# Patient Record
Sex: MALE | Race: WHITE | NOT HISPANIC OR LATINO | Employment: OTHER | URBAN - NONMETROPOLITAN AREA
[De-identification: names, ages, dates, MRNs, and addresses within clinical notes are randomized per-mention and may not be internally consistent; named-entity substitution may affect disease eponyms.]

---

## 2017-10-10 ENCOUNTER — LAB (OUTPATIENT)
Dept: LAB | Facility: HOSPITAL | Age: 62
End: 2017-10-10

## 2017-10-10 ENCOUNTER — OFFICE VISIT (OUTPATIENT)
Dept: FAMILY MEDICINE CLINIC | Facility: CLINIC | Age: 62
End: 2017-10-10

## 2017-10-10 VITALS
HEIGHT: 67 IN | HEART RATE: 72 BPM | DIASTOLIC BLOOD PRESSURE: 88 MMHG | WEIGHT: 180.9 LBS | SYSTOLIC BLOOD PRESSURE: 142 MMHG | BODY MASS INDEX: 28.39 KG/M2 | OXYGEN SATURATION: 98 %

## 2017-10-10 DIAGNOSIS — Z23 NEED FOR TDAP VACCINATION: ICD-10-CM

## 2017-10-10 DIAGNOSIS — M54.31 BILATERAL SCIATICA: Primary | ICD-10-CM

## 2017-10-10 DIAGNOSIS — Z00.00 HEALTH CARE MAINTENANCE: ICD-10-CM

## 2017-10-10 DIAGNOSIS — Z11.59 NEED FOR HEPATITIS B SCREENING TEST: ICD-10-CM

## 2017-10-10 DIAGNOSIS — Z23 FLU VACCINE NEED: ICD-10-CM

## 2017-10-10 DIAGNOSIS — M54.32 BILATERAL SCIATICA: Primary | ICD-10-CM

## 2017-10-10 DIAGNOSIS — I10 ESSENTIAL HYPERTENSION: ICD-10-CM

## 2017-10-10 DIAGNOSIS — Z11.59 NEED FOR HEPATITIS C SCREENING TEST: ICD-10-CM

## 2017-10-10 LAB
ALBUMIN SERPL-MCNC: 5 G/DL (ref 3.4–4.8)
ALBUMIN/GLOB SERPL: 1.7 G/DL (ref 1.1–1.8)
ALP SERPL-CCNC: 56 U/L (ref 38–126)
ALT SERPL W P-5'-P-CCNC: 36 U/L (ref 21–72)
ANION GAP SERPL CALCULATED.3IONS-SCNC: 14 MMOL/L (ref 5–15)
ARTICHOKE IGE QN: 83 MG/DL (ref 1–129)
AST SERPL-CCNC: 31 U/L (ref 17–59)
BILIRUB CONJ SERPL-MCNC: 0 MG/DL (ref 0–0.3)
BILIRUB SERPL-MCNC: 0.8 MG/DL (ref 0.2–1.3)
BUN BLD-MCNC: 11 MG/DL (ref 7–21)
BUN/CREAT SERPL: 12.8 (ref 7–25)
CALCIUM SPEC-SCNC: 10.2 MG/DL (ref 8.4–10.2)
CHLORIDE SERPL-SCNC: 99 MMOL/L (ref 95–110)
CHOLEST SERPL-MCNC: 173 MG/DL (ref 0–199)
CO2 SERPL-SCNC: 28 MMOL/L (ref 22–31)
CREAT BLD-MCNC: 0.86 MG/DL (ref 0.7–1.3)
DEPRECATED RDW RBC AUTO: 43.8 FL (ref 35.1–43.9)
ERYTHROCYTE [DISTWIDTH] IN BLOOD BY AUTOMATED COUNT: 13.2 % (ref 11.5–14.5)
FERRITIN SERPL-MCNC: 215 NG/ML (ref 17.9–464)
GFR SERPL CREATININE-BSD FRML MDRD: 91 ML/MIN/1.73 (ref 49–113)
GGT SERPL-CCNC: 26 U/L (ref 15–73)
GLOBULIN UR ELPH-MCNC: 3 GM/DL (ref 2.3–3.5)
GLUCOSE BLD-MCNC: 103 MG/DL (ref 60–100)
HBA1C MFR BLD: 5 % (ref 4–5.6)
HCT VFR BLD AUTO: 41.5 % (ref 39–49)
HDLC SERPL-MCNC: 66 MG/DL (ref 60–200)
HGB BLD-MCNC: 14.5 G/DL (ref 13.7–17.3)
IRON 24H UR-MRATE: 111 MCG/DL (ref 49–181)
IRON SATN MFR SERPL: 31 % (ref 20–55)
LDLC/HDLC SERPL: 1.37 {RATIO} (ref 0–3.55)
MCH RBC QN AUTO: 30.9 PG (ref 26.5–34)
MCHC RBC AUTO-ENTMCNC: 34.9 G/DL (ref 31.5–36.3)
MCV RBC AUTO: 88.3 FL (ref 80–98)
PLATELET # BLD AUTO: 214 10*3/MM3 (ref 150–450)
PMV BLD AUTO: 10.6 FL (ref 8–12)
POTASSIUM BLD-SCNC: 4.6 MMOL/L (ref 3.5–5.1)
PROT SERPL-MCNC: 8 G/DL (ref 6.3–8.6)
RBC # BLD AUTO: 4.7 10*6/MM3 (ref 4.37–5.74)
SODIUM BLD-SCNC: 141 MMOL/L (ref 137–145)
TIBC SERPL-MCNC: 357 MCG/DL (ref 261–462)
TRIGL SERPL-MCNC: 83 MG/DL (ref 20–199)
WBC NRBC COR # BLD: 6.53 10*3/MM3 (ref 3.2–9.8)

## 2017-10-10 PROCEDURE — 80074 ACUTE HEPATITIS PANEL: CPT | Performed by: STUDENT IN AN ORGANIZED HEALTH CARE EDUCATION/TRAINING PROGRAM

## 2017-10-10 PROCEDURE — 83550 IRON BINDING TEST: CPT | Performed by: STUDENT IN AN ORGANIZED HEALTH CARE EDUCATION/TRAINING PROGRAM

## 2017-10-10 PROCEDURE — 90715 TDAP VACCINE 7 YRS/> IM: CPT | Performed by: STUDENT IN AN ORGANIZED HEALTH CARE EDUCATION/TRAINING PROGRAM

## 2017-10-10 PROCEDURE — 99213 OFFICE O/P EST LOW 20 MIN: CPT | Performed by: STUDENT IN AN ORGANIZED HEALTH CARE EDUCATION/TRAINING PROGRAM

## 2017-10-10 PROCEDURE — 90686 IIV4 VACC NO PRSV 0.5 ML IM: CPT | Performed by: STUDENT IN AN ORGANIZED HEALTH CARE EDUCATION/TRAINING PROGRAM

## 2017-10-10 PROCEDURE — 90472 IMMUNIZATION ADMIN EACH ADD: CPT | Performed by: STUDENT IN AN ORGANIZED HEALTH CARE EDUCATION/TRAINING PROGRAM

## 2017-10-10 PROCEDURE — 85027 COMPLETE CBC AUTOMATED: CPT | Performed by: STUDENT IN AN ORGANIZED HEALTH CARE EDUCATION/TRAINING PROGRAM

## 2017-10-10 PROCEDURE — 82728 ASSAY OF FERRITIN: CPT | Performed by: STUDENT IN AN ORGANIZED HEALTH CARE EDUCATION/TRAINING PROGRAM

## 2017-10-10 PROCEDURE — 90471 IMMUNIZATION ADMIN: CPT | Performed by: STUDENT IN AN ORGANIZED HEALTH CARE EDUCATION/TRAINING PROGRAM

## 2017-10-10 PROCEDURE — 86704 HEP B CORE ANTIBODY TOTAL: CPT | Performed by: STUDENT IN AN ORGANIZED HEALTH CARE EDUCATION/TRAINING PROGRAM

## 2017-10-10 PROCEDURE — 83540 ASSAY OF IRON: CPT | Performed by: STUDENT IN AN ORGANIZED HEALTH CARE EDUCATION/TRAINING PROGRAM

## 2017-10-10 PROCEDURE — 86707 HEPATITIS BE ANTIBODY: CPT | Performed by: STUDENT IN AN ORGANIZED HEALTH CARE EDUCATION/TRAINING PROGRAM

## 2017-10-10 PROCEDURE — 83036 HEMOGLOBIN GLYCOSYLATED A1C: CPT | Performed by: STUDENT IN AN ORGANIZED HEALTH CARE EDUCATION/TRAINING PROGRAM

## 2017-10-10 PROCEDURE — 87350 HEPATITIS BE AG IA: CPT | Performed by: STUDENT IN AN ORGANIZED HEALTH CARE EDUCATION/TRAINING PROGRAM

## 2017-10-10 PROCEDURE — 82977 ASSAY OF GGT: CPT | Performed by: STUDENT IN AN ORGANIZED HEALTH CARE EDUCATION/TRAINING PROGRAM

## 2017-10-10 PROCEDURE — 80061 LIPID PANEL: CPT | Performed by: STUDENT IN AN ORGANIZED HEALTH CARE EDUCATION/TRAINING PROGRAM

## 2017-10-10 PROCEDURE — 80053 COMPREHEN METABOLIC PANEL: CPT | Performed by: STUDENT IN AN ORGANIZED HEALTH CARE EDUCATION/TRAINING PROGRAM

## 2017-10-10 PROCEDURE — 36415 COLL VENOUS BLD VENIPUNCTURE: CPT

## 2017-10-10 PROCEDURE — 86706 HEP B SURFACE ANTIBODY: CPT | Performed by: STUDENT IN AN ORGANIZED HEALTH CARE EDUCATION/TRAINING PROGRAM

## 2017-10-10 PROCEDURE — 82248 BILIRUBIN DIRECT: CPT | Performed by: STUDENT IN AN ORGANIZED HEALTH CARE EDUCATION/TRAINING PROGRAM

## 2017-10-10 RX ORDER — MELOXICAM 7.5 MG/1
7.5 TABLET ORAL DAILY
Status: DISCONTINUED | OUTPATIENT
Start: 2017-10-10 | End: 2017-10-12

## 2017-10-10 RX ORDER — LISINOPRIL 10 MG/1
10 TABLET ORAL DAILY
Qty: 30 TABLET | Refills: 5 | Status: SHIPPED | OUTPATIENT
Start: 2017-10-10 | End: 2018-01-04 | Stop reason: SDUPTHER

## 2017-10-10 RX ORDER — TRIAMCINOLONE ACETONIDE 40 MG/ML
80 INJECTION, SUSPENSION INTRA-ARTICULAR; INTRAMUSCULAR ONCE
Status: DISCONTINUED | OUTPATIENT
Start: 2017-10-10 | End: 2017-10-10

## 2017-10-11 ENCOUNTER — TELEPHONE (OUTPATIENT)
Dept: FAMILY MEDICINE CLINIC | Facility: CLINIC | Age: 62
End: 2017-10-11

## 2017-10-11 NOTE — PROGRESS NOTES
CHIEF COMPLAINT:     Chief Complaint   Patient presents with   • Establish Care         Subjective:     62 y.o. male who presents to establish care and for bilateral leg pain.  Patient has not seen a physician in approximately 2.5 years due to losing his insurance previously.      Leg Pain -- patient has bilateral leg pain that originates in the lower back area and radiates down both legs to the ankles.  The pain is constant and dull mostly with intermittent episodes of throbbing on both sides.  Previous imaging shows joint space narrowing and osteophyte production in the lumbar spine, hips, & knees bilaterally.  Patient had previously been following with pain management where he received Norco as well as injections for his pain, but was unable to continue when he lost his insurance.  Patient would like to be referred to pain management to re-establish now that he has insurance.    HTN -- Patient had previous diagnosis of essential hypertension and was previously on medication, but cannot remember what medicine.  He has not had any medications for HTN in the last 2.5 years.  BP in office today was 142/88.      Preventive Care -- Patient is due for Tdap & flu shot.  He is also unsure of his hepatitis vaccination status.  He had a colonoscopy 3 years ago and was told to return for another in 10 years.  He has previously received prevnar as well as zostavax.       Preventative:  Over the past 2 weeks, have you felt down, depressed, or hopeless?Yes   Over the past 2 weeks, have you felt little interest or pleasure in doing things?Yes  Clinical depression screening refused by patient.No     PHQ-9 Depression Screening  Little interest or pleasure in doing things? 1   Feeling down, depressed, or hopeless? 1   Trouble falling or staying asleep, or sleeping too much? 1   Feeling tired or having little energy? 0   Poor appetite or overeating? 0   Feeling bad about yourself - or that you are a failure or have let yourself or  your family down? 1   Trouble concentrating on things, such as reading the newspaper or watching television? 0   Moving or speaking so slowly that other people could have noticed? Or the opposite - being so fidgety or restless that you have been moving around a lot more than usual? 0   Thoughts that you would be better off dead, or of hurting yourself in some way? 0   PHQ-9 Total Score 4   If you checked off any problems, how difficult have these problems made it for you to do your work, take care of things at home, or get along with other people? Not difficult at all       Health Maintenance:  Health Maintenance   Topic Date Due   • TDAP/TD VACCINES (1 - Tdap) 06/22/1976   • MEDICARE ANNUAL WELLNESS  10/10/2017   • COLONOSCOPY  10/10/2024   • HEPATITIS C SCREENING  Completed   • INFLUENZA VACCINE  Completed   • ZOSTER VACCINE  Completed       Past Medical Hx:  Past Medical History:   Diagnosis Date   • Asthma     Previously diagnosed and previously prescribed inhaler medications.     • Essential hypertension    • Gastroesophageal reflux disease        Past Surgical Hx:  Past Surgical History:   Procedure Laterality Date   • BONE GRAFT     • WRIST FRACTURE SURGERY Bilateral        Current & Past Meds:    Current Outpatient Prescriptions:   •  lisinopril (PRINIVIL,ZESTRIL) 10 MG tablet, Take 1 tablet by mouth Daily., Disp: 30 tablet, Rfl: 5    Current Facility-Administered Medications:   •  meloxicam (MOBIC) tablet 7.5 mg, 7.5 mg, Oral, Daily, Pelon Stewart Jr., MD    Medications Discontinued During This Encounter   Medication Reason   • triamcinolone acetonide (KENALOG-40) injection 80 mg        Allergies:  Review of patient's allergies indicates no known allergies.    Family Hx:  Family History   Problem Relation Age of Onset   • COPD Mother    • Hypertension Mother    • Diabetes type II Mother    • Cancer Father    • Heart failure Father         Social History:  Social History     Social History   • Marital  status:      Spouse name: N/A   • Number of children: N/A   • Years of education: N/A     Occupational History   • Disability      Social History Main Topics   • Smoking status: Former Smoker     Years: 25.00     Types: Pipe     Start date: 10/10/1972     Quit date: 10/10/1997   • Smokeless tobacco: Current User     Types: Chew      Comment: Approximately 3 minutes spent on risks of smokeless tobacco including increased risk of mouth and bladder cancers   • Alcohol use 1.2 oz/week     2 Cans of beer per week      Comment: Prior heavy drinker   • Drug use: No   • Sexual activity: Yes     Partners: Female     Other Topics Concern   • Not on file     Social History Narrative    Patient lives in [place] with his wife.  Transportation is an issue for the couple.        reports that he drinks about 1.2 oz of alcohol per week    reports that he does not use illicit drugs.      Review of Systems  General:negative for - chills, fatigue, fever, hot flashes, malaise, night sweats, weight gain or weight loss  Psychological: negative for - anxiety, depression, sleep disturbances or suicidal ideation  Ophthalmic: negative for - blurry vision or loss of vision  ENT: negative for - hearing change, nasal congestion or sore throat  Hematological and Lymphatic: negative for - jaundice  Endocrine: negative for - hair pattern changes, skin changes or temperature intolerance  Respiratory: no cough, shortness of breath, or wheezing  Cardiovascular: no chest pain, edema or dyspnea on exertion  Gastrointestinal: no  Nausea/vomiting, abdominal pain, change in bowel habits, or black or bloody stools  Genito-Urinary: no dysuria, trouble voiding, or hematuria  Musculoskeletal: positive for knee, hip, & lumbar pain bilaterally.  Positive for right shoulder pain.  Neurological: negative for - dizziness, headaches, numbness/tingling or seizures  Dermatological: negative for rash and skin lesion changes        Objective:     /88   "Pulse 72  Ht 67\" (170.2 cm)  Wt 180 lb 14.4 oz (82.1 kg)  SpO2 98%  BMI 28.33 kg/m2    Physical Exam   General Appearance:    Alert, cooperative, no distress, appears stated age   Head:    Normocephalic, without obvious abnormality, atraumatic   Eyes:    PERRL, conjunctiva/corneas clear, EOM's intact   Ears:    Normal TM's and external ear canals, both ears   Nose:   Nares normal, septum midline, mucosa normal, no drainage     or sinus tenderness   Throat:   Lips, mucosa, and tongue normal; teeth and gums normal   Neck:   Supple, symmetrical, trachea midline, no adenopathy   Back:     Symmetric, no curvature, ROM normal   Lungs:     Clear to auscultation bilaterally, respirations unlabored   Chest Wall:    No tenderness or deformity    Heart:    Regular rate and rhythm, S1 and S2 normal, no murmur, rub    or gallop   Abdomen:     Soft, non-tender, bowel sounds active all four quadrants,     no masses, no organomegaly   Extremities:   Decreased ROM in both lower extremities on hip & knee flexion bilaterally.  Positive straight leg raise test bilaterally. No crepitus or warmth at joints.    Pulses:   2+ and symmetric all extremities   Skin:   Skin color, texture, turgor normal, no rashes or lesions   Lymph nodes:   Cervical, supraclavicular nodes normal   Neurologic:   CNII-XII grossly intact           Assessment/Plan:     1. Bilateral sciatica    2. Essential hypertension    3. Flu vaccine need    4. Need for Tdap vaccination    5. Health care maintenance    6. Need for hepatitis B screening test    7. Need for hepatitis C screening test         Diagnoses & Orders (This Visit):  Jian was seen today for establish care.    Diagnoses and all orders for this visit:    Bilateral sciatica  -     Discontinue: triamcinolone acetonide (KENALOG-40) injection 80 mg; Inject 2 mL into the shoulder, thigh, or buttocks 1 (One) Time.  -     Cancel: XR Knee 1 or 2 View Bilateral; Future  -     XR Spine Lumbar 2 or 3 View; " Future  -     XR Pelvis 1 or 2 View; Future  -     meloxicam (MOBIC) tablet 7.5 mg; Take 1 tablet by mouth Daily.  -     Ambulatory Referral to Pain Management  -     XR knee 4+ vw bilateral; Future    Essential hypertension  -     lisinopril (PRINIVIL,ZESTRIL) 10 MG tablet; Take 1 tablet by mouth Daily.    Flu vaccine need  -     Flu Vaccine Quad PF 3YR+    Need for Tdap vaccination  -     Tdap Vaccine Greater Than or Equal To 8yo IM    Health care maintenance  -     Lipid Panel  -     Hemoglobin A1c  -     Comprehensive Metabolic Panel  -     CBC (No Diff)    Need for hepatitis B screening test  -     Comprehensive metabolic panel; Future  -     Bilirubin, direct; Future  -     Gamma GT; Future  -     Hepatitis B and C Profile; Future  -     Iron; Future  -     Ferritin; Future  -     Iron and TIBC; Future    Need for hepatitis C screening test  -     Comprehensive metabolic panel; Future  -     Bilirubin, direct; Future  -     Gamma GT; Future  -     Hepatitis B and C Profile; Future  -     Iron; Future  -     Ferritin; Future  -     Iron and TIBC; Future            Follow-up:     Return in about 3 months (around 1/10/2018).      GOALS:   Goals     PAIN CONTROL            Preventative:  Health Maintenance   Topic Date Due   • TDAP/TD VACCINES (1 - Tdap) 06/22/1976   • COLONOSCOPY  10/10/2024   • INFLUENZA VACCINE  Completed   • ZOSTER VACCINE  Completed         Pelon Stewart Jr., M.D.  PGY1  Family Practice Resident  [address] [place] [address]  Phone: [phone]  Fax: [phone]        RISK SCORE: 3     Pelon Stewart Jr., M.D.  PGY1  Family Practice Resident  [address] [place] [address]  Phone: [phone]  Fax: [phone]      This document has been electronically signed by Pelon Stewart Jr, MD on October 10, 2017 7:21 PM

## 2017-10-11 NOTE — PROGRESS NOTES
I have reviewed the notes, assessments, and/or procedures performed. I concur with her/his documentation of Jian Baker.     Yuan Harper, DO

## 2017-10-12 DIAGNOSIS — M17.0 PRIMARY OSTEOARTHRITIS OF BOTH KNEES: Primary | ICD-10-CM

## 2017-10-12 LAB
HBV CORE AB SER DONR QL IA: NEGATIVE
HBV CORE IGM SERPL QL IA: NEGATIVE
HBV E AB SERPL QL IA: NEGATIVE
HBV E AG SERPL QL IA: NEGATIVE
HBV SURFACE AB SER QL: NON REACTIVE
HBV SURFACE AG SERPL QL IA: NEGATIVE
HCV AB S/CO SERPL IA: <0.1 S/CO RATIO (ref 0–0.9)
LABORATORY COMMENT REPORT: NORMAL

## 2017-10-12 RX ORDER — MELOXICAM 7.5 MG/1
7.5 TABLET ORAL DAILY
Qty: 30 TABLET | Refills: 5 | Status: SHIPPED | OUTPATIENT
Start: 2017-10-12 | End: 2018-01-04

## 2018-01-04 ENCOUNTER — OFFICE VISIT (OUTPATIENT)
Dept: FAMILY MEDICINE CLINIC | Facility: CLINIC | Age: 63
End: 2018-01-04

## 2018-01-04 VITALS
WEIGHT: 179.8 LBS | HEART RATE: 92 BPM | HEIGHT: 67 IN | BODY MASS INDEX: 28.22 KG/M2 | DIASTOLIC BLOOD PRESSURE: 90 MMHG | SYSTOLIC BLOOD PRESSURE: 140 MMHG | OXYGEN SATURATION: 98 %

## 2018-01-04 DIAGNOSIS — I10 ESSENTIAL HYPERTENSION: ICD-10-CM

## 2018-01-04 DIAGNOSIS — M17.0 PRIMARY OSTEOARTHRITIS OF BOTH KNEES: ICD-10-CM

## 2018-01-04 DIAGNOSIS — M54.32 BILATERAL SCIATICA: Primary | ICD-10-CM

## 2018-01-04 DIAGNOSIS — M54.31 BILATERAL SCIATICA: Primary | ICD-10-CM

## 2018-01-04 PROCEDURE — 99213 OFFICE O/P EST LOW 20 MIN: CPT | Performed by: STUDENT IN AN ORGANIZED HEALTH CARE EDUCATION/TRAINING PROGRAM

## 2018-01-04 PROCEDURE — 96372 THER/PROPH/DIAG INJ SC/IM: CPT | Performed by: FAMILY MEDICINE

## 2018-01-04 RX ORDER — LISINOPRIL 10 MG/1
10 TABLET ORAL DAILY
Qty: 30 TABLET | Refills: 2 | Status: SHIPPED | OUTPATIENT
Start: 2018-01-04 | End: 2018-04-02 | Stop reason: SDUPTHER

## 2018-01-04 RX ORDER — TRIAMCINOLONE ACETONIDE 40 MG/ML
80 INJECTION, SUSPENSION INTRA-ARTICULAR; INTRAMUSCULAR ONCE
Status: COMPLETED | OUTPATIENT
Start: 2018-01-04 | End: 2018-01-04

## 2018-01-04 RX ORDER — DICLOFENAC SODIUM 75 MG/1
75 TABLET, DELAYED RELEASE ORAL 2 TIMES DAILY
Qty: 60 TABLET | Refills: 2 | Status: SHIPPED | OUTPATIENT
Start: 2018-01-04 | End: 2018-04-02

## 2018-01-04 RX ORDER — LISINOPRIL 2.5 MG/1
12.5 TABLET ORAL DAILY
Qty: 150 TABLET | Refills: 5 | Status: SHIPPED | OUTPATIENT
Start: 2018-01-04 | End: 2018-01-04 | Stop reason: SDUPTHER

## 2018-01-04 RX ORDER — HYDROCHLOROTHIAZIDE 12.5 MG/1
12.5 CAPSULE, GELATIN COATED ORAL DAILY
Qty: 30 CAPSULE | Refills: 2 | Status: SHIPPED | OUTPATIENT
Start: 2018-01-04 | End: 2018-04-02 | Stop reason: SDDI

## 2018-01-04 RX ADMIN — TRIAMCINOLONE ACETONIDE 80 MG: 40 INJECTION, SUSPENSION INTRA-ARTICULAR; INTRAMUSCULAR at 10:04

## 2018-01-04 NOTE — PROGRESS NOTES
Chief Complaint   Patient presents with   • Hypertension   • Leg Pain     Subjective:     Jian Baker is a 62 y.o. male who presents for follow up of HTN.    New concerns: No new concerns.     Evaluation:   Blood pressure reading not indicated for medication reasons: Yes   Blood pressure reading refused by patient: No   Home blood pressure readings: Does not check at home   Blood pressure often elevated in physician office: No  Symptoms:   Headache: no   Visual disturbance: no   Fatigue: no   Dyspnea: no   Orthopnea: no   Edema: no   Chest pain: no   Palpitations: no   Diaphoresis: no    Risk Factors:   None    Comorbid Conditions:   Former smoker    Patient was also seen for his bilateral sciactica and osteoarthritis of both knees.  He does not believe the mobiq he is using has worked well.  We will try diclofenac now and also a triamcinolone shot today.  Also, patient had some confusion with getting into a pain clinic last time with a referral made to [facility].  Patient would prefer to go to [facility] which is much closer to him.      Pain continues to be a 5/10 constantly and patient has trouble rising from a chair due to back pain.  It does not improve or worsen during the day.      The following portions of the patient's history were reviewed and updated as appropriate: allergies, current medications, past family history, past medical history, past social history, past surgical history and problem list.    Preventative:  Over the past 2 weeks, have you felt down, depressed, or hopeless?No   Over the past 2 weeks, have you felt little interest or pleasure in doing things?No  Clinical depression screening refused by patient.No     On osteoporosis therapy?Not Indicated     After patient had left our office it was confirmed that bugs which had been seen on patients head and under winter cap on physical exam were bed bugs.  He has been called at his main # which is his cell phone # [phone]  and a message was left.  If patient does not return call to office by early next week will try him again.  Will also send letter to his primary residence with patient information literature on bed bugs & how to manage infestations in the home. UpToDate -- Bed Bug Overview for Patients     Past Medical Hx:  Past Medical History:   Diagnosis Date   • Asthma     Previously diagnosed and previously prescribed inhaler medications.     • Essential hypertension    • Gastroesophageal reflux disease        Past Surgical Hx:  Past Surgical History:   Procedure Laterality Date   • BONE GRAFT     • WRIST FRACTURE SURGERY Bilateral        Health Maintenance:  Health Maintenance   Topic Date Due   • MEDICARE ANNUAL WELLNESS  10/10/2017   • COLONOSCOPY  10/10/2024   • TDAP/TD VACCINES (2 - Td) 10/10/2027   • HEPATITIS C SCREENING  Completed   • INFLUENZA VACCINE  Completed   • ZOSTER VACCINE  Completed       Current Meds:    Current Outpatient Prescriptions:   •  lisinopril (PRINIVIL,ZESTRIL) 10 MG tablet, Take 1 tablet by mouth Daily., Disp: 30 tablet, Rfl: 2  •  diclofenac (VOLTAREN) 75 MG EC tablet, Take 1 tablet by mouth 2 (Two) Times a Day., Disp: 60 tablet, Rfl: 2  •  hydrochlorothiazide (MICROZIDE) 12.5 MG capsule, Take 1 capsule by mouth Daily., Disp: 30 capsule, Rfl: 2  No current facility-administered medications for this visit.     Allergies:  Review of patient's allergies indicates no known allergies.    Family Hx:  Family History   Problem Relation Age of Onset   • COPD Mother    • Hypertension Mother    • Diabetes type II Mother    • Cancer Father    • Heart failure Father         Social History:  Social History     Social History   • Marital status:      Spouse name: N/A   • Number of children: N/A   • Years of education: N/A     Occupational History   • Disability      Social History Main Topics   • Smoking status: Former Smoker     Years: 25.00     Types: Pipe     Start date: 10/10/1972     Quit date:  "10/10/1997   • Smokeless tobacco: Current User     Types: Chew      Comment: Approximately 3 minutes spent on risks of smokeless tobacco including increased risk of mouth and bladder cancers   • Alcohol use 1.2 oz/week     2 Cans of beer per week      Comment: Prior heavy drinker   • Drug use: No   • Sexual activity: Yes     Partners: Female     Other Topics Concern   • Not on file     Social History Narrative    Patient lives in [place] with his wife.  Transportation is an issue for the couple.       Review of Systems  Review of Systems  Hypertension ROS: taking medications as instructed, no medication side effects noted, no TIA's, no chest pain on exertion, no dyspnea on exertion and no swelling of ankles.    Objective:     /90 (BP Location: Left arm, Patient Position: Sitting)  Pulse 92  Ht 170.2 cm (67\")  Wt 81.6 kg (179 lb 12.8 oz)  SpO2 98%  BMI 28.16 kg/m2    General:  alert, appears stated age and cooperative   Oropharynx: Poor dentition    Eyes:  conjunctivae/corneas clear. PERRL, EOM's intact. Fundi benign.    Ears:  normal TM's and external ear canals both ears   Neck: no adenopathy, no carotid bruit, no JVD, supple, symmetrical, trachea midline and thyroid not enlarged, symmetric, no tenderness/mass/nodules   Thyroid:  no palpable nodule   Lung: clear to auscultation bilaterally   Heart:  regular rate and rhythm, S1, S2 normal, no murmur, click, rub or gallop   Abdomen: soft, non-tender; bowel sounds normal; no masses,  no organomegaly   Extremities: extremities normal, atraumatic, no cyanosis or edema   Skin: warm and dry, no hyperpigmentation, vitiligo, or suspicious lesions   Pulses: 2+ and symmetric   Neuro: normal without focal findings, mental status, speech normal, alert and oriented x3 and reflexes normal and symmetric       Lab Review    none     Assessment:     Jian was seen today for hypertension and leg pain.    Diagnoses and all orders for this visit:    Bilateral sciatica  -  "    triamcinolone acetonide (KENALOG-40) injection 80 mg; Inject 2 mL into the shoulder, thigh, or buttocks 1 (One) Time.  -     Ambulatory Referral to Pain Management    Essential hypertension  -     Discontinue: lisinopril (PRINIVIL,ZESTRIL) 2.5 MG tablet; Take 5 tablets by mouth Daily.  -     lisinopril (PRINIVIL,ZESTRIL) 10 MG tablet; Take 1 tablet by mouth Daily.    Primary osteoarthritis of both knees  -     Ambulatory Referral to Pain Management    Other orders  -     hydrochlorothiazide (MICROZIDE) 12.5 MG capsule; Take 1 capsule by mouth Daily.  -     diclofenac (VOLTAREN) 75 MG EC tablet; Take 1 tablet by mouth 2 (Two) Times a Day.        Plan:   1.  Rx changes: HCTZ 12.5 mg added to his current lisinoprio  2.  Education:    EKG ordered: no    Findings: N/A   Presented an overview of HTN, expected course, considerations, risk factors, and exacerbation prevention.   Discussed treatment options for HTN: yes   Recommended restricted dietary Na intake: yes   Recommended increased in dietary K intake: yes   Discussed patient action plan for HTN: yes  3.  Compliance at present is estimated to be inadequate. Efforts to improve compliance (if necessary) will be directed at Taking medications every day.    Return in about 3 months (around 4/4/2018) for Recheck.    Goals     • Medication management            Patient has been having trouble with medication compliance.  Goal now is to take medications everyday at the same time.    Barriers: patient has not been on medications consistently for sometime.  Has trouble with remembering to take medications.              Preventative:  Patient is currently up-to-date on preventative maintenance items.     RISK SCORE: 4     Pelon Stewart Jr., M.D.  PGY1  Family Practice Resident  [address] [place] [address]  Phone: [phone]  Fax: [phone]      This document has been electronically signed by Pelon Stewart Jr, MD on January 4, 2018 10:50 AM

## 2018-01-04 NOTE — PROGRESS NOTES
Subjective:     Jian Baker is a 62 y.o. male who presents for follow up of hypertension.  His blood pressure is marginally controlled. He is not taking medication regularly.  He also has bilateral sciatica. He is requesting pain management referral in [place], [place].     The following portions of the patient's history were reviewed and updated as appropriate: allergies, current medications, past family history, past medical history, past social history, past surgical history and problem list.      Past Medical Hx:  Past Medical History:   Diagnosis Date   • Asthma     Previously diagnosed and previously prescribed inhaler medications.     • Essential hypertension    • Gastroesophageal reflux disease        Past Surgical Hx:  Past Surgical History:   Procedure Laterality Date   • BONE GRAFT     • WRIST FRACTURE SURGERY Bilateral        Health Maintenance:  Health Maintenance   Topic Date Due   • MEDICARE ANNUAL WELLNESS  10/10/2017   • COLONOSCOPY  10/10/2024   • TDAP/TD VACCINES (2 - Td) 10/10/2027   • HEPATITIS C SCREENING  Completed   • INFLUENZA VACCINE  Completed   • ZOSTER VACCINE  Completed       Current Meds:    Current Outpatient Prescriptions:   •  lisinopril (PRINIVIL,ZESTRIL) 10 MG tablet, Take 1 tablet by mouth Daily., Disp: 30 tablet, Rfl: 2  •  diclofenac (VOLTAREN) 75 MG EC tablet, Take 1 tablet by mouth 2 (Two) Times a Day., Disp: 60 tablet, Rfl: 2  •  hydrochlorothiazide (MICROZIDE) 12.5 MG capsule, Take 1 capsule by mouth Daily., Disp: 30 capsule, Rfl: 2    Allergies:  Review of patient's allergies indicates no known allergies.    Family Hx:  Family History   Problem Relation Age of Onset   • COPD Mother    • Hypertension Mother    • Diabetes type II Mother    • Cancer Father    • Heart failure Father         Social History:  Social History     Social History   • Marital status:      Spouse name: N/A   • Number of children: N/A   • Years of education: N/A     Occupational History   •  "Disability      Social History Main Topics   • Smoking status: Former Smoker     Years: 25.00     Types: Pipe     Start date: 10/10/1972     Quit date: 10/10/1997   • Smokeless tobacco: Current User     Types: Chew      Comment: Approximately 3 minutes spent on risks of smokeless tobacco including increased risk of mouth and bladder cancers   • Alcohol use 1.2 oz/week     2 Cans of beer per week      Comment: Prior heavy drinker   • Drug use: No   • Sexual activity: Yes     Partners: Female     Other Topics Concern   • Not on file     Social History Narrative    Patient lives in [place] with his wife.  Transportation is an issue for the couple.       Review of Systems  Review of Systems   Constitutional: Negative for activity change, appetite change, fatigue and fever.   HENT: Negative for ear pain and sore throat.    Eyes: Negative for pain and visual disturbance.   Respiratory: Negative for cough and shortness of breath.    Cardiovascular: Negative for chest pain and palpitations.   Gastrointestinal: Negative for abdominal pain and nausea.   Endocrine: Negative for cold intolerance and heat intolerance.   Genitourinary: Negative for difficulty urinating and dysuria.   Musculoskeletal: Positive for arthralgias and back pain. Negative for gait problem.   Skin: Negative for color change and rash.   Neurological: Negative for dizziness, weakness and headaches.   Hematological: Negative for adenopathy. Does not bruise/bleed easily.   Psychiatric/Behavioral: Negative for agitation, confusion and sleep disturbance.       Objective:     /90 (BP Location: Left arm, Patient Position: Sitting)  Pulse 92  Ht 170.2 cm (67\")  Wt 81.6 kg (179 lb 12.8 oz)  SpO2 98%  BMI 28.16 kg/m2  Physical Exam   Constitutional: He is oriented to person, place, and time. He appears well-developed and well-nourished.   HENT:   Head: Normocephalic and atraumatic.   Right Ear: External ear normal.   Left Ear: External ear normal. "   Nose: Nose normal.   Mouth/Throat: Oropharynx is clear and moist. Abnormal dentition.   Eyes: Conjunctivae and EOM are normal. Pupils are equal, round, and reactive to light.   Neck: Normal range of motion.   Cardiovascular: Normal rate, regular rhythm and normal heart sounds.    Pulmonary/Chest: Effort normal and breath sounds normal.   Abdominal: Soft. Bowel sounds are normal.   Musculoskeletal: Normal range of motion.   Neurological: He is alert and oriented to person, place, and time.   Skin: Skin is warm and dry.   Psychiatric: He has a normal mood and affect. His behavior is normal.       Lab Review  Results for orders placed or performed in visit on 10/10/17   Lipid Panel   Result Value Ref Range    Total Cholesterol 173 0 - 199 mg/dL    Triglycerides 83 20 - 199 mg/dL    HDL Cholesterol 66 60 - 200 mg/dL    LDL Cholesterol  83 1 - 129 mg/dL    LDL/HDL Ratio 1.37 0.00 - 3.55   Hemoglobin A1c   Result Value Ref Range    Hemoglobin A1C 5.0 4 - 5.6 %   Comprehensive Metabolic Panel   Result Value Ref Range    Glucose 103 (H) 60 - 100 mg/dL    BUN 11 7 - 21 mg/dL    Creatinine 0.86 0.70 - 1.30 mg/dL    Sodium 141 137 - 145 mmol/L    Potassium 4.6 3.5 - 5.1 mmol/L    Chloride 99 95 - 110 mmol/L    CO2 28.0 22.0 - 31.0 mmol/L    Calcium 10.2 8.4 - 10.2 mg/dL    Total Protein 8.0 6.3 - 8.6 g/dL    Albumin 5.00 (H) 3.40 - 4.80 g/dL    ALT (SGPT) 36 21 - 72 U/L    AST (SGOT) 31 17 - 59 U/L    Alkaline Phosphatase 56 38 - 126 U/L    Total Bilirubin 0.8 0.2 - 1.3 mg/dL    eGFR Non  Amer 91 49 - 113 mL/min/1.73    Globulin 3.0 2.3 - 3.5 gm/dL    A/G Ratio 1.7 1.1 - 1.8 g/dL    BUN/Creatinine Ratio 12.8 7.0 - 25.0    Anion Gap 14.0 5.0 - 15.0 mmol/L   CBC (No Diff)   Result Value Ref Range    WBC 6.53 3.20 - 9.80 10*3/mm3    RBC 4.70 4.37 - 5.74 10*6/mm3    Hemoglobin 14.5 13.7 - 17.3 g/dL    Hematocrit 41.5 39.0 - 49.0 %    MCV 88.3 80.0 - 98.0 fL    MCH 30.9 26.5 - 34.0 pg    MCHC 34.9 31.5 - 36.3 g/dL    RDW  13.2 11.5 - 14.5 %    RDW-SD 43.8 35.1 - 43.9 fl    MPV 10.6 8.0 - 12.0 fL    Platelets 214 150 - 450 10*3/mm3            Assessment:     Jian was seen today for hypertension and leg pain.    Diagnoses and all orders for this visit:    Bilateral sciatica  -     triamcinolone acetonide (KENALOG-40) injection 80 mg; Inject 2 mL into the shoulder, thigh, or buttocks 1 (One) Time.  -     Ambulatory Referral to Pain Management    Essential hypertension  -     Discontinue: lisinopril (PRINIVIL,ZESTRIL) 2.5 MG tablet; Take 5 tablets by mouth Daily.  -     lisinopril (PRINIVIL,ZESTRIL) 10 MG tablet; Take 1 tablet by mouth Daily.    Primary osteoarthritis of both knees  -     Ambulatory Referral to Pain Management    Other orders  -     hydrochlorothiazide (MICROZIDE) 12.5 MG capsule; Take 1 capsule by mouth Daily.  -     diclofenac (VOLTAREN) 75 MG EC tablet; Take 1 tablet by mouth 2 (Two) Times a Day.      Plan:     I have seen and examined the patient.  I have reviewed the notes, assessments, and/or procedures performed by Dr. Stewart, I concur with her/his documentation and assessment and plan for Jian Baker.        This document has been electronically signed by Ada Nguyen MD on January 24, 2018 7:53 AM

## 2018-04-02 ENCOUNTER — OFFICE VISIT (OUTPATIENT)
Dept: FAMILY MEDICINE CLINIC | Facility: CLINIC | Age: 63
End: 2018-04-02

## 2018-04-02 ENCOUNTER — TELEPHONE (OUTPATIENT)
Dept: FAMILY MEDICINE CLINIC | Facility: CLINIC | Age: 63
End: 2018-04-02

## 2018-04-02 VITALS
HEART RATE: 111 BPM | DIASTOLIC BLOOD PRESSURE: 84 MMHG | BODY MASS INDEX: 28.74 KG/M2 | HEIGHT: 67 IN | WEIGHT: 183.1 LBS | OXYGEN SATURATION: 97 % | SYSTOLIC BLOOD PRESSURE: 150 MMHG

## 2018-04-02 DIAGNOSIS — I10 ESSENTIAL HYPERTENSION: Primary | ICD-10-CM

## 2018-04-02 DIAGNOSIS — J30.9 ALLERGIC RHINITIS, UNSPECIFIED CHRONICITY, UNSPECIFIED SEASONALITY, UNSPECIFIED TRIGGER: ICD-10-CM

## 2018-04-02 DIAGNOSIS — M17.0 PRIMARY OSTEOARTHRITIS OF BOTH KNEES: ICD-10-CM

## 2018-04-02 PROCEDURE — 99213 OFFICE O/P EST LOW 20 MIN: CPT | Performed by: STUDENT IN AN ORGANIZED HEALTH CARE EDUCATION/TRAINING PROGRAM

## 2018-04-02 RX ORDER — ATORVASTATIN CALCIUM 20 MG/1
20 TABLET, FILM COATED ORAL DAILY
Qty: 30 TABLET | Refills: 5 | Status: SHIPPED | OUTPATIENT
Start: 2018-04-02 | End: 2018-09-06 | Stop reason: SDUPTHER

## 2018-04-02 RX ORDER — LISINOPRIL 10 MG/1
20 TABLET ORAL DAILY
Qty: 30 TABLET | Refills: 5 | Status: SHIPPED | OUTPATIENT
Start: 2018-04-02 | End: 2018-07-05 | Stop reason: SDUPTHER

## 2018-04-02 RX ORDER — MELOXICAM 7.5 MG/1
15 TABLET ORAL DAILY
Qty: 30 TABLET | Refills: 5 | Status: SHIPPED | OUTPATIENT
Start: 2018-04-02 | End: 2018-09-06 | Stop reason: SDUPTHER

## 2018-04-02 RX ORDER — MELOXICAM 7.5 MG/1
TABLET ORAL
COMMUNITY
Start: 2018-02-07 | End: 2018-04-02 | Stop reason: SDUPTHER

## 2018-04-02 RX ORDER — FLUTICASONE PROPIONATE 50 MCG
2 SPRAY, SUSPENSION (ML) NASAL DAILY
Qty: 1 BOTTLE | Refills: 10 | Status: SHIPPED | OUTPATIENT
Start: 2018-04-02 | End: 2019-11-18 | Stop reason: SDUPTHER

## 2018-04-02 NOTE — TELEPHONE ENCOUNTER
[facility] CALLED TO CLARIFY SCRIPT FOR MELOXICAM AND LISINOPRIL. PLEASE CALL PHARMACY TO CLARIFY.

## 2018-04-02 NOTE — PROGRESS NOTES
Hypertension (f/u) and Osteoarthritis (f/u)      Subjective:     Jian Baker is a 62 y.o. male who presents for follow up for hypertension & osteoarthritis.  Patient states that his osteoarthritis has been doing somewhat better on his Mobic and patient would like to continue with this therapy.      HTN -- Patient has been prescribed lisinopril & hctz but states that he has only been taking the lisinopril.  His BP is elevated in clinic today.  Discussed this with patient and will increase his lisinopril to 20 today as monotherapy.  If future BP elevations occur will consider adding hctz as combo with lisinopril so the patient is only taking one pill for his BP per day.     Allergic Rhinitis -- patient complains of sinus drainage and post nasal drip that is causing him to dry heave.  Endorses associated headaches, but no fevers.  Also endorses some cough productive of clear sputum.  Discussed Flonase with patient and he is amenable to trying.     Preventative:  Over the past 2 weeks, have you felt down, depressed, or hopeless?No   Over the past 2 weeks, have you felt little interest or pleasure in doing things?No  Clinical depression screening refused by patient.No     On osteoporosis therapy?Not Indicated     Past Medical Hx:  Past Medical History:   Diagnosis Date   • Asthma     Previously diagnosed and previously prescribed inhaler medications.     • Essential hypertension    • Gastroesophageal reflux disease        Past Surgical Hx:  Past Surgical History:   Procedure Laterality Date   • BONE GRAFT     • WRIST FRACTURE SURGERY Bilateral        Health Maintenance:  Health Maintenance   Topic Date Due   • MEDICARE ANNUAL WELLNESS  10/10/2017   • COLONOSCOPY  10/10/2024   • TDAP/TD VACCINES (2 - Td) 10/10/2027   • HEPATITIS C SCREENING  Completed   • INFLUENZA VACCINE  Completed   • ZOSTER VACCINE  Completed       Current Meds:    Current Outpatient Prescriptions:   •  lisinopril (PRINIVIL,ZESTRIL) 10 MG tablet,  Take 2 tablets by mouth Daily., Disp: 30 tablet, Rfl: 5  •  meloxicam (MOBIC) 7.5 MG tablet, Take 2 tablets by mouth Daily., Disp: 30 tablet, Rfl: 5  •  atorvastatin (LIPITOR) 20 MG tablet, Take 1 tablet by mouth Daily., Disp: 30 tablet, Rfl: 5  •  fluticasone (FLONASE) 50 MCG/ACT nasal spray, 2 sprays into each nostril Daily., Disp: 1 bottle, Rfl: 10    Allergies:  Review of patient's allergies indicates no known allergies.    Family Hx:  Family History   Problem Relation Age of Onset   • COPD Mother    • Hypertension Mother    • Diabetes type II Mother    • Cancer Father    • Heart failure Father         Social History:  Social History     Social History   • Marital status:      Spouse name: N/A   • Number of children: N/A   • Years of education: N/A     Occupational History   • Disability      Social History Main Topics   • Smoking status: Former Smoker     Years: 25.00     Types: Pipe     Start date: 10/10/1972     Quit date: 10/10/1997   • Smokeless tobacco: Current User     Types: Chew      Comment: Approximately 3 minutes spent on risks of smokeless tobacco including increased risk of mouth and bladder cancers   • Alcohol use 1.2 oz/week     2 Cans of beer per week      Comment: Prior heavy drinker   • Drug use: No   • Sexual activity: Yes     Partners: Female     Other Topics Concern   • Not on file     Social History Narrative    Patient lives in [place] with his wife.  Transportation is an issue for the couple.       Review of Systems  General:negative for - chills, fatigue, fever, hot flashes, malaise, night sweats, weight gain or weight loss  Psychological: negative for - anxiety, depression, sleep disturbances or suicidal ideation  Ophthalmic: negative for - blurry vision or loss of vision  ENT: negative for - hearing change, nasal congestion or sore throat  Hematological and Lymphatic: negative for - jaundice  Endocrine: negative for - hair pattern changes, skin changes or temperature  "intolerance  Respiratory: no cough, shortness of breath, or wheezing  Cardiovascular: no chest pain, edema or dyspnea on exertion  Gastrointestinal: no  Nausea/vomiting, abdominal pain, change in bowel habits, or black or bloody stools  Genito-Urinary: no dysuria, trouble voiding, or hematuria  Musculoskeletal: negative for - joint pain or muscle pain  Neurological: negative for - dizziness, headaches, numbness/tingling or seizures  Dermatological: negative for rash and skin lesion changes      Objective:     /84   Pulse 111   Ht 170.2 cm (67\")   Wt 83.1 kg (183 lb 1.6 oz)   SpO2 97%   BMI 28.68 kg/m²         General Appearance:    Alert, cooperative, no distress, appears stated age   Head:    Normocephalic, without obvious abnormality, atraumatic   Eyes:    PERRL, conjunctiva/corneas clear, EOM's intact   Ears:    Normal TM's and external ear canals, both ears   Nose:   Nares normal, septum midline, mucosa normal, no drainage     or sinus tenderness   Throat:   Lips, mucosa, and tongue normal; teeth and gums normal   Neck:   Supple, symmetrical, trachea midline, no adenopathy;     thyroid:  no enlargement/tenderness/nodules; no carotid    bruit   Back:     Symmetric, no curvature, ROM normal, no CVA tenderness   Lungs:     Clear to auscultation bilaterally, respirations unlabored   Chest Wall:    No tenderness or deformity    Heart:    Regular rate and rhythm, S1 and S2 normal, no murmur, rub    or gallop   Abdomen:     Soft, non-tender, bowel sounds active all four quadrants,     no masses, no organomegaly   Extremities:   Extremities normal, atraumatic, no cyanosis or edema   Pulses:   2+ and symmetric all extremities   Skin:   Skin color, texture, turgor normal, no rashes or lesions   Lymph nodes:   Cervical, supraclavicular, and axillary nodes normal   Neurologic:   CNII-XII grossly intact              Assessment/Plan:     1. Essential hypertension    2. Allergic rhinitis, unspecified chronicity, " unspecified seasonality, unspecified trigger    3. Primary osteoarthritis of both knees       Jian was seen today for hypertension and osteoarthritis.    Diagnoses and all orders for this visit:    Essential hypertension  -     lisinopril (PRINIVIL,ZESTRIL) 10 MG tablet; Take 2 tablets by mouth Daily.  -     atorvastatin (LIPITOR) 20 MG tablet; Take 1 tablet by mouth Daily.    Allergic rhinitis, unspecified chronicity, unspecified seasonality, unspecified trigger  -     fluticasone (FLONASE) 50 MCG/ACT nasal spray; 2 sprays into each nostril Daily.    Primary osteoarthritis of both knees  -     meloxicam (MOBIC) 7.5 MG tablet; Take 2 tablets by mouth Daily.        Follow-up:     Return in about 2 months (around 6/2/2018) for Recheck if HTN.    GOALS:  Goals     • Better Compliance with HTN Medications (pt-stated)            Barriers: None      • Medication management            Patient has been having trouble with medication compliance.  Goal now is to take medications everyday at the same time.    Barriers: patient has not been on medications consistently for sometime.  Has trouble with remembering to take medications.                Preventative:  Health Maintenance   Topic Date Due   • MEDICARE ANNUAL WELLNESS  10/10/2017   • COLONOSCOPY  10/10/2024   • TDAP/TD VACCINES (2 - Td) 10/10/2027   • HEPATITIS C SCREENING  Completed   • INFLUENZA VACCINE  Completed   • ZOSTER VACCINE  Completed       RISK SCORE: 3     Pelon Stewart Jr., M.D.  PGY1  Family Practice Resident  [address] [place] [address]  Phone: [phone]  Fax: [phone]      This document has been electronically signed by Pelon Stewart Jr, MD on 04/02/18 3:53 PM

## 2018-04-02 NOTE — PROGRESS NOTES
Subjective:     Jian Baker is a 62 y.o. male who presents for htn and OA.  He does not know what medications he is taking or what the medications are for.  He complains of sinus tenderness, post nasal drip. He complains of ringing in his ears.   The following portions of the patient's history were reviewed and updated as appropriate: allergies, current medications, past family history, past medical history, past social history, past surgical history and problem list.      Past Medical Hx:  Past Medical History:   Diagnosis Date   • Asthma     Previously diagnosed and previously prescribed inhaler medications.     • Essential hypertension    • Gastroesophageal reflux disease        Past Surgical Hx:  Past Surgical History:   Procedure Laterality Date   • BONE GRAFT     • WRIST FRACTURE SURGERY Bilateral        Health Maintenance:  Health Maintenance   Topic Date Due   • MEDICARE ANNUAL WELLNESS  10/10/2017   • COLONOSCOPY  10/10/2024   • TDAP/TD VACCINES (2 - Td) 10/10/2027   • HEPATITIS C SCREENING  Completed   • INFLUENZA VACCINE  Completed   • ZOSTER VACCINE  Completed       Current Meds:    Current Outpatient Prescriptions:   •  lisinopril (PRINIVIL,ZESTRIL) 10 MG tablet, Take 2 tablets by mouth Daily., Disp: 30 tablet, Rfl: 5  •  meloxicam (MOBIC) 7.5 MG tablet, Take 2 tablets by mouth Daily., Disp: 30 tablet, Rfl: 5  •  atorvastatin (LIPITOR) 20 MG tablet, Take 1 tablet by mouth Daily., Disp: 30 tablet, Rfl: 5  •  fluticasone (FLONASE) 50 MCG/ACT nasal spray, 2 sprays into each nostril Daily., Disp: 1 bottle, Rfl: 10    Allergies:  Review of patient's allergies indicates no known allergies.    Family Hx:  Family History   Problem Relation Age of Onset   • COPD Mother    • Hypertension Mother    • Diabetes type II Mother    • Cancer Father    • Heart failure Father         Social History:  Social History     Social History   • Marital status:      Spouse name: N/A   • Number of children: N/A   •  "Years of education: N/A     Occupational History   • Disability      Social History Main Topics   • Smoking status: Former Smoker     Years: 25.00     Types: Pipe     Start date: 10/10/1972     Quit date: 10/10/1997   • Smokeless tobacco: Current User     Types: Chew      Comment: Approximately 3 minutes spent on risks of smokeless tobacco including increased risk of mouth and bladder cancers   • Alcohol use 1.2 oz/week     2 Cans of beer per week      Comment: Prior heavy drinker   • Drug use: No   • Sexual activity: Yes     Partners: Female     Other Topics Concern   • Not on file     Social History Narrative    Patient lives in [place] with his wife.  Transportation is an issue for the couple.       Review of Systems  Review of Systems   Constitutional: Negative for activity change, appetite change, fatigue and fever.   HENT: Positive for postnasal drip, sinus pressure and tinnitus. Negative for ear pain and sore throat.    Eyes: Negative for pain and visual disturbance.   Respiratory: Negative for cough and shortness of breath.    Cardiovascular: Negative for chest pain and palpitations.   Gastrointestinal: Negative for abdominal pain and nausea.   Endocrine: Negative for cold intolerance and heat intolerance.   Genitourinary: Negative for difficulty urinating and dysuria.   Musculoskeletal: Positive for back pain. Negative for arthralgias and gait problem.   Skin: Negative for color change and rash.   Neurological: Negative for dizziness, weakness and headaches.   Hematological: Negative for adenopathy. Does not bruise/bleed easily.   Psychiatric/Behavioral: Negative for agitation, confusion and sleep disturbance.       Objective:     /84   Pulse 111   Ht 170.2 cm (67\")   Wt 83.1 kg (183 lb 1.6 oz)   SpO2 97%   BMI 28.68 kg/m²   Physical Exam   Constitutional: He is oriented to person, place, and time. He appears well-developed and well-nourished.   HENT:   Head: Normocephalic and atraumatic. "   Right Ear: Hearing, tympanic membrane, external ear and ear canal normal.   Left Ear: Hearing, tympanic membrane, external ear and ear canal normal.   Nose: Nose normal.   Mouth/Throat: Oropharynx is clear and moist.   Eyes: Conjunctivae and EOM are normal. Pupils are equal, round, and reactive to light.   Neck: Normal range of motion.   Cardiovascular: Normal rate, regular rhythm and normal heart sounds.    Pulmonary/Chest: Effort normal and breath sounds normal.   Abdominal: Soft. Bowel sounds are normal.   Musculoskeletal: Normal range of motion. He exhibits tenderness (low back).   Neurological: He is alert and oriented to person, place, and time.   Skin: Skin is warm and dry.   Psychiatric: He has a normal mood and affect. His behavior is normal. Judgment and thought content normal.       Lab Review  Results for orders placed or performed in visit on 10/10/17   Lipid Panel   Result Value Ref Range    Total Cholesterol 173 0 - 199 mg/dL    Triglycerides 83 20 - 199 mg/dL    HDL Cholesterol 66 60 - 200 mg/dL    LDL Cholesterol  83 1 - 129 mg/dL    LDL/HDL Ratio 1.37 0.00 - 3.55   Hemoglobin A1c   Result Value Ref Range    Hemoglobin A1C 5.0 4 - 5.6 %   Comprehensive Metabolic Panel   Result Value Ref Range    Glucose 103 (H) 60 - 100 mg/dL    BUN 11 7 - 21 mg/dL    Creatinine 0.86 0.70 - 1.30 mg/dL    Sodium 141 137 - 145 mmol/L    Potassium 4.6 3.5 - 5.1 mmol/L    Chloride 99 95 - 110 mmol/L    CO2 28.0 22.0 - 31.0 mmol/L    Calcium 10.2 8.4 - 10.2 mg/dL    Total Protein 8.0 6.3 - 8.6 g/dL    Albumin 5.00 (H) 3.40 - 4.80 g/dL    ALT (SGPT) 36 21 - 72 U/L    AST (SGOT) 31 17 - 59 U/L    Alkaline Phosphatase 56 38 - 126 U/L    Total Bilirubin 0.8 0.2 - 1.3 mg/dL    eGFR Non  Amer 91 49 - 113 mL/min/1.73    Globulin 3.0 2.3 - 3.5 gm/dL    A/G Ratio 1.7 1.1 - 1.8 g/dL    BUN/Creatinine Ratio 12.8 7.0 - 25.0    Anion Gap 14.0 5.0 - 15.0 mmol/L   CBC (No Diff)   Result Value Ref Range    WBC 6.53 3.20 - 9.80  10*3/mm3    RBC 4.70 4.37 - 5.74 10*6/mm3    Hemoglobin 14.5 13.7 - 17.3 g/dL    Hematocrit 41.5 39.0 - 49.0 %    MCV 88.3 80.0 - 98.0 fL    MCH 30.9 26.5 - 34.0 pg    MCHC 34.9 31.5 - 36.3 g/dL    RDW 13.2 11.5 - 14.5 %    RDW-SD 43.8 35.1 - 43.9 fl    MPV 10.6 8.0 - 12.0 fL    Platelets 214 150 - 450 10*3/mm3            Assessment:     Jian was seen today for hypertension and osteoarthritis.    Diagnoses and all orders for this visit:    Essential hypertension  -     lisinopril (PRINIVIL,ZESTRIL) 10 MG tablet; Take 2 tablets by mouth Daily.    Other orders  -     meloxicam (MOBIC) 7.5 MG tablet; Take 2 tablets by mouth Daily.  -     atorvastatin (LIPITOR) 20 MG tablet; Take 1 tablet by mouth Daily.  -     fluticasone (FLONASE) 50 MCG/ACT nasal spray; 2 sprays into each nostril Daily.        Plan:     I have seen and examined the patient.  I have reviewed the notes, assessments, and/or procedures performed by Pelon Stewart Jr, MD , I concur with her/his documentation and assessment and plan for Jian Baker.        This document has been electronically signed by Ada Nguyen MD on April 2, 2018 2:34 PM

## 2018-04-04 ENCOUNTER — TELEPHONE (OUTPATIENT)
Dept: FAMILY MEDICINE CLINIC | Facility: CLINIC | Age: 63
End: 2018-04-04

## 2018-04-04 NOTE — TELEPHONE ENCOUNTER
[facility] HAS NOT RECEIVED CLARIFICATION ON SCRIPTS LISINOPRIL AND MELOXICAM CALLED ABOUT 4-2-18    PLEASE CALL ASAP    THANK YOU

## 2018-04-23 ENCOUNTER — TELEPHONE (OUTPATIENT)
Dept: FAMILY MEDICINE CLINIC | Facility: CLINIC | Age: 63
End: 2018-04-23

## 2018-04-23 NOTE — TELEPHONE ENCOUNTER
[facility] CALLED AND IS NEEDING A SCRIPT CLARIFICATION ON LISINOPRIL AND MELOXICAM.     PLEASE CALL [phone]    THANK YOU

## 2018-04-26 ENCOUNTER — TELEPHONE (OUTPATIENT)
Dept: FAMILY MEDICINE CLINIC | Facility: CLINIC | Age: 63
End: 2018-04-26

## 2018-04-26 NOTE — TELEPHONE ENCOUNTER
[facility] CALLED ABOUT THE NON-COMPLIANCE OF PATIENT TAKING HIS LISINOPRIL.  THEY HAVE ATTEMPTED TO REACH HIM BY PHONE WITH NO SUCCESS.  THEY JUST WANTED YOU TO KNOW FOR YOUR FURTHER TREATMENT OF PATIENT.    THANK YOU

## 2018-06-27 ENCOUNTER — APPOINTMENT (OUTPATIENT)
Dept: LAB | Facility: HOSPITAL | Age: 63
End: 2018-06-27

## 2018-06-27 ENCOUNTER — OFFICE VISIT (OUTPATIENT)
Dept: FAMILY MEDICINE CLINIC | Facility: CLINIC | Age: 63
End: 2018-06-27

## 2018-06-27 VITALS
WEIGHT: 188.5 LBS | DIASTOLIC BLOOD PRESSURE: 88 MMHG | OXYGEN SATURATION: 98 % | HEIGHT: 67 IN | HEART RATE: 85 BPM | BODY MASS INDEX: 29.58 KG/M2 | SYSTOLIC BLOOD PRESSURE: 142 MMHG

## 2018-06-27 DIAGNOSIS — M25.562 CHRONIC PAIN OF BOTH KNEES: ICD-10-CM

## 2018-06-27 DIAGNOSIS — I10 ESSENTIAL HYPERTENSION: Primary | ICD-10-CM

## 2018-06-27 DIAGNOSIS — G89.29 CHRONIC PAIN OF BOTH KNEES: ICD-10-CM

## 2018-06-27 DIAGNOSIS — M25.561 CHRONIC PAIN OF BOTH KNEES: ICD-10-CM

## 2018-06-27 LAB
ALBUMIN SERPL-MCNC: 4.6 G/DL (ref 3.4–4.8)
ALBUMIN/GLOB SERPL: 1.6 G/DL (ref 1.1–1.8)
ALP SERPL-CCNC: 40 U/L (ref 38–126)
ALT SERPL W P-5'-P-CCNC: 27 U/L (ref 21–72)
ANION GAP SERPL CALCULATED.3IONS-SCNC: 10 MMOL/L (ref 5–15)
ARTICHOKE IGE QN: 49 MG/DL (ref 1–129)
AST SERPL-CCNC: 31 U/L (ref 17–59)
BASOPHILS # BLD AUTO: 0.02 10*3/MM3 (ref 0–0.2)
BASOPHILS NFR BLD AUTO: 0.4 % (ref 0–2)
BILIRUB SERPL-MCNC: 0.6 MG/DL (ref 0.2–1.3)
BUN BLD-MCNC: 22 MG/DL (ref 7–21)
BUN/CREAT SERPL: 26.2 (ref 7–25)
CALCIUM SPEC-SCNC: 9.7 MG/DL (ref 8.4–10.2)
CHLORIDE SERPL-SCNC: 101 MMOL/L (ref 95–110)
CHOLEST SERPL-MCNC: 135 MG/DL (ref 0–199)
CO2 SERPL-SCNC: 29 MMOL/L (ref 22–31)
CREAT BLD-MCNC: 0.84 MG/DL (ref 0.7–1.3)
DEPRECATED RDW RBC AUTO: 43.8 FL (ref 35.1–43.9)
EOSINOPHIL # BLD AUTO: 0.08 10*3/MM3 (ref 0–0.7)
EOSINOPHIL NFR BLD AUTO: 1.6 % (ref 0–7)
ERYTHROCYTE [DISTWIDTH] IN BLOOD BY AUTOMATED COUNT: 13.1 % (ref 11.5–14.5)
GFR SERPL CREATININE-BSD FRML MDRD: 93 ML/MIN/1.73 (ref 49–113)
GLOBULIN UR ELPH-MCNC: 2.9 GM/DL (ref 2.3–3.5)
GLUCOSE BLD-MCNC: 98 MG/DL (ref 60–100)
HCT VFR BLD AUTO: 39.4 % (ref 39–49)
HDLC SERPL-MCNC: 68 MG/DL (ref 60–200)
HGB BLD-MCNC: 13.4 G/DL (ref 13.7–17.3)
IMM GRANULOCYTES # BLD: 0.01 10*3/MM3 (ref 0–0.02)
IMM GRANULOCYTES NFR BLD: 0.2 % (ref 0–0.5)
LDLC/HDLC SERPL: 0.79 {RATIO} (ref 0–3.55)
LYMPHOCYTES # BLD AUTO: 0.95 10*3/MM3 (ref 0.6–4.2)
LYMPHOCYTES NFR BLD AUTO: 19.3 % (ref 10–50)
MCH RBC QN AUTO: 31.2 PG (ref 26.5–34)
MCHC RBC AUTO-ENTMCNC: 34 G/DL (ref 31.5–36.3)
MCV RBC AUTO: 91.6 FL (ref 80–98)
MONOCYTES # BLD AUTO: 0.43 10*3/MM3 (ref 0–0.9)
MONOCYTES NFR BLD AUTO: 8.7 % (ref 0–12)
NEUTROPHILS # BLD AUTO: 3.44 10*3/MM3 (ref 2–8.6)
NEUTROPHILS NFR BLD AUTO: 69.8 % (ref 37–80)
PLATELET # BLD AUTO: 209 10*3/MM3 (ref 150–450)
PMV BLD AUTO: 10.7 FL (ref 8–12)
POTASSIUM BLD-SCNC: 4.9 MMOL/L (ref 3.5–5.1)
PROT SERPL-MCNC: 7.5 G/DL (ref 6.3–8.6)
RBC # BLD AUTO: 4.3 10*6/MM3 (ref 4.37–5.74)
SODIUM BLD-SCNC: 140 MMOL/L (ref 137–145)
TRIGL SERPL-MCNC: 68 MG/DL (ref 20–199)
WBC NRBC COR # BLD: 4.93 10*3/MM3 (ref 3.2–9.8)

## 2018-06-27 PROCEDURE — 80061 LIPID PANEL: CPT | Performed by: STUDENT IN AN ORGANIZED HEALTH CARE EDUCATION/TRAINING PROGRAM

## 2018-06-27 PROCEDURE — 80053 COMPREHEN METABOLIC PANEL: CPT | Performed by: STUDENT IN AN ORGANIZED HEALTH CARE EDUCATION/TRAINING PROGRAM

## 2018-06-27 PROCEDURE — 99213 OFFICE O/P EST LOW 20 MIN: CPT | Performed by: STUDENT IN AN ORGANIZED HEALTH CARE EDUCATION/TRAINING PROGRAM

## 2018-06-27 PROCEDURE — 85025 COMPLETE CBC W/AUTO DIFF WBC: CPT | Performed by: STUDENT IN AN ORGANIZED HEALTH CARE EDUCATION/TRAINING PROGRAM

## 2018-06-27 PROCEDURE — 20610 DRAIN/INJ JOINT/BURSA W/O US: CPT | Performed by: STUDENT IN AN ORGANIZED HEALTH CARE EDUCATION/TRAINING PROGRAM

## 2018-06-27 PROCEDURE — 36415 COLL VENOUS BLD VENIPUNCTURE: CPT | Performed by: STUDENT IN AN ORGANIZED HEALTH CARE EDUCATION/TRAINING PROGRAM

## 2018-07-05 RX ORDER — LISINOPRIL 10 MG/1
20 TABLET ORAL DAILY
Qty: 60 TABLET | Refills: 5 | Status: SHIPPED | OUTPATIENT
Start: 2018-07-05 | End: 2019-04-02 | Stop reason: SDUPTHER

## 2018-07-05 NOTE — PROGRESS NOTES
"Hypertension    Subjective       Subjective:     Jian Baker is a 63 y.o. male who presents for follow up for hypertension as well as knee pain.    HTN -- patient is currently on lisinopril 20 mg daily.  States that he needs refills on medications as they were originally sent with only 30 pills instead of 60.  Will resend these.  Patient states that he has no complaints about current medications.  Denies any symptoms of hypotension such as diaphoresis, changes in hearing, changes in vision, or any other adverse symptoms at this time.  His BP in office today is BP: 142/88 which is elevated however patient states he has not taken medication over the last 2 days.  He has been going to his local firehouse to have his BP checked 1-2 times every week & it has been running in the 120-130s/80s.  Will maintain current regimen.    Knee Pain -- Patient has had chronic bilateral knee pain for years.  Patient states that he had been diagnosed with juvenile arthritis as a child.  The knee pain is dull & throbbing in nature with no radiation.  It is constant throughout the day.  He often tries to \"work his knees out\" on stairs to help loosen them up which he states helps.  He has also been using meloxicam which helps somewhat but not completely.  Patient states that he had good luck with joint injections in his shoulder in the past and would like to try joint injections in the knee.  Will do injections in both knees. Will send patient for standing 4-view xray today.    Preventative:  Over the past 2 weeks, have you felt down, depressed, or hopeless?No   Over the past 2 weeks, have you felt little interest or pleasure in doing things?No  Clinical depression screening refused by patient.No     On osteoporosis therapy?Not Indicated     Past Medical Hx:  Past Medical History:   Diagnosis Date   • Asthma     Previously diagnosed and previously prescribed inhaler medications.     • Essential hypertension    • Gastroesophageal " reflux disease        Past Surgical Hx:  Past Surgical History:   Procedure Laterality Date   • BONE GRAFT     • WRIST FRACTURE SURGERY Bilateral        Health Maintenance:  Health Maintenance   Topic Date Due   • MEDICARE ANNUAL WELLNESS  10/10/2017   • ZOSTER VACCINE (2 of 2) 12/05/2017   • INFLUENZA VACCINE  08/01/2018   • COLONOSCOPY  10/10/2024   • TDAP/TD VACCINES (2 - Td) 10/10/2027   • HEPATITIS C SCREENING  Completed       Current Meds:    Current Outpatient Prescriptions:   •  atorvastatin (LIPITOR) 20 MG tablet, Take 1 tablet by mouth Daily., Disp: 30 tablet, Rfl: 5  •  fluticasone (FLONASE) 50 MCG/ACT nasal spray, 2 sprays into each nostril Daily., Disp: 1 bottle, Rfl: 10  •  lisinopril (PRINIVIL,ZESTRIL) 10 MG tablet, Take 2 tablets by mouth Daily., Disp: 30 tablet, Rfl: 5  •  meloxicam (MOBIC) 7.5 MG tablet, Take 2 tablets by mouth Daily., Disp: 30 tablet, Rfl: 5    Allergies:  Patient has no known allergies.    Family Hx:  Family History   Problem Relation Age of Onset   • COPD Mother    • Hypertension Mother    • Diabetes type II Mother    • Cancer Father    • Heart failure Father         Social History:  Social History     Social History   • Marital status:      Spouse name: N/A   • Number of children: N/A   • Years of education: N/A     Occupational History   • Disability      Social History Main Topics   • Smoking status: Former Smoker     Years: 25.00     Types: Pipe     Start date: 10/10/1972     Quit date: 10/10/1997   • Smokeless tobacco: Current User     Types: Chew      Comment: Approximately 3 minutes spent on risks of smokeless tobacco including increased risk of mouth and bladder cancers   • Alcohol use 1.2 oz/week     2 Cans of beer per week      Comment: Prior heavy drinker   • Drug use: No   • Sexual activity: Yes     Partners: Female     Other Topics Concern   • Not on file     Social History Narrative    Patient lives in [place] with his wife.  Transportation is an issue  "for the couple.       Review of Systems  General:negative for - chills, fatigue, fever, hot flashes, malaise, night sweats, weight gain or weight loss  Psychological: negative for - anxiety, depression, sleep disturbances or suicidal ideation  Ophthalmic: negative for - blurry vision or loss of vision  ENT: negative for - hearing change, nasal congestion or sore throat  Hematological and Lymphatic: negative for - jaundice  Endocrine: negative for - hair pattern changes, skin changes or temperature intolerance  Respiratory: no cough, shortness of breath, or wheezing  Cardiovascular: no chest pain, edema or dyspnea on exertion  Gastrointestinal: no  Nausea/vomiting, abdominal pain, change in bowel habits, or black or bloody stools  Genito-Urinary: no dysuria, trouble voiding, or hematuria  Musculoskeletal: negative for - joint pain or muscle pain  Neurological: negative for - dizziness, headaches, numbness/tingling or seizures  Dermatological: negative for rash and skin lesion changes       Objective         Objective:     /88 (BP Location: Left arm, Patient Position: Sitting, Cuff Size: Adult)   Pulse 85   Ht 170.2 cm (67\")   Wt 85.5 kg (188 lb 8 oz)   SpO2 98%   BMI 29.52 kg/m²         General Appearance:    Alert, cooperative, no distress, appears stated age   Head:    Normocephalic, without obvious abnormality, atraumatic   Eyes:    PERRL, conjunctiva/corneas clear, EOM's intact   Ears:    Normal TM's and external ear canals, both ears   Nose:   Nares normal, septum midline, mucosa normal, no drainage     or sinus tenderness   Throat:   Lips, mucosa, and tongue normal; teeth and gums normal   Neck:   Supple, symmetrical, trachea midline, no adenopathy;     thyroid:  no enlargement/tenderness/nodules; no carotid    bruit   Back:     Symmetric, no curvature, ROM normal, no CVA tenderness   Lungs:     Clear to auscultation bilaterally, respirations unlabored   Chest Wall:    No tenderness or deformity    " Heart:    Regular rate and rhythm, S1 and S2 normal, no murmur, rub    or gallop   Abdomen:     Soft, non-tender, bowel sounds active all four quadrants,     no masses, no organomegaly   Extremities:   Extremities normal, atraumatic, no cyanosis or edema   Pulses:   2+ and symmetric all extremities   Skin:   Skin color, texture, turgor normal, no rashes or lesions   Lymph nodes:   Cervical, supraclavicular, and axillary nodes normal   Neurologic:   CNII-XII grossly intact              Assessment/Plan          Assessment/Plan:     1. Essential hypertension    2. Chronic pain of both knees       Jian was seen today for hypertension.    Diagnoses and all orders for this visit:    Essential hypertension  -     Comprehensive Metabolic Panel  -     CBC Auto Differential  -     Lipid Panel    Chronic pain of both knees  -     XR Knee 4+ View Left  -     XR Knee 4+ View Right  -     Arthrocentesis  -     Arthrocentesis          Follow-up:     Return in about 3 months (around 9/27/2018).    GOALS:  Less Knee Pain  Barriers: None    Preventative:  Health Maintenance   Topic Date Due   • MEDICARE ANNUAL WELLNESS  10/10/2017   • ZOSTER VACCINE (2 of 2) 12/05/2017   • INFLUENZA VACCINE  08/01/2018   • COLONOSCOPY  10/10/2024   • TDAP/TD VACCINES (2 - Td) 10/10/2027   • HEPATITIS C SCREENING  Completed     He  reports that he quit smoking about 20 years ago. His smoking use included Pipe. He started smoking about 45 years ago. He quit after 25.00 years of use. His smokeless tobacco use includes Chew.  He  reports that he drinks about 1.2 oz of alcohol per week .  Patient's Body mass index is 29.52 kg/m². BMI is above normal parameters. Recommendations include: exercise counseling and nutrition counseling.           RISK SCORE: 4     Pelon Stewart Jr., M.D.  PGY1  Family Practice Resident  [address] [place], [place] [address]  Phone: [phone]  Fax: [phone]      This document has been electronically signed by  Pelon Stewart Jr, MD on 07/05/18 2:53 PM

## 2018-07-28 NOTE — PROGRESS NOTES
Subjective:     Jian Baker is a 63 y.o. male who presents for a recheck of hypertension and joint pain.  He is having a lot of knee pain and would like joint injections.  He has had injections in them before that seemed to help.  He hasn't taken his blood pressure medicine for a couple of days because he was out of it.  Otherwise he is doing well.      The following portions of the patient's history were reviewed and updated as appropriate: allergies, current medications, past family history, past medical history, past social history, past surgical history and problem list.    Past Medical Hx:  Past Medical History:   Diagnosis Date   • Asthma     Previously diagnosed and previously prescribed inhaler medications.     • Essential hypertension    • Gastroesophageal reflux disease        Past Surgical Hx:  Past Surgical History:   Procedure Laterality Date   • BONE GRAFT     • WRIST FRACTURE SURGERY Bilateral        Health Maintenance:  Health Maintenance   Topic Date Due   • MEDICARE ANNUAL WELLNESS  10/10/2017   • ZOSTER VACCINE (2 of 2) 12/05/2017   • INFLUENZA VACCINE  08/01/2018   • COLONOSCOPY  10/10/2024   • TDAP/TD VACCINES (2 - Td) 10/10/2027   • HEPATITIS C SCREENING  Completed       Current Meds:    Current Outpatient Prescriptions:   •  atorvastatin (LIPITOR) 20 MG tablet, Take 1 tablet by mouth Daily., Disp: 30 tablet, Rfl: 5  •  fluticasone (FLONASE) 50 MCG/ACT nasal spray, 2 sprays into each nostril Daily., Disp: 1 bottle, Rfl: 10  •  lisinopril (PRINIVIL,ZESTRIL) 10 MG tablet, Take 2 tablets by mouth Daily., Disp: 60 tablet, Rfl: 5  •  meloxicam (MOBIC) 7.5 MG tablet, Take 2 tablets by mouth Daily., Disp: 30 tablet, Rfl: 5    Allergies:  Patient has no known allergies.    Family Hx:  Family History   Problem Relation Age of Onset   • COPD Mother    • Hypertension Mother    • Diabetes type II Mother    • Cancer Father    • Heart failure Father         Social History:  Social History     Social  "History   • Marital status:      Spouse name: N/A   • Number of children: N/A   • Years of education: N/A     Occupational History   • Disability      Social History Main Topics   • Smoking status: Former Smoker     Years: 25.00     Types: Pipe     Start date: 10/10/1972     Quit date: 10/10/1997   • Smokeless tobacco: Current User     Types: Chew      Comment: Approximately 3 minutes spent on risks of smokeless tobacco including increased risk of mouth and bladder cancers   • Alcohol use 1.2 oz/week     2 Cans of beer per week      Comment: Prior heavy drinker   • Drug use: No   • Sexual activity: Yes     Partners: Female     Other Topics Concern   • Not on file     Social History Narrative    Patient lives in [place] with his wife.  Transportation is an issue for the couple.       Review of Systems  Review of Systems   Constitutional: Negative for activity change and appetite change.   Respiratory: Negative for cough and shortness of breath.    Cardiovascular: Negative for chest pain, palpitations and leg swelling.   Gastrointestinal: Negative for abdominal pain, constipation, diarrhea, nausea and vomiting.   Genitourinary: Negative for difficulty urinating and dysuria.   Musculoskeletal: Positive for arthralgias and joint swelling.       Objective:     /88 (BP Location: Left arm, Patient Position: Sitting, Cuff Size: Adult)   Pulse 85   Ht 170.2 cm (67\")   Wt 85.5 kg (188 lb 8 oz)   SpO2 98%   BMI 29.52 kg/m²   Physical Exam   Constitutional: He is oriented to person, place, and time. He appears well-developed and well-nourished. No distress.   Cardiovascular: Normal rate, regular rhythm, normal heart sounds and intact distal pulses.  Exam reveals no gallop and no friction rub.    No murmur heard.  Pulmonary/Chest: Effort normal and breath sounds normal. No respiratory distress. He has no wheezes. He has no rales.   Abdominal: Soft. Bowel sounds are normal. He exhibits no distension. There " is no tenderness. There is no rebound and no guarding.   Musculoskeletal: He exhibits tenderness.   Knees tender to palpation   Neurological: He is alert and oriented to person, place, and time.   Skin: He is not diaphoretic.   Psychiatric: He has a normal mood and affect. His behavior is normal. Judgment and thought content normal.   Nursing note and vitals reviewed.      Lab Review  Results for orders placed or performed in visit on 06/27/18   Comprehensive Metabolic Panel   Result Value Ref Range    Glucose 98 60 - 100 mg/dL    BUN 22 (H) 7 - 21 mg/dL    Creatinine 0.84 0.70 - 1.30 mg/dL    Sodium 140 137 - 145 mmol/L    Potassium 4.9 3.5 - 5.1 mmol/L    Chloride 101 95 - 110 mmol/L    CO2 29.0 22.0 - 31.0 mmol/L    Calcium 9.7 8.4 - 10.2 mg/dL    Total Protein 7.5 6.3 - 8.6 g/dL    Albumin 4.60 3.40 - 4.80 g/dL    ALT (SGPT) 27 21 - 72 U/L    AST (SGOT) 31 17 - 59 U/L    Alkaline Phosphatase 40 38 - 126 U/L    Total Bilirubin 0.6 0.2 - 1.3 mg/dL    eGFR Non  Amer 93 49 - 113 mL/min/1.73    Globulin 2.9 2.3 - 3.5 gm/dL    A/G Ratio 1.6 1.1 - 1.8 g/dL    BUN/Creatinine Ratio 26.2 (H) 7.0 - 25.0    Anion Gap 10.0 5.0 - 15.0 mmol/L   CBC Auto Differential   Result Value Ref Range    WBC 4.93 3.20 - 9.80 10*3/mm3    RBC 4.30 (L) 4.37 - 5.74 10*6/mm3    Hemoglobin 13.4 (L) 13.7 - 17.3 g/dL    Hematocrit 39.4 39.0 - 49.0 %    MCV 91.6 80.0 - 98.0 fL    MCH 31.2 26.5 - 34.0 pg    MCHC 34.0 31.5 - 36.3 g/dL    RDW 13.1 11.5 - 14.5 %    RDW-SD 43.8 35.1 - 43.9 fl    MPV 10.7 8.0 - 12.0 fL    Platelets 209 150 - 450 10*3/mm3    Neutrophil % 69.8 37.0 - 80.0 %    Lymphocyte % 19.3 10.0 - 50.0 %    Monocyte % 8.7 0.0 - 12.0 %    Eosinophil % 1.6 0.0 - 7.0 %    Basophil % 0.4 0.0 - 2.0 %    Immature Grans % 0.2 0.0 - 0.5 %    Neutrophils, Absolute 3.44 2.00 - 8.60 10*3/mm3    Lymphocytes, Absolute 0.95 0.60 - 4.20 10*3/mm3    Monocytes, Absolute 0.43 0.00 - 0.90 10*3/mm3    Eosinophils, Absolute 0.08 0.00 - 0.70  10*3/mm3    Basophils, Absolute 0.02 0.00 - 0.20 10*3/mm3    Immature Grans, Absolute 0.01 0.00 - 0.02 10*3/mm3   Lipid Panel   Result Value Ref Range    Total Cholesterol 135 0 - 199 mg/dL    Triglycerides 68 20 - 199 mg/dL    HDL Cholesterol 68 60 - 200 mg/dL    LDL Cholesterol  49 1 - 129 mg/dL    LDL/HDL Ratio 0.79 0.00 - 3.55          Assessment:     Jian was seen today for hypertension.    Diagnoses and all orders for this visit:    Essential hypertension  -     Comprehensive Metabolic Panel  -     CBC Auto Differential  -     Lipid Panel  -     lisinopril (PRINIVIL,ZESTRIL) 10 MG tablet; Take 2 tablets by mouth Daily.    Chronic pain of both knees  -     XR Knee 4+ View Left  -     XR Knee 4+ View Right  -     Arthrocentesis  -     Arthrocentesis        Plan:     I have seen and examined the patient.  I have reviewed the notes, assessments, and/or procedures performed by Dr. Stewart, I concur with her/his documentation and assessment and plan for Jian Howell Samuel.

## 2018-09-06 ENCOUNTER — TELEPHONE (OUTPATIENT)
Dept: FAMILY MEDICINE CLINIC | Facility: CLINIC | Age: 63
End: 2018-09-06

## 2018-09-06 DIAGNOSIS — M17.0 PRIMARY OSTEOARTHRITIS OF BOTH KNEES: ICD-10-CM

## 2018-09-06 DIAGNOSIS — I10 ESSENTIAL HYPERTENSION: ICD-10-CM

## 2018-09-06 RX ORDER — ATORVASTATIN CALCIUM 20 MG/1
20 TABLET, FILM COATED ORAL DAILY
Qty: 30 TABLET | Refills: 5 | Status: SHIPPED | OUTPATIENT
Start: 2018-09-06 | End: 2019-04-02 | Stop reason: SDUPTHER

## 2018-09-06 RX ORDER — MELOXICAM 7.5 MG/1
15 TABLET ORAL DAILY
Qty: 30 TABLET | Refills: 5 | Status: SHIPPED | OUTPATIENT
Start: 2018-09-06 | End: 2018-10-01 | Stop reason: SDUPTHER

## 2018-09-07 ENCOUNTER — TELEPHONE (OUTPATIENT)
Dept: FAMILY MEDICINE CLINIC | Facility: CLINIC | Age: 63
End: 2018-09-07

## 2018-09-07 NOTE — TELEPHONE ENCOUNTER
atorvastatin (LIPITOR) 20 MG tablet  meloxicam (MOBIC) 7.5 MG tablet    SCRIPTS NEED TO GO TO [facility]  [address] [place] [address]    FAX#[phone]

## 2018-10-01 ENCOUNTER — OFFICE VISIT (OUTPATIENT)
Dept: FAMILY MEDICINE CLINIC | Facility: CLINIC | Age: 63
End: 2018-10-01

## 2018-10-01 VITALS
OXYGEN SATURATION: 98 % | SYSTOLIC BLOOD PRESSURE: 142 MMHG | WEIGHT: 186.6 LBS | HEIGHT: 67 IN | HEART RATE: 88 BPM | DIASTOLIC BLOOD PRESSURE: 80 MMHG | BODY MASS INDEX: 29.29 KG/M2

## 2018-10-01 DIAGNOSIS — M17.0 PRIMARY OSTEOARTHRITIS OF BOTH KNEES: ICD-10-CM

## 2018-10-01 DIAGNOSIS — I10 ESSENTIAL HYPERTENSION: Primary | ICD-10-CM

## 2018-10-01 PROCEDURE — 99213 OFFICE O/P EST LOW 20 MIN: CPT | Performed by: STUDENT IN AN ORGANIZED HEALTH CARE EDUCATION/TRAINING PROGRAM

## 2018-10-01 RX ORDER — MELOXICAM 7.5 MG/1
7.5 TABLET ORAL DAILY
Qty: 60 TABLET | Refills: 5 | Status: SHIPPED | OUTPATIENT
Start: 2018-10-01 | End: 2019-04-02 | Stop reason: SDUPTHER

## 2018-10-01 RX ORDER — CETIRIZINE HYDROCHLORIDE 10 MG/1
10 TABLET ORAL DAILY
Qty: 30 TABLET | Refills: 11 | Status: SHIPPED | OUTPATIENT
Start: 2018-10-01 | End: 2019-04-02 | Stop reason: SDUPTHER

## 2018-10-01 NOTE — PROGRESS NOTES
[facility] PROGRESS NOTE  Pelon Stewart Jr, MD    Subjective   Hypertension (2 mo f/u)      Subjective:     Jian Baker is a 63 y.o. male who presents for follow up for    Hypertension  He is not exercising and is adherent to a low-salt diet.  Blood pressure is well controlled at home. Cardiac symptoms: none. Patient denies: chest pain, claudication, cough, dyspnea and palpitations. Cardiovascular risk factors: advanced age (older than 55 for men, 65 for women) and hypertension. Use of agents associated with hypertension: none. History of target organ damage: none.        I have reviewed the patient's medical history in detail; there are no changes to the history as noted in the electronic medical record.    Past Medical Hx:  Past Medical History:   Diagnosis Date   • Asthma     Previously diagnosed and previously prescribed inhaler medications.     • Essential hypertension    • Gastroesophageal reflux disease        Past Surgical Hx:  Past Surgical History:   Procedure Laterality Date   • BONE GRAFT     • WRIST FRACTURE SURGERY Bilateral        Health Maintenance:  Health Maintenance   Topic Date Due   • MEDICARE ANNUAL WELLNESS  10/10/2017   • ZOSTER VACCINE (2 of 2) 12/05/2017   • INFLUENZA VACCINE  08/01/2018   • COLONOSCOPY  10/10/2024   • TDAP/TD VACCINES (2 - Td) 10/10/2027   • HEPATITIS C SCREENING  Completed       Current Meds:    Current Outpatient Prescriptions:   •  atorvastatin (LIPITOR) 20 MG tablet, Take 1 tablet by mouth Daily., Disp: 30 tablet, Rfl: 5  •  fluticasone (FLONASE) 50 MCG/ACT nasal spray, 2 sprays into each nostril Daily., Disp: 1 bottle, Rfl: 10  •  lisinopril (PRINIVIL,ZESTRIL) 10 MG tablet, Take 2 tablets by mouth Daily., Disp: 60 tablet, Rfl: 5  •  meloxicam (MOBIC) 7.5 MG tablet, Take 2 tablets by mouth Daily., Disp: 30 tablet, Rfl: 5    Allergies:  Patient has no known allergies.    Family Hx:  Family History   Problem Relation Age of Onset   • COPD  Mother    • Hypertension Mother    • Diabetes type II Mother    • Cancer Father    • Heart failure Father         Social History:  Social History     Social History   • Marital status:      Spouse name: N/A   • Number of children: N/A   • Years of education: N/A     Occupational History   • Disability      Social History Main Topics   • Smoking status: Former Smoker     Years: 25.00     Types: Pipe     Start date: 10/10/1972     Quit date: 10/10/1997   • Smokeless tobacco: Current User     Types: Chew      Comment: Approximately 3 minutes spent on risks of smokeless tobacco including increased risk of mouth and bladder cancers   • Alcohol use 1.2 oz/week     2 Cans of beer per week      Comment: Prior heavy drinker   • Drug use: No   • Sexual activity: Yes     Partners: Female     Other Topics Concern   • Not on file     Social History Narrative    Patient lives in [place] with his wife.  Transportation is an issue for the couple.       Review of Systems  Review of Systems   Constitutional: Negative for chills, diaphoresis, fatigue and fever.   HENT: Negative for ear pain and sore throat.    Eyes: Negative for pain and visual disturbance.   Respiratory: Negative for chest tightness and shortness of breath.    Cardiovascular: Negative for chest pain and palpitations.   Gastrointestinal: Negative for abdominal distention, abdominal pain, diarrhea and nausea.   Endocrine: Negative for polydipsia and polyuria.   Genitourinary: Negative for dysuria, flank pain and hematuria.   Musculoskeletal: Negative for arthralgias, joint swelling and myalgias.   Skin: Negative for color change and pallor.   Neurological: Negative for dizziness, seizures and speech difficulty.   Hematological: Negative for adenopathy. Does not bruise/bleed easily.   Psychiatric/Behavioral: Negative for agitation and confusion.         Objective   Objective:     Vitals:    10/01/18 1032   BP: 142/80   Pulse: 88   SpO2: 98%   Weight: 84.6 kg  "(186 lb 9.6 oz)   Height: 170.2 cm (67\")       Physical Exam   Constitutional: He is oriented to person, place, and time. He appears well-developed and well-nourished. No distress.   HENT:   Head: Normocephalic and atraumatic.   Right Ear: External ear normal.   Left Ear: External ear normal.   Nose: Nose normal.   Mouth/Throat: Oropharynx is clear and moist. No oropharyngeal exudate.   Eyes: Pupils are equal, round, and reactive to light. Conjunctivae and EOM are normal. Right eye exhibits no discharge. Left eye exhibits no discharge. No scleral icterus.   Neck: Normal range of motion. Neck supple. No tracheal deviation present. No thyromegaly present.   Cardiovascular: Normal rate, regular rhythm, S1 normal, S2 normal, normal heart sounds and intact distal pulses.  Exam reveals no gallop and no friction rub.    No murmur heard.  Pulmonary/Chest: Effort normal and breath sounds normal. No stridor. No respiratory distress. He has no wheezes. He has no rales. He exhibits no tenderness.   Abdominal: Soft. Bowel sounds are normal. He exhibits no distension. There is no tenderness.   Musculoskeletal: Normal range of motion. He exhibits deformity (right hand secondary to work accident ). He exhibits no edema or tenderness.   Neurological: He is alert and oriented to person, place, and time. No cranial nerve deficit.   Skin: Skin is warm and dry. Capillary refill takes less than 2 seconds. No rash noted. He is not diaphoretic. No erythema.   Psychiatric: He has a normal mood and affect. His behavior is normal. He expresses no suicidal plans and no homicidal plans.   Vitals reviewed.    WBC   Date Value Ref Range Status   06/27/2018 4.93 3.20 - 9.80 10*3/mm3 Final     RBC   Date Value Ref Range Status   06/27/2018 4.30 (L) 4.37 - 5.74 10*6/mm3 Final     Hemoglobin   Date Value Ref Range Status   06/27/2018 13.4 (L) 13.7 - 17.3 g/dL Final     Hematocrit   Date Value Ref Range Status   06/27/2018 39.4 39.0 - 49.0 % Final "     MCV   Date Value Ref Range Status   06/27/2018 91.6 80.0 - 98.0 fL Final     MCH   Date Value Ref Range Status   06/27/2018 31.2 26.5 - 34.0 pg Final     MCHC   Date Value Ref Range Status   06/27/2018 34.0 31.5 - 36.3 g/dL Final     RDW   Date Value Ref Range Status   06/27/2018 13.1 11.5 - 14.5 % Final     RDW-SD   Date Value Ref Range Status   06/27/2018 43.8 35.1 - 43.9 fl Final     MPV   Date Value Ref Range Status   06/27/2018 10.7 8.0 - 12.0 fL Final     Platelets   Date Value Ref Range Status   06/27/2018 209 150 - 450 10*3/mm3 Final     Neutrophil %   Date Value Ref Range Status   06/27/2018 69.8 37.0 - 80.0 % Final     Lymphocyte %   Date Value Ref Range Status   06/27/2018 19.3 10.0 - 50.0 % Final     Monocyte %   Date Value Ref Range Status   06/27/2018 8.7 0.0 - 12.0 % Final     Eosinophil %   Date Value Ref Range Status   06/27/2018 1.6 0.0 - 7.0 % Final     Basophil %   Date Value Ref Range Status   06/27/2018 0.4 0.0 - 2.0 % Final     Immature Grans %   Date Value Ref Range Status   06/27/2018 0.2 0.0 - 0.5 % Final     Neutrophils, Absolute   Date Value Ref Range Status   06/27/2018 3.44 2.00 - 8.60 10*3/mm3 Final     Lymphocytes, Absolute   Date Value Ref Range Status   06/27/2018 0.95 0.60 - 4.20 10*3/mm3 Final     Monocytes, Absolute   Date Value Ref Range Status   06/27/2018 0.43 0.00 - 0.90 10*3/mm3 Final     Eosinophils, Absolute   Date Value Ref Range Status   06/27/2018 0.08 0.00 - 0.70 10*3/mm3 Final     Basophils, Absolute   Date Value Ref Range Status   06/27/2018 0.02 0.00 - 0.20 10*3/mm3 Final     Immature Grans, Absolute   Date Value Ref Range Status   06/27/2018 0.01 0.00 - 0.02 10*3/mm3 Final        Assessment/Plan   Assessment/Plan:     Jian was seen today for hypertension.    Diagnoses and all orders for this visit:    Essential hypertension    Primary osteoarthritis of both knees  -     meloxicam (MOBIC) 7.5 MG tablet; Take 2 tablets by mouth Daily.         Diagnosis Plan   1.  Essential hypertension     2. Primary osteoarthritis of both knees  meloxicam (MOBIC) 7.5 MG tablet       Follow-up:     No Follow-up on file.    GOALS:  Goals     • Better Compliance with HTN Medications (pt-stated)            Barriers: None      • Medication management            Patient has been having trouble with medication compliance.  Goal now is to take medications everyday at the same time.    Barriers: patient has not been on medications consistently for sometime.  Has trouble with remembering to take medications.              Preventative:  Health Maintenance   Topic Date Due   • MEDICARE ANNUAL WELLNESS  10/10/2017   • ZOSTER VACCINE (2 of 2) 12/05/2017   • INFLUENZA VACCINE  08/01/2018   • COLONOSCOPY  10/10/2024   • TDAP/TD VACCINES (2 - Td) 10/10/2027   • HEPATITIS C SCREENING  Completed       He  reports that he quit smoking about 20 years ago. His smoking use included Pipe. He started smoking about 46 years ago. He quit after 25.00 years of use. His smokeless tobacco use includes Chew.    He  reports that he drinks about 1.2 oz of alcohol per week .    He  reports that he does not use drugs.    Patient's Body mass index is 29.23 kg/m². BMI is above normal parameters. Recommendations include: educational material and exercise counseling.         RISK SCORE: 4    Pelon Stewart Jr., M.D.  PGY2  Family Practice Resident  [address] [place] [address]  Phone: [phone]  Fax: [phone]      This document has been electronically signed by Pelon Stewart Jr, MD on 10/01/18 10:43 AM

## 2018-10-09 NOTE — PROGRESS NOTES
I have seen the patient.  I have reviewed the notes, assessments, and/or procedures performed by Dr Stewart, I concur with his  documentation and assessment and plan for Jian Baker.          This document has been electronically signed by Hebert Anguiano MD on October 9, 2018 10:00 AM

## 2019-04-02 ENCOUNTER — OFFICE VISIT (OUTPATIENT)
Dept: FAMILY MEDICINE CLINIC | Facility: CLINIC | Age: 64
End: 2019-04-02

## 2019-04-02 VITALS
HEART RATE: 124 BPM | BODY MASS INDEX: 29.19 KG/M2 | OXYGEN SATURATION: 98 % | DIASTOLIC BLOOD PRESSURE: 100 MMHG | WEIGHT: 186 LBS | HEIGHT: 67 IN | SYSTOLIC BLOOD PRESSURE: 170 MMHG

## 2019-04-02 DIAGNOSIS — M17.0 PRIMARY OSTEOARTHRITIS OF BOTH KNEES: ICD-10-CM

## 2019-04-02 DIAGNOSIS — I10 ESSENTIAL HYPERTENSION: ICD-10-CM

## 2019-04-02 PROCEDURE — 99213 OFFICE O/P EST LOW 20 MIN: CPT | Performed by: STUDENT IN AN ORGANIZED HEALTH CARE EDUCATION/TRAINING PROGRAM

## 2019-04-02 RX ORDER — MELOXICAM 7.5 MG/1
7.5 TABLET ORAL DAILY
Qty: 60 TABLET | Refills: 5 | Status: SHIPPED | OUTPATIENT
Start: 2019-04-02 | End: 2020-01-02 | Stop reason: SDUPTHER

## 2019-04-02 RX ORDER — LISINOPRIL 10 MG/1
20 TABLET ORAL DAILY
Qty: 60 TABLET | Refills: 5 | Status: SHIPPED | OUTPATIENT
Start: 2019-04-02 | End: 2019-09-23 | Stop reason: SDUPTHER

## 2019-04-02 RX ORDER — ATORVASTATIN CALCIUM 20 MG/1
20 TABLET, FILM COATED ORAL DAILY
Qty: 30 TABLET | Refills: 5 | Status: SHIPPED | OUTPATIENT
Start: 2019-04-02 | End: 2019-09-23 | Stop reason: SDUPTHER

## 2019-04-02 RX ORDER — PANTOPRAZOLE SODIUM 20 MG/1
20 TABLET, DELAYED RELEASE ORAL DAILY
Qty: 30 TABLET | Refills: 5 | Status: SHIPPED | OUTPATIENT
Start: 2019-04-02 | End: 2019-09-23 | Stop reason: SDUPTHER

## 2019-04-02 RX ORDER — CETIRIZINE HYDROCHLORIDE 10 MG/1
10 TABLET ORAL DAILY
Qty: 30 TABLET | Refills: 11 | Status: SHIPPED | OUTPATIENT
Start: 2019-04-02 | End: 2019-08-09 | Stop reason: ALTCHOICE

## 2019-04-09 ENCOUNTER — PROCEDURE VISIT (OUTPATIENT)
Dept: FAMILY MEDICINE CLINIC | Facility: CLINIC | Age: 64
End: 2019-04-09

## 2019-04-09 VITALS
WEIGHT: 190.4 LBS | BODY MASS INDEX: 29.88 KG/M2 | DIASTOLIC BLOOD PRESSURE: 90 MMHG | SYSTOLIC BLOOD PRESSURE: 132 MMHG | HEIGHT: 67 IN | HEART RATE: 65 BPM | OXYGEN SATURATION: 98 %

## 2019-04-09 DIAGNOSIS — M17.0 PRIMARY OSTEOARTHRITIS OF BOTH KNEES: Primary | ICD-10-CM

## 2019-04-09 PROCEDURE — 20610 DRAIN/INJ JOINT/BURSA W/O US: CPT | Performed by: STUDENT IN AN ORGANIZED HEALTH CARE EDUCATION/TRAINING PROGRAM

## 2019-04-09 PROCEDURE — 99213 OFFICE O/P EST LOW 20 MIN: CPT | Performed by: STUDENT IN AN ORGANIZED HEALTH CARE EDUCATION/TRAINING PROGRAM

## 2019-04-12 NOTE — PROGRESS NOTES
I have reviewed the notes, assessments, and/or procedures performed by Pelon Stewart Jr., MD, I concur with her/his documentation and assessment and plan for Jian Baker.                This document has been electronically signed by Angel Rea MD on April 12, 2019 11:24 AM

## 2019-04-12 NOTE — PATIENT INSTRUCTIONS
Attention Deficit Hyperactivity Disorder, Pediatric  Attention deficit hyperactivity disorder (ADHD) is a condition that can make it hard for a child to pay attention and concentrate or to control his or her behavior. The child may also have a lot of energy. ADHD is a disorder of the brain (neurodevelopmental disorder), and symptoms are typically first seen in early childhood. It is a common reason for behavioral and academic problems in school.  There are three main types of ADHD:  · Inattentive. With this type, children have difficulty paying attention.  · Hyperactive-impulsive. With this type, children have a lot of energy and have difficulty controlling their behavior.  · Combination. This type involves having symptoms of both of the other types.    ADHD is a lifelong condition. If it is not treated, the disorder can affect a child's future academic achievement, employment, and relationships.  What are the causes?  The exact cause of this condition is not known.  What increases the risk?  This condition is more likely to develop in:  · Children who have a first-degree relative, such as a parent or brother or sister, with the condition.  · Children who had a low birth weight.  · Children whose mothers had problems during pregnancy or used alcohol or tobacco during pregnancy.  · Children who have had a brain infection or a head injury.  · Children who have been exposed to lead.    What are the signs or symptoms?  Symptoms of this condition depend on the type of ADHD. Symptoms are listed here for each type:  Inattentive  · Problems with organization.  · Difficulty staying focused.  · Problems completing assignments at school.  · Often making simple mistakes.  · Problems sustaining mental effort.  · Not listening to instructions.  · Losing things often.  · Forgetting things often.  · Being easily distracted.  Hyperactive-impulsive  · Fidgeting often.  · Difficulty sitting still in one's seat.  · Talking a  "lot.  · Talking out of turn.  · Interrupting others.  · Difficulty relaxing or doing quiet activities.  · High energy levels and constant movement.  · Difficulty waiting.  · Always \"on the go.\"  Combination  · Having symptoms of both of the other types.  Children with ADHD may feel frustrated with themselves and may find school to be particularly discouraging. They often perform below their abilities in school.  As children get older, the excess movement can lessen, but the problems with paying attention and staying organized often continue. Most children do not outgrow ADHD, but with good treatment, they can learn to cope with the symptoms.  How is this diagnosed?  This condition is diagnosed based on a child's symptoms and academic history. The child's health care provider will do a complete assessment. As part of the assessment, the health care provider will ask the child questions and will ask the parents and teachers for their observations of the child. The health care provider looks for specific symptoms of ADHD.  Diagnosis will include:  · Ruling out other reasons for the child's behavior.  · Reviewing behavior rating scales that have been filled out about the child by people who deal with the child on a daily basis.    A diagnosis is made only after all information from multiple people has been considered.  How is this treated?  Treatment for this condition may include:  · Behavior therapy.  · Medicines to decrease impulsivity and hyperactivity and to increase attention. Behavior therapy is preferred for children younger than 6 years old. The combination of medicine and behavior therapy is most effective for children older than 6 years of age.  · Tutoring or extra support at school.  · Techniques for parents to use at home to help manage their child's symptoms and behavior.    Follow these instructions at home:  Eating and drinking  · Offer your child a well-balanced diet. Breakfast that includes a balance " of whole grains, protein, and fruits or vegetables is especially important for school performance.  · If your child has trouble with hyperactivity, have your child avoid drinks that contain caffeine. These include:  ? Soft drinks.  ? Coffee.  ? Tea.  · If your child is older and finds that caffeinated drinks help to improve his or her attention, talk with your child's health care provider about what amount of caffeine intake is a safe for your child.  Lifestyle    · Make sure your child gets a full night of sleep and regular daily exercise.  · Help manage your child's behavior by following the techniques learned in therapy. These may include:  ? Looking for good behavior and rewarding it.  ? Making rules for behavior that your child can understand and follow.  ? Giving clear instructions.  ? Responding consistently to your child's challenging behaviors.  ? Setting realistic goals.  ? Looking for activities that can lead to success and self-esteem.  ? Making time for pleasant activities with your child.  ? Giving lots of affection.  · Help your child learn to be organized. Some ways to do this include:  ? Keeping daily schedules the same. Have a regular wake-up time and bedtime for your child. Schedule all activities, including time for homework and time for play. Post the schedule in a place where your child will see it. Miguel schedule changes in advance.  ? Having a regular place for your child to store items such as clothing, backpacks, and school supplies.  ? Encouraging your child to write down school assignments and to bring home needed books. Work with your child's teachers for assistance in organizing school work.  General instructions  · Learn as much as you can about ADHD. This will improve your ability to help your child and to make sure he or she gets the support needed. It will also help you educate your child's teachers and instructors if they do not feel that they have adequate knowledge or experience  in these areas.  · Work with your child's teachers to make sure your child gets the support and extra help that is needed. This may include:  ? Tutoring.  ? Teacher cues to help your child remain on task.  ? Seating changes so your child is working at a desk that is free from distractions.  · Give over-the-counter and prescription medicines only as told by your child's health care provider.  · Keep all follow-up visits as told by your health care provider. This is important.  Contact a health care provider if:  · Your child has repeated muscle twitches (tics), coughs, or speech outbursts.  · Your child has sleep problems.  · Your child has a marked loss of appetite.  · Your child develops depression.  · Your child has new or worsening behavioral problems.  · Your child has dizziness.  · Your child has a racing heart.  · Your child has stomach pains.  · Your child develops headaches.  Get help right away if:  · Your child talks about or threatens suicide.  · You are worried that your child is having a bad reaction to a medicine that he or she is taking for ADHD.  This information is not intended to replace advice given to you by your health care provider. Make sure you discuss any questions you have with your health care provider.  Document Released: 12/08/2003 Document Revised: 08/16/2017 Document Reviewed: 07/13/2017  [employer] Interactive Patient Education © 2019 [employer]

## 2019-04-12 NOTE — PROGRESS NOTES
[facility]  PROGRESS NOTE  Pelon Stewart Jr, MD  Subjective   SUBJECTIVE:   CC: Hypertension (out of all meds)   Jian Baker is a 63 y.o. male who [facility] the [facility] today for primary osteoarthritis of both knees.  Patient states that he has a dull aching pain that is made worse with use.  Pain is better with rest.  He has received knee injections in the past with good relief of symptoms for approximately 6 months.  He would like to have his knees injected again.  He has not amenable to coming back to clinic in 5 days for injection.    Patient's blood pressure is also elevated today in office.  He states that he has been out of medication for a couple of weeks.  We will refill medications today and discussed with patient that the next time he runs out of his medications he can call and receive a refill over the phone.    I have reviewed the patient's medical, family, and social history in detail;there are no changes to the history as noted in the electronic medical record.   Past Medical History:   Diagnosis Date   • Asthma     Previously diagnosed and previously prescribed inhaler medications.     • Essential hypertension    • Gastroesophageal reflux disease      Past Surgical History:   Procedure Laterality Date   • BONE GRAFT     • WRIST FRACTURE SURGERY Bilateral      Current Outpatient Medications on File Prior to Visit   Medication Sig   • fluticasone (FLONASE) 50 MCG/ACT nasal spray 2 sprays into each nostril Daily.     No current facility-administered medications on file prior to visit.      He has No Known Allergies.    Family History   Problem Relation Age of Onset   • COPD Mother    • Hypertension Mother    • Diabetes type II Mother    • Cancer Father    • Heart failure Father         He  reports that he quit smoking about 21 years ago. His smoking use included pipe. He started smoking about 46 years ago. He quit after 25.00 years of use. His  "smokeless tobacco use includes chew. He reports that he drinks about 1.2 oz of alcohol per week. He reports that he does not use drugs.    Review of Systems General: negative for - fatigue or weight loss  PULM: no cough, shortness of breath, or wheezing  CV: no chest pain or dyspnea on exertion  MSK: Bilateral knee pain  GI: no abdominal pain, change in bowel habits, or black or bloody stools  NEURO: no TIA or stroke symptoms; No confusion, dizziness, HA or seizures        Objective   OBJECTIVE:     Vitals:    04/02/19 0918   BP: 170/100   Pulse: (!) 124   SpO2: 98%   Weight: 84.4 kg (186 lb)   Height: 170.2 cm (67\")     Physical Exam GEN: Well developed, in no acute distress  HEENT: NCAT, without lesions or deformities  CV: Normal S1/S2, no murmurs or friction rubs  PULM: Normal effort, without wheezes or rhonchi  GI: Soft, non-tender & non-distended; +BSx4  Ext: Pain in knees bilaterally with palpation of medial and lateral joint lines significant crepitus on flexion extension both knees.  Neuro: AxO x 3, normal gait  Psych: appropriate mood & affect    DATA REVIEWED:  No visits with results within 3 Month(s) from this visit.   Latest known visit with results is:   Office Visit on 06/27/2018   Component Date Value Ref Range Status   • Glucose 06/27/2018 98  60 - 100 mg/dL Final   • BUN 06/27/2018 22* 7 - 21 mg/dL Final   • Creatinine 06/27/2018 0.84  0.70 - 1.30 mg/dL Final   • Sodium 06/27/2018 140  137 - 145 mmol/L Final   • Potassium 06/27/2018 4.9  3.5 - 5.1 mmol/L Final   • Chloride 06/27/2018 101  95 - 110 mmol/L Final   • CO2 06/27/2018 29.0  22.0 - 31.0 mmol/L Final   • Calcium 06/27/2018 9.7  8.4 - 10.2 mg/dL Final   • Total Protein 06/27/2018 7.5  6.3 - 8.6 g/dL Final   • Albumin 06/27/2018 4.60  3.40 - 4.80 g/dL Final   • ALT (SGPT) 06/27/2018 27  21 - 72 U/L Final   • AST (SGOT) 06/27/2018 31  17 - 59 U/L Final   • Alkaline Phosphatase 06/27/2018 40  38 - 126 U/L Final   • Total Bilirubin 06/27/2018 " 0.6  0.2 - 1.3 mg/dL Final   • eGFR Non  Amer 06/27/2018 93  49 - 113 mL/min/1.73 Final   • Globulin 06/27/2018 2.9  2.3 - 3.5 gm/dL Final   • A/G Ratio 06/27/2018 1.6  1.1 - 1.8 g/dL Final   • BUN/Creatinine Ratio 06/27/2018 26.2* 7.0 - 25.0 Final   • Anion Gap 06/27/2018 10.0  5.0 - 15.0 mmol/L Final   • WBC 06/27/2018 4.93  3.20 - 9.80 10*3/mm3 Final   • RBC 06/27/2018 4.30* 4.37 - 5.74 10*6/mm3 Final   • Hemoglobin 06/27/2018 13.4* 13.7 - 17.3 g/dL Final   • Hematocrit 06/27/2018 39.4  39.0 - 49.0 % Final   • MCV 06/27/2018 91.6  80.0 - 98.0 fL Final   • MCH 06/27/2018 31.2  26.5 - 34.0 pg Final   • MCHC 06/27/2018 34.0  31.5 - 36.3 g/dL Final   • RDW 06/27/2018 13.1  11.5 - 14.5 % Final   • RDW-SD 06/27/2018 43.8  35.1 - 43.9 fl Final   • MPV 06/27/2018 10.7  8.0 - 12.0 fL Final   • Platelets 06/27/2018 209  150 - 450 10*3/mm3 Final   • Neutrophil % 06/27/2018 69.8  37.0 - 80.0 % Final   • Lymphocyte % 06/27/2018 19.3  10.0 - 50.0 % Final   • Monocyte % 06/27/2018 8.7  0.0 - 12.0 % Final   • Eosinophil % 06/27/2018 1.6  0.0 - 7.0 % Final   • Basophil % 06/27/2018 0.4  0.0 - 2.0 % Final   • Immature Grans % 06/27/2018 0.2  0.0 - 0.5 % Final   • Neutrophils, Absolute 06/27/2018 3.44  2.00 - 8.60 10*3/mm3 Final   • Lymphocytes, Absolute 06/27/2018 0.95  0.60 - 4.20 10*3/mm3 Final   • Monocytes, Absolute 06/27/2018 0.43  0.00 - 0.90 10*3/mm3 Final   • Eosinophils, Absolute 06/27/2018 0.08  0.00 - 0.70 10*3/mm3 Final   • Basophils, Absolute 06/27/2018 0.02  0.00 - 0.20 10*3/mm3 Final   • Immature Grans, Absolute 06/27/2018 0.01  0.00 - 0.02 10*3/mm3 Final   • Total Cholesterol 06/27/2018 135  0 - 199 mg/dL Final   • Triglycerides 06/27/2018 68  20 - 199 mg/dL Final   • HDL Cholesterol 06/27/2018 68  60 - 200 mg/dL Final   • LDL Cholesterol  06/27/2018 49  1 - 129 mg/dL Final   • LDL/HDL Ratio 06/27/2018 0.79  0.00 - 3.55 Final     Assessment/Plan   ASSESSMENT & PLAN:      Diagnosis Plan   1. Primary  osteoarthritis of both knees  meloxicam (MOBIC) 7.5 MG tablet  Return to clinic next Tuesday for procedure clinic for bilateral knee injections   2. Essential hypertension  lisinopril (PRINIVIL,ZESTRIL) 10 MG tablet    atorvastatin (LIPITOR) 20 MG tablet     No orders of the defined types were placed in this encounter.    Medications Discontinued During This Encounter   Medication Reason   • lisinopril (PRINIVIL,ZESTRIL) 10 MG tablet Reorder   • meloxicam (MOBIC) 7.5 MG tablet Reorder   • cetirizine (zyrTEC) 10 MG tablet Reorder   • atorvastatin (LIPITOR) 20 MG tablet Reorder     -- Preventive Medicine Topics --   Health Maintenance Topics with due status: Overdue       Topic Date Due    MEDICARE ANNUAL WELLNESS 10/10/2017    ZOSTER VACCINE 12/05/2017     Patient's Body mass index is 29.13 kg/m². BMI is above normal parameters. Recommendations include: educational material and nutrition counseling.    Mr. Baker   reports that he quit smoking about 21 years ago. His smoking use included pipe. He started smoking about 46 years ago. He quit after 25.00 years of use. His smokeless tobacco use includes chew.  Goals Addressed     None           Follow-up:   Return in about 5 days (around 4/7/2019).  RISK SCORE: 4  ___________________________  Pelon Stewart Jr., M.D. PGY2  Family Practice Resident  [address] [place] [address]  Phone: [phone]  Fax: [phone]  ¯¯¯¯¯¯¯¯¯¯¯¯¯¯¯¯¯¯¯¯¯¯¯¯¯¯¯    This document has been electronically signed by  Pelon Stewart Jr, MD on 04/11/19 7:26 PM

## 2019-04-30 ENCOUNTER — OFFICE VISIT (OUTPATIENT)
Dept: FAMILY MEDICINE CLINIC | Facility: CLINIC | Age: 64
End: 2019-04-30

## 2019-04-30 VITALS
HEIGHT: 67 IN | WEIGHT: 189.1 LBS | SYSTOLIC BLOOD PRESSURE: 138 MMHG | BODY MASS INDEX: 29.68 KG/M2 | OXYGEN SATURATION: 98 % | HEART RATE: 65 BPM | DIASTOLIC BLOOD PRESSURE: 60 MMHG

## 2019-04-30 DIAGNOSIS — G47.9 SLEEPING DIFFICULTY: ICD-10-CM

## 2019-04-30 DIAGNOSIS — M79.2 NEUROPATHIC PAIN: Primary | ICD-10-CM

## 2019-04-30 DIAGNOSIS — M25.552 LEFT HIP PAIN: ICD-10-CM

## 2019-04-30 PROCEDURE — 99213 OFFICE O/P EST LOW 20 MIN: CPT | Performed by: STUDENT IN AN ORGANIZED HEALTH CARE EDUCATION/TRAINING PROGRAM

## 2019-04-30 RX ORDER — ZOLPIDEM TARTRATE 10 MG/1
10 TABLET ORAL NIGHTLY PRN
Qty: 10 TABLET | Refills: 0 | Status: SHIPPED | OUTPATIENT
Start: 2019-04-30 | End: 2019-11-18

## 2019-04-30 RX ORDER — PREGABALIN 50 MG/1
50 CAPSULE ORAL 3 TIMES DAILY
Qty: 30 CAPSULE | Refills: 1 | Status: SHIPPED | OUTPATIENT
Start: 2019-04-30 | End: 2019-07-12 | Stop reason: SDUPTHER

## 2019-05-14 RX ORDER — TRIAMCINOLONE ACETONIDE 40 MG/ML
80 INJECTION, SUSPENSION INTRA-ARTICULAR; INTRAMUSCULAR
Status: DISCONTINUED | OUTPATIENT
Start: 2019-05-14 | End: 2019-05-14 | Stop reason: HOSPADM

## 2019-05-14 RX ORDER — LIDOCAINE HYDROCHLORIDE 10 MG/ML
1 INJECTION, SOLUTION INFILTRATION; PERINEURAL
Status: DISCONTINUED | OUTPATIENT
Start: 2019-05-14 | End: 2019-05-14 | Stop reason: HOSPADM

## 2019-05-14 RX ADMIN — TRIAMCINOLONE ACETONIDE 80 MG: 40 INJECTION, SUSPENSION INTRA-ARTICULAR; INTRAMUSCULAR at 14:02

## 2019-05-14 RX ADMIN — LIDOCAINE HYDROCHLORIDE 1 ML: 10 INJECTION, SOLUTION INFILTRATION; PERINEURAL at 14:02

## 2019-05-14 NOTE — PROGRESS NOTES
[facility]  PROGRESS NOTE  Pelon Stewart Jr, MD  Subjective   SUBJECTIVE:   CC: Annual Exam   Jian Baker is a 63 y.o. male who presents to the [facility] today for his annual exam to discuss sleeping difficulty since the loss of his son in a car accident 2 weeks ago.    Preventive: patient is relatively healthy.  He eats a balanced diet & gets exercise by doing chores around the the house & getting outside to walk.  He is up to date on all his vaccinations.  He sees a dentist annually.  His main complaint is pain in his knees which has been helped with bilateral knee injections of kenalog.  He does not smoke.  He does not drink alcohol.    Acute Grief:  Patient's son passed away recently in a car accident about 2 weeks ago.  Patient states that it has been difficult on him & his wife but he feels that he is doing okay given the situation.  His main complaint is that he is having difficulty sleeping because of thinking about his son.  He will fall asleep but wake up every 45-60 minutes then has trouble falling back asleep.  We discussed using ambien for a short period to help him through the initial grief stage.  He is agreeable.     I have reviewed the patient's medical, family, and social history in detail;there are no changes to the history as noted in the electronic medical record.   Past Medical History:   Diagnosis Date   • Asthma     Previously diagnosed and previously prescribed inhaler medications.     • Essential hypertension    • Gastroesophageal reflux disease      Past Surgical History:   Procedure Laterality Date   • BONE GRAFT     • WRIST FRACTURE SURGERY Bilateral      Current Outpatient Medications on File Prior to Visit   Medication Sig   • atorvastatin (LIPITOR) 20 MG tablet Take 1 tablet by mouth Daily.   • cetirizine (zyrTEC) 10 MG tablet Take 1 tablet by mouth Daily.   • fluticasone (FLONASE) 50 MCG/ACT nasal spray 2 sprays into each  "nostril Daily.   • lisinopril (PRINIVIL,ZESTRIL) 10 MG tablet Take 2 tablets by mouth Daily.   • meloxicam (MOBIC) 7.5 MG tablet Take 1 tablet by mouth Daily.   • pantoprazole (PROTONIX) 20 MG EC tablet Take 1 tablet by mouth Daily.     Current Facility-Administered Medications on File Prior to Visit   Medication   • [DISCONTINUED] lidocaine (XYLOCAINE) 1 % injection 1 mL   • [DISCONTINUED] triamcinolone acetonide (KENALOG-40) injection 80 mg     He has No Known Allergies.    Family History   Problem Relation Age of Onset   • COPD Mother    • Hypertension Mother    • Diabetes type II Mother    • Cancer Father    • Heart failure Father         He  reports that he quit smoking about 21 years ago. His smoking use included pipe. He started smoking about 46 years ago. He quit after 25.00 years of use. His smokeless tobacco use includes chew. He reports that he drinks about 1.2 oz of alcohol per week. He reports that he does not use drugs.    Review of Systems General: negative for - fatigue or weight loss  PULM: no cough, shortness of breath, or wheezing  CV: no chest pain or dyspnea on exertion  MSK: Negative for gait disturbance, joint pain, joint swelling or muscular weakness  GI: no abdominal pain, change in bowel habits, or black or bloody stools  NEURO: no TIA or stroke symptoms; No confusion, dizziness, HA or seizures        Objective   OBJECTIVE:     Vitals:    04/30/19 1335   BP: 138/60   Pulse: 65   SpO2: 98%   Weight: 85.8 kg (189 lb 1.6 oz)   Height: 170.2 cm (67\")     Physical Exam GEN: Well developed, in no acute distress  HEENT: NCAT, without lesions or deformities  CV: Normal S1/S2, no murmurs or friction rubs  PULM: Normal effort, without wheezes or rhonchi  GI: Soft, non-tender & non-distended; +BSx4  Ext: Full ROM, without edema or deformities  Neuro: AxO x 3, normal gait  Psych: appropriate mood & affect    DATA REVIEWED:  No visits with results within 3 Month(s) from this visit.   Latest known visit " with results is:   Office Visit on 06/27/2018   Component Date Value Ref Range Status   • Glucose 06/27/2018 98  60 - 100 mg/dL Final   • BUN 06/27/2018 22* 7 - 21 mg/dL Final   • Creatinine 06/27/2018 0.84  0.70 - 1.30 mg/dL Final   • Sodium 06/27/2018 140  137 - 145 mmol/L Final   • Potassium 06/27/2018 4.9  3.5 - 5.1 mmol/L Final   • Chloride 06/27/2018 101  95 - 110 mmol/L Final   • CO2 06/27/2018 29.0  22.0 - 31.0 mmol/L Final   • Calcium 06/27/2018 9.7  8.4 - 10.2 mg/dL Final   • Total Protein 06/27/2018 7.5  6.3 - 8.6 g/dL Final   • Albumin 06/27/2018 4.60  3.40 - 4.80 g/dL Final   • ALT (SGPT) 06/27/2018 27  21 - 72 U/L Final   • AST (SGOT) 06/27/2018 31  17 - 59 U/L Final   • Alkaline Phosphatase 06/27/2018 40  38 - 126 U/L Final   • Total Bilirubin 06/27/2018 0.6  0.2 - 1.3 mg/dL Final   • eGFR Non  Amer 06/27/2018 93  49 - 113 mL/min/1.73 Final   • Globulin 06/27/2018 2.9  2.3 - 3.5 gm/dL Final   • A/G Ratio 06/27/2018 1.6  1.1 - 1.8 g/dL Final   • BUN/Creatinine Ratio 06/27/2018 26.2* 7.0 - 25.0 Final   • Anion Gap 06/27/2018 10.0  5.0 - 15.0 mmol/L Final   • WBC 06/27/2018 4.93  3.20 - 9.80 10*3/mm3 Final   • RBC 06/27/2018 4.30* 4.37 - 5.74 10*6/mm3 Final   • Hemoglobin 06/27/2018 13.4* 13.7 - 17.3 g/dL Final   • Hematocrit 06/27/2018 39.4  39.0 - 49.0 % Final   • MCV 06/27/2018 91.6  80.0 - 98.0 fL Final   • MCH 06/27/2018 31.2  26.5 - 34.0 pg Final   • MCHC 06/27/2018 34.0  31.5 - 36.3 g/dL Final   • RDW 06/27/2018 13.1  11.5 - 14.5 % Final   • RDW-SD 06/27/2018 43.8  35.1 - 43.9 fl Final   • MPV 06/27/2018 10.7  8.0 - 12.0 fL Final   • Platelets 06/27/2018 209  150 - 450 10*3/mm3 Final   • Neutrophil % 06/27/2018 69.8  37.0 - 80.0 % Final   • Lymphocyte % 06/27/2018 19.3  10.0 - 50.0 % Final   • Monocyte % 06/27/2018 8.7  0.0 - 12.0 % Final   • Eosinophil % 06/27/2018 1.6  0.0 - 7.0 % Final   • Basophil % 06/27/2018 0.4  0.0 - 2.0 % Final   • Immature Grans % 06/27/2018 0.2  0.0 - 0.5 % Final    • Neutrophils, Absolute 06/27/2018 3.44  2.00 - 8.60 10*3/mm3 Final   • Lymphocytes, Absolute 06/27/2018 0.95  0.60 - 4.20 10*3/mm3 Final   • Monocytes, Absolute 06/27/2018 0.43  0.00 - 0.90 10*3/mm3 Final   • Eosinophils, Absolute 06/27/2018 0.08  0.00 - 0.70 10*3/mm3 Final   • Basophils, Absolute 06/27/2018 0.02  0.00 - 0.20 10*3/mm3 Final   • Immature Grans, Absolute 06/27/2018 0.01  0.00 - 0.02 10*3/mm3 Final   • Total Cholesterol 06/27/2018 135  0 - 199 mg/dL Final   • Triglycerides 06/27/2018 68  20 - 199 mg/dL Final   • HDL Cholesterol 06/27/2018 68  60 - 200 mg/dL Final   • LDL Cholesterol  06/27/2018 49  1 - 129 mg/dL Final   • LDL/HDL Ratio 06/27/2018 0.79  0.00 - 3.55 Final     Assessment/Plan   ASSESSMENT & PLAN:      Diagnosis Plan   1. Neuropathic pain     2. Sleeping difficulty     3. Left hip pain  XR Hips Bilateral With or Without Pelvis 2 View     Orders Placed This Encounter   Procedures   • XR Hips Bilateral With or Without Pelvis 2 View     Order Specific Question:   Reason for Exam:     Answer:   Left hip pain     There are no discontinued medications.  -- Preventive Medicine Topics --   Health Maintenance Topics with due status: Overdue       Topic Date Due    MEDICARE ANNUAL WELLNESS 10/10/2017    ZOSTER VACCINE 12/05/2017     Patient's Body mass index is 29.62 kg/m². BMI is above normal parameters. Recommendations include: educational material and nutrition counseling.    Mr. Baker   reports that he quit smoking about 21 years ago. His smoking use included pipe. He started smoking about 46 years ago. He quit after 25.00 years of use. His smokeless tobacco use includes chew.       Follow-up:   Return in about 6 weeks (around 6/11/2019) for Left hip pain f/u.  RISK SCORE: 3  ___________________________  Pelon Stewart Jr., M.D. PGY2  Family Practice Resident  [address] [place] [address]  Phone: [phone]  Fax: [phone]  ¯¯¯¯¯¯¯¯¯¯¯¯¯¯¯¯¯¯¯¯¯¯¯¯¯¯¯    This document  has been electronically signed by  Pelon Stewart Jr, MD on 05/14/19 4:21 PM

## 2019-05-15 NOTE — PROGRESS NOTES
I have reviewed the notes, assessments, and/or procedures performed by Pelon Stewart Jr., MD, I concur with her/his documentation and assessment and plan for Jian Baker.                This document has been electronically signed by Angel Rea MD on May 15, 2019 9:53 AM

## 2019-05-17 NOTE — PROGRESS NOTES
I have reviewed the notes, assessments, and/or procedures performed by Dr. Pelon Stewart Jr, I concur with his  documentation and assessment and plan for Jian Baker        This document has been electronically signed by Hebert Anguiano MD on May 17, 2019 10:30 AM

## 2019-07-09 ENCOUNTER — OFFICE VISIT (OUTPATIENT)
Dept: FAMILY MEDICINE CLINIC | Facility: CLINIC | Age: 64
End: 2019-07-09

## 2019-07-09 ENCOUNTER — APPOINTMENT (OUTPATIENT)
Dept: LAB | Facility: HOSPITAL | Age: 64
End: 2019-07-09

## 2019-07-09 VITALS
BODY MASS INDEX: 28.52 KG/M2 | HEIGHT: 67 IN | SYSTOLIC BLOOD PRESSURE: 132 MMHG | HEART RATE: 93 BPM | DIASTOLIC BLOOD PRESSURE: 60 MMHG | OXYGEN SATURATION: 98 % | WEIGHT: 181.7 LBS | TEMPERATURE: 97.3 F

## 2019-07-09 DIAGNOSIS — R68.83 SHAKING CHILLS: Primary | ICD-10-CM

## 2019-07-09 PROCEDURE — 85025 COMPLETE CBC W/AUTO DIFF WBC: CPT | Performed by: STUDENT IN AN ORGANIZED HEALTH CARE EDUCATION/TRAINING PROGRAM

## 2019-07-09 PROCEDURE — 36415 COLL VENOUS BLD VENIPUNCTURE: CPT | Performed by: STUDENT IN AN ORGANIZED HEALTH CARE EDUCATION/TRAINING PROGRAM

## 2019-07-09 PROCEDURE — 99213 OFFICE O/P EST LOW 20 MIN: CPT | Performed by: STUDENT IN AN ORGANIZED HEALTH CARE EDUCATION/TRAINING PROGRAM

## 2019-07-10 LAB
BASOPHILS # BLD AUTO: 0.05 10*3/MM3 (ref 0–0.2)
BASOPHILS NFR BLD AUTO: 0.4 % (ref 0–1.5)
DEPRECATED RDW RBC AUTO: 43.2 FL (ref 37–54)
EOSINOPHIL # BLD AUTO: 0.17 10*3/MM3 (ref 0–0.4)
EOSINOPHIL NFR BLD AUTO: 1.3 % (ref 0.3–6.2)
ERYTHROCYTE [DISTWIDTH] IN BLOOD BY AUTOMATED COUNT: 13.2 % (ref 12.3–15.4)
HCT VFR BLD AUTO: 32.6 % (ref 37.5–51)
HGB BLD-MCNC: 10.8 G/DL (ref 13–17.7)
IMM GRANULOCYTES # BLD AUTO: 0.06 10*3/MM3 (ref 0–0.05)
IMM GRANULOCYTES NFR BLD AUTO: 0.5 % (ref 0–0.5)
LYMPHOCYTES # BLD AUTO: 1.48 10*3/MM3 (ref 0.7–3.1)
LYMPHOCYTES NFR BLD AUTO: 11.3 % (ref 19.6–45.3)
MCH RBC QN AUTO: 29.4 PG (ref 26.6–33)
MCHC RBC AUTO-ENTMCNC: 33.1 G/DL (ref 31.5–35.7)
MCV RBC AUTO: 88.8 FL (ref 79–97)
MONOCYTES # BLD AUTO: 1.37 10*3/MM3 (ref 0.1–0.9)
MONOCYTES NFR BLD AUTO: 10.4 % (ref 5–12)
NEUTROPHILS # BLD AUTO: 10.01 10*3/MM3 (ref 1.7–7)
NEUTROPHILS NFR BLD AUTO: 76.1 % (ref 42.7–76)
NRBC BLD AUTO-RTO: 0 /100 WBC (ref 0–0.2)
PLATELET # BLD AUTO: 351 10*3/MM3 (ref 140–450)
PMV BLD AUTO: 11.9 FL (ref 6–12)
RBC # BLD AUTO: 3.67 10*6/MM3 (ref 4.14–5.8)
WBC NRBC COR # BLD: 13.14 10*3/MM3 (ref 3.4–10.8)

## 2019-07-11 ENCOUNTER — TELEPHONE (OUTPATIENT)
Dept: FAMILY MEDICINE CLINIC | Facility: CLINIC | Age: 64
End: 2019-07-11

## 2019-07-11 NOTE — TELEPHONE ENCOUNTER
Pt called and is needing a refill on his pregabalin (LYRICA) 50 MG capsule and would like for it to be called into [facility] please.      Thank you,      Alannah

## 2019-07-12 NOTE — TELEPHONE ENCOUNTER
Pt called and is needing a refill on his pregabalin (LYRICA) 50 MG capsule and would like for it to be called into [facility] please.        Thanks,  Ada

## 2019-07-15 RX ORDER — PREGABALIN 50 MG/1
50 CAPSULE ORAL 3 TIMES DAILY
Qty: 30 CAPSULE | Refills: 1 | Status: SHIPPED | OUTPATIENT
Start: 2019-07-15 | End: 2019-08-09

## 2019-08-09 ENCOUNTER — OFFICE VISIT (OUTPATIENT)
Dept: FAMILY MEDICINE CLINIC | Facility: CLINIC | Age: 64
End: 2019-08-09

## 2019-08-09 VITALS
OXYGEN SATURATION: 97 % | BODY MASS INDEX: 28.63 KG/M2 | DIASTOLIC BLOOD PRESSURE: 80 MMHG | HEART RATE: 91 BPM | SYSTOLIC BLOOD PRESSURE: 138 MMHG | WEIGHT: 182.4 LBS | HEIGHT: 67 IN | TEMPERATURE: 97.5 F

## 2019-08-09 DIAGNOSIS — J30.9 ALLERGIC RHINITIS, UNSPECIFIED SEASONALITY, UNSPECIFIED TRIGGER: ICD-10-CM

## 2019-08-09 DIAGNOSIS — M25.551 PAIN OF RIGHT HIP JOINT: Primary | ICD-10-CM

## 2019-08-09 PROCEDURE — 99213 OFFICE O/P EST LOW 20 MIN: CPT | Performed by: STUDENT IN AN ORGANIZED HEALTH CARE EDUCATION/TRAINING PROGRAM

## 2019-08-09 RX ORDER — LORATADINE 10 MG/1
10 TABLET ORAL DAILY
Qty: 30 TABLET | Refills: 11 | Status: SHIPPED | OUTPATIENT
Start: 2019-08-09 | End: 2019-11-18 | Stop reason: SDUPTHER

## 2019-08-09 RX ORDER — PREGABALIN 50 MG/1
50 CAPSULE ORAL 3 TIMES DAILY
Qty: 90 CAPSULE | Refills: 2 | Status: SHIPPED | OUTPATIENT
Start: 2019-08-09 | End: 2019-11-18 | Stop reason: SDUPTHER

## 2019-08-18 NOTE — PROGRESS NOTES
FAMILY MEDICINE  RESIDENCY CLINIC PROGRESS NOTE  Pelon Stewart Jr, MD  Subjective   SUBJECTIVE:   CC: Hip Pain (1 mo follow up pt denies any pain) and muscle strain (upper abd)     Jian Baker is a 64 y.o. male who presents to the [facility] today for  F/u for right side hip pain.  Initial visit was one month ago.  No known inciting event.  Patient had pain from mid lower back down right hip.  Associated numbness and tingling pain. Pain has been improved with lyrica.  He would like to continue current medication.    Allergic Rhinitis  Patient presents for evaluation of allergic symptoms.  Symptoms include clear rhinorrhea, cough, itchy eyes and itchy nose and are present in a seasonal pattern.  Precipitants include being outdoors.  Treatment in the past has included claritin which has been helpful..  Treatment currently includes intranasal steroids: Fluticasone and is effective in the patient's opinion.       I have reviewed the patient's medical, family, and social history in detail;there are no changes to the history as noted in the electronic medical record.   Concurrent Medical History:  Past Medical History:   Diagnosis Date   • Asthma     Previously diagnosed and previously prescribed inhaler medications.     • Essential hypertension    • Gastroesophageal reflux disease      Past Surgical History:   Procedure Laterality Date   • BONE GRAFT     • WRIST FRACTURE SURGERY Bilateral      Current Outpatient Medications on File Prior to Visit   Medication Sig   • atorvastatin (LIPITOR) 20 MG tablet Take 1 tablet by mouth Daily.   • fluticasone (FLONASE) 50 MCG/ACT nasal spray 2 sprays into each nostril Daily.   • lisinopril (PRINIVIL,ZESTRIL) 10 MG tablet Take 2 tablets by mouth Daily.   • meloxicam (MOBIC) 7.5 MG tablet Take 1 tablet by mouth Daily.   • pantoprazole (PROTONIX) 20 MG EC tablet Take 1 tablet by mouth Daily.   • zolpidem (AMBIEN) 10 MG tablet Take 1 tablet by mouth At  "Night As Needed for Sleep.     No current facility-administered medications on file prior to visit.      He has No Known Allergies.    Family History   Problem Relation Age of Onset   • COPD Mother    • Hypertension Mother    • Diabetes type II Mother    • Cancer Father    • Heart failure Father         He  reports that he quit smoking about 21 years ago. His smoking use included pipe. He started smoking about 46 years ago. He quit after 25.00 years of use. His smokeless tobacco use includes chew. He reports that he drinks about 1.2 oz of alcohol per week. He reports that he does not use drugs.    Review of Systems General: negative for - fatigue or weight loss  HEENT: sneezing, clear rhinorrhea  PULM: no cough, shortness of breath, or wheezing  CV: no chest pain or dyspnea on exertion  MSK: Negative for gait disturbance, joint pain, joint swelling or muscular weakness  GI: no abdominal pain, change in bowel habits, or black or bloody stools  NEURO: no TIA or stroke symptoms; No confusion, dizziness, HA or seizures    Objective   OBJECTIVE:     Vitals:    08/09/19 1055   BP: 138/80   BP Location: Left arm   Patient Position: Sitting   Pulse: 91   Temp: 97.5 °F (36.4 °C)   TempSrc: Temporal   SpO2: 97%   Weight: 82.7 kg (182 lb 6.4 oz)   Height: 170.2 cm (67\")     Physical Exam GEN: Well developed, in no acute distress  HEENT: NCAT, without lesions or deformities  CV: Normal S1/S2, no murmurs or friction rubs  PULM: Normal effort, without wheezes or rhonchi  GI: Soft, non-tender & non-distended; +BSx4  Ext: Full ROM, without edema or deformities  Neuro: AxO x 3, normal gait  Psych: appropriate mood & affect    DATA REVIEWED:  Office Visit on 07/09/2019   Component Date Value Ref Range Status   • WBC 07/09/2019 13.14* 3.40 - 10.80 10*3/mm3 Final   • RBC 07/09/2019 3.67* 4.14 - 5.80 10*6/mm3 Final   • Hemoglobin 07/09/2019 10.8* 13.0 - 17.7 g/dL Final   • Hematocrit 07/09/2019 32.6* 37.5 - 51.0 % Final   • MCV " 07/09/2019 88.8  79.0 - 97.0 fL Final   • MCH 07/09/2019 29.4  26.6 - 33.0 pg Final   • MCHC 07/09/2019 33.1  31.5 - 35.7 g/dL Final   • RDW 07/09/2019 13.2  12.3 - 15.4 % Final   • RDW-SD 07/09/2019 43.2  37.0 - 54.0 fl Final   • MPV 07/09/2019 11.9  6.0 - 12.0 fL Final   • Platelets 07/09/2019 351  140 - 450 10*3/mm3 Final   • Neutrophil % 07/09/2019 76.1* 42.7 - 76.0 % Final   • Lymphocyte % 07/09/2019 11.3* 19.6 - 45.3 % Final   • Monocyte % 07/09/2019 10.4  5.0 - 12.0 % Final   • Eosinophil % 07/09/2019 1.3  0.3 - 6.2 % Final   • Basophil % 07/09/2019 0.4  0.0 - 1.5 % Final   • Immature Grans % 07/09/2019 0.5  0.0 - 0.5 % Final   • Neutrophils, Absolute 07/09/2019 10.01* 1.70 - 7.00 10*3/mm3 Final   • Lymphocytes, Absolute 07/09/2019 1.48  0.70 - 3.10 10*3/mm3 Final   • Monocytes, Absolute 07/09/2019 1.37* 0.10 - 0.90 10*3/mm3 Final   • Eosinophils, Absolute 07/09/2019 0.17  0.00 - 0.40 10*3/mm3 Final   • Basophils, Absolute 07/09/2019 0.05  0.00 - 0.20 10*3/mm3 Final   • Immature Grans, Absolute 07/09/2019 0.06* 0.00 - 0.05 10*3/mm3 Final   • nRBC 07/09/2019 0.0  0.0 - 0.2 /100 WBC Final     Assessment/Plan   ASSESSMENT & PLAN:      Diagnosis Plan   1. Pain of right hip joint  2/2 to nerve pain.  Continue current therapy.  pregabalin (LYRICA) 50 MG capsule   2. Allergic rhinitis, unspecified seasonality, unspecified trigger  Stable & well controlled on claritin.  Continue current therapy.   loratadine (CLARITIN) 10 MG tablet       Medication Refills:  Requested Prescriptions     Signed Prescriptions Disp Refills   • pregabalin (LYRICA) 50 MG capsule 90 capsule 2     Sig: Take 1 capsule by mouth 3 (Three) Times a Day.   • loratadine (CLARITIN) 10 MG tablet 30 tablet 11     Sig: Take 1 tablet by mouth Daily.       -- Preventive Medicine Topics --   Health Maintenance Topics with due status: Overdue       Topic Date Due    MEDICARE ANNUAL WELLNESS 10/10/2017    ZOSTER VACCINE 12/05/2017     Health Maintenance  Topics with due status: Due On       Topic Date Due    INFLUENZA VACCINE 08/01/2019     Patient's Body mass index is 28.57 kg/m². BMI is above normal parameters. Recommendations include: educational material.    Mr. Baker   reports that he quit smoking about 21 years ago. His smoking use included pipe. He started smoking about 46 years ago. He quit after 25.00 years of use. His smokeless tobacco use includes chew.         Patient Instructions   Follow-up:   Return in about 3 months (around 11/9/2019).    No future appointments.     RISK SCORE: 3   ___________________________  Pelon Stewart Jr., M.D. PGY3  Family Practice Resident  [address] [place] [address]  Phone: [phone]  Fax: [phone]  ¯¯¯¯¯¯¯¯¯¯¯¯¯¯¯¯¯¯¯¯¯¯¯¯¯¯¯    This document has been electronically signed by  Pelon Stewart Jr, MD on 08/09/2019 3:23 PM

## 2019-08-19 NOTE — PROGRESS NOTES
I have reviewed the notes, assessments, and/or procedures performed by Pelon Stewart Jr., MD, I concur with her/his documentation and assessment and plan for Jian Baker.                This document has been electronically signed by Angel Rea MD on August 19, 2019 10:50 AM

## 2019-08-19 NOTE — PROGRESS NOTES
I have reviewed the notes, assessments, and/or procedures performed by Pelon Stewart Jr., MD, I concur with her/his documentation and assessment and plan for Jian Baker.                This document has been electronically signed by Angel Rea MD on August 19, 2019 9:55 AM

## 2019-09-23 DIAGNOSIS — I10 ESSENTIAL HYPERTENSION: ICD-10-CM

## 2019-09-24 RX ORDER — ATORVASTATIN CALCIUM 20 MG/1
TABLET, FILM COATED ORAL
Qty: 30 TABLET | Refills: 5 | Status: SHIPPED | OUTPATIENT
Start: 2019-09-24 | End: 2019-11-18 | Stop reason: SDUPTHER

## 2019-09-24 RX ORDER — PANTOPRAZOLE SODIUM 20 MG/1
TABLET, DELAYED RELEASE ORAL
Qty: 30 TABLET | Refills: 5 | Status: SHIPPED | OUTPATIENT
Start: 2019-09-24 | End: 2019-11-18 | Stop reason: SDUPTHER

## 2019-09-24 RX ORDER — LISINOPRIL 10 MG/1
TABLET ORAL
Qty: 60 TABLET | Refills: 5 | Status: SHIPPED | OUTPATIENT
Start: 2019-09-24 | End: 2019-11-18 | Stop reason: SDUPTHER

## 2019-11-18 ENCOUNTER — OFFICE VISIT (OUTPATIENT)
Dept: FAMILY MEDICINE CLINIC | Facility: CLINIC | Age: 64
End: 2019-11-18

## 2019-11-18 VITALS
TEMPERATURE: 97.1 F | SYSTOLIC BLOOD PRESSURE: 126 MMHG | OXYGEN SATURATION: 99 % | BODY MASS INDEX: 28.49 KG/M2 | HEART RATE: 88 BPM | WEIGHT: 181.56 LBS | DIASTOLIC BLOOD PRESSURE: 78 MMHG | HEIGHT: 67 IN

## 2019-11-18 DIAGNOSIS — J30.9 ALLERGIC RHINITIS, UNSPECIFIED SEASONALITY, UNSPECIFIED TRIGGER: ICD-10-CM

## 2019-11-18 DIAGNOSIS — Z23 INFLUENZA VACCINE ADMINISTERED: ICD-10-CM

## 2019-11-18 DIAGNOSIS — K21.9 GASTROESOPHAGEAL REFLUX DISEASE, ESOPHAGITIS PRESENCE NOT SPECIFIED: ICD-10-CM

## 2019-11-18 DIAGNOSIS — M17.0 PRIMARY OSTEOARTHRITIS OF BOTH KNEES: Primary | ICD-10-CM

## 2019-11-18 DIAGNOSIS — M25.551 PAIN OF RIGHT HIP JOINT: ICD-10-CM

## 2019-11-18 DIAGNOSIS — I10 ESSENTIAL HYPERTENSION: ICD-10-CM

## 2019-11-18 PROCEDURE — 90686 IIV4 VACC NO PRSV 0.5 ML IM: CPT | Performed by: STUDENT IN AN ORGANIZED HEALTH CARE EDUCATION/TRAINING PROGRAM

## 2019-11-18 PROCEDURE — G0008 ADMIN INFLUENZA VIRUS VAC: HCPCS | Performed by: STUDENT IN AN ORGANIZED HEALTH CARE EDUCATION/TRAINING PROGRAM

## 2019-11-18 PROCEDURE — 20610 DRAIN/INJ JOINT/BURSA W/O US: CPT | Performed by: STUDENT IN AN ORGANIZED HEALTH CARE EDUCATION/TRAINING PROGRAM

## 2019-11-18 PROCEDURE — 99213 OFFICE O/P EST LOW 20 MIN: CPT | Performed by: STUDENT IN AN ORGANIZED HEALTH CARE EDUCATION/TRAINING PROGRAM

## 2019-11-18 RX ORDER — LORATADINE 10 MG/1
10 TABLET ORAL DAILY
Qty: 30 TABLET | Refills: 11 | Status: SHIPPED | OUTPATIENT
Start: 2019-11-18 | End: 2020-01-02 | Stop reason: SDUPTHER

## 2019-11-18 RX ORDER — TRIAMCINOLONE ACETONIDE 40 MG/ML
40 INJECTION, SUSPENSION INTRA-ARTICULAR; INTRAMUSCULAR ONCE
Status: DISCONTINUED | OUTPATIENT
Start: 2019-11-18 | End: 2020-01-02

## 2019-11-18 RX ORDER — FLUTICASONE PROPIONATE 50 MCG
2 SPRAY, SUSPENSION (ML) NASAL DAILY
Qty: 1 BOTTLE | Refills: 10 | Status: SHIPPED | OUTPATIENT
Start: 2019-11-18 | End: 2020-01-02 | Stop reason: SDUPTHER

## 2019-11-18 RX ORDER — LIDOCAINE HYDROCHLORIDE 10 MG/ML
2 INJECTION, SOLUTION INFILTRATION; PERINEURAL ONCE
Status: DISCONTINUED | OUTPATIENT
Start: 2019-11-18 | End: 2020-01-02

## 2019-11-18 RX ORDER — PANTOPRAZOLE SODIUM 20 MG/1
20 TABLET, DELAYED RELEASE ORAL DAILY
Qty: 30 TABLET | Refills: 5 | Status: SHIPPED | OUTPATIENT
Start: 2019-11-18 | End: 2020-01-02 | Stop reason: SDUPTHER

## 2019-11-18 RX ORDER — LISINOPRIL 10 MG/1
20 TABLET ORAL DAILY
Qty: 60 TABLET | Refills: 5 | Status: SHIPPED | OUTPATIENT
Start: 2019-11-18 | End: 2020-01-02 | Stop reason: SDUPTHER

## 2019-11-18 RX ORDER — PREGABALIN 50 MG/1
50 CAPSULE ORAL 3 TIMES DAILY
Qty: 90 CAPSULE | Refills: 1 | Status: SHIPPED | OUTPATIENT
Start: 2019-11-18 | End: 2020-01-02 | Stop reason: SDUPTHER

## 2019-11-18 RX ORDER — MELOXICAM 7.5 MG/1
7.5 TABLET ORAL DAILY
Qty: 60 TABLET | Refills: 5 | Status: CANCELLED | OUTPATIENT
Start: 2019-11-18

## 2019-11-18 RX ORDER — ATORVASTATIN CALCIUM 20 MG/1
20 TABLET, FILM COATED ORAL DAILY
Qty: 30 TABLET | Refills: 5 | Status: SHIPPED | OUTPATIENT
Start: 2019-11-18 | End: 2020-01-02 | Stop reason: SDUPTHER

## 2019-11-18 NOTE — PROGRESS NOTES
Family Medicine Residency  Serge Marroquin III, MD    Subjective:     Jian Baker is a 64 y.o. male who presents for knee pain, hypertension, seasonal allergies.    Bilatreral knee pain:  7/10 pain stiffness worse at rest without worseneing in the am, pt with minimal effect from pregabalin. Previous phsical therapy with minimal effect.  Patient requesting bilateral glucocorticoid injections today.    HTN: Without symptoms of hypotension, minimally orthostatic.  Patient minimally orthostatic by position changes to rapidly, however corrects within seconds and has no extenuating dizziness.  Patient requesting refill of maintenance medication.    Seasonal Allergies: Pt requesting refills without issue.  Patient feels well controlled her intranasal therapy, however counseled before day on 4-day off regimen requirements.    GERD: Patient without symptoms of acid reflux, has been on PPI for months, hesitant to change currently and requesting refill of maintenance medication.    The following portions of the patient's history were reviewed and updated as appropriate: allergies, current medications, past family history, past medical history, past social history, past surgical history and problem list.    Past Medical Hx:  Past Medical History:   Diagnosis Date   • Asthma     Previously diagnosed and previously prescribed inhaler medications.     • Essential hypertension    • Gastroesophageal reflux disease        Past Surgical Hx:  Past Surgical History:   Procedure Laterality Date   • BONE GRAFT     • WRIST FRACTURE SURGERY Bilateral        Current Meds:    Current Outpatient Medications:   •  atorvastatin (LIPITOR) 20 MG tablet, Take 1 tablet by mouth Daily., Disp: 30 tablet, Rfl: 5  •  fluticasone (FLONASE) 50 MCG/ACT nasal spray, 2 sprays into the nostril(s) as directed by provider Daily., Disp: 1 bottle, Rfl: 10  •  lisinopril (PRINIVIL,ZESTRIL) 10 MG tablet, Take 2 tablets by mouth Daily., Disp: 60 tablet,  Rfl: 5  •  loratadine (CLARITIN) 10 MG tablet, Take 1 tablet by mouth Daily., Disp: 30 tablet, Rfl: 11  •  meloxicam (MOBIC) 7.5 MG tablet, Take 1 tablet by mouth Daily., Disp: 60 tablet, Rfl: 5  •  pantoprazole (PROTONIX) 20 MG EC tablet, Take 1 tablet by mouth Daily., Disp: 30 tablet, Rfl: 5  •  pregabalin (LYRICA) 50 MG capsule, Take 1 capsule by mouth 3 (Three) Times a Day., Disp: 90 capsule, Rfl: 1    Current Facility-Administered Medications:   •  lidocaine (XYLOCAINE) 1 % injection 2 mL, 2 mL, Infiltration, Once, Serge Marroquin III, MD  •  lidocaine (XYLOCAINE) 1 % injection 2 mL, 2 mL, Infiltration, Once, Serge Marroquin III, MD  •  triamcinolone acetonide (KENALOG-40) injection 40 mg, 40 mg, Intramuscular, Once, Serge Marroquin III, MD  •  triamcinolone acetonide (KENALOG-40) injection 40 mg, 40 mg, Intramuscular, Once, Serge Marroquin III, MD    Allergies:  No Known Allergies    Family Hx:  Family History   Problem Relation Age of Onset   • COPD Mother    • Hypertension Mother    • Diabetes type II Mother    • Cancer Father    • Heart failure Father         Social History:  Social History     Socioeconomic History   • Marital status:      Spouse name: Not on file   • Number of children: Not on file   • Years of education: Not on file   • Highest education level: Not on file   Occupational History   • Occupation: Disability   Tobacco Use   • Smoking status: Former Smoker     Packs/day: 1.00     Years: 25.00     Pack years: 25.00     Types: Pipe     Start date: 10/10/1972     Last attempt to quit: 10/31/2019     Years since quittin.0   • Smokeless tobacco: Current User     Types: Chew   Substance and Sexual Activity   • Alcohol use: Yes     Alcohol/week: 1.2 oz     Types: 2 Cans of beer per week     Comment: Prior heavy drinker   • Drug use: No   • Sexual activity: Yes     Partners: Female   Social History Narrative    Patient lives in [place] with his wife.  Transportation is an  "issue for the couple.       Review of Systems  Review of Systems   Constitutional: Negative for activity change, appetite change, chills, diaphoresis and fever.   HENT: Negative for congestion, rhinorrhea, sinus pressure, sinus pain and sore throat.    Eyes: Negative for visual disturbance.   Respiratory: Negative for apnea, cough, choking, chest tightness, shortness of breath and wheezing.    Cardiovascular: Negative for chest pain, palpitations and leg swelling.   Gastrointestinal: Negative for abdominal distention, abdominal pain, blood in stool, constipation, diarrhea, nausea and vomiting.   Genitourinary: Negative for difficulty urinating, dysuria, frequency, hematuria and urgency.   Musculoskeletal: Positive for arthralgias and myalgias. Negative for back pain, joint swelling and neck pain.   Skin: Negative for color change, pallor, rash and wound.   Neurological: Negative for dizziness, weakness, numbness and headaches.   Psychiatric/Behavioral: Negative for agitation and behavioral problems.       Objective:     /78   Pulse 88   Temp 97.1 °F (36.2 °C) (Temporal)   Ht 170.2 cm (67\")   Wt 82.4 kg (181 lb 9 oz)   SpO2 99%   BMI 28.44 kg/m²   Physical Exam   Constitutional: He is oriented to person, place, and time. He appears well-developed and well-nourished. No distress.   HENT:   Head: Normocephalic and atraumatic.   Right Ear: External ear normal.   Left Ear: External ear normal.   Nose: Nose normal.   Eyes: Conjunctivae and EOM are normal. Pupils are equal, round, and reactive to light. Right eye exhibits no discharge. Left eye exhibits no discharge. No scleral icterus.   Neck: Normal range of motion. Neck supple. No thyromegaly present.   Cardiovascular: Normal rate, regular rhythm, normal heart sounds and intact distal pulses. Exam reveals no gallop and no friction rub.   No murmur heard.  Pulmonary/Chest: Effort normal and breath sounds normal. No respiratory distress. He has no wheezes. He " has no rales. He exhibits no tenderness.   Abdominal: Soft. Bowel sounds are normal. He exhibits no distension and no mass. There is no tenderness. There is no guarding.   Musculoskeletal: Normal range of motion. He exhibits no edema, tenderness or deformity.   Crepitus to extension bilaterally good strength bilaterally no effusion noted on physical exam   Lymphadenopathy:     He has no cervical adenopathy.   Neurological: He is alert and oriented to person, place, and time. No cranial nerve deficit.   Skin: Skin is warm and dry. Capillary refill takes 2 to 3 seconds. He is not diaphoretic.   Psychiatric: He has a normal mood and affect. His behavior is normal. Judgment and thought content normal.        Assessment/Plan:     Jian was seen today for knee pain.    Diagnoses and all orders for this visit:    Primary osteoarthritis of both knees  -     triamcinolone acetonide (KENALOG-40) injection 40 mg  -     triamcinolone acetonide (KENALOG-40) injection 40 mg  -     lidocaine (XYLOCAINE) 1 % injection 2 mL  -     lidocaine (XYLOCAINE) 1 % injection 2 mL    Pain of right hip joint  -     pregabalin (LYRICA) 50 MG capsule; Take 1 capsule by mouth 3 (Three) Times a Day.    Allergic rhinitis, unspecified seasonality, unspecified trigger  -     loratadine (CLARITIN) 10 MG tablet; Take 1 tablet by mouth Daily.  -     fluticasone (FLONASE) 50 MCG/ACT nasal spray; 2 sprays into the nostril(s) as directed by provider Daily.    Essential hypertension  -     lisinopril (PRINIVIL,ZESTRIL) 10 MG tablet; Take 2 tablets by mouth Daily.  -     atorvastatin (LIPITOR) 20 MG tablet; Take 1 tablet by mouth Daily.    Influenza vaccine administered  -     Fluarix/Fluzone/Afluria Quad/FluLaval Quad    Gastroesophageal reflux disease, esophagitis presence not specified  -     pantoprazole (PROTONIX) 20 MG EC tablet; Take 1 tablet by mouth Daily.    Other orders  -     Cancel: Flucelvax Quad=>4Years ([id number])  -     Cancel: meloxicam  "(MOBIC) 7.5 MG tablet; Take 1 tablet by mouth Daily.    Patient with bilateral osteoarthritis x-rays reviewed with patient today.  Patient with procedure note as below.    Hip joint with neuropathic type pain refilling maintenance medication will be further addressed by PCP.    Allergic rhinitis well controlled on p.o. H2 blockade and intranasal corticosteroids will continue and refill maintenance medication and defer narrowing management of therapy to PCP, patient also has significant concurrent history of hayfever.    Hypertension well-controlled on current management without signs of hypotension and refill of hyperlipidemic medication related to increase cardiovascular risk factors.    Refill of patient's maintenance PPI which she has been several months and PCP will look at chronicity of PPI.      Arthrocentesis  Date/Time: 11/21/2019 8:01 PM  Performed by: Serge Marroquin III, MD  Authorized by: Serge Marroquin III, MD   Consent: Verbal consent obtained. Written consent not obtained.  Consent given by: patient  Patient understanding: patient states understanding of the procedure being performed  Patient consent: the patient's understanding of the procedure matches consent given  Procedure consent: procedure consent matches procedure scheduled  Relevant documents: relevant documents present and verified  Test results: test results available and properly labeled  Site marked: the operative site was marked  Imaging studies: imaging studies available  Required items: required blood products, implants, devices, and special equipment available  Patient identity confirmed: verbally with patient  Time out: Immediately prior to procedure a \"time out\" was called to verify the correct patient, procedure, equipment, support staff and site/side marked as required.  Indications: pain   Body area: knee  Joint: right knee  Local anesthesia used: no    Anesthesia:  Local anesthesia used: no    Sedation:  Patient sedated: " "no    Preparation: Patient was prepped and draped in the usual sterile fashion.  Needle size: 22 G  Ultrasound guidance: no  Approach: lateral  Patient tolerance: Patient tolerated the procedure well with no immediate complications  Comments: Right knee cleaned with iodine-based soap and alcohol and ethyl chloride spray was used with 2 cc of lidocaine and 1 cc of 40 mg/cc triamcinolone.  Needle was injected without issue and Band-Aid was placed after hemostasis with alcohol prep pad.    Arthrocentesis  Date/Time: 11/21/2019 8:03 PM  Performed by: Serge Marroquin III, MD  Authorized by: Serge Marroquin III, MD   Consent: Verbal consent obtained. Written consent obtained.  Risks and benefits: risks, benefits and alternatives were discussed  Consent given by: patient  Patient understanding: patient states understanding of the procedure being performed  Patient consent: the patient's understanding of the procedure matches consent given  Procedure consent: procedure consent matches procedure scheduled  Relevant documents: relevant documents present and verified  Test results: test results available and properly labeled  Site marked: the operative site was marked  Imaging studies: imaging studies available  Required items: required blood products, implants, devices, and special equipment available  Patient identity confirmed: verbally with patient  Time out: Immediately prior to procedure a \"time out\" was called to verify the correct patient, procedure, equipment, support staff and site/side marked as required.  Indications: pain   Body area: knee  Joint: left knee  Local anesthesia used: no    Anesthesia:  Local anesthesia used: no    Sedation:  Patient sedated: no    Needle size: 22 G  Ultrasound guidance: no  Approach: lateral  Patient tolerance: Patient tolerated the procedure well with no immediate complications  Comments: Left knee injected after cleaned and prepped with iodine-based soap and alcohol prep pad. "  22-gauge needle introduced from lateral approach after ethyl chloride spray, injection containing 2 cc of lidocaine and 1 cc of triamcinolone at 40 mg/cc.  Minimal postinjection bleeding with hemostasis is improved with alcohol pad and Band-Aid was affixed after patient tolerated procedure well.                    · Rx changes: As above  · Patient Education: Knee medications  · Compliance at present is estimated to be excellent.   · Efforts to improve compliance (if necessary) will be directed at increased exercise.     Follow-up:     Return in about 1 month (around 12/18/2019).    Preventative:  Health Maintenance   Topic Date Due   • MEDICARE ANNUAL WELLNESS  10/10/2017   • ZOSTER VACCINE (2 of 2) 12/05/2017   • COLONOSCOPY  10/10/2024   • TDAP/TD VACCINES (2 - Td) 10/10/2027   • HEPATITIS C SCREENING  Completed   • INFLUENZA VACCINE  Completed     Male Preventative: Exercises regularly  Recommended: none    Weight  -Class: Overweight: 25.0-29.9kg/m2   -Patient's Body mass index is 28.44 kg/m². BMI is within normal parameters. No follow-up required..   eat more fruits and vegetables, decrease soda or juice intake, increase water intake, increase physical activity, reduce screen time, reduce portion size, cut out extra servings, reduce fast food intake, family to eat at dinner table more often, keep TV off during meals, plan meals, eat breakfast and have 3 meals a day    Alcohol use:  reports that he drinks about 1.2 oz of alcohol per week.  Nicotine status  reports that he quit smoking about 3 weeks ago. His smoking use included pipe. He started smoking about 47 years ago. He has a 25.00 pack-year smoking history. His smokeless tobacco use includes chew.   Jian Howell Samuel is a current cigarettes user.  He currently smokes and chews 1 pack of cigarettes, cigars and chaw or dip of smokeless tobacco per day for a duration of 25 years. I have educated him on the risk of diseases from using tobacco products such as  cancer, COPD and heart diease.     I advised him to quit and he is not willing to quit.    I spent 4 minutes counseling the patient.          Goals     • Better Compliance with HTN Medications (pt-stated)      Barriers: None      • Medication management      Patient has been having trouble with medication compliance.  Goal now is to take medications everyday at the same time.    Barriers: patient has not been on medications consistently for sometime.  Has trouble with remembering to take medications.              RISK SCORE: 3         This document has been electronically signed by Serge Marroquin III, MD on November 21, 2019 8:07 PM

## 2019-11-20 ENCOUNTER — TELEPHONE (OUTPATIENT)
Dept: FAMILY MEDICINE CLINIC | Facility: CLINIC | Age: 64
End: 2019-11-20

## 2019-11-21 PROBLEM — Z11.59 NEED FOR HEPATITIS B SCREENING TEST: Status: RESOLVED | Noted: 2017-10-10 | Resolved: 2019-11-21

## 2019-11-21 PROBLEM — Z23 NEED FOR TDAP VACCINATION: Status: RESOLVED | Noted: 2017-10-10 | Resolved: 2019-11-21

## 2019-11-21 PROBLEM — Z11.59 NEED FOR HEPATITIS C SCREENING TEST: Status: RESOLVED | Noted: 2017-10-10 | Resolved: 2019-11-21

## 2019-11-21 PROBLEM — Z23 FLU VACCINE NEED: Status: RESOLVED | Noted: 2017-10-10 | Resolved: 2019-11-21

## 2019-11-21 PROBLEM — Z00.00 HEALTH CARE MAINTENANCE: Status: RESOLVED | Noted: 2017-10-10 | Resolved: 2019-11-21

## 2019-11-24 NOTE — PROGRESS NOTES
I have seen the patient.  I have reviewed the notes, assessments, and/or procedures performed by Dr. Marroquin, I concur with her/his documentation and assessment and plan for Jian Baker.       This document has been electronically signed by Ayaan Rowland MD on November 24, 2019 4:24 PM

## 2020-01-02 ENCOUNTER — LAB (OUTPATIENT)
Dept: LAB | Facility: HOSPITAL | Age: 65
End: 2020-01-02

## 2020-01-02 ENCOUNTER — OFFICE VISIT (OUTPATIENT)
Dept: FAMILY MEDICINE CLINIC | Facility: CLINIC | Age: 65
End: 2020-01-02

## 2020-01-02 VITALS
BODY MASS INDEX: 29.16 KG/M2 | DIASTOLIC BLOOD PRESSURE: 78 MMHG | SYSTOLIC BLOOD PRESSURE: 124 MMHG | WEIGHT: 185.8 LBS | HEART RATE: 56 BPM | TEMPERATURE: 97.8 F | OXYGEN SATURATION: 96 % | HEIGHT: 67 IN

## 2020-01-02 DIAGNOSIS — J30.9 ALLERGIC RHINITIS, UNSPECIFIED SEASONALITY, UNSPECIFIED TRIGGER: ICD-10-CM

## 2020-01-02 DIAGNOSIS — Z13.220 SCREENING FOR HYPERLIPIDEMIA: ICD-10-CM

## 2020-01-02 DIAGNOSIS — M79.2 NEUROPATHIC PAIN: Primary | ICD-10-CM

## 2020-01-02 DIAGNOSIS — I10 ESSENTIAL HYPERTENSION: ICD-10-CM

## 2020-01-02 DIAGNOSIS — M79.2 NEUROPATHIC PAIN: ICD-10-CM

## 2020-01-02 DIAGNOSIS — R79.9 ABNORMAL FINDING OF BLOOD CHEMISTRY, UNSPECIFIED: ICD-10-CM

## 2020-01-02 DIAGNOSIS — M17.0 PRIMARY OSTEOARTHRITIS OF BOTH KNEES: ICD-10-CM

## 2020-01-02 DIAGNOSIS — M25.551 PAIN OF RIGHT HIP JOINT: ICD-10-CM

## 2020-01-02 DIAGNOSIS — K21.9 GASTROESOPHAGEAL REFLUX DISEASE, ESOPHAGITIS PRESENCE NOT SPECIFIED: ICD-10-CM

## 2020-01-02 PROBLEM — G62.9 NEUROPATHY: Status: ACTIVE | Noted: 2020-01-02

## 2020-01-02 PROCEDURE — 80053 COMPREHEN METABOLIC PANEL: CPT

## 2020-01-02 PROCEDURE — 85025 COMPLETE CBC W/AUTO DIFF WBC: CPT

## 2020-01-02 PROCEDURE — 99213 OFFICE O/P EST LOW 20 MIN: CPT | Performed by: STUDENT IN AN ORGANIZED HEALTH CARE EDUCATION/TRAINING PROGRAM

## 2020-01-02 PROCEDURE — 82607 VITAMIN B-12: CPT

## 2020-01-02 PROCEDURE — 36415 COLL VENOUS BLD VENIPUNCTURE: CPT

## 2020-01-02 PROCEDURE — 83036 HEMOGLOBIN GLYCOSYLATED A1C: CPT

## 2020-01-02 PROCEDURE — G0481 DRUG TEST DEF 8-14 CLASSES: HCPCS | Performed by: STUDENT IN AN ORGANIZED HEALTH CARE EDUCATION/TRAINING PROGRAM

## 2020-01-02 PROCEDURE — 80307 DRUG TEST PRSMV CHEM ANLYZR: CPT | Performed by: STUDENT IN AN ORGANIZED HEALTH CARE EDUCATION/TRAINING PROGRAM

## 2020-01-02 PROCEDURE — 80061 LIPID PANEL: CPT

## 2020-01-02 RX ORDER — PREGABALIN 50 MG/1
50 CAPSULE ORAL 3 TIMES DAILY
Qty: 90 CAPSULE | Refills: 2 | Status: SHIPPED | OUTPATIENT
Start: 2020-01-02 | End: 2020-01-02 | Stop reason: SDUPTHER

## 2020-01-02 RX ORDER — FLUTICASONE PROPIONATE 50 MCG
2 SPRAY, SUSPENSION (ML) NASAL DAILY
Qty: 1 BOTTLE | Refills: 10 | Status: SHIPPED | OUTPATIENT
Start: 2020-01-02 | End: 2021-12-21 | Stop reason: SDUPTHER

## 2020-01-02 RX ORDER — PANTOPRAZOLE SODIUM 20 MG/1
20 TABLET, DELAYED RELEASE ORAL DAILY
Qty: 30 TABLET | Refills: 5 | Status: SHIPPED | OUTPATIENT
Start: 2020-01-02 | End: 2020-08-18 | Stop reason: SDUPTHER

## 2020-01-02 RX ORDER — LORATADINE 10 MG/1
10 TABLET ORAL DAILY
Qty: 30 TABLET | Refills: 11 | Status: SHIPPED | OUTPATIENT
Start: 2020-01-02 | End: 2020-01-02

## 2020-01-02 RX ORDER — MELOXICAM 7.5 MG/1
7.5 TABLET ORAL DAILY
Qty: 60 TABLET | Refills: 5 | Status: SHIPPED | OUTPATIENT
Start: 2020-01-02 | End: 2020-04-17

## 2020-01-02 RX ORDER — LISINOPRIL 10 MG/1
20 TABLET ORAL DAILY
Qty: 60 TABLET | Refills: 5 | Status: SHIPPED | OUTPATIENT
Start: 2020-01-02 | End: 2020-04-17 | Stop reason: SDUPTHER

## 2020-01-02 RX ORDER — CETIRIZINE HYDROCHLORIDE 10 MG/1
10 TABLET ORAL DAILY
Qty: 30 TABLET | Refills: 2 | Status: SHIPPED | OUTPATIENT
Start: 2020-01-02 | End: 2020-06-02 | Stop reason: SDUPTHER

## 2020-01-02 RX ORDER — PREGABALIN 50 MG/1
50 CAPSULE ORAL 3 TIMES DAILY
Qty: 90 CAPSULE | Refills: 2 | Status: SHIPPED | OUTPATIENT
Start: 2020-01-02 | End: 2020-07-10 | Stop reason: SDUPTHER

## 2020-01-02 RX ORDER — ATORVASTATIN CALCIUM 20 MG/1
20 TABLET, FILM COATED ORAL DAILY
Qty: 30 TABLET | Refills: 5 | Status: SHIPPED | OUTPATIENT
Start: 2020-01-02 | End: 2021-03-19

## 2020-01-02 NOTE — PROGRESS NOTES
Family Medicine Residency   Stephania Head MD    Jian Baker is a 64 y.o. male who presents to family medicine residency clinic for the following:    Subjective:     He is here today for follow up. He had knee injections a month ago, and his pain has improved.     He also takes Lyrica for neuropathy and needs a refill. The neuropathy is worse in his legs from his knees down. He has tried gabapentin in the past, but it did not seem to help him, so he was transitioned to Lyrica.     Past Medical Hx:   Past Medical History:   Diagnosis Date   • Asthma     Previously diagnosed and previously prescribed inhaler medications.     • Essential hypertension    • Gastroesophageal reflux disease        Past Surgical Hx:  Past Surgical History:   Procedure Laterality Date   • BONE GRAFT     • WRIST FRACTURE SURGERY Bilateral        Current Meds:    Current Outpatient Medications:   •  atorvastatin (LIPITOR) 20 MG tablet, Take 1 tablet by mouth Daily., Disp: 30 tablet, Rfl: 5  •  fluticasone (FLONASE) 50 MCG/ACT nasal spray, 2 sprays into the nostril(s) as directed by provider Daily., Disp: 1 bottle, Rfl: 10  •  lisinopril (PRINIVIL,ZESTRIL) 10 MG tablet, Take 2 tablets by mouth Daily., Disp: 60 tablet, Rfl: 5  •  meloxicam (MOBIC) 7.5 MG tablet, Take 1 tablet by mouth Daily., Disp: 60 tablet, Rfl: 5  •  pantoprazole (PROTONIX) 20 MG EC tablet, Take 1 tablet by mouth Daily., Disp: 30 tablet, Rfl: 5  •  pregabalin (LYRICA) 50 MG capsule, Take 1 capsule by mouth 3 (Three) Times a Day., Disp: 90 capsule, Rfl: 2  •  cetirizine (zyrTEC) 10 MG tablet, Take 1 tablet by mouth Daily., Disp: 30 tablet, Rfl: 2  No current facility-administered medications for this visit.     Allergies:  Patient has no known allergies.    Family Hx:  Family History   Problem Relation Age of Onset   • COPD Mother    • Hypertension Mother    • Diabetes type II Mother    • Cancer Father    • Heart failure Father         Social  History:  Social History     Socioeconomic History   • Marital status:      Spouse name: Not on file   • Number of children: Not on file   • Years of education: Not on file   • Highest education level: Not on file   Occupational History   • Occupation: Disability   Tobacco Use   • Smoking status: Former Smoker     Packs/day: 1.00     Years: 25.00     Pack years: 25.00     Types: Pipe     Start date: 10/10/1972     Last attempt to quit: 10/31/2019     Years since quittin.1   • Smokeless tobacco: Current User     Types: Chew   Substance and Sexual Activity   • Alcohol use: Yes     Alcohol/week: 2.0 standard drinks     Types: 2 Cans of beer per week     Comment: Prior heavy drinker   • Drug use: No   • Sexual activity: Yes     Partners: Female   Social History Narrative    Patient lives in [place] with his wife.  Transportation is an issue for the couple.       Review of Systems  Review of Systems   Constitutional: Negative for chills, diaphoresis and fever.   HENT: Negative for sneezing and sore throat.    Eyes: Negative for pain and discharge.   Respiratory: Negative for cough and shortness of breath.    Gastrointestinal: Negative for constipation, diarrhea, nausea and vomiting.   Endocrine: Negative for cold intolerance and heat intolerance.   Genitourinary: Negative for difficulty urinating, dysuria, frequency and urgency.   Musculoskeletal: Positive for arthralgias and myalgias.   Skin: Negative for color change and pallor.   Allergic/Immunologic: Negative for environmental allergies and food allergies.   Neurological: Negative for dizziness, syncope and weakness.   Psychiatric/Behavioral: Negative for confusion and sleep disturbance.       Physical Exam:  Vitals:    20 1319   BP: 124/78   Pulse: 56   Temp: 97.8 °F (36.6 °C)   SpO2: 96%       Body mass index is 29.1 kg/m².   Physical Exam   Constitutional: He is oriented to person, place, and time. He appears well-developed and well-nourished.  No distress.   HENT:   Head: Normocephalic and atraumatic.   Nose: Nose normal.   Eyes: Conjunctivae and EOM are normal.   Neck: Normal range of motion. Neck supple.   Cardiovascular: Normal rate, regular rhythm and normal heart sounds.   No murmur heard.  Pulmonary/Chest: Effort normal and breath sounds normal. No respiratory distress. He has no wheezes. He has no rales.   Abdominal: Soft. Bowel sounds are normal. He exhibits no distension. There is no tenderness. There is no guarding.   Musculoskeletal: Normal range of motion.   Neurological: He is alert and oriented to person, place, and time.   Skin: Skin is warm and dry.   Psychiatric: He has a normal mood and affect. His behavior is normal.   Vitals reviewed.      Objective:     Data Reviewed:     LABS:   Lab Results   Component Value Date    GLUCOSE 98 06/27/2018    BUN 22 (H) 06/27/2018    CREATININE 0.84 06/27/2018    EGFRIFNONA 93 06/27/2018    BCR 26.2 (H) 06/27/2018    K 4.9 06/27/2018    CO2 29.0 06/27/2018    CALCIUM 9.7 06/27/2018    ALBUMIN 4.60 06/27/2018    AST 31 06/27/2018    ALT 27 06/27/2018     Lab Results   Component Value Date    WBC 13.14 (H) 07/09/2019    HGB 10.8 (L) 07/09/2019    HCT 32.6 (L) 07/09/2019    MCV 88.8 07/09/2019     07/09/2019     Lab Results   Component Value Date    CHOL 135 06/27/2018    CHLPL 160 11/24/2015    TRIG 68 06/27/2018    HDL 68 06/27/2018    LDL 49 06/27/2018     No results found for: TSH, D8IHJVD, E1YORZE, THYROIDAB  Lab Results   Component Value Date    HGBA1C 5.0 10/10/2017    HGBA1C 5.1 05/15/2014     Lab Results   Component Value Date    CREATININE 0.84 06/27/2018     Lab Results   Component Value Date    IRON 111 10/10/2017    TIBC 357 10/10/2017    FERRITIN 215.00 10/10/2017         Health Maintenance:   Weight  -Class: Overweight: 25.0-29.9kg/m2   -Patient's Body mass index is 29.1 kg/m². BMI is above normal parameters. Recommendations include: exercise counseling and nutrition  counseling.      Alcohol use:  reports that he drinks about 2.0 standard drinks of alcohol per week.    Nicotine status  reports that he quit smoking about 2 months ago. His smoking use included pipe. He started smoking about 47 years ago. He has a 25.00 pack-year smoking history. His smokeless tobacco use includes chew.   Jian Baker  reports that he quit smoking about 2 months ago. His smoking use included pipe. He started smoking about 47 years ago. He has a 25.00 pack-year smoking history. His smokeless tobacco use includes chew.       Health Maintenance  Health Maintenance   Topic Date Due   • MEDICARE ANNUAL WELLNESS  10/10/2017   • ZOSTER VACCINE (2 of 2) 12/05/2017   • COLONOSCOPY  10/10/2024   • TDAP/TD VACCINES (2 - Td) 10/10/2027   • HEPATITIS C SCREENING  Completed   • INFLUENZA VACCINE  Completed        Goals:   Goals        Patient Stated    • Better Compliance with HTN Medications (pt-stated)      Barriers: None         Other    • Medication management      Patient has been having trouble with medication compliance.  Goal now is to take medications everyday at the same time.    Barriers: patient has not been on medications consistently for sometime.  Has trouble with remembering to take medications.                Assessment/Plan:       Assessment/Plan   Jian was seen today for hip pain and arthritis.    Diagnoses and all orders for this visit:    Neuropathic pain  -     Hemoglobin A1c; Future  -     Vitamin B12; Future  -     ToxASSURE Select 13 (MW) - Urine, Clean Catch; Future  -     ToxASSURE Select 13 (MW) - Urine, Clean Catch    Essential hypertension  -     atorvastatin (LIPITOR) 20 MG tablet; Take 1 tablet by mouth Daily.  -     lisinopril (PRINIVIL,ZESTRIL) 10 MG tablet; Take 2 tablets by mouth Daily.  -     Comprehensive Metabolic Panel; Future  -     CBC & Differential; Future    Primary osteoarthritis of both knees  -     meloxicam (MOBIC) 7.5 MG tablet; Take 1 tablet by mouth  Daily.    Gastroesophageal reflux disease, esophagitis presence not specified  -     pantoprazole (PROTONIX) 20 MG EC tablet; Take 1 tablet by mouth Daily.    Allergic rhinitis, unspecified seasonality, unspecified trigger  -     Discontinue: loratadine (CLARITIN) 10 MG tablet; Take 1 tablet by mouth Daily.  -     fluticasone (FLONASE) 50 MCG/ACT nasal spray; 2 sprays into the nostril(s) as directed by provider Daily.    Screening for hyperlipidemia  -     Lipid Panel; Future    Abnormal finding of blood chemistry, unspecified   -     Hemoglobin A1c; Future    Pain of right hip joint  -     Discontinue: pregabalin (LYRICA) 50 MG capsule; Take 1 capsule by mouth 3 (Three) Times a Day.  -     pregabalin (LYRICA) 50 MG capsule; Take 1 capsule by mouth 3 (Three) Times a Day.    Other orders  -     cetirizine (zyrTEC) 10 MG tablet; Take 1 tablet by mouth Daily.      I will obtain some labs today to rule out other causes of neuropathy.     We will refill Lyrica for neuropathy. Will obtain UDS today.     His allergies are uncontrolled so we will switch from Claritin to Zyrtec.     [facility] #[id number]        FOLLOW-UP  Return in about 3 months (around 4/2/2020) for Recheck.      RISK SCORE: 3        This document has been electronically signed by Stephania Head MD on January 2, 2020 3:00 PM

## 2020-01-03 LAB
ALBUMIN SERPL-MCNC: 4.4 G/DL (ref 3.5–5.2)
ALBUMIN/GLOB SERPL: 1.5 G/DL
ALP SERPL-CCNC: 51 U/L (ref 39–117)
ALT SERPL W P-5'-P-CCNC: 20 U/L (ref 1–41)
ANION GAP SERPL CALCULATED.3IONS-SCNC: 11.7 MMOL/L (ref 5–15)
AST SERPL-CCNC: 19 U/L (ref 1–40)
BASOPHILS # BLD AUTO: 0.04 10*3/MM3 (ref 0–0.2)
BASOPHILS NFR BLD AUTO: 0.6 % (ref 0–1.5)
BILIRUB SERPL-MCNC: 0.6 MG/DL (ref 0.2–1.2)
BUN BLD-MCNC: 16 MG/DL (ref 8–23)
BUN/CREAT SERPL: 17 (ref 7–25)
CALCIUM SPEC-SCNC: 9.5 MG/DL (ref 8.6–10.5)
CHLORIDE SERPL-SCNC: 101 MMOL/L (ref 98–107)
CHOLEST SERPL-MCNC: 143 MG/DL (ref 0–200)
CO2 SERPL-SCNC: 26.3 MMOL/L (ref 22–29)
CREAT BLD-MCNC: 0.94 MG/DL (ref 0.76–1.27)
DEPRECATED RDW RBC AUTO: 55 FL (ref 37–54)
EOSINOPHIL # BLD AUTO: 0.09 10*3/MM3 (ref 0–0.4)
EOSINOPHIL NFR BLD AUTO: 1.3 % (ref 0.3–6.2)
ERYTHROCYTE [DISTWIDTH] IN BLOOD BY AUTOMATED COUNT: 16.9 % (ref 12.3–15.4)
GFR SERPL CREATININE-BSD FRML MDRD: 81 ML/MIN/1.73
GLOBULIN UR ELPH-MCNC: 3 GM/DL
GLUCOSE BLD-MCNC: 90 MG/DL (ref 65–99)
HBA1C MFR BLD: 5.2 % (ref 4.8–5.6)
HCT VFR BLD AUTO: 39.9 % (ref 37.5–51)
HDLC SERPL-MCNC: 77 MG/DL (ref 40–60)
HGB BLD-MCNC: 14.1 G/DL (ref 13–17.7)
IMM GRANULOCYTES # BLD AUTO: 0.02 10*3/MM3 (ref 0–0.05)
IMM GRANULOCYTES NFR BLD AUTO: 0.3 % (ref 0–0.5)
LDLC SERPL CALC-MCNC: 58 MG/DL (ref 0–100)
LDLC/HDLC SERPL: 0.76 {RATIO}
LYMPHOCYTES # BLD AUTO: 1.14 10*3/MM3 (ref 0.7–3.1)
LYMPHOCYTES NFR BLD AUTO: 16.6 % (ref 19.6–45.3)
MCH RBC QN AUTO: 31.7 PG (ref 26.6–33)
MCHC RBC AUTO-ENTMCNC: 35.3 G/DL (ref 31.5–35.7)
MCV RBC AUTO: 89.7 FL (ref 79–97)
MONOCYTES # BLD AUTO: 0.7 10*3/MM3 (ref 0.1–0.9)
MONOCYTES NFR BLD AUTO: 10.2 % (ref 5–12)
NEUTROPHILS # BLD AUTO: 4.89 10*3/MM3 (ref 1.7–7)
NEUTROPHILS NFR BLD AUTO: 71 % (ref 42.7–76)
NRBC BLD AUTO-RTO: 0 /100 WBC (ref 0–0.2)
PLATELET # BLD AUTO: 230 10*3/MM3 (ref 140–450)
PMV BLD AUTO: 10.5 FL (ref 6–12)
POTASSIUM BLD-SCNC: 4.2 MMOL/L (ref 3.5–5.2)
PROT SERPL-MCNC: 7.4 G/DL (ref 6–8.5)
RBC # BLD AUTO: 4.45 10*6/MM3 (ref 4.14–5.8)
SODIUM BLD-SCNC: 139 MMOL/L (ref 136–145)
TRIGL SERPL-MCNC: 38 MG/DL (ref 0–150)
VIT B12 BLD-MCNC: 160 PG/ML (ref 211–946)
VLDLC SERPL-MCNC: 7.6 MG/DL (ref 5–40)
WBC NRBC COR # BLD: 6.88 10*3/MM3 (ref 3.4–10.8)

## 2020-01-03 NOTE — PROGRESS NOTES
I have reviewed the notes, assessments, and/or procedures performed by *Dr Head**during office visit. I concur with her/his documentation and assessment and plan for Jian Baker.          This document has been electronically signed by Devang Carbajal MD on January 3, 2020 3:40 PM

## 2020-01-05 LAB — CONV REPORT SUMMARY: NORMAL

## 2020-01-23 DIAGNOSIS — E53.8 B12 DEFICIENCY: Primary | ICD-10-CM

## 2020-01-23 RX ORDER — CYANOCOBALAMIN 1000 UG/ML
1000 INJECTION, SOLUTION INTRAMUSCULAR; SUBCUTANEOUS
Status: DISCONTINUED | OUTPATIENT
Start: 2020-01-23 | End: 2022-03-14

## 2020-01-24 ENCOUNTER — TELEPHONE (OUTPATIENT)
Dept: FAMILY MEDICINE CLINIC | Facility: CLINIC | Age: 65
End: 2020-01-24

## 2020-01-24 NOTE — TELEPHONE ENCOUNTER
----- Message from Stephania Head MD sent at 1/23/2020  3:33 PM CST -----  Normal labs other than low vitamin B12. Let him know that I put in the order that he can come get vitamin B12 shots in the office. He can try oral supplements over the counter first if he prefers, but they usually are not absorbed as well        Patient has been called and notified, said he has an appointment on the 4th of February and would get his shot done then

## 2020-02-04 ENCOUNTER — OFFICE VISIT (OUTPATIENT)
Dept: FAMILY MEDICINE CLINIC | Facility: CLINIC | Age: 65
End: 2020-02-04

## 2020-02-04 VITALS
DIASTOLIC BLOOD PRESSURE: 80 MMHG | HEIGHT: 67 IN | WEIGHT: 188.3 LBS | OXYGEN SATURATION: 96 % | BODY MASS INDEX: 29.55 KG/M2 | SYSTOLIC BLOOD PRESSURE: 122 MMHG | HEART RATE: 70 BPM

## 2020-02-04 DIAGNOSIS — R25.2 MUSCLE CRAMPS AT NIGHT: Primary | ICD-10-CM

## 2020-02-04 DIAGNOSIS — G62.9 NEUROPATHY: ICD-10-CM

## 2020-02-04 DIAGNOSIS — E53.8 VITAMIN B 12 DEFICIENCY: ICD-10-CM

## 2020-02-04 PROCEDURE — 96372 THER/PROPH/DIAG INJ SC/IM: CPT | Performed by: STUDENT IN AN ORGANIZED HEALTH CARE EDUCATION/TRAINING PROGRAM

## 2020-02-04 PROCEDURE — 99213 OFFICE O/P EST LOW 20 MIN: CPT | Performed by: STUDENT IN AN ORGANIZED HEALTH CARE EDUCATION/TRAINING PROGRAM

## 2020-02-04 RX ORDER — MAGNESIUM OXIDE 400 MG/1
400 TABLET ORAL NIGHTLY
Qty: 30 TABLET | Refills: 4 | Status: SHIPPED | OUTPATIENT
Start: 2020-02-04 | End: 2020-07-03

## 2020-02-04 RX ADMIN — CYANOCOBALAMIN 1000 MCG: 1000 INJECTION, SOLUTION INTRAMUSCULAR; SUBCUTANEOUS at 11:07

## 2020-02-04 NOTE — PATIENT INSTRUCTIONS
Take magnesium oxide nightly for night cramps. It might take a couple of days to stop the cramping, but should see improvement in a week or so.   Vitamin B12 Deficiency  Vitamin B12 deficiency means that your body does not have enough vitamin B12. The body needs this vitamin:  · To make red blood cells.  · To make genes (DNA).  · To help the nerves work.  If you do not have enough vitamin B12 in your body, you can have health problems.  What are the causes?  · Not eating enough foods that contain vitamin B12.  · Not being able to absorb vitamin B12 from the food that you eat.  · Certain digestive system diseases.  · A condition in which the body does not make enough of a certain protein, which results in too few red blood cells (pernicious anemia).  · Having a surgery in which part of the stomach or small intestine is removed.  · Taking medicines that make it hard for the body to absorb vitamin B12. These medicines include:  ? Heartburn medicines.  ? Some antibiotic medicines.  ? Other medicines that are used to treat certain conditions.  What increases the risk?  · Being older than age 50.  · Eating a vegetarian or vegan diet, especially while you are pregnant.  · Eating a poor diet while you are pregnant.  · Taking certain medicines.  · Having alcoholism.  What are the signs or symptoms?  In some cases, there are no symptoms. If the condition leads to too few blood cells or nerve damage, symptoms can occur, such as:  · Feeling weak.  · Feeling tired (fatigued).  · Not being hungry.  · Weight loss.  · A loss of feeling (numbness) or tingling in your hands and feet.  · Redness and burning of the tongue.  · Being mixed up (confused) or having memory problems.  · Sadness (depression).  · Problems with your senses. This can include color blindness, ringing in the ears, or loss of taste.  · Watery poop (diarrhea) or trouble pooping (constipation).  · Trouble walking.  If anemia is very bad, symptoms can  include:  · Being short of breath.  · Being dizzy.  · Having a very fast heartbeat.  How is this treated?  · Changing the way you eat and drink, such as:  ? Eating more foods that contain vitamin B12.  ? Drinking little or no alcohol.  · Getting vitamin B12 shots.  · Taking vitamin B12 supplements. Your doctor will tell you the dose that is best for you.  Follow these instructions at home:  Eating and drinking    · Eat lots of healthy foods that contain vitamin B12. These include:  ? Meats and poultry, such as beef, pork, chicken, turkey, and organ meats, such as liver.  ? Seafood, such as clams, rainbow trout, salmon, tuna, and seamus.  ? Eggs.  ? Cereal and dairy products that have vitamin B12 added to them. Check the label.  The items listed above may not be a complete list of what you can eat and drink. Contact a dietitian for more options.  General instructions  · Get any shots as told by your doctor.  · Take supplements only as told by your doctor.  · Do not drink alcohol if your doctor tells you not to. In some cases, you may only be asked to limit alcohol use.  · Keep all follow-up visits as told by your doctor. This is important.  Contact a doctor if:  · Your symptoms come back.  Get help right away if:  · You have trouble breathing.  · You have a very fast heartbeat.  · You have chest pain.  · You get dizzy.  · You pass out.  Summary  · Vitamin B12 deficiency means that your body is not getting enough vitamin B12.  · In some cases, there are no symptoms of this condition.  · Treatment may include making a change in the way you eat and drink, getting vitamin B12 shots, or taking supplements.  · Eat lots of healthy foods that contain vitamin B12.  This information is not intended to replace advice given to you by your health care provider. Make sure you discuss any questions you have with your health care provider.  Document Released: 12/06/2012 Document Revised: 08/27/2019 Document Reviewed:  08/27/2019  Elsevier Interactive Patient Education © 2019 [employer]

## 2020-02-04 NOTE — PROGRESS NOTES
Subjective   Jian Baker is a 64 y.o. male who presents for follow up for vitamin B12 deficiency, nocturnal leg cramps.    Vitamin B12 deficiency: Jian received his vitamin B12 injection today.  He will follow-up in 28 days for his next injection.  He was found to have low vitamin B12 values, and offered injections versus OTC pills.    Nocturnal leg cramps: Jian has nocturnal leg cramps which wake him from a dead sleep and are quite painful.  He has them nightly at least 1-2 times a night.  He has tried muscle relaxants in the past which made him constipated.  He has not tried magnesium or quinine.      Past Medical Hx:  Past Medical History:   Diagnosis Date   • Asthma     Previously diagnosed and previously prescribed inhaler medications.     • Essential hypertension    • Gastroesophageal reflux disease        Past Surgical Hx:  Past Surgical History:   Procedure Laterality Date   • BONE GRAFT     • WRIST FRACTURE SURGERY Bilateral        Health Maintenance:  Health Maintenance   Topic Date Due   • MEDICARE ANNUAL WELLNESS  10/10/2017   • ZOSTER VACCINE (2 of 2) 12/05/2017   • COLONOSCOPY  10/10/2024   • TDAP/TD VACCINES (3 - Td) 10/10/2027   • HEPATITIS C SCREENING  Completed   • INFLUENZA VACCINE  Completed       Current Meds:    Current Outpatient Medications:   •  atorvastatin (LIPITOR) 20 MG tablet, Take 1 tablet by mouth Daily., Disp: 30 tablet, Rfl: 5  •  cetirizine (zyrTEC) 10 MG tablet, Take 1 tablet by mouth Daily., Disp: 30 tablet, Rfl: 2  •  fluticasone (FLONASE) 50 MCG/ACT nasal spray, 2 sprays into the nostril(s) as directed by provider Daily., Disp: 1 bottle, Rfl: 10  •  lisinopril (PRINIVIL,ZESTRIL) 10 MG tablet, Take 2 tablets by mouth Daily., Disp: 60 tablet, Rfl: 5  •  meloxicam (MOBIC) 7.5 MG tablet, Take 1 tablet by mouth Daily., Disp: 60 tablet, Rfl: 5  •  pantoprazole (PROTONIX) 20 MG EC tablet, Take 1 tablet by mouth Daily., Disp: 30 tablet, Rfl: 5  •  pregabalin  (LYRICA) 50 MG capsule, Take 1 capsule by mouth 3 (Three) Times a Day., Disp: 90 capsule, Rfl: 2  •  magnesium oxide (MAG-OX) 400 MG tablet, Take 1 tablet by mouth Every Night for 150 days., Disp: 30 tablet, Rfl: 4    Current Facility-Administered Medications:   •  cyanocobalamin injection 1,000 mcg, 1,000 mcg, Intramuscular, Q28 Days, Stephania Head MD, 1,000 mcg at 20 1107    Allergies:  Patient has no known allergies.    Family Hx:  Family History   Problem Relation Age of Onset   • COPD Mother    • Hypertension Mother    • Diabetes type II Mother    • Cancer Father    • Heart failure Father         Social History:  Social History     Socioeconomic History   • Marital status:      Spouse name: Not on file   • Number of children: Not on file   • Years of education: Not on file   • Highest education level: Not on file   Occupational History   • Occupation: Disability   Tobacco Use   • Smoking status: Former Smoker     Packs/day: 1.00     Years: 25.00     Pack years: 25.00     Types: Pipe     Start date: 10/10/1972     Last attempt to quit: 10/31/2019     Years since quittin.2   • Smokeless tobacco: Current User     Types: Chew   Substance and Sexual Activity   • Alcohol use: Yes     Alcohol/week: 2.0 standard drinks     Types: 2 Cans of beer per week     Comment: Prior heavy drinker   • Drug use: No   • Sexual activity: Yes     Partners: Female   Social History Narrative    Patient lives in [place] with his wife.  Transportation is an issue for the couple.       Review of Systems  Review of Systems   Constitutional: Negative for activity change and appetite change.   HENT: Negative for congestion and ear pain.    Eyes: Negative for pain and discharge.   Respiratory: Negative for chest tightness and shortness of breath.    Cardiovascular: Negative for chest pain and palpitations.   Gastrointestinal: Negative for abdominal distention and abdominal pain.   Endocrine: Negative for cold  "intolerance and heat intolerance.   Genitourinary: Negative for difficulty urinating and dysuria.   Musculoskeletal: Negative for arthralgias and back pain.        Bilateral lower leg pain/paresthesia.   Skin: Negative for color change and rash.   Allergic/Immunologic: Negative for environmental allergies and food allergies.   Neurological: Negative for dizziness and headaches.   Hematological: Negative for adenopathy. Does not bruise/bleed easily.   Psychiatric/Behavioral: Negative for agitation and confusion.            Objective:     /80   Pulse 70   Ht 170.2 cm (67\")   Wt 85.4 kg (188 lb 4.8 oz)   SpO2 96%   BMI 29.49 kg/m²       Physical Exam   Constitutional: He is oriented to person, place, and time. He appears well-developed and well-nourished.   HENT:   Head: Normocephalic and atraumatic.   Eyes: Pupils are equal, round, and reactive to light. EOM are normal.   Neck: Neck supple. No tracheal deviation present. No thyromegaly present.   Cardiovascular: Normal rate, S1 normal, S2 normal, normal heart sounds and intact distal pulses.   Pulses:       Radial pulses are 2+ on the left side.        Dorsalis pedis pulses are 2+ on the right side, and 2+ on the left side.   Pulmonary/Chest: Effort normal and breath sounds normal.   Abdominal: Soft.   Musculoskeletal: Normal range of motion.   Neurological: He is alert and oriented to person, place, and time. GCS eye subscore is 4. GCS verbal subscore is 5. GCS motor subscore is 6.   Skin: Skin is warm and dry. Capillary refill takes 2 to 3 seconds.   Psychiatric: He has a normal mood and affect. His speech is normal and behavior is normal. Thought content normal.       Assessment/Plan:     Jian was seen today for hip pain.    Diagnoses and all orders for this visit:    Muscle cramps at night  -     magnesium oxide (MAG-OX) 400 MG tablet; Take 1 tablet by mouth Every Night for 150 days.  -Try magnesium tablets nightly for leg cramps.  If this does not " resolve leg cramps in 1 week, can suggest quinine nightly.    Vitamin B 12 deficiency    -Can schedule nurse only visit for the vitamin B12 injection next month.    Follow-up:     Return in about 1 month (around 3/4/2020).    Goals     • Better Compliance with HTN Medications (pt-stated)      Barriers: None      • Medication management      Patient has been having trouble with medication compliance.  Goal now is to take medications everyday at the same time.    Barriers: patient has not been on medications consistently for sometime.  Has trouble with remembering to take medications.          Decrease nocturnal leg cramps    Preventative:    Vaccines Recommended at this visit:   No Vaccines recommended today. Patient is up to date on all vaccines.     Vaccines Received at this visit:  No Vaccines recommended today. Patient is up to date on all vaccines.     Screenings Recommended at this visit:  No Screenings offered today. Patient is up to date on all screenings at this time.     Screenings Ordered at this visit:  No screenings were offered today. Patient is up to date on all screenings.     Smoking Status:  Patient is a former smoker.    Alcohol Intake:  Occasional/rare    Patient's Body mass index is 29.49 kg/m². BMI is within normal parameters. No follow-up required..         RISK SCORE: 3   Rashad Devine MD PGY-1    This document has been electronically signed by Rashad Devine MD on February 4, 2020 1:30 PM

## 2020-02-04 NOTE — PROGRESS NOTES
Subjective:     Jian Baker is a 64 y.o. male who presents for   Chief Complaint   Patient presents with   • Hip Pain     1 MONTH RECHK        64-year-old male with history of B12 deficiency and nocturnal leg cramps hyperlipidemia reflux osteoarthritis and hypertension presents for scheduled follow-up visit.  Patient complains of nocturnal leg cramps which often awaken him from a sound sleep he states these occur nightly and he has to get out of bed and walk around patient has tried muscle relaxants with minimal relief.  Patient also has ongoing paresthesias for which he was recently diagnosed with vitamin B12 deficiency he presents today to get his first injection please see Dr. Devine note for more detailed information regarding today's encounter.        Past Medical Hx:  Past Medical History:   Diagnosis Date   • Asthma     Previously diagnosed and previously prescribed inhaler medications.     • Essential hypertension    • Gastroesophageal reflux disease        Past Surgical Hx:  Past Surgical History:   Procedure Laterality Date   • BONE GRAFT     • WRIST FRACTURE SURGERY Bilateral        Health Maintenance:  Health Maintenance   Topic Date Due   • MEDICARE ANNUAL WELLNESS  10/10/2017   • ZOSTER VACCINE (2 of 2) 12/05/2017   • COLONOSCOPY  10/10/2024   • TDAP/TD VACCINES (3 - Td) 10/10/2027   • HEPATITIS C SCREENING  Completed   • INFLUENZA VACCINE  Completed       Current Meds:    Current Outpatient Medications:   •  atorvastatin (LIPITOR) 20 MG tablet, Take 1 tablet by mouth Daily., Disp: 30 tablet, Rfl: 5  •  cetirizine (zyrTEC) 10 MG tablet, Take 1 tablet by mouth Daily., Disp: 30 tablet, Rfl: 2  •  fluticasone (FLONASE) 50 MCG/ACT nasal spray, 2 sprays into the nostril(s) as directed by provider Daily., Disp: 1 bottle, Rfl: 10  •  lisinopril (PRINIVIL,ZESTRIL) 10 MG tablet, Take 2 tablets by mouth Daily., Disp: 60 tablet, Rfl: 5  •  meloxicam (MOBIC) 7.5 MG tablet, Take 1 tablet by mouth Daily.,  "Disp: 60 tablet, Rfl: 5  •  pantoprazole (PROTONIX) 20 MG EC tablet, Take 1 tablet by mouth Daily., Disp: 30 tablet, Rfl: 5  •  pregabalin (LYRICA) 50 MG capsule, Take 1 capsule by mouth 3 (Three) Times a Day., Disp: 90 capsule, Rfl: 2  •  magnesium oxide (MAG-OX) 400 MG tablet, Take 1 tablet by mouth Every Night for 150 days., Disp: 30 tablet, Rfl: 4    Current Facility-Administered Medications:   •  cyanocobalamin injection 1,000 mcg, 1,000 mcg, Intramuscular, Q28 Days, Stephania Head MD, 1,000 mcg at 20 1107    Allergies:  Patient has no known allergies.    Family Hx:  Family History   Problem Relation Age of Onset   • COPD Mother    • Hypertension Mother    • Diabetes type II Mother    • Cancer Father    • Heart failure Father         Social History:  Social History     Socioeconomic History   • Marital status:      Spouse name: Not on file   • Number of children: Not on file   • Years of education: Not on file   • Highest education level: Not on file   Occupational History   • Occupation: Disability   Tobacco Use   • Smoking status: Former Smoker     Packs/day: 1.00     Years: 25.00     Pack years: 25.00     Types: Pipe     Start date: 10/10/1972     Last attempt to quit: 10/31/2019     Years since quittin.2   • Smokeless tobacco: Current User     Types: Chew   Substance and Sexual Activity   • Alcohol use: Yes     Alcohol/week: 2.0 standard drinks     Types: 2 Cans of beer per week     Comment: Prior heavy drinker   • Drug use: No   • Sexual activity: Yes     Partners: Female   Social History Narrative    Patient lives in [place] with his wife.  Transportation is an issue for the couple.       Review of Systems  Review of Systems    Objective:     /80   Pulse 70   Ht 170.2 cm (67\")   Wt 85.4 kg (188 lb 4.8 oz)   SpO2 96%   BMI 29.49 kg/m²   Physical Exam   Alert no distress ENT grossly normal neck supple extremities show no tremor no edema neuro nonfocal no ataxia    Lab " Review  Results for orders placed or performed in visit on 01/02/20   Lipid Panel   Result Value Ref Range    Total Cholesterol 143 0 - 200 mg/dL    Triglycerides 38 0 - 150 mg/dL    HDL Cholesterol 77 (H) 40 - 60 mg/dL    LDL Cholesterol  58 0 - 100 mg/dL    VLDL Cholesterol 7.6 5 - 40 mg/dL    LDL/HDL Ratio 0.76    Comprehensive Metabolic Panel   Result Value Ref Range    Glucose 90 65 - 99 mg/dL    BUN 16 8 - 23 mg/dL    Creatinine 0.94 0.76 - 1.27 mg/dL    Sodium 139 136 - 145 mmol/L    Potassium 4.2 3.5 - 5.2 mmol/L    Chloride 101 98 - 107 mmol/L    CO2 26.3 22.0 - 29.0 mmol/L    Calcium 9.5 8.6 - 10.5 mg/dL    Total Protein 7.4 6.0 - 8.5 g/dL    Albumin 4.40 3.50 - 5.20 g/dL    ALT (SGPT) 20 1 - 41 U/L    AST (SGOT) 19 1 - 40 U/L    Alkaline Phosphatase 51 39 - 117 U/L    Total Bilirubin 0.6 0.2 - 1.2 mg/dL    eGFR Non African Amer 81 >60 mL/min/1.73    Globulin 3.0 gm/dL    A/G Ratio 1.5 g/dL    BUN/Creatinine Ratio 17.0 7.0 - 25.0    Anion Gap 11.7 5.0 - 15.0 mmol/L   Hemoglobin A1c   Result Value Ref Range    Hemoglobin A1C 5.20 4.80 - 5.60 %   Vitamin B12   Result Value Ref Range    Vitamin B-12 160 (L) 211 - 946 pg/mL   CBC Auto Differential   Result Value Ref Range    WBC 6.88 3.40 - 10.80 10*3/mm3    RBC 4.45 4.14 - 5.80 10*6/mm3    Hemoglobin 14.1 13.0 - 17.7 g/dL    Hematocrit 39.9 37.5 - 51.0 %    MCV 89.7 79.0 - 97.0 fL    MCH 31.7 26.6 - 33.0 pg    MCHC 35.3 31.5 - 35.7 g/dL    RDW 16.9 (H) 12.3 - 15.4 %    RDW-SD 55.0 (H) 37.0 - 54.0 fl    MPV 10.5 6.0 - 12.0 fL    Platelets 230 140 - 450 10*3/mm3    Neutrophil % 71.0 42.7 - 76.0 %    Lymphocyte % 16.6 (L) 19.6 - 45.3 %    Monocyte % 10.2 5.0 - 12.0 %    Eosinophil % 1.3 0.3 - 6.2 %    Basophil % 0.6 0.0 - 1.5 %    Immature Grans % 0.3 0.0 - 0.5 %    Neutrophils, Absolute 4.89 1.70 - 7.00 10*3/mm3    Lymphocytes, Absolute 1.14 0.70 - 3.10 10*3/mm3    Monocytes, Absolute 0.70 0.10 - 0.90 10*3/mm3    Eosinophils, Absolute 0.09 0.00 - 0.40 10*3/mm3      Basophils, Absolute 0.04 0.00 - 0.20 10*3/mm3    Immature Grans, Absolute 0.02 0.00 - 0.05 10*3/mm3    nRBC 0.0 0.0 - 0.2 /100 WBC            Assessment:     Jian was seen today for hip pain.    Diagnoses and all orders for this visit:    Muscle cramps at night  -     magnesium oxide (MAG-OX) 400 MG tablet; Take 1 tablet by mouth Every Night for 150 days.    Vitamin B 12 deficiency    Neuropathy        Plan:     I have seen and examined the patient.  I have reviewed the notes, assessments, and/or procedures performed by Dr. Devine, I concur with her/his documentation and assessment and plan for Jian Baker.            This document has been electronically signed by Angel Rea MD on February 4, 2020 3:17 PM

## 2020-03-04 ENCOUNTER — OFFICE VISIT (OUTPATIENT)
Dept: FAMILY MEDICINE CLINIC | Facility: CLINIC | Age: 65
End: 2020-03-04

## 2020-03-04 VITALS
DIASTOLIC BLOOD PRESSURE: 82 MMHG | HEART RATE: 86 BPM | WEIGHT: 191.7 LBS | SYSTOLIC BLOOD PRESSURE: 128 MMHG | TEMPERATURE: 97.9 F | HEIGHT: 67 IN | OXYGEN SATURATION: 98 % | BODY MASS INDEX: 30.09 KG/M2

## 2020-03-04 DIAGNOSIS — I10 ESSENTIAL HYPERTENSION: Primary | ICD-10-CM

## 2020-03-04 DIAGNOSIS — M17.0 PRIMARY OSTEOARTHRITIS OF BOTH KNEES: ICD-10-CM

## 2020-03-04 DIAGNOSIS — E53.8 VITAMIN B 12 DEFICIENCY: ICD-10-CM

## 2020-03-04 PROCEDURE — 96372 THER/PROPH/DIAG INJ SC/IM: CPT | Performed by: STUDENT IN AN ORGANIZED HEALTH CARE EDUCATION/TRAINING PROGRAM

## 2020-03-04 PROCEDURE — 99213 OFFICE O/P EST LOW 20 MIN: CPT | Performed by: STUDENT IN AN ORGANIZED HEALTH CARE EDUCATION/TRAINING PROGRAM

## 2020-03-04 PROCEDURE — 20610 DRAIN/INJ JOINT/BURSA W/O US: CPT | Performed by: STUDENT IN AN ORGANIZED HEALTH CARE EDUCATION/TRAINING PROGRAM

## 2020-03-04 RX ORDER — TRIAMCINOLONE ACETONIDE 40 MG/ML
80 INJECTION, SUSPENSION INTRA-ARTICULAR; INTRAMUSCULAR
Status: COMPLETED | OUTPATIENT
Start: 2020-03-04 | End: 2020-03-04

## 2020-03-04 RX ORDER — LIDOCAINE HYDROCHLORIDE 10 MG/ML
1 INJECTION, SOLUTION INFILTRATION; PERINEURAL
Status: COMPLETED | OUTPATIENT
Start: 2020-03-04 | End: 2020-03-04

## 2020-03-04 RX ADMIN — LIDOCAINE HYDROCHLORIDE 1 ML: 10 INJECTION, SOLUTION INFILTRATION; PERINEURAL at 09:24

## 2020-03-04 RX ADMIN — TRIAMCINOLONE ACETONIDE 80 MG: 40 INJECTION, SUSPENSION INTRA-ARTICULAR; INTRAMUSCULAR at 09:24

## 2020-03-04 RX ADMIN — CYANOCOBALAMIN 1000 MCG: 1000 INJECTION, SOLUTION INTRAMUSCULAR; SUBCUTANEOUS at 08:41

## 2020-03-04 NOTE — PROGRESS NOTES
FAMILY MEDICINE  RESIDENCY CLINIC PROGRESS NOTE  Pelon Stewart Jr, MD  Subjective   SUBJECTIVE:   CC: Knee Pain (1 mo follow up) and Injections (b12)     Jian Baker is a 64 y.o. male who presents to the [facility] today for HTN & Knee pain.     HTN -- stable on lisinopril.  No medication side effects.  No issues getting medications.    Knee Pain -- left sided knee pain 2/2 to OA.  This is a chronic issues worsening over the past month or so.  Ambulation makes this worse.  Steroid injections have helped in the past.  He would like one today.      I have reviewed the patient's medical, family, and social history in detail;there are no changes to the history as noted in the electronic medical record.   Concurrent Medical History:  Past Medical History:   Diagnosis Date   • Asthma     Previously diagnosed and previously prescribed inhaler medications.     • Essential hypertension    • Gastroesophageal reflux disease      Past Surgical History:   Procedure Laterality Date   • BONE GRAFT     • WRIST FRACTURE SURGERY Bilateral      Current Outpatient Medications on File Prior to Visit   Medication Sig   • atorvastatin (LIPITOR) 20 MG tablet Take 1 tablet by mouth Daily.   • cetirizine (zyrTEC) 10 MG tablet Take 1 tablet by mouth Daily.   • fluticasone (FLONASE) 50 MCG/ACT nasal spray 2 sprays into the nostril(s) as directed by provider Daily.   • lisinopril (PRINIVIL,ZESTRIL) 10 MG tablet Take 2 tablets by mouth Daily.   • magnesium oxide (MAG-OX) 400 MG tablet Take 1 tablet by mouth Every Night for 150 days.   • meloxicam (MOBIC) 7.5 MG tablet Take 1 tablet by mouth Daily.   • pantoprazole (PROTONIX) 20 MG EC tablet Take 1 tablet by mouth Daily.   • pregabalin (LYRICA) 50 MG capsule Take 1 capsule by mouth 3 (Three) Times a Day.     Current Facility-Administered Medications on File Prior to Visit   Medication   • cyanocobalamin injection 1,000 mcg     He has No Known Allergies.    family  "history includes COPD in his mother; Cancer in his father; Diabetes type II in his mother; Heart failure in his father; Hypertension in his mother.  He  reports that he quit smoking about 4 months ago. His smoking use included pipe. He started smoking about 47 years ago. He has a 25.00 pack-year smoking history. His smokeless tobacco use includes chew. He reports that he drinks about 2.0 standard drinks of alcohol per week. He reports that he does not use drugs.      Review of Systems General: negative for - fatigue or weight loss  PULM: no cough, shortness of breath, or wheezing  CV: no chest pain or dyspnea on exertion  MSK: Knee Pain, left side.   GI: no abdominal pain, change in bowel habits, or black or bloody stools  NEURO: no TIA or stroke symptoms; No confusion, dizziness, HA or seizures    Objective   OBJECTIVE:   /82   Pulse 86   Temp 97.9 °F (36.6 °C) (Tympanic)   Ht 170.2 cm (67\")   Wt 87 kg (191 lb 11.2 oz)   SpO2 98%   BMI 30.02 kg/m²    Physical Exam GEN: Well developed, in no acute distress  HEENT: NCAT, without lesions or deformities  CV: Normal S1/S2, no murmurs or friction rubs  PULM: Normal effort, without wheezes or rhonchi  GI: Soft, non-tender & non-distended; +BSx4  Ext: Full ROM, without edema or deformities  Neuro: AxO x 3, normal gait  Psych: appropriate mood & affect  MSK: Left knee TTP on medial & lateral joint space.     DATA REVIEWED:  Lab Results   Component Value Date    WBC 6.88 01/02/2020    HGB 14.1 01/02/2020    HCT 39.9 01/02/2020    MCV 89.7 01/02/2020     01/02/2020     Lab Results   Component Value Date    GLUCOSE 90 01/02/2020    BUN 16 01/02/2020    CREATININE 0.94 01/02/2020    EGFRIFNONA 81 01/02/2020    BCR 17.0 01/02/2020    K 4.2 01/02/2020    CO2 26.3 01/02/2020    CALCIUM 9.5 01/02/2020    ALBUMIN 4.40 01/02/2020    AST 19 01/02/2020    ALT 20 01/02/2020     Lab Results   Component Value Date    CHOL 143 01/02/2020    CHLPL 160 11/24/2015    TRIG " 38 01/02/2020    HDL 77 (H) 01/02/2020    LDL 58 01/02/2020     CMP:  Lab Results   Component Value Date    BUN 16 01/02/2020    CREATININE 0.94 01/02/2020    EGFRIFNONA 81 01/02/2020     01/02/2020    K 4.2 01/02/2020     01/02/2020    CALCIUM 9.5 01/02/2020    ALBUMIN 4.40 01/02/2020    BILITOT 0.6 01/02/2020    ALKPHOS 51 01/02/2020    AST 19 01/02/2020    ALT 20 01/02/2020   , CBC:  Lab Results   Component Value Date    WBC 6.88 01/02/2020    RBC 4.45 01/02/2020    HGB 14.1 01/02/2020    HCT 39.9 01/02/2020    MCV 89.7 01/02/2020    MCH 31.7 01/02/2020    MCHC 35.3 01/02/2020    RDW 16.9 (H) 01/02/2020     01/02/2020     -- Preventive Medicine Topics --   Health Maintenance Topics with due status: Overdue       Topic Date Due    MEDICARE ANNUAL WELLNESS 10/10/2017    ZOSTER VACCINE 12/05/2017   WEIGHT: Patient's Body mass index is 30.02 kg/m². BMI is above normal parameters. Recommendations include: educational material and nutrition counseling.    TOBACCO: Mr. Baker  reports that he quit smoking about 4 months ago. His smoking use included pipe. He started smoking about 47 years ago. He has a 25.00 pack-year smoking history. His smokeless tobacco use includes chew.  ALCOHOL: Mr. Baker  reports that he drinks about 2.0 standard drinks of alcohol per week.  ILLICIT DRUGS: Mr. Baker  reports that he does not use drugs.  Goals        General    • Better Compliance with HTN Medications (pt-stated)      Barriers: None         Lifestyle    • Medication management      Patient has been having trouble with medication compliance.  Goal now is to take medications everyday at the same time.    Barriers: patient has not been on medications consistently for sometime.  Has trouble with remembering to take medications.            Assessment/Plan   ASSESSMENT & PLAN:      Diagnosis Plan   1. Essential hypertension  Continue lisinopril daily   2. Vitamin B 12 deficiency  B12 injection today   3. Primary  osteoarthritis of both knees  Steroid injection of left knee.           Patient Instructions   Follow-up: No follow-ups on file.     Future Appointments   Date Time Provider Department Center   4/24/2020 11:00 AM Pelon Stewart Jr., MD [facility] RES None      RISK SCORE: 3    ______________________________________  Pelon Stewart Jr., M.D. PGY3  Family Practice Resident  [address] [place] [address]  Phone: [phone]  Fax: [phone]  ¯¯¯¯¯¯¯¯¯¯¯¯¯¯¯¯¯¯¯¯¯¯¯¯¯¯¯    This document has been electronically signed by  Pelon Stewart Jr, MD on 03/04/2020 9:20 AM

## 2020-03-04 NOTE — PATIENT INSTRUCTIONS
"DASH Eating Plan  DASH stands for \"Dietary Approaches to Stop Hypertension.\" The DASH eating plan is a healthy eating plan that has been shown to reduce high blood pressure (hypertension). It may also reduce your risk for type 2 diabetes, heart disease, and stroke. The DASH eating plan may also help with weight loss.  What are tips for following this plan?    General guidelines  · Avoid eating more than 2,300 mg (milligrams) of salt (sodium) a day. If you have hypertension, you may need to reduce your sodium intake to 1,500 mg a day.  · Limit alcohol intake to no more than 1 drink a day for nonpregnant women and 2 drinks a day for men. One drink equals 12 oz of beer, 5 oz of wine, or 1½ oz of hard liquor.  · Work with your health care provider to maintain a healthy body weight or to lose weight. Ask what an ideal weight is for you.  · Get at least 30 minutes of exercise that causes your heart to beat faster (aerobic exercise) most days of the week. Activities may include walking, swimming, or biking.  · Work with your health care provider or diet and nutrition specialist (dietitian) to adjust your eating plan to your individual calorie needs.  Reading food labels    · Check food labels for the amount of sodium per serving. Choose foods with less than 5 percent of the Daily Value of sodium. Generally, foods with less than 300 mg of sodium per serving fit into this eating plan.  · To find whole grains, look for the word \"whole\" as the first word in the ingredient list.  Shopping  · Buy products labeled as \"low-sodium\" or \"no salt added.\"  · Buy fresh foods. Avoid canned foods and premade or frozen meals.  Cooking  · Avoid adding salt when cooking. Use salt-free seasonings or herbs instead of table salt or sea salt. Check with your health care provider or pharmacist before using salt substitutes.  · Do not villasenor foods. Cook foods using healthy methods such as baking, boiling, grilling, and broiling instead.  · Cook with " heart-healthy oils, such as olive, canola, soybean, or sunflower oil.  Meal planning  · Eat a balanced diet that includes:  ? 5 or more servings of fruits and vegetables each day. At each meal, try to fill half of your plate with fruits and vegetables.  ? Up to 6-8 servings of whole grains each day.  ? Less than 6 oz of lean meat, poultry, or fish each day. A 3-oz serving of meat is about the same size as a deck of cards. One egg equals 1 oz.  ? 2 servings of low-fat dairy each day.  ? A serving of nuts, seeds, or beans 5 times each week.  ? Heart-healthy fats. Healthy fats called Omega-3 fatty acids are found in foods such as flaxseeds and coldwater fish, like sardines, salmon, and mackerel.  · Limit how much you eat of the following:  ? Canned or prepackaged foods.  ? Food that is high in trans fat, such as fried foods.  ? Food that is high in saturated fat, such as fatty meat.  ? Sweets, desserts, sugary drinks, and other foods with added sugar.  ? Full-fat dairy products.  · Do not salt foods before eating.  · Try to eat at least 2 vegetarian meals each week.  · Eat more home-cooked food and less restaurant, buffet, and fast food.  · When eating at a restaurant, ask that your food be prepared with less salt or no salt, if possible.  What foods are recommended?  The items listed may not be a complete list. Talk with your dietitian about what dietary choices are best for you.  Grains  Whole-grain or whole-wheat bread. Whole-grain or whole-wheat pasta. Brown rice. Oatmeal. Quinoa. Bulgur. Whole-grain and low-sodium cereals. Fatou bread. Low-fat, low-sodium crackers. Whole-wheat flour tortillas.  Vegetables  Fresh or frozen vegetables (raw, steamed, roasted, or grilled). Low-sodium or reduced-sodium tomato and vegetable juice. Low-sodium or reduced-sodium tomato sauce and tomato paste. Low-sodium or reduced-sodium canned vegetables.  Fruits  All fresh, dried, or frozen fruit. Canned fruit in natural juice (without  added sugar).  Meat and other protein foods  Skinless chicken or turkey. Ground chicken or turkey. Pork with fat trimmed off. Fish and seafood. Egg whites. Dried beans, peas, or lentils. Unsalted nuts, nut butters, and seeds. Unsalted canned beans. Lean cuts of beef with fat trimmed off. Low-sodium, lean deli meat.  Dairy  Low-fat (1%) or fat-free (skim) milk. Fat-free, low-fat, or reduced-fat cheeses. Nonfat, low-sodium ricotta or cottage cheese. Low-fat or nonfat yogurt. Low-fat, low-sodium cheese.  Fats and oils  Soft margarine without trans fats. Vegetable oil. Low-fat, reduced-fat, or light mayonnaise and salad dressings (reduced-sodium). Canola, safflower, olive, soybean, and sunflower oils. Avocado.  Seasoning and other foods  Herbs. Spices. Seasoning mixes without salt. Unsalted popcorn and pretzels. Fat-free sweets.  What foods are not recommended?  The items listed may not be a complete list. Talk with your dietitian about what dietary choices are best for you.  Grains  Baked goods made with fat, such as croissants, muffins, or some breads. Dry pasta or rice meal packs.  Vegetables  Creamed or fried vegetables. Vegetables in a cheese sauce. Regular canned vegetables (not low-sodium or reduced-sodium). Regular canned tomato sauce and paste (not low-sodium or reduced-sodium). Regular tomato and vegetable juice (not low-sodium or reduced-sodium). Pickles. Olives.  Fruits  Canned fruit in a light or heavy syrup. Fried fruit. Fruit in cream or butter sauce.  Meat and other protein foods  Fatty cuts of meat. Ribs. Fried meat. Ordaz. Sausage. Bologna and other processed lunch meats. Salami. Fatback. Hotdogs. Bratwurst. Salted nuts and seeds. Canned beans with added salt. Canned or smoked fish. Whole eggs or egg yolks. Chicken or turkey with skin.  Dairy  Whole or 2% milk, cream, and half-and-half. Whole or full-fat cream cheese. Whole-fat or sweetened yogurt. Full-fat cheese. Nondairy creamers. Whipped toppings.  Processed cheese and cheese spreads.  Fats and oils  Butter. Stick margarine. Lard. Shortening. Ghee. Ordaz fat. Tropical oils, such as coconut, palm kernel, or palm oil.  Seasoning and other foods  Salted popcorn and pretzels. Onion salt, garlic salt, seasoned salt, table salt, and sea salt. Worcestershire sauce. Tartar sauce. Barbecue sauce. Teriyaki sauce. Soy sauce, including reduced-sodium. Steak sauce. Canned and packaged gravies. Fish sauce. Oyster sauce. Cocktail sauce. Horseradish that you find on the shelf. Ketchup. Mustard. Meat flavorings and tenderizers. Bouillon cubes. Hot sauce and Tabasco sauce. Premade or packaged marinades. Premade or packaged taco seasonings. Relishes. Regular salad dressings.  Where to find more information:  · National Heart, Lung, and Blood [place]: www.nhlbi.nih.gov  · American Heart Association: www.heart.org  Summary  · The DASH eating plan is a healthy eating plan that has been shown to reduce high blood pressure (hypertension). It may also reduce your risk for type 2 diabetes, heart disease, and stroke.  · With the DASH eating plan, you should limit salt (sodium) intake to 2,300 mg a day. If you have hypertension, you may need to reduce your sodium intake to 1,500 mg a day.  · When on the DASH eating plan, aim to eat more fresh fruits and vegetables, whole grains, lean proteins, low-fat dairy, and heart-healthy fats.  · Work with your health care provider or diet and nutrition specialist (dietitian) to adjust your eating plan to your individual calorie needs.  This information is not intended to replace advice given to you by your health care provider. Make sure you discuss any questions you have with your health care provider.  Document Released: 12/06/2012 Document Revised: 12/11/2017 Document Reviewed: 12/11/2017  [employer] Interactive Patient Education © 2020 [employer]

## 2020-03-04 NOTE — PROGRESS NOTES
I have seen the patient.  I have reviewed the notes, assessments, and/or procedures performed by damian Stewart and Florentin, I concur with her/his documentation and assessment and plan for Jian Baker.               This document has been electronically signed by Angel Rea MD on March 4, 2020 11:02 AM

## 2020-03-04 NOTE — PROGRESS NOTES
"Procedure   Arthrocentesis  Date/Time: 3/4/2020 9:24 AM  Performed by: Pelon Stewart Jr., MD  Authorized by: Pelon Stewart Jr., MD   Consent: Verbal consent obtained. Written consent obtained.  Risks and benefits: risks, benefits and alternatives were discussed  Consent given by: patient  Patient understanding: patient states understanding of the procedure being performed  Patient consent: the patient's understanding of the procedure matches consent given  Procedure consent: procedure consent matches procedure scheduled  Site marked: the operative site was marked  Patient identity confirmed: verbally with patient  Time out: Immediately prior to procedure a \"time out\" was called to verify the correct patient, procedure, equipment, support staff and site/side marked as required.  Indications: pain   Body area: knee  Joint: left knee  Local anesthesia used: yes    Anesthesia:  Local anesthesia used: yes  Local Anesthetic: topical anesthetic    Sedation:  Patient sedated: no    Needle size: 22 G  Ultrasound guidance: no  Approach: anterior           "

## 2020-04-06 ENCOUNTER — TELEPHONE (OUTPATIENT)
Dept: FAMILY MEDICINE CLINIC | Facility: CLINIC | Age: 65
End: 2020-04-06

## 2020-04-06 NOTE — TELEPHONE ENCOUNTER
TRIED TO CALL  PATIENT TO RESCHEDULE APPT FOR 04/24; UNABLE TO REACH PATIENT OR LEAVE VM.      THANK YOU,      [place]

## 2020-04-17 ENCOUNTER — OFFICE VISIT (OUTPATIENT)
Dept: FAMILY MEDICINE CLINIC | Facility: CLINIC | Age: 65
End: 2020-04-17

## 2020-04-17 VITALS — HEIGHT: 67 IN | BODY MASS INDEX: 30.02 KG/M2

## 2020-04-17 DIAGNOSIS — I10 ESSENTIAL HYPERTENSION: Primary | ICD-10-CM

## 2020-04-17 DIAGNOSIS — M17.0 PRIMARY OSTEOARTHRITIS OF BOTH KNEES: ICD-10-CM

## 2020-04-17 PROCEDURE — 99441 PR PHYS/QHP TELEPHONE EVALUATION 5-10 MIN: CPT | Performed by: STUDENT IN AN ORGANIZED HEALTH CARE EDUCATION/TRAINING PROGRAM

## 2020-04-17 RX ORDER — MELOXICAM 7.5 MG/1
15 TABLET ORAL DAILY
Qty: 60 TABLET | Refills: 5 | Status: SHIPPED | OUTPATIENT
Start: 2020-04-17 | End: 2020-09-23 | Stop reason: SDUPTHER

## 2020-04-17 RX ORDER — LISINOPRIL 10 MG/1
20 TABLET ORAL DAILY
Qty: 60 TABLET | Refills: 5 | Status: SHIPPED | OUTPATIENT
Start: 2020-04-17 | End: 2020-10-02 | Stop reason: SDUPTHER

## 2020-04-17 NOTE — PROGRESS NOTES
You have chosen to receive care through a telephone visit today. Do you consent to use a telephone visit for your medical care today? Yes         [facility] MEDICINE RESIDENCY CLINIC - PROGRESS NOTE  Pelon Stewart Jr, MD  Subjective   Hypertension    Subjective:     Jian Baker is a 64 y.o. male who presents to the [facility] Clinic today for htn & osteoarthritis of both knees.    HTN -- on lisinopril.  Currently stable.  Denies medication side effects    OA of knees -- this is a chronic problem.  It has been stable over the past year except for some recent aching, shooting pain in the right knee that occurs intermittently over the past week.  Patient gets knees injected every 3-4 months which helps.  He is taking meloxicam 7.5 mg daily which is not helping as much as it used to.  He would like to increase to 15 mg.     I have reviewed the patient's medical, family, and social history in detail;there are no changes to the history as noted in the electronic medical record.     Past Medical Hx:  Past Medical History:   Diagnosis Date   • Asthma     Previously diagnosed and previously prescribed inhaler medications.     • Essential hypertension    • Gastroesophageal reflux disease        Past Surgical Hx:  Past Surgical History:   Procedure Laterality Date   • BONE GRAFT     • WRIST FRACTURE SURGERY Bilateral        Health Maintenance:  Health Maintenance   Topic Date Due   • MEDICARE ANNUAL WELLNESS  10/10/2017   • ZOSTER VACCINE (2 of 2) 12/05/2017   • INFLUENZA VACCINE  08/01/2020   • COLONOSCOPY  10/10/2024   • TDAP/TD VACCINES (3 - Td) 10/10/2027   • HEPATITIS C SCREENING  Completed       Current Meds:    Current Outpatient Medications:   •  atorvastatin (LIPITOR) 20 MG tablet, Take 1 tablet by mouth Daily., Disp: 30 tablet, Rfl: 5  •  cetirizine (zyrTEC) 10 MG tablet, Take 1 tablet by mouth Daily., Disp: 30 tablet, Rfl: 2  •  fluticasone (FLONASE) 50 MCG/ACT nasal spray, 2 sprays into the  nostril(s) as directed by provider Daily., Disp: 1 bottle, Rfl: 10  •  lisinopril (PRINIVIL,ZESTRIL) 10 MG tablet, Take 2 tablets by mouth Daily., Disp: 60 tablet, Rfl: 5  •  magnesium oxide (MAG-OX) 400 MG tablet, Take 1 tablet by mouth Every Night for 150 days., Disp: 30 tablet, Rfl: 4  •  meloxicam (MOBIC) 7.5 MG tablet, Take 2 tablets by mouth Daily., Disp: 60 tablet, Rfl: 5  •  pantoprazole (PROTONIX) 20 MG EC tablet, Take 1 tablet by mouth Daily., Disp: 30 tablet, Rfl: 5  •  pregabalin (LYRICA) 50 MG capsule, Take 1 capsule by mouth 3 (Three) Times a Day., Disp: 90 capsule, Rfl: 2    Current Facility-Administered Medications:   •  cyanocobalamin injection 1,000 mcg, 1,000 mcg, Intramuscular, Q28 Days, Stephania Head MD, 1,000 mcg at 20 0841    Allergies:  Patient has no known allergies.    Family Hx:  Family History   Problem Relation Age of Onset   • COPD Mother    • Hypertension Mother    • Diabetes type II Mother    • Cancer Father    • Heart failure Father         Social History:  Social History     Socioeconomic History   • Marital status:      Spouse name: Not on file   • Number of children: Not on file   • Years of education: Not on file   • Highest education level: Not on file   Occupational History   • Occupation: Disability   Tobacco Use   • Smoking status: Former Smoker     Packs/day: 1.00     Years: 25.00     Pack years: 25.00     Types: Pipe     Start date: 10/10/1972     Last attempt to quit: 10/31/2019     Years since quittin.4   • Smokeless tobacco: Current User     Types: Chew   Substance and Sexual Activity   • Alcohol use: Yes     Alcohol/week: 2.0 standard drinks     Types: 2 Cans of beer per week     Comment: Prior heavy drinker   • Drug use: No   • Sexual activity: Yes     Partners: Female   Social History Narrative    Patient lives in [place] with his wife.  Transportation is an issue for the couple.       Review of Systems  Review of Systems   Constitutional:  "Negative for chills, diaphoresis, fatigue and fever.   HENT: Negative for ear pain and sore throat.    Eyes: Negative for pain and visual disturbance.   Respiratory: Negative for chest tightness and shortness of breath.    Cardiovascular: Negative for chest pain and palpitations.   Gastrointestinal: Negative for abdominal distention, abdominal pain, diarrhea and nausea.   Endocrine: Negative for polydipsia and polyuria.   Genitourinary: Negative for dysuria, flank pain and hematuria.   Musculoskeletal: Positive for arthralgias. Negative for joint swelling and myalgias.   Skin: Negative for color change and pallor.   Neurological: Negative for dizziness, seizures and speech difficulty.   Hematological: Negative for adenopathy. Does not bruise/bleed easily.   Psychiatric/Behavioral: Negative for agitation and confusion.       Objective   Objective:     Vitals:    04/17/20 1101   Height: 170.2 cm (67\")       Physical Exam  Defer 2/2 telehealth visit    Lab Results   Component Value Date    WBC 6.88 01/02/2020    HGB 14.1 01/02/2020    HCT 39.9 01/02/2020    MCV 89.7 01/02/2020     01/02/2020     Lab Results   Component Value Date    GLUCOSE 90 01/02/2020    BUN 16 01/02/2020    CREATININE 0.94 01/02/2020    EGFRIFNONA 81 01/02/2020    BCR 17.0 01/02/2020    K 4.2 01/02/2020    CO2 26.3 01/02/2020    CALCIUM 9.5 01/02/2020    ALBUMIN 4.40 01/02/2020    AST 19 01/02/2020    ALT 20 01/02/2020     Lab Results   Component Value Date    HGBA1C 5.20 01/02/2020     Lab Results   Component Value Date    CHOL 143 01/02/2020    CHLPL 160 11/24/2015    TRIG 38 01/02/2020    HDL 77 (H) 01/02/2020    LDL 58 01/02/2020     No results found for: TSH, R2YUGSV, X9AXYSJ, THYROIDAB     Assessment/Plan   Assessment/Plan:     Medications Discontinued During This Encounter   Medication Reason   • meloxicam (MOBIC) 7.5 MG tablet    • lisinopril (PRINIVIL,ZESTRIL) 10 MG tablet Catalino Villar was seen today for " hypertension.    Diagnoses and all orders for this visit:    Essential hypertension  -     lisinopril (PRINIVIL,ZESTRIL) 10 MG tablet; Take 2 tablets by mouth Daily.    Primary osteoarthritis of both knees  -     meloxicam (MOBIC) 7.5 MG tablet; Take 2 tablets by mouth Daily.       Diagnosis Plan   1. Essential hypertension  Stable, continue current medications  Continue checking BP intermittently at home  lisinopril (PRINIVIL,ZESTRIL) 10 MG tablet   2. Primary osteoarthritis of both knees  Increase meloxicam  meloxicam (MOBIC) 15 MG tablet       Follow-up:   No follow-ups on file.  Goals        General    • Better Compliance with HTN Medications (pt-stated)      Barriers: None         Lifestyle    • Medication management      Patient has been having trouble with medication compliance.  Goal now is to take medications everyday at the same time.    Barriers: patient has not been on medications consistently for sometime.  Has trouble with remembering to take medications.              Preventative:  Health Maintenance   Topic Date Due   • MEDICARE ANNUAL WELLNESS  10/10/2017   • ZOSTER VACCINE (2 of 2) 12/05/2017   • INFLUENZA VACCINE  08/01/2020   • COLONOSCOPY  10/10/2024   • TDAP/TD VACCINES (3 - Td) 10/10/2027   • HEPATITIS C SCREENING  Completed       He  reports that he quit smoking about 5 months ago. His smoking use included pipe. He started smoking about 47 years ago. He has a 25.00 pack-year smoking history. His smokeless tobacco use includes chew.    He  reports that he drinks about 2.0 standard drinks of alcohol per week.  He  reports that he does not use drugs.    Patient's Body mass index is 30.02 kg/m². BMI is above normal parameters. Recommendations include: educational material.         RISK SCORE: 3    This was an audio enabled telemedicine encounter.  9 min.      Pelon Stewart Jr., M.D.  PGY2  Family Practice Resident  [address] [place] [address]  Phone: [phone]  Fax: [phone]      This document has been electronically signed by Pelon Stewart Jr, MD on 04/17/20 11:18 AM

## 2020-04-17 NOTE — PROGRESS NOTES
I have spoken with the patient.  I have reviewed the notes, assessments, and/or procedures performed by Pelon Stewart Jr., MD, I concur with her/his documentation and assessment and plan for Jian Dante Samuel.               This document has been electronically signed by Angel Rea MD on April 17, 2020 11:37

## 2020-06-02 ENCOUNTER — OFFICE VISIT (OUTPATIENT)
Dept: FAMILY MEDICINE CLINIC | Facility: CLINIC | Age: 65
End: 2020-06-02

## 2020-06-02 VITALS — BODY MASS INDEX: 30.02 KG/M2 | HEIGHT: 67 IN

## 2020-06-02 DIAGNOSIS — E53.8 VITAMIN B 12 DEFICIENCY: ICD-10-CM

## 2020-06-02 DIAGNOSIS — I10 ESSENTIAL HYPERTENSION: Primary | ICD-10-CM

## 2020-06-02 PROCEDURE — 99442 PR PHYS/QHP TELEPHONE EVALUATION 11-20 MIN: CPT | Performed by: STUDENT IN AN ORGANIZED HEALTH CARE EDUCATION/TRAINING PROGRAM

## 2020-06-02 RX ORDER — CETIRIZINE HYDROCHLORIDE 10 MG/1
10 TABLET ORAL DAILY
Qty: 30 TABLET | Refills: 2 | Status: SHIPPED | OUTPATIENT
Start: 2020-06-02 | End: 2020-07-02 | Stop reason: ALTCHOICE

## 2020-06-02 RX ORDER — LANOLIN ALCOHOL/MO/W.PET/CERES
1000 CREAM (GRAM) TOPICAL DAILY
Qty: 30 TABLET | Refills: 2 | Status: SHIPPED | OUTPATIENT
Start: 2020-06-02 | End: 2021-12-21 | Stop reason: SDUPTHER

## 2020-06-02 NOTE — PROGRESS NOTES
FAMILY MEDICINE  RESIDENCY CLINIC PROGRESS NOTE  Pelon Stewart Jr, MD  Subjective   SUBJECTIVE:   CC: HTN & B12 Deficiency     Jian Baker is a 64 y.o. male who presents to the [facility] today for f/u for:    HTN: stable on lisinopril.  No side effects of medication.    B12 Deficiency: Patient states that he feels a little sluggish these days.  He had previously been getting B12 injections q28d but has not been able to get one since March because of the COVID-19 pandemic.  He would like to get PO tablets in the mean time.      I have reviewed the patient's medical, family, and social history in detail;there are no changes to the history as noted in the electronic medical record.   Concurrent Medical History:  Past Medical History:   Diagnosis Date   • Asthma     Previously diagnosed and previously prescribed inhaler medications.     • Essential hypertension    • Gastroesophageal reflux disease      Past Surgical History:   Procedure Laterality Date   • BONE GRAFT     • WRIST FRACTURE SURGERY Bilateral      Current Outpatient Medications on File Prior to Visit   Medication Sig   • atorvastatin (LIPITOR) 20 MG tablet Take 1 tablet by mouth Daily.   • fluticasone (FLONASE) 50 MCG/ACT nasal spray 2 sprays into the nostril(s) as directed by provider Daily.   • lisinopril (PRINIVIL,ZESTRIL) 10 MG tablet Take 2 tablets by mouth Daily.   • magnesium oxide (MAG-OX) 400 MG tablet Take 1 tablet by mouth Every Night for 150 days.   • meloxicam (MOBIC) 7.5 MG tablet Take 2 tablets by mouth Daily.   • pantoprazole (PROTONIX) 20 MG EC tablet Take 1 tablet by mouth Daily.   • pregabalin (LYRICA) 50 MG capsule Take 1 capsule by mouth 3 (Three) Times a Day.   • [DISCONTINUED] cetirizine (zyrTEC) 10 MG tablet Take 1 tablet by mouth Daily.     Current Facility-Administered Medications on File Prior to Visit   Medication   • cyanocobalamin injection 1,000 mcg     He has No Known Allergies.    family  "history includes COPD in his mother; Cancer in his father; Diabetes type II in his mother; Heart failure in his father; Hypertension in his mother.  He  reports that he quit smoking about 7 months ago. His smoking use included pipe. He started smoking about 47 years ago. He has a 25.00 pack-year smoking history. His smokeless tobacco use includes chew. He reports that he drinks about 2.0 standard drinks of alcohol per week. He reports that he does not use drugs.      Review of Systems General: +mild fatigue  PULM: no cough, shortness of breath, or wheezing  CV: no chest pain or dyspnea on exertion  MSK: Negative for gait disturbance, joint pain, joint swelling or muscular weakness  GI: no abdominal pain, change in bowel habits, or black or bloody stools  NEURO: no TIA or stroke symptoms; No confusion, dizziness, HA or seizures    Objective   OBJECTIVE:   Ht 170.2 cm (67\")   BMI 30.02 kg/m²    Physical Exam Telehealth    DATA REVIEWED:  Lab Results   Component Value Date    WBC 6.88 01/02/2020    HGB 14.1 01/02/2020    HCT 39.9 01/02/2020    MCV 89.7 01/02/2020     01/02/2020     Lab Results   Component Value Date    GLUCOSE 90 01/02/2020    BUN 16 01/02/2020    CREATININE 0.94 01/02/2020    EGFRIFNONA 81 01/02/2020    BCR 17.0 01/02/2020    K 4.2 01/02/2020    CO2 26.3 01/02/2020    CALCIUM 9.5 01/02/2020    ALBUMIN 4.40 01/02/2020    AST 19 01/02/2020    ALT 20 01/02/2020     Lab Results   Component Value Date    CHOL 143 01/02/2020    CHLPL 160 11/24/2015    TRIG 38 01/02/2020    HDL 77 (H) 01/02/2020    LDL 58 01/02/2020     Lab Results   Component Value Date    XIMSIIBG11 160 (L) 01/02/2020     -- Preventive Medicine Topics --   Health Maintenance Topics with due status: Overdue       Topic Date Due    MEDICARE ANNUAL WELLNESS 10/10/2017    ZOSTER VACCINE 12/05/2017     WEIGHT: Patient's Body mass index is 30.02 kg/m². BMI is above normal parameters. Recommendations include: educational " material.    TOBACCO: Mr. Baker  reports that he quit smoking about 7 months ago. His smoking use included pipe. He started smoking about 47 years ago. He has a 25.00 pack-year smoking history. His smokeless tobacco use includes chew.  ALCOHOL: Mr. Baker  reports that he drinks about 2.0 standard drinks of alcohol per week.  ILLICIT DRUGS: Mr. Baker  reports that he does not use drugs.  Goals        General    • Better Compliance with HTN Medications (pt-stated)      Barriers: None         Lifestyle    • Medication management      Patient has been having trouble with medication compliance.  Goal now is to take medications everyday at the same time.    Barriers: patient has not been on medications consistently for sometime.  Has trouble with remembering to take medications.            Assessment/Plan   ASSESSMENT & PLAN:      Diagnosis Plan   1. Essential hypertension  Stable on lisinopril, continue current therapy   2. Vitamin B 12 deficiency  Typically gets injection every 28 days but has not recently due to COVID-19.    Cyanocobalamin 1000 mcg daily until he can get injections again.      This was an audioenabled telemedicine encounter. 14 min.         Patient Instructions   Follow-up: No follow-ups on file.   No future appointments.   RISK SCORE: 4    ______________________________________  Pelon Stewart Jr., M.D. PGY3  Family Practice Resident  [address] [place] [address]  Phone: [phone]  Fax: [phone]  ¯¯¯¯¯¯¯¯¯¯¯¯¯¯¯¯¯¯¯¯¯¯¯¯¯¯¯    This document has been electronically signed by  Pelon Stewart Jr, MD on 06/02/2020 5:00 PM

## 2020-06-03 NOTE — PROGRESS NOTES
I have reviewed the notes, assessments, and/or procedures performed by Pelon Stewart Jr., MD, I concur with her/his documentation and assessment and plan for Jian Baker.                This document has been electronically signed by Angel Rea MD on Alejandrina 3, 2020 11:29

## 2020-06-24 ENCOUNTER — TELEPHONE (OUTPATIENT)
Dept: FAMILY MEDICINE CLINIC | Facility: CLINIC | Age: 65
End: 2020-06-24

## 2020-07-02 ENCOUNTER — OFFICE VISIT (OUTPATIENT)
Dept: FAMILY MEDICINE CLINIC | Facility: CLINIC | Age: 65
End: 2020-07-02

## 2020-07-02 VITALS
BODY MASS INDEX: 30.33 KG/M2 | DIASTOLIC BLOOD PRESSURE: 86 MMHG | OXYGEN SATURATION: 98 % | HEIGHT: 67 IN | WEIGHT: 193.25 LBS | HEART RATE: 81 BPM | TEMPERATURE: 98.7 F | SYSTOLIC BLOOD PRESSURE: 138 MMHG

## 2020-07-02 DIAGNOSIS — E53.8 B12 DEFICIENCY: ICD-10-CM

## 2020-07-02 DIAGNOSIS — Z00.00 MEDICARE ANNUAL WELLNESS VISIT, SUBSEQUENT: Primary | ICD-10-CM

## 2020-07-02 PROCEDURE — 96160 PT-FOCUSED HLTH RISK ASSMT: CPT | Performed by: STUDENT IN AN ORGANIZED HEALTH CARE EDUCATION/TRAINING PROGRAM

## 2020-07-02 PROCEDURE — G0439 PPPS, SUBSEQ VISIT: HCPCS | Performed by: STUDENT IN AN ORGANIZED HEALTH CARE EDUCATION/TRAINING PROGRAM

## 2020-07-02 PROCEDURE — 96372 THER/PROPH/DIAG INJ SC/IM: CPT | Performed by: STUDENT IN AN ORGANIZED HEALTH CARE EDUCATION/TRAINING PROGRAM

## 2020-07-02 RX ORDER — CYANOCOBALAMIN 1000 UG/ML
1000 INJECTION, SOLUTION INTRAMUSCULAR; SUBCUTANEOUS
Status: DISCONTINUED | OUTPATIENT
Start: 2020-07-02 | End: 2022-03-14

## 2020-07-02 RX ORDER — LORATADINE 10 MG/1
10 TABLET ORAL DAILY
Qty: 30 TABLET | Refills: 11 | Status: SHIPPED | OUTPATIENT
Start: 2020-07-02 | End: 2021-08-11 | Stop reason: SDUPTHER

## 2020-07-02 RX ADMIN — CYANOCOBALAMIN 1000 MCG: 1000 INJECTION, SOLUTION INTRAMUSCULAR; SUBCUTANEOUS at 14:09

## 2020-07-02 NOTE — PROGRESS NOTES
The ABCs of the Annual Wellness Visit  Subsequent Medicare Wellness Visit    Chief Complaint   Patient presents with   • Medicare Wellness-Initial Visit       Subjective   History of Present Illness:  Jian Baker is a 65 y.o. male who presents for a Subsequent Medicare Wellness Visit.    HEALTH RISK ASSESSMENT    Recent Hospitalizations:  No hospitalization(s) within the last year.    Current Medical Providers:  Patient Care Team:  Pelon Stewart Jr., MD as PCP - General (Family Medicine)    Smoking Status:  Social History     Tobacco Use   Smoking Status Former Smoker   • Packs/day: 1.00   • Years: 25.00   • Pack years: 25.00   • Types: Pipe   • Start date: 10/10/1972   • Last attempt to quit: 10/31/2019   • Years since quittin.6   Smokeless Tobacco Current User   • Types: Chew       Alcohol Consumption:  Social History     Substance and Sexual Activity   Alcohol Use Yes   • Alcohol/week: 2.0 standard drinks   • Types: 2 Cans of beer per week    Comment: Prior heavy drinker       Depression Screen:   PHQ-2/PHQ-9 Depression Screening 2020   Little interest or pleasure in doing things 1   Feeling down, depressed, or hopeless 0   Trouble falling or staying asleep, or sleeping too much 2   Feeling tired or having little energy 1   Poor appetite or overeating 0   Feeling bad about yourself - or that you are a failure or have let yourself or your family down 0   Trouble concentrating on things, such as reading the newspaper or watching television 1   Moving or speaking so slowly that other people could have noticed. Or the opposite - being so fidgety or restless that you have been moving around a lot more than usual 1   Thoughts that you would be better off dead, or of hurting yourself in some way 0   Total Score 6   If you checked off any problems, how difficult have these problems made it for you to do your work, take care of things at home, or get along with other people? Somewhat difficult       Fall  Risk Screen:  JAVIER Fall Risk Assessment has not been completed.    Health Habits and Functional and Cognitive Screening:  Functional & Cognitive Status 7/2/2020   Do you have difficulty preparing food and eating? No   Do you have difficulty bathing yourself, getting dressed or grooming yourself? No   Do you have difficulty using the toilet? No   Do you have difficulty moving around from place to place? No   Do you have trouble with steps or getting out of a bed or a chair? No   Current Diet Well Balanced Diet   Dental Exam Not up to date   Eye Exam Not up to date   Exercise (times per week) 7 times per week   Current Exercise Activities Include Walking   Do you need help using the phone?  No   Are you deaf or do you have serious difficulty hearing?  No   Do you need help with transportation? No   Do you need help shopping? No   Do you need help preparing meals?  No   Do you need help with housework?  No   Do you need help with laundry? No   Do you need help taking your medications? No   Do you need help managing money? No   Do you ever drive or ride in a car without wearing a seat belt? No   Have you felt unusual stress, anger or loneliness in the last month? No   Who do you live with? Spouse   If you need help, do you have trouble finding someone available to you? No   Have you been bothered in the last four weeks by sexual problems? No   Do you have difficulty concentrating, remembering or making decisions? No         Does the patient have evidence of cognitive impairment? No    Asprin use counseling:Does not need ASA (and currently is not on it)    Age-appropriate Screening Schedule:  Refer to the list below for future screening recommendations based on patient's age, sex and/or medical conditions. Orders for these recommended tests are listed in the plan section. The patient has been provided with a written plan.    Health Maintenance   Topic Date Due   • ZOSTER VACCINE (2 of 2) 12/05/2017   • INFLUENZA  VACCINE  08/01/2020   • COLONOSCOPY  10/10/2024   • TDAP/TD VACCINES (3 - Td) 10/10/2027          The following portions of the patient's history were reviewed and updated as appropriate: allergies, current medications, past family history, past medical history, past social history, past surgical history and problem list.    Outpatient Medications Prior to Visit   Medication Sig Dispense Refill   • atorvastatin (LIPITOR) 20 MG tablet Take 1 tablet by mouth Daily. 30 tablet 5   • fluticasone (FLONASE) 50 MCG/ACT nasal spray 2 sprays into the nostril(s) as directed by provider Daily. 1 bottle 10   • lisinopril (PRINIVIL,ZESTRIL) 10 MG tablet Take 2 tablets by mouth Daily. 60 tablet 5   • magnesium oxide (MAG-OX) 400 MG tablet Take 1 tablet by mouth Every Night for 150 days. 30 tablet 4   • meloxicam (MOBIC) 7.5 MG tablet Take 2 tablets by mouth Daily. 60 tablet 5   • pantoprazole (PROTONIX) 20 MG EC tablet Take 1 tablet by mouth Daily. 30 tablet 5   • pregabalin (LYRICA) 50 MG capsule Take 1 capsule by mouth 3 (Three) Times a Day. 90 capsule 2   • vitamin B-12 (CYANOCOBALAMIN) 1000 MCG tablet Take 1 tablet by mouth Daily. 30 tablet 2   • cetirizine (zyrTEC) 10 MG tablet Take 1 tablet by mouth Daily. 30 tablet 2     Facility-Administered Medications Prior to Visit   Medication Dose Route Frequency Provider Last Rate Last Dose   • cyanocobalamin injection 1,000 mcg  1,000 mcg Intramuscular Q28 Days Stephania Head MD   1,000 mcg at 03/04/20 0841       Patient Active Problem List   Diagnosis   • Essential hypertension   • Bilateral sciatica   • Primary osteoarthritis of both knees   • Neuropathy   • Muscle cramps at night   • Vitamin B 12 deficiency       Advanced Care Planning:  ACP discussion was held with the patient during this visit. Patient does not have an advance directive, information provided.    Review of Systems    Compared to one year ago, the patient feels his physical health is the same.  Compared to  "one year ago, the patient feels his mental health is the same.    Reviewed chart for potential of high risk medication in the elderly: yes  Reviewed chart for potential of harmful drug interactions in the elderly:yes    Objective         Vitals:    07/02/20 1341   BP: 138/86   Pulse: 81   Temp: 98.7 °F (37.1 °C)   TempSrc: Tympanic   SpO2: 98%   Weight: 87.7 kg (193 lb 4 oz)   Height: 170.2 cm (67\")   PainSc:   8   PainLoc: Leg  Comment: left leg       Body mass index is 30.27 kg/m².  Discussed the patient's BMI with him. The BMI is above average; BMI management plan is completed.    Physical Exam          Assessment/Plan   Medicare Risks and Personalized Health Plan  CMS Preventative Services Quick Reference  Cardiovascular risk  Chronic Pain   Depression/Dysphoria  Fall Risk    The above risks/problems have been discussed with the patient.  Pertinent information has been shared with the patient in the After Visit Summary.  Follow up plans and orders are seen below in the Assessment/Plan Section.    Diagnoses and all orders for this visit:    1. Medicare annual wellness visit, subsequent (Primary)    2. B12 deficiency  -     cyanocobalamin injection 1,000 mcg    Other orders  -     loratadine (Claritin) 10 MG tablet; Take 1 tablet by mouth Daily.  Dispense: 30 tablet; Refill: 11      Follow Up:  Return in about 1 month (around 8/2/2020) for Follow-Up Knee Pain.     An After Visit Summary and PPPS were given to the patient.             "

## 2020-07-09 NOTE — PROGRESS NOTES
I have reviewed the notes, assessments, and/or procedures performed by *Pelon Stewart Jr., MD  **during office visit. I concur with her/his documentation and assessment and plan for Jian Baker.          This document has been electronically signed by Devang Carbajal MD on July 9, 2020 08:58

## 2020-07-10 DIAGNOSIS — M25.551 PAIN OF RIGHT HIP JOINT: ICD-10-CM

## 2020-07-10 RX ORDER — PREGABALIN 50 MG/1
50 CAPSULE ORAL 3 TIMES DAILY
Qty: 90 CAPSULE | Refills: 2 | Status: SHIPPED | OUTPATIENT
Start: 2020-07-10 | End: 2020-07-10

## 2020-07-10 RX ORDER — PREGABALIN 50 MG/1
50 CAPSULE ORAL 3 TIMES DAILY
Qty: 90 CAPSULE | Refills: 2 | Status: SHIPPED | OUTPATIENT
Start: 2020-07-10 | End: 2020-10-29 | Stop reason: SDUPTHER

## 2020-07-10 NOTE — TELEPHONE ENCOUNTER
Pt called and left a voicemail for Dr. Stewart. He said he called his pharmacy and they are needing a script for his Lyrica.     Pt was last seen on 7/2/2020 for his Medicare Wellness visit, before that 6/2/2020 for HTN and Vit B12 deficiency.     Pt uses [facility].     [phone]    Thank you.

## 2020-08-18 DIAGNOSIS — K21.9 GASTROESOPHAGEAL REFLUX DISEASE, ESOPHAGITIS PRESENCE NOT SPECIFIED: ICD-10-CM

## 2020-08-18 RX ORDER — PANTOPRAZOLE SODIUM 20 MG/1
20 TABLET, DELAYED RELEASE ORAL DAILY
Qty: 30 TABLET | Refills: 11 | Status: SHIPPED | OUTPATIENT
Start: 2020-08-18 | End: 2020-11-10 | Stop reason: SDUPTHER

## 2020-09-23 DIAGNOSIS — M17.0 PRIMARY OSTEOARTHRITIS OF BOTH KNEES: ICD-10-CM

## 2020-09-23 RX ORDER — MELOXICAM 7.5 MG/1
15 TABLET ORAL DAILY
Qty: 60 TABLET | Refills: 5 | Status: SHIPPED | OUTPATIENT
Start: 2020-09-23 | End: 2020-10-02 | Stop reason: SDUPTHER

## 2020-10-02 ENCOUNTER — OFFICE VISIT (OUTPATIENT)
Dept: FAMILY MEDICINE CLINIC | Facility: CLINIC | Age: 65
End: 2020-10-02

## 2020-10-02 VITALS
DIASTOLIC BLOOD PRESSURE: 78 MMHG | HEIGHT: 67 IN | OXYGEN SATURATION: 97 % | BODY MASS INDEX: 30.29 KG/M2 | HEART RATE: 109 BPM | WEIGHT: 193 LBS | SYSTOLIC BLOOD PRESSURE: 130 MMHG | TEMPERATURE: 98.1 F

## 2020-10-02 DIAGNOSIS — M17.0 PRIMARY OSTEOARTHRITIS OF BOTH KNEES: ICD-10-CM

## 2020-10-02 DIAGNOSIS — E53.8 B12 DEFICIENCY: ICD-10-CM

## 2020-10-02 DIAGNOSIS — G89.29 CHRONIC PAIN OF LEFT KNEE: Primary | ICD-10-CM

## 2020-10-02 DIAGNOSIS — I10 ESSENTIAL HYPERTENSION: ICD-10-CM

## 2020-10-02 DIAGNOSIS — M25.562 CHRONIC PAIN OF LEFT KNEE: Primary | ICD-10-CM

## 2020-10-02 PROCEDURE — 99213 OFFICE O/P EST LOW 20 MIN: CPT | Performed by: STUDENT IN AN ORGANIZED HEALTH CARE EDUCATION/TRAINING PROGRAM

## 2020-10-02 RX ORDER — MELOXICAM 7.5 MG/1
15 TABLET ORAL DAILY
Qty: 60 TABLET | Refills: 5 | Status: SHIPPED | OUTPATIENT
Start: 2020-10-02 | End: 2021-04-09

## 2020-10-02 RX ORDER — LIDOCAINE 50 MG/G
1 PATCH TOPICAL EVERY 24 HOURS
Qty: 30 EACH | Refills: 3 | Status: SHIPPED | OUTPATIENT
Start: 2020-10-02 | End: 2021-12-21 | Stop reason: SDUPTHER

## 2020-10-02 RX ORDER — LISINOPRIL 10 MG/1
20 TABLET ORAL DAILY
Qty: 60 TABLET | Refills: 5 | Status: SHIPPED | OUTPATIENT
Start: 2020-10-02 | End: 2021-04-16

## 2020-10-02 NOTE — PROGRESS NOTES
FAMILY MEDICINE  RESIDENCY CLINIC PROGRESS NOTE  Chiqui Coker MD  Subjective   SUBJECTIVE:   CC: HTN & B12 Deficiency     Jian Baker is a 65 y.o. male who presents to the [facility] today for f/u for:    HTN: stable on lisinopril.  No side effects of medication.    Knee pain: doing well on mobic except for some increased pain on some days.     I have reviewed the patient's medical, family, and social history in detail;there are no changes to the history as noted in the electronic medical record.   Concurrent Medical History:  Past Medical History:   Diagnosis Date   • Asthma     Previously diagnosed and previously prescribed inhaler medications.     • Essential hypertension    • Gastroesophageal reflux disease      Past Surgical History:   Procedure Laterality Date   • BONE GRAFT     • WRIST FRACTURE SURGERY Bilateral      Current Outpatient Medications on File Prior to Visit   Medication Sig   • atorvastatin (LIPITOR) 20 MG tablet Take 1 tablet by mouth Daily.   • fluticasone (FLONASE) 50 MCG/ACT nasal spray 2 sprays into the nostril(s) as directed by provider Daily.   • loratadine (Claritin) 10 MG tablet Take 1 tablet by mouth Daily.   • pantoprazole (PROTONIX) 20 MG EC tablet Take 1 tablet by mouth Daily.   • pregabalin (LYRICA) 50 MG capsule Take 1 capsule by mouth 3 (Three) Times a Day.   • vitamin B-12 (CYANOCOBALAMIN) 1000 MCG tablet Take 1 tablet by mouth Daily.   • [DISCONTINUED] lisinopril (PRINIVIL,ZESTRIL) 10 MG tablet Take 2 tablets by mouth Daily.   • [DISCONTINUED] meloxicam (MOBIC) 7.5 MG tablet Take 2 tablets by mouth Daily.     Current Facility-Administered Medications on File Prior to Visit   Medication   • cyanocobalamin injection 1,000 mcg   • cyanocobalamin injection 1,000 mcg     He has No Known Allergies.    family history includes COPD in his mother; Cancer in his father; Diabetes type II in his mother; Heart failure in his father; Hypertension in his  "mother.  He  reports that he quit smoking about 11 months ago. His smoking use included pipe. He started smoking about 48 years ago. He has a 25.00 pack-year smoking history. His smokeless tobacco use includes chew. He reports current alcohol use of about 2.0 standard drinks of alcohol per week. He reports that he does not use drugs.      Review of Systems   Constitutional: Negative for activity change, appetite change, chills, fatigue and fever.   HENT: Negative for drooling, ear discharge, ear pain, facial swelling, hearing loss, mouth sores, rhinorrhea and sinus pain.    Eyes: Negative for pain, redness and itching.   Respiratory: Negative for cough, choking, chest tightness, shortness of breath and stridor.    Cardiovascular: Negative for chest pain, palpitations and leg swelling.   Gastrointestinal: Negative for abdominal distention, abdominal pain, anal bleeding, blood in stool, constipation, diarrhea and nausea.   Endocrine: Negative for heat intolerance, polydipsia and polyphagia.   Genitourinary: Negative for dysuria, flank pain, frequency and genital sores.   Musculoskeletal: Negative for back pain, gait problem, joint swelling and myalgias.        Knee pain     Skin: Negative for pallor and rash.   Neurological: Negative for seizures, facial asymmetry, speech difficulty, light-headedness, numbness and headaches.   Hematological: Negative for adenopathy. Does not bruise/bleed easily.   Psychiatric/Behavioral: Negative for confusion, decreased concentration, dysphoric mood and hallucinations. The patient is not nervous/anxious and is not hyperactive.       Objective   OBJECTIVE:   /78   Pulse 109   Temp 98.1 °F (36.7 °C)   Ht 170.2 cm (67\")   Wt 87.5 kg (193 lb)   SpO2 97%   BMI 30.23 kg/m²    Physical Exam   Constitutional: He is oriented to person, place, and time. He appears well-developed.   HENT:   Head: Normocephalic and atraumatic.   Right Ear: External ear normal.   Left Ear: External " ear normal.   Nose: Nose normal.   Eyes: Pupils are equal, round, and reactive to light. Conjunctivae are normal.   Neck: Normal range of motion. Neck supple.   Cardiovascular: Normal rate, regular rhythm and normal heart sounds.   Pulmonary/Chest: Effort normal and breath sounds normal.   Abdominal: Soft. Bowel sounds are normal.   Musculoskeletal: Tenderness present.   Neurological: He is alert and oriented to person, place, and time.   Skin: Skin is warm and dry.   Psychiatric: His behavior is normal. Judgment and thought content normal.     DATA REVIEWED:  Lab Results   Component Value Date    WBC 6.88 01/02/2020    HGB 14.1 01/02/2020    HCT 39.9 01/02/2020    MCV 89.7 01/02/2020     01/02/2020     Lab Results   Component Value Date    GLUCOSE 90 01/02/2020    BUN 16 01/02/2020    CREATININE 0.94 01/02/2020    EGFRIFNONA 81 01/02/2020    BCR 17.0 01/02/2020    K 4.2 01/02/2020    CO2 26.3 01/02/2020    CALCIUM 9.5 01/02/2020    ALBUMIN 4.40 01/02/2020    AST 19 01/02/2020    ALT 20 01/02/2020     Lab Results   Component Value Date    CHOL 143 01/02/2020    CHLPL 160 11/24/2015    TRIG 38 01/02/2020    HDL 77 (H) 01/02/2020    LDL 58 01/02/2020     Lab Results   Component Value Date    QMFNXRPO55 160 (L) 01/02/2020     -- Preventive Medicine Topics --   Health Maintenance Topics with due status: Overdue       Topic Date Due    ZOSTER VACCINE 12/05/2017    INFLUENZA VACCINE 08/01/2020     WEIGHT: Patient's Body mass index is 30.23 kg/m². BMI is above normal parameters. Recommendations include: educational material.    TOBACCO: Mr. Baker  reports that he quit smoking about 11 months ago. His smoking use included pipe. He started smoking about 48 years ago. He has a 25.00 pack-year smoking history. His smokeless tobacco use includes chew.  ALCOHOL: Mr. Baker  reports current alcohol use of about 2.0 standard drinks of alcohol per week.  ILLICIT DRUGS: Mr. Baker  reports no history of drug use.  Goals     •  Better Compliance with HTN Medications (pt-stated)      Barriers: None      • Medication management      Patient has been having trouble with medication compliance.  Goal now is to take medications everyday at the same time.    Barriers: patient has not been on medications consistently for sometime.  Has trouble with remembering to take medications.            Assessment/Plan   ASSESSMENT & PLAN:      Diagnosis Plan   1. Essential hypertension  Stable on lisinopril, continue current therapy   2. Vitamin B 12 deficiency            Typically gets injection every 28 days but has not recently for the past three months. Will recheck b12 today.      3. Left knee pain       Will add lidocaine patch and refer to general surgery           Patient Instructions   Follow-up: 2 months   No future appointments.   RISK SCORE: 4    ______________________________________       Chiqui Coker M.D. PGY3  [facility]  [address] [place] [address]  Office: [phone]    This document has been electronically signed by Chiuqi Coker MD on October 2, 2020 16:26 CDT      This document has been electronically signed by  Chiqui Coker MD on 06/02/2020 5:00 PM

## 2020-10-02 NOTE — PROGRESS NOTES
I have seen the patient.  I have reviewed the notes, assessments, and/or procedures performed by Dr. Coker, I concur with her/his documentation and assessment and plan for Jian Baker.       This document has been electronically signed by Ayaan Rowland MD on October 2, 2020 16:50 CDT

## 2020-10-05 ENCOUNTER — TELEPHONE (OUTPATIENT)
Dept: FAMILY MEDICINE CLINIC | Facility: CLINIC | Age: 65
End: 2020-10-05

## 2020-10-08 DIAGNOSIS — M25.562 ACUTE PAIN OF LEFT KNEE: Primary | ICD-10-CM

## 2020-10-12 ENCOUNTER — OFFICE VISIT (OUTPATIENT)
Dept: ORTHOPEDIC SURGERY | Facility: CLINIC | Age: 65
End: 2020-10-12

## 2020-10-12 ENCOUNTER — LAB (OUTPATIENT)
Dept: LAB | Facility: HOSPITAL | Age: 65
End: 2020-10-12

## 2020-10-12 VITALS — WEIGHT: 192 LBS | BODY MASS INDEX: 30.13 KG/M2 | HEIGHT: 67 IN

## 2020-10-12 DIAGNOSIS — G89.29 CHRONIC PAIN OF LEFT KNEE: ICD-10-CM

## 2020-10-12 DIAGNOSIS — G89.29 CHRONIC PAIN OF LEFT KNEE: Primary | ICD-10-CM

## 2020-10-12 DIAGNOSIS — M25.562 CHRONIC PAIN OF LEFT KNEE: ICD-10-CM

## 2020-10-12 DIAGNOSIS — M25.562 CHRONIC PAIN OF LEFT KNEE: Primary | ICD-10-CM

## 2020-10-12 DIAGNOSIS — M17.0 PRIMARY OSTEOARTHRITIS OF BOTH KNEES: ICD-10-CM

## 2020-10-12 LAB
CHROMATIN AB SERPL-ACNC: <10 IU/ML (ref 0–14)
CRP SERPL-MCNC: 0.31 MG/DL (ref 0–0.5)
ERYTHROCYTE [SEDIMENTATION RATE] IN BLOOD: 4 MM/HR (ref 0–20)
URATE SERPL-MCNC: 8 MG/DL (ref 3.4–7)

## 2020-10-12 PROCEDURE — 99203 OFFICE O/P NEW LOW 30 MIN: CPT | Performed by: ORTHOPAEDIC SURGERY

## 2020-10-12 PROCEDURE — 85652 RBC SED RATE AUTOMATED: CPT

## 2020-10-12 PROCEDURE — 86140 C-REACTIVE PROTEIN: CPT

## 2020-10-12 PROCEDURE — 36415 COLL VENOUS BLD VENIPUNCTURE: CPT

## 2020-10-12 PROCEDURE — 86431 RHEUMATOID FACTOR QUANT: CPT

## 2020-10-12 PROCEDURE — 84550 ASSAY OF BLOOD/URIC ACID: CPT

## 2020-10-12 PROCEDURE — 86038 ANTINUCLEAR ANTIBODIES: CPT

## 2020-10-12 NOTE — PROGRESS NOTES
Jian Baker is a 65 y.o. male   Primary provider:  Chiqui Coekr MD       Chief Complaint   Patient presents with   • Left Knee - Knee Pain       HISTORY OF PRESENT ILLNESS:  Left knee pain started years ago is gradually getting worse. xrays done today. Patient states he has been given steroid injections by Dr. Stewart.  He tells me has had pain with his knees his whole life and is had what he describes as juvenile rheumatoid arthritis.  He is never seen a rheumatologist recently has had several injections in both knees by Dr. Stewart in the past.  He now sees Dr. Shetty hurts him on a daily basis limits his activity.  The injections only helped him for minimum of a month or so.  Knee Pain   The incident occurred more than 1 week ago. There was no injury mechanism. The pain is present in the left knee. The quality of the pain is described as stabbing. The pain is moderate. The pain has been constant since onset. Associated symptoms comments: CLICKING, POPPING. . He reports no foreign bodies present. The symptoms are aggravated by weight bearing (WALKING. ). Treatments tried: INJECTIONS.         CONCURRENT MEDICAL HISTORY:    Past Medical History:   Diagnosis Date   • Asthma     Previously diagnosed and previously prescribed inhaler medications.     • Essential hypertension    • Gastroesophageal reflux disease        No Known Allergies      Current Outpatient Medications:   •  atorvastatin (LIPITOR) 20 MG tablet, Take 1 tablet by mouth Daily., Disp: 30 tablet, Rfl: 5  •  fluticasone (FLONASE) 50 MCG/ACT nasal spray, 2 sprays into the nostril(s) as directed by provider Daily., Disp: 1 bottle, Rfl: 10  •  lidocaine (LIDODERM) 5 %, Place 1 patch on the skin as directed by provider Daily. Remove & Discard patch within 12 hours or as directed by MD, Disp: 30 each, Rfl: 3  •  lisinopril (PRINIVIL,ZESTRIL) 10 MG tablet, Take 2 tablets by mouth Daily., Disp: 60 tablet, Rfl: 5  •  loratadine (Claritin) 10 MG tablet, Take  1 tablet by mouth Daily., Disp: 30 tablet, Rfl: 11  •  meloxicam (MOBIC) 7.5 MG tablet, Take 2 tablets by mouth Daily., Disp: 60 tablet, Rfl: 5  •  pantoprazole (PROTONIX) 20 MG EC tablet, Take 1 tablet by mouth Daily., Disp: 30 tablet, Rfl: 11  •  pregabalin (LYRICA) 50 MG capsule, Take 1 capsule by mouth 3 (Three) Times a Day., Disp: 90 capsule, Rfl: 2  •  vitamin B-12 (CYANOCOBALAMIN) 1000 MCG tablet, Take 1 tablet by mouth Daily., Disp: 30 tablet, Rfl: 2    Current Facility-Administered Medications:   •  cyanocobalamin injection 1,000 mcg, 1,000 mcg, Intramuscular, Q28 Days, Stephania Head MD, 1,000 mcg at 20 0841  •  cyanocobalamin injection 1,000 mcg, 1,000 mcg, Intramuscular, Q28 Days, Pelon Stewart Jr., MD, 1,000 mcg at 20 1409    Past Surgical History:   Procedure Laterality Date   • BONE GRAFT     • WRIST FRACTURE SURGERY Bilateral        Family History   Problem Relation Age of Onset   • COPD Mother    • Hypertension Mother    • Diabetes type II Mother    • Cancer Father    • Heart failure Father        Social History     Socioeconomic History   • Marital status:      Spouse name: Not on file   • Number of children: Not on file   • Years of education: Not on file   • Highest education level: Not on file   Occupational History   • Occupation: Disability   Tobacco Use   • Smoking status: Former Smoker     Packs/day: 1.00     Years: 25.00     Pack years: 25.00     Types: Pipe     Start date: 10/10/1972     Quit date: 10/31/2019     Years since quittin.9   • Smokeless tobacco: Current User     Types: Chew   Substance and Sexual Activity   • Alcohol use: Yes     Alcohol/week: 2.0 standard drinks     Types: 2 Cans of beer per week     Comment: Prior heavy drinker   • Drug use: No   • Sexual activity: Yes     Partners: Female   Social History Narrative    Patient lives in [place] with his wife.  Transportation is an issue for the couple.        Review of Systems  "  Constitutional: Positive for activity change. Negative for chills and fever.   HENT: Negative for facial swelling.    Eyes: Negative for photophobia.   Respiratory: Negative for apnea and shortness of breath.    Cardiovascular: Negative for chest pain and leg swelling.   Gastrointestinal: Negative for abdominal pain, nausea and vomiting.   Genitourinary: Negative for dysuria.   Musculoskeletal: Positive for arthralgias, gait problem and joint swelling.   Skin: Negative for color change and rash.   Neurological: Negative for seizures and syncope.   Psychiatric/Behavioral: Positive for sleep disturbance. Negative for behavioral problems and dysphoric mood.   All other systems reviewed and are negative.        PHYSICAL EXAMINATION:       Ht 170.2 cm (67\")   Wt 87.1 kg (192 lb)   BMI 30.07 kg/m²     Physical Exam  Constitutional:       Appearance: Normal appearance. He is well-developed.   HENT:      Head: Normocephalic and atraumatic.      Nose: Nose normal. No congestion.   Eyes:      General: No scleral icterus.     Pupils: Pupils are equal, round, and reactive to light.   Neck:      Musculoskeletal: Neck supple.      Trachea: No tracheal deviation.   Pulmonary:      Effort: Pulmonary effort is normal.   Musculoskeletal:         General: Swelling, tenderness and deformity present.   Skin:     General: Skin is warm and dry.      Findings: No erythema.   Neurological:      General: No focal deficit present.      Mental Status: He is alert and oriented to person, place, and time.   Psychiatric:         Mood and Affect: Mood normal.         GAIT:     []  Normal  []  Antalgic    Assistive device: [x]  None  []  Walker     []  Crutches  []  Cane     []  Wheelchair  []  Stretcher    Ortho Exam  Mild valgus deformity tender laterally and medially patellofemoral crepitus 2+ bilaterally.    Procedure: Right knee. Left knee.   Clinical history: Knee pain.     TECHNIQUE:  Four views right knee. Four views left knee.   " .           Prior relevant exam: Bilateral knee exam October 10, 2017.     Right knee: Lateral tibiofemoral joint space narrowing. Mild  degenerative changes right patellofemoral joint. The right knee  is otherwise unremarkable.     Left knee: Mild degenerative changes patellofemoral joint.  Osteophytes arising from the anterior superior aspect of the  patella at the insertion of the quadriceps tendon. Mild  degenerative changes, joint space narrowing tibiofemoral joint.  Subchondral radiolucencies distal femur possibly grade IV  chondromalacia. Left knee is otherwise unremarkable.     IMPRESSION:  CONCLUSION: As above.     Electronically signed by:  John Johnson MD  6/28/2018 10:39 AM CDT  Workstation: [id number]    No results found.  I reviewed his x-rays does have a little bit of narrowing of the patellofemoral joint with osteophytes and also what appear to be some chondromalacia distal femur probably areas that are down to bone.      ASSESSMENT:    Diagnoses and all orders for this visit:    Chronic pain of left knee  -     Rheumatoid Factor, Quant; Future  -     PRADIP; Future  -     Uric Acid; Future  -     C-reactive Protein; Future  -     Sedimentation Rate; Future  -     MRI Knee Left Without Contrast; Future    Primary osteoarthritis of both knees  -     Rheumatoid Factor, Quant; Future  -     PRADIP; Future  -     Uric Acid; Future  -     C-reactive Protein; Future  -     Sedimentation Rate; Future  -     MRI Knee Left Without Contrast; Future          PLAN I will get a set up for MRI scan of the most symptomatic left knee evaluate this further.  Is had multiple treatments thus far including anti-inflammatories and multiple injections which have not really helped him muscular sent for arthritis profile see him back after MRI scan.    No follow-ups on file.      This document has been electronically signed by Norm Martin MD on October 12, 2020 10:40 CDT

## 2020-10-13 LAB — ANA SER QL: NEGATIVE

## 2020-10-20 ENCOUNTER — HOSPITAL ENCOUNTER (OUTPATIENT)
Dept: MRI IMAGING | Facility: HOSPITAL | Age: 65
Discharge: HOME | End: 2020-10-20
Admitting: ORTHOPAEDIC SURGERY

## 2020-10-20 DIAGNOSIS — M25.562 CHRONIC PAIN OF LEFT KNEE: ICD-10-CM

## 2020-10-20 DIAGNOSIS — G89.29 CHRONIC PAIN OF LEFT KNEE: ICD-10-CM

## 2020-10-20 DIAGNOSIS — M17.0 PRIMARY OSTEOARTHRITIS OF BOTH KNEES: ICD-10-CM

## 2020-10-20 PROCEDURE — 73721 MRI JNT OF LWR EXTRE W/O DYE: CPT

## 2020-10-29 DIAGNOSIS — M25.551 PAIN OF RIGHT HIP JOINT: ICD-10-CM

## 2020-10-29 RX ORDER — PREGABALIN 50 MG/1
50 CAPSULE ORAL 3 TIMES DAILY
Qty: 90 CAPSULE | Refills: 2 | Status: SHIPPED | OUTPATIENT
Start: 2020-10-29 | End: 2021-01-21 | Stop reason: SDUPTHER

## 2020-11-10 DIAGNOSIS — K21.9 GASTROESOPHAGEAL REFLUX DISEASE: ICD-10-CM

## 2020-11-10 RX ORDER — PANTOPRAZOLE SODIUM 20 MG/1
20 TABLET, DELAYED RELEASE ORAL DAILY
Qty: 30 TABLET | Refills: 11 | Status: SHIPPED | OUTPATIENT
Start: 2020-11-10 | End: 2021-12-21 | Stop reason: SDUPTHER

## 2020-11-16 ENCOUNTER — OFFICE VISIT (OUTPATIENT)
Dept: ORTHOPEDIC SURGERY | Facility: CLINIC | Age: 65
End: 2020-11-16

## 2020-11-16 VITALS — HEIGHT: 67 IN | WEIGHT: 191.3 LBS | BODY MASS INDEX: 30.02 KG/M2

## 2020-11-16 DIAGNOSIS — M17.0 PRIMARY OSTEOARTHRITIS OF BOTH KNEES: ICD-10-CM

## 2020-11-16 DIAGNOSIS — M25.562 CHRONIC PAIN OF LEFT KNEE: Primary | ICD-10-CM

## 2020-11-16 DIAGNOSIS — G89.29 CHRONIC PAIN OF LEFT KNEE: Primary | ICD-10-CM

## 2020-11-16 PROCEDURE — 99213 OFFICE O/P EST LOW 20 MIN: CPT | Performed by: ORTHOPAEDIC SURGERY

## 2020-11-16 NOTE — PROGRESS NOTES
Jian Baker is a 65 y.o. male returns for     Chief Complaint   Patient presents with   • Left Knee - Follow-up   • Results     MRI       HISTORY OF PRESENT ILLNESS: Patient being seen for left knee follow up. MRI done at , would like to discuss findings.  He comes in today still hurting the knees follow-up of the MRI scan.       CONCURRENT MEDICAL HISTORY:    Past Medical History:   Diagnosis Date   • Asthma     Previously diagnosed and previously prescribed inhaler medications.     • Essential hypertension    • Gastroesophageal reflux disease        No Known Allergies      Current Outpatient Medications:   •  atorvastatin (LIPITOR) 20 MG tablet, Take 1 tablet by mouth Daily., Disp: 30 tablet, Rfl: 5  •  fluticasone (FLONASE) 50 MCG/ACT nasal spray, 2 sprays into the nostril(s) as directed by provider Daily., Disp: 1 bottle, Rfl: 10  •  lidocaine (LIDODERM) 5 %, Place 1 patch on the skin as directed by provider Daily. Remove & Discard patch within 12 hours or as directed by MD, Disp: 30 each, Rfl: 3  •  lisinopril (PRINIVIL,ZESTRIL) 10 MG tablet, Take 2 tablets by mouth Daily., Disp: 60 tablet, Rfl: 5  •  loratadine (Claritin) 10 MG tablet, Take 1 tablet by mouth Daily., Disp: 30 tablet, Rfl: 11  •  meloxicam (MOBIC) 7.5 MG tablet, Take 2 tablets by mouth Daily., Disp: 60 tablet, Rfl: 5  •  pantoprazole (PROTONIX) 20 MG EC tablet, Take 1 tablet by mouth Daily., Disp: 30 tablet, Rfl: 11  •  pregabalin (LYRICA) 50 MG capsule, Take 1 capsule by mouth 3 (Three) Times a Day., Disp: 90 capsule, Rfl: 2  •  vitamin B-12 (CYANOCOBALAMIN) 1000 MCG tablet, Take 1 tablet by mouth Daily., Disp: 30 tablet, Rfl: 2    Current Facility-Administered Medications:   •  cyanocobalamin injection 1,000 mcg, 1,000 mcg, Intramuscular, Q28 Days, Stephania Head MD, 1,000 mcg at 03/04/20 0841  •  cyanocobalamin injection 1,000 mcg, 1,000 mcg, Intramuscular, Q28 Days, Pelon Stewart Jr., MD, 1,000 mcg at 07/02/20 1409    Past  "Surgical History:   Procedure Laterality Date   • BONE GRAFT     • WRIST FRACTURE SURGERY Bilateral            ROS: Review of systems has been updated as of today's date.  All other systems are negative except as noted previously.    PHYSICAL EXAMINATION:       Ht 170.2 cm (67\")   Wt 86.8 kg (191 lb 4.8 oz)   BMI 29.96 kg/m²     Physical Exam  Constitutional:       Appearance: He is well-developed.   HENT:      Head: Normocephalic and atraumatic.   Eyes:      Pupils: Pupils are equal, round, and reactive to light.   Neck:      Musculoskeletal: Neck supple.      Trachea: No tracheal deviation.   Pulmonary:      Effort: Pulmonary effort is normal.   Musculoskeletal:         General: Swelling and tenderness present. No deformity.   Skin:     General: Skin is warm and dry.      Findings: No erythema.   Neurological:      Mental Status: He is alert and oriented to person, place, and time.      Gait: Gait abnormal.   Psychiatric:         Mood and Affect: Mood normal.         GAIT:     []  Normal  [x]  Antalgic    Assistive device: [x]  None  []  Walker     []  Crutches  []  Cane     []  Wheelchair  []  Stretcher    Ortho Exam  Tender lateral facet of the patella.  Moderate crepitus is noted.  Also tender medial lateral joint line.  Mild tenderness posteriorly calf is negative.  Neurovascular intact.  Mri Knee Left Without Contrast    Result Date: 10/20/2020  Narrative: MRI left knee. HISTORY: Persistent knee pain. Prior exam left knee October 12: 2020. TECHNIQUE: Multiplanar multisequence noncontrast images left knee. Small intra-articular and suprapatellar effusion. There is signal abnormality areas of grade 1 grade II chondromalacia patellar articular hyaline cartilage lateral articular facet. Series 2 images 9, 10. Areas of grade 2 and grade III chondromalacia  distal femur both lateral and medial aspect. Normal medial meniscus. Normal lateral meniscus. Normal anterior and posterior cruciate ligaments. Small " subchondral cystic lesion of arthritic origin at the intercondylar notch distal femur. Small ganglion adjacent to the fibular head. Signal abnormality, chronic partial-thickness tear distal aspect quadriceps tendon just above the patella series 2 image five and series 6 image 11.     Impression: Small effusion. Areas of grade 1 grade II chondromalacia patellar articular hyaline cartilage lateral articular facet. Areas of grade 2 and grade III chondromalacia  distal femur both medial and lateral aspect. Signal abnormality and widening of the distal aspect of the quadriceps tendon just above the patella this suggests chronic partial-thickness tear. Small subchondral cystic lesion of arthritic origin intercondylar notch distal femur. Small ganglia adjacent to the fibular head. Electronically signed by:  John Johnson MD  10/20/2020 1:21 PM CDT Workstation: [id number]    Significant arthritic change lateral facet of the patella usually tilted to the lateral side        ASSESSMENT:    Diagnoses and all orders for this visit:    Chronic pain of left knee    Primary osteoarthritis of both knees          PLAN he is failed conservative treatment with activity modifications bracing and multiple injections.  I think his underlying problem is degenerative arthritis of the knee.  This involves primarily his patellofemoral joint.  We will try viscosupplementation I think a reasonable conservative option at this point we will go and get him approved I think is medically necessary and see him back at this point    Patient's Body mass index is 29.96 kg/m². BMI is above normal parameters. Recommendations include: exercise counseling and nutrition counseling.      No follow-ups on file.      This document has been electronically signed by Suzy Cabrera MA on November 16, 2020 09:36 CST

## 2020-12-02 ENCOUNTER — CLINICAL SUPPORT (OUTPATIENT)
Dept: ORTHOPEDIC SURGERY | Facility: CLINIC | Age: 65
End: 2020-12-02

## 2020-12-02 VITALS — BODY MASS INDEX: 31.17 KG/M2 | HEIGHT: 67 IN | WEIGHT: 198.6 LBS

## 2020-12-02 DIAGNOSIS — G89.29 CHRONIC PAIN OF LEFT KNEE: ICD-10-CM

## 2020-12-02 DIAGNOSIS — M25.562 CHRONIC PAIN OF LEFT KNEE: ICD-10-CM

## 2020-12-02 DIAGNOSIS — M17.0 PRIMARY OSTEOARTHRITIS OF BOTH KNEES: Primary | ICD-10-CM

## 2020-12-02 PROCEDURE — 20610 DRAIN/INJ JOINT/BURSA W/O US: CPT | Performed by: ORTHOPAEDIC SURGERY

## 2020-12-02 NOTE — PROGRESS NOTES
"Knee Joint Injection      Patient: Jian Baker    YOB: 1955    Chief Complaint   Patient presents with   • Injections     Monovisc left knee       History of Present Illness:  Patient is here for an injection.  No new complaints.    Physical Exam: 65 y.o. male  General Appearance:    Alert, cooperative, in no acute distress                   Vitals:    12/02/20 0945   Weight: 90.1 kg (198 lb 9.6 oz)   Height: 170.2 cm (67\")   PainSc:   5   PainLoc: Knee        Patient is alert and oriented, ×3 no acute distress.  Affect is normal respiratory rate is normal unlabored.  Exam and complaints are unchanged.    Procedure:  Large Joint Arthrocentesis: L knee  Date/Time: 12/2/2020 9:46 AM  Consent given by: patient  Site marked: site marked  Timeout: Immediately prior to procedure a time out was called to verify the correct patient, procedure, equipment, support staff and site/side marked as required   Supporting Documentation  Indications: pain   Procedure Details  Location: knee - L knee  Preparation: Patient was prepped and draped in the usual sterile fashion  Needle size: 22 G  Approach: anteromedial  Medications administered: 88 mg Hyaluronan 88 MG/4ML  Patient tolerance: patient tolerated the procedure well with no immediate complications          Assessment:    Diagnoses and all orders for this visit:    Primary osteoarthritis of both knees  -     Large Joint Arthrocentesis: L knee    Chronic pain of left knee  -     Large Joint Arthrocentesis: L knee        Plan:   Slowly increase activity as tolerated.  Discussed importance of leg strengthening and general conditioning.  Discussed warning signs of injection.  Discussed that purpose of injections was symptom improvement and improved activity.  Also discussed that further treatment options depended on symptoms at followup and length of time of improvement after injections.    No follow-ups on file.        This document has been electronically " signed by Norm Martin MD on December 2, 2020 13:11 CST

## 2020-12-03 ENCOUNTER — OFFICE VISIT (OUTPATIENT)
Dept: FAMILY MEDICINE CLINIC | Facility: CLINIC | Age: 65
End: 2020-12-03

## 2020-12-03 VITALS
HEART RATE: 74 BPM | WEIGHT: 197 LBS | OXYGEN SATURATION: 99 % | TEMPERATURE: 97.6 F | DIASTOLIC BLOOD PRESSURE: 78 MMHG | SYSTOLIC BLOOD PRESSURE: 126 MMHG | BODY MASS INDEX: 30.85 KG/M2

## 2020-12-03 DIAGNOSIS — E53.8 B12 DEFICIENCY: Primary | ICD-10-CM

## 2020-12-03 DIAGNOSIS — M17.12 PRIMARY OSTEOARTHRITIS OF LEFT KNEE: ICD-10-CM

## 2020-12-03 DIAGNOSIS — Z23 NEEDS FLU SHOT: ICD-10-CM

## 2020-12-03 PROCEDURE — 99213 OFFICE O/P EST LOW 20 MIN: CPT | Performed by: STUDENT IN AN ORGANIZED HEALTH CARE EDUCATION/TRAINING PROGRAM

## 2020-12-03 PROCEDURE — 90686 IIV4 VACC NO PRSV 0.5 ML IM: CPT | Performed by: STUDENT IN AN ORGANIZED HEALTH CARE EDUCATION/TRAINING PROGRAM

## 2020-12-03 PROCEDURE — G0008 ADMIN INFLUENZA VIRUS VAC: HCPCS | Performed by: STUDENT IN AN ORGANIZED HEALTH CARE EDUCATION/TRAINING PROGRAM

## 2020-12-03 PROCEDURE — 96372 THER/PROPH/DIAG INJ SC/IM: CPT | Performed by: STUDENT IN AN ORGANIZED HEALTH CARE EDUCATION/TRAINING PROGRAM

## 2020-12-03 RX ORDER — CYANOCOBALAMIN 1000 UG/ML
1000 INJECTION, SOLUTION INTRAMUSCULAR; SUBCUTANEOUS
Status: DISCONTINUED | OUTPATIENT
Start: 2020-12-03 | End: 2022-03-14

## 2020-12-03 RX ADMIN — CYANOCOBALAMIN 1000 MCG: 1000 INJECTION, SOLUTION INTRAMUSCULAR; SUBCUTANEOUS at 13:58

## 2020-12-03 NOTE — PROGRESS NOTES
I have reviewed the notes, assessments, and/or procedures performed by Dr. Chiqui Coker, I concur with her  documentation and assessment and plan for Jian Baker        This document has been electronically signed by Hebert Anguiano MD on December 3, 2020 15:34 CST

## 2020-12-03 NOTE — PROGRESS NOTES
ID: Jian Baker    CC:   Chief Complaint   Patient presents with   • Knee Pain     2 month followup       Subjective:     HPI     Jian Baker is a 65 y.o. male who presents for follow up on knee pain. Patient saw Dr. Jones yesterday and received a steroid injection. Patient states he is feeling good and is grateful that I referred him to ortho. He is ambulating well, he states his pain is much better controlled.     Patient has not received his b12 in many months. His last b12 test was in jan 2020 and it was 160.     Preventative:  Over the past 2 weeks, have you felt down, depressed, or hopeless? no  Over the past 2 weeks, have you felt little interest or pleasure in doing things? no  Clinical depression screening refused by patient no    On osteoporosis therapy? no    Past Medical Hx:  Past Medical History:   Diagnosis Date   • Asthma     Previously diagnosed and previously prescribed inhaler medications.     • Essential hypertension    • Gastroesophageal reflux disease        Past Surgical Hx:  Past Surgical History:   Procedure Laterality Date   • BONE GRAFT     • WRIST FRACTURE SURGERY Bilateral        Health Maintenance:  Health Maintenance   Topic Date Due   • ZOSTER VACCINE (2 of 2) 12/05/2017   • INFLUENZA VACCINE  08/01/2020   • ANNUAL WELLNESS VISIT  07/02/2021   • COLONOSCOPY  10/10/2024   • TDAP/TD VACCINES (3 - Td) 10/10/2027   • HEPATITIS C SCREENING  Completed   • Pneumococcal Vaccine 0-64  Aged Out       Current Meds:    Current Outpatient Medications:   •  atorvastatin (LIPITOR) 20 MG tablet, Take 1 tablet by mouth Daily., Disp: 30 tablet, Rfl: 5  •  fluticasone (FLONASE) 50 MCG/ACT nasal spray, 2 sprays into the nostril(s) as directed by provider Daily., Disp: 1 bottle, Rfl: 10  •  lidocaine (LIDODERM) 5 %, Place 1 patch on the skin as directed by provider Daily. Remove & Discard patch within 12 hours or as directed by MD, Disp: 30 each, Rfl: 3  •  lisinopril (PRINIVIL,ZESTRIL) 10  MG tablet, Take 2 tablets by mouth Daily., Disp: 60 tablet, Rfl: 5  •  loratadine (Claritin) 10 MG tablet, Take 1 tablet by mouth Daily., Disp: 30 tablet, Rfl: 11  •  meloxicam (MOBIC) 7.5 MG tablet, Take 2 tablets by mouth Daily., Disp: 60 tablet, Rfl: 5  •  pantoprazole (PROTONIX) 20 MG EC tablet, Take 1 tablet by mouth Daily., Disp: 30 tablet, Rfl: 11  •  pregabalin (LYRICA) 50 MG capsule, Take 1 capsule by mouth 3 (Three) Times a Day., Disp: 90 capsule, Rfl: 2  •  vitamin B-12 (CYANOCOBALAMIN) 1000 MCG tablet, Take 1 tablet by mouth Daily., Disp: 30 tablet, Rfl: 2    Current Facility-Administered Medications:   •  cyanocobalamin injection 1,000 mcg, 1,000 mcg, Intramuscular, Q28 Days, Stephania Head MD, 1,000 mcg at 20 0841  •  cyanocobalamin injection 1,000 mcg, 1,000 mcg, Intramuscular, Q28 Days, Pelon Stewart Jr., MD, 1,000 mcg at 20 1409  •  cyanocobalamin injection 1,000 mcg, 1,000 mcg, Intramuscular, Q28 Days, Chiqui Coker MD    Allergies:  Patient has no known allergies.    Family Hx:  Family History   Problem Relation Age of Onset   • COPD Mother    • Hypertension Mother    • Diabetes type II Mother    • Cancer Father    • Heart failure Father         Social History:  Social History     Socioeconomic History   • Marital status:      Spouse name: Not on file   • Number of children: Not on file   • Years of education: Not on file   • Highest education level: Not on file   Occupational History   • Occupation: Disability   Tobacco Use   • Smoking status: Former Smoker     Packs/day: 1.00     Years: 25.00     Pack years: 25.00     Types: Pipe     Start date: 10/10/1972     Quit date: 10/31/2019     Years since quittin.0   • Smokeless tobacco: Current User     Types: Chew   Substance and Sexual Activity   • Alcohol use: Yes     Alcohol/week: 2.0 standard drinks     Types: 2 Cans of beer per week     Comment: Prior heavy drinker   • Drug use: No   • Sexual activity: Yes      Partners: Female   Social History Narrative    Patient lives in [place] with his wife.  Transportation is an issue for the couple.       Review of Systems   Constitutional: Negative for activity change, appetite change, chills, fatigue and fever.   HENT: Negative for drooling, ear discharge, ear pain, facial swelling, hearing loss, mouth sores, rhinorrhea and sinus pain.    Eyes: Negative for pain, redness and itching.   Respiratory: Negative for cough, choking, chest tightness, shortness of breath and stridor.    Cardiovascular: Negative for chest pain, palpitations and leg swelling.   Gastrointestinal: Negative for abdominal distention, abdominal pain, anal bleeding, blood in stool, constipation, diarrhea and nausea.   Endocrine: Negative for heat intolerance, polydipsia and polyphagia.   Genitourinary: Negative for dysuria, flank pain, frequency and genital sores.   Musculoskeletal: Negative for back pain, gait problem, joint swelling and myalgias.        Knee pain     Skin: Negative for pallor and rash.   Neurological: Negative for seizures, facial asymmetry, speech difficulty, light-headedness, numbness and headaches.   Hematological: Negative for adenopathy. Does not bruise/bleed easily.   Psychiatric/Behavioral: Negative for confusion, decreased concentration, dysphoric mood and hallucinations. The patient is not nervous/anxious and is not hyperactive.            Objective:     /78   Pulse 74   Temp 97.6 °F (36.4 °C)   Wt 89.4 kg (197 lb)   SpO2 99%   BMI 30.85 kg/m²     Physical Exam  Constitutional:       Appearance: He is well-developed.   HENT:      Head: Normocephalic and atraumatic.      Right Ear: External ear normal.      Left Ear: External ear normal.      Nose: Nose normal.   Eyes:      Conjunctiva/sclera: Conjunctivae normal.      Pupils: Pupils are equal, round, and reactive to light.   Neck:      Musculoskeletal: Normal range of motion and neck supple.   Cardiovascular:      Rate  and Rhythm: Normal rate and regular rhythm.      Heart sounds: Normal heart sounds.   Pulmonary:      Effort: Pulmonary effort is normal.      Breath sounds: Normal breath sounds.   Abdominal:      General: Bowel sounds are normal.      Palpations: Abdomen is soft.   Musculoskeletal: Normal range of motion.      Comments: Tenderness on palpation of left knee     Skin:     General: Skin is warm and dry.   Neurological:      Mental Status: He is alert and oriented to person, place, and time.   Psychiatric:         Behavior: Behavior normal.         Thought Content: Thought content normal.         Judgment: Judgment normal.                Assessment/Plan:     Diagnoses and all orders for this visit:    1. B12 deficiency (Primary)  -     cyanocobalamin injection 1,000 mcg    2. Needs flu shot  -     FluLaval Quad >6 Months (4380-1260)    3. Primary osteoarthritis of left knee    Other orders  -     Fluad Quad 65+ yrs (6850-3735)    Will continue to monitor patients knee pain. Will continue with three month follow up and evaluate at that time for another steroid injection to be done here or at the ortho group. No rx changes.     Follow-up:   3 months    Goals:   Goals     • Better Compliance with HTN Medications (pt-stated)      Barriers: None      • Medication management      Patient has been having trouble with medication compliance.  Goal now is to take medications everyday at the same time.    Barriers: patient has not been on medications consistently for sometime.  Has trouble with remembering to take medications.            Barriers to goals: none    Health Maintenance   Topic Date Due   • ZOSTER VACCINE (2 of 2) 12/05/2017   • INFLUENZA VACCINE  08/01/2020   • ANNUAL WELLNESS VISIT  07/02/2021   • COLONOSCOPY  10/10/2024   • TDAP/TD VACCINES (3 - Td) 10/10/2027   • HEPATITIS C SCREENING  Completed   • Pneumococcal Vaccine 0-64  Aged Out       Tobacco: nonsmoker  Alcohol: does not drink  Lifestyle: Patient's Body  mass index is 30.85 kg/m². BMI is above normal parameters. Recommendations include: exercise counseling and nutrition counseling.   eat more fruits and vegetables, decrease soda or juice intake, increase water intake, increase physical activity, reduce screen time, reduce portion size, cut out extra servings, reduce fast food intake, family to eat at dinner table more often, keep TV off during meals, plan meals, eat breakfast and have 3 meals a day    RISK SCORE: 3         Chiqui Coker M.D. PGY3  [facility]  [address] [place] [address]  Office: [phone]    This document has been electronically signed by Chiqui Coker MD on December 3, 2020 14:08 CST            This document has been electronically signed by Chiqui Coker MD on December 3, 2020 13:52 CST

## 2021-01-18 ENCOUNTER — TELEPHONE (OUTPATIENT)
Dept: FAMILY MEDICINE CLINIC | Facility: CLINIC | Age: 66
End: 2021-01-18

## 2021-01-18 NOTE — TELEPHONE ENCOUNTER
PT CALLED REQUESTING A REFILL OF pregabalin (LYRICA) 50 MG capsule SENT TO [facility]. HIS CALL BACK [phone]

## 2021-01-21 DIAGNOSIS — M25.551 PAIN OF RIGHT HIP JOINT: ICD-10-CM

## 2021-01-21 RX ORDER — PREGABALIN 50 MG/1
50 CAPSULE ORAL 3 TIMES DAILY
Qty: 90 CAPSULE | Refills: 2 | Status: SHIPPED | OUTPATIENT
Start: 2021-01-21 | End: 2021-06-14 | Stop reason: SDUPTHER

## 2021-02-03 ENCOUNTER — OFFICE VISIT (OUTPATIENT)
Dept: ORTHOPEDIC SURGERY | Facility: CLINIC | Age: 66
End: 2021-02-03

## 2021-02-03 VITALS
HEART RATE: 78 BPM | HEIGHT: 67 IN | TEMPERATURE: 98 F | OXYGEN SATURATION: 95 % | WEIGHT: 192 LBS | BODY MASS INDEX: 30.13 KG/M2

## 2021-02-03 DIAGNOSIS — M75.52 SUBACROMIAL BURSITIS OF LEFT SHOULDER JOINT: ICD-10-CM

## 2021-02-03 DIAGNOSIS — M25.512 ACUTE PAIN OF LEFT SHOULDER: Primary | ICD-10-CM

## 2021-02-03 PROCEDURE — 99213 OFFICE O/P EST LOW 20 MIN: CPT | Performed by: ORTHOPAEDIC SURGERY

## 2021-02-03 PROCEDURE — 20610 DRAIN/INJ JOINT/BURSA W/O US: CPT | Performed by: ORTHOPAEDIC SURGERY

## 2021-02-03 RX ORDER — LIDOCAINE HYDROCHLORIDE AND EPINEPHRINE 10; 10 MG/ML; UG/ML
2 INJECTION, SOLUTION INFILTRATION; PERINEURAL
Status: COMPLETED | OUTPATIENT
Start: 2021-02-03 | End: 2021-02-03

## 2021-02-03 RX ORDER — BUPIVACAINE HYDROCHLORIDE 5 MG/ML
3 INJECTION, SOLUTION PERINEURAL
Status: COMPLETED | OUTPATIENT
Start: 2021-02-03 | End: 2021-02-03

## 2021-02-03 RX ORDER — TRIAMCINOLONE ACETONIDE 40 MG/ML
80 INJECTION, SUSPENSION INTRA-ARTICULAR; INTRAMUSCULAR
Status: COMPLETED | OUTPATIENT
Start: 2021-02-03 | End: 2021-02-03

## 2021-02-03 RX ADMIN — TRIAMCINOLONE ACETONIDE 80 MG: 40 INJECTION, SUSPENSION INTRA-ARTICULAR; INTRAMUSCULAR at 14:35

## 2021-02-03 RX ADMIN — BUPIVACAINE HYDROCHLORIDE 3 ML: 5 INJECTION, SOLUTION PERINEURAL at 14:35

## 2021-02-03 RX ADMIN — LIDOCAINE HYDROCHLORIDE AND EPINEPHRINE 2 ML: 10; 10 INJECTION, SOLUTION INFILTRATION; PERINEURAL at 14:35

## 2021-02-03 NOTE — PROGRESS NOTES
Jian Baker is a 65 y.o. male   Primary provider:  Chiqui Coker MD       Chief Complaint   Patient presents with   • Left Shoulder - Pain       HISTORY OF PRESENT ILLNESS: No known injury to the shoulder.     This is the first office visit for evaluation of left shoulder and arm pain.    Mr. Baker is 65 years old and left-hand dominant.  He has a longstanding history of pain in the left shoulder and arm.  There is been no history of trauma.  The pain is primarily at the apex of the shoulder and will occasionally radiate into the arm.  Pain is worse with abduction and also occasionally worse at night.  There is been no specific relieving factor.  He has had no treatment for this.    Home medicines include Lipitor Flonase lisinopril Claritin Mobic Protonix and Lyrica.  He has no drug allergies.  Past medical history is markable for hypertension asthma and neuropathy.  He is disabled and .    Dr. Coker has asked that I see him for evaluation and treatment.    Pain  This is a new problem. The current episode started more than 1 year ago. The problem occurs intermittently. The problem has been gradually worsening. Associated symptoms include joint swelling. Exacerbated by: driving. The treatment provided no relief.        CONCURRENT MEDICAL HISTORY:    Past Medical History:   Diagnosis Date   • Asthma     Previously diagnosed and previously prescribed inhaler medications.     • Essential hypertension    • Gastroesophageal reflux disease        No Known Allergies      Current Outpatient Medications:   •  atorvastatin (LIPITOR) 20 MG tablet, Take 1 tablet by mouth Daily., Disp: 30 tablet, Rfl: 5  •  fluticasone (FLONASE) 50 MCG/ACT nasal spray, 2 sprays into the nostril(s) as directed by provider Daily., Disp: 1 bottle, Rfl: 10  •  lidocaine (LIDODERM) 5 %, Place 1 patch on the skin as directed by provider Daily. Remove & Discard patch within 12 hours or as directed by MD, Disp: 30 each, Rfl: 3  •   lisinopril (PRINIVIL,ZESTRIL) 10 MG tablet, Take 2 tablets by mouth Daily., Disp: 60 tablet, Rfl: 5  •  loratadine (Claritin) 10 MG tablet, Take 1 tablet by mouth Daily., Disp: 30 tablet, Rfl: 11  •  meloxicam (MOBIC) 7.5 MG tablet, Take 2 tablets by mouth Daily., Disp: 60 tablet, Rfl: 5  •  pantoprazole (PROTONIX) 20 MG EC tablet, Take 1 tablet by mouth Daily., Disp: 30 tablet, Rfl: 11  •  pregabalin (LYRICA) 50 MG capsule, Take 1 capsule by mouth 3 (Three) Times a Day., Disp: 90 capsule, Rfl: 2  •  vitamin B-12 (CYANOCOBALAMIN) 1000 MCG tablet, Take 1 tablet by mouth Daily., Disp: 30 tablet, Rfl: 2    Current Facility-Administered Medications:   •  cyanocobalamin injection 1,000 mcg, 1,000 mcg, Intramuscular, Q28 Days, Stephania Head MD, 1,000 mcg at 20 0841  •  cyanocobalamin injection 1,000 mcg, 1,000 mcg, Intramuscular, Q28 Days, Pelon Stewart Jr., MD, 1,000 mcg at 20 1409  •  cyanocobalamin injection 1,000 mcg, 1,000 mcg, Intramuscular, Q28 Days, Chiqui Coker MD, 1,000 mcg at 20 1358    Past Surgical History:   Procedure Laterality Date   • BONE GRAFT     • WRIST FRACTURE SURGERY Bilateral        Family History   Problem Relation Age of Onset   • COPD Mother    • Hypertension Mother    • Diabetes type II Mother    • Cancer Father    • Heart failure Father         Social History     Socioeconomic History   • Marital status:      Spouse name: Not on file   • Number of children: Not on file   • Years of education: Not on file   • Highest education level: Not on file   Occupational History   • Occupation: Disability   Tobacco Use   • Smoking status: Former Smoker     Packs/day: 1.00     Years: 25.00     Pack years: 25.00     Types: Pipe     Start date: 10/10/1972     Quit date: 10/31/2019     Years since quittin.2   • Smokeless tobacco: Current User     Types: Chew   Substance and Sexual Activity   • Alcohol use: Yes     Alcohol/week: 2.0 standard drinks     Types: 2 Cans of  "beer per week     Comment: Prior heavy drinker   • Drug use: No   • Sexual activity: Yes     Partners: Female   Social History Narrative    Patient lives in [place] with his wife.  Transportation is an issue for the couple.        Review of Systems   HENT: Positive for ear pain.    Respiratory: Positive for shortness of breath and wheezing.    Musculoskeletal: Positive for joint swelling and neck stiffness.     Review of systems is positive as noted above.  PHYSICAL EXAMINATION:       Vitals:    02/03/21 1341   Pulse: 78   Temp: 98 °F (36.7 °C)   SpO2: 95%   Weight: 87.1 kg (192 lb)   Height: 170.2 cm (67\")       Physical Exam in general he is alert and in no apparent distress at rest.  He responds appropriately to questions and commands.    GAIT:     []  Normal  [x]  Antalgic    Assistive device: [x]  None  []  Walker     []  Crutches  []  Cane     []  Wheelchair  []  Stretcher    Ortho Exam is directed to the upper extremities.  Biceps contours are normal.  With some encouragement he can demonstrate full active elevation of the left shoulder with mild complaints of pain.  There is no periscapular tenderness.  The acromioclavicular joint is nontender.  Strength of rotation and abduction of the shoulder are all mildly decreased with complaints of pain.  Impingement testing produces mild to moderate discomfort.  Radial pulses strong.  Sensory exam is intact to soft touch.    Xr Shoulder 2+ View Left    Result Date: 2/3/2021  Ordering Provider:  Adarsh Loco MD Ordering Diagnosis/Indication:  Acute pain of left shoulder Procedure:  XR SHOULDER 2+ VW LEFT Exam Date:  2/3/21 COMPARISON: None      Radiographs of the left shoulder 2 AP views done today show mild glenohumeral degenerative change.  The acromiohumeral space is slightly narrowed and there is slight irregularity of the greater tuberosity.  There is widening of the acromioclavicular joint space. Adarsh Loco MD 2/3/21      Xr Shoulder 2+ View " Left    Result Date: 2/3/2021  Narrative: Ordering Provider:  Adarsh Loco MD Ordering Diagnosis/Indication:  Acute pain of left shoulder Procedure:  XR SHOULDER 2+ VW LEFT Exam Date:  2/3/21 COMPARISON: None     Impression:  Radiographs of the left shoulder 2 AP views done today show mild glenohumeral degenerative change.  The acromiohumeral space is slightly narrowed and there is slight irregularity of the greater tuberosity.  There is widening of the acromioclavicular joint space. Adarsh Loco MD 2/3/21          ASSESSMENT: Left shoulder pain probably secondary to rotator cuff tendinitis.    The natural history of the disorder was discussed and treatment options reviewed.  At present I see no surgical indications.  He was instructed in symptomatic care.  Potential benefits of injection were discussed.  He wished to proceed with this.    The left shoulder was prepped.  Skin was infiltrated with 1% Xylocaine with epinephrine.  The subacromial bursa was then injected with a mixture of Marcaine Kenalog and Xylocaine with epinephrine.  There were no complications and he reported improvement in his pain after injection.    Return here in 1 month if his symptoms have not improved.    Diagnoses and all orders for this visit:    Acute pain of left shoulder  -     XR Shoulder 2+ View Left; Future  -     Large Joint Arthrocentesis: R subacromial bursa    Subacromial bursitis of left shoulder joint          PLAN  Large Joint Arthrocentesis: R subacromial bursa  Date/Time: 2/3/2021 2:35 PM  Consent given by: patient  Site marked: site marked  Timeout: Immediately prior to procedure a time out was called to verify the correct patient, procedure, equipment, support staff and site/side marked as required   Supporting Documentation  Indications: pain   Procedure Details  Location: shoulder - R subacromial bursa  Preparation: Patient was prepped and draped in the usual sterile fashion  Needle size: 22 G  Medications  administered: 80 mg triamcinolone acetonide 40 MG/ML; 3 mL bupivacaine 0.5 %; 2 mL lidocaine-EPINEPHrine 1 %-1:[id number]  Patient tolerance: patient tolerated the procedure well with no immediate complications        Patient's Body mass index is 30.07 kg/m². BMI is above normal parameters. Recommendations include: exercise counseling, nutrition counseling and referral to primary care.  Return in about 4 weeks (around 3/3/2021).    Adarsh Loco MD

## 2021-02-08 NOTE — PROGRESS NOTES
Large Joint Arthrocentesis: L subacromial bursa  Date/Time: 2/8/2021 4:07 PM  Consent given by: patient  Site marked: site marked  Timeout: Immediately prior to procedure a time out was called to verify the correct patient, procedure, equipment, support staff and site/side marked as required   Supporting Documentation  Indications: pain   Procedure Details  Location: shoulder - L subacromial bursa  Preparation: Patient was prepped and draped in the usual sterile fashion  Needle size: 22 G  Medications administered: 80 mg triamcinolone acetonide 40 MG/ML; 3 mL bupivacaine 0.5 %; 2 mL lidocaine-EPINEPHrine 1 %-1:[id number]  Patient tolerance: patient tolerated the procedure well with no immediate complications          This is the correct shoulder that was injected 02/03/2021, not the right.

## 2021-03-03 ENCOUNTER — OFFICE VISIT (OUTPATIENT)
Dept: FAMILY MEDICINE CLINIC | Facility: CLINIC | Age: 66
End: 2021-03-03

## 2021-03-03 VITALS
OXYGEN SATURATION: 97 % | HEART RATE: 86 BPM | HEIGHT: 67 IN | BODY MASS INDEX: 30.56 KG/M2 | TEMPERATURE: 98.6 F | DIASTOLIC BLOOD PRESSURE: 78 MMHG | SYSTOLIC BLOOD PRESSURE: 136 MMHG | WEIGHT: 194.7 LBS

## 2021-03-03 DIAGNOSIS — M75.82 TENDINITIS OF LEFT ROTATOR CUFF: Primary | ICD-10-CM

## 2021-03-03 PROCEDURE — 99213 OFFICE O/P EST LOW 20 MIN: CPT | Performed by: STUDENT IN AN ORGANIZED HEALTH CARE EDUCATION/TRAINING PROGRAM

## 2021-03-03 NOTE — PROGRESS NOTES
ID: Jian Baker    CC:   Chief Complaint   Patient presents with   • B12 deficiency     3 month followup        Subjective:     HPI     Jian Baker is a 65 y.o. male who presents for 3 month follow up. Patient is doing well. He no longer has arm and knee pain. He had a steroid shot on his knee in October and on his left shoulder last month. Patient states he has no difficulty with movement or getting around.     Preventative:  Over the past 2 weeks, have you felt down, depressed, or hopeless? no  Over the past 2 weeks, have you felt little interest or pleasure in doing things? no  Clinical depression screening refused by patient no    On osteoporosis therapy? no    Past Medical Hx:  Past Medical History:   Diagnosis Date   • Asthma     Previously diagnosed and previously prescribed inhaler medications.     • Essential hypertension    • Gastroesophageal reflux disease        Past Surgical Hx:  Past Surgical History:   Procedure Laterality Date   • BONE GRAFT     • WRIST FRACTURE SURGERY Bilateral        Health Maintenance:  Health Maintenance   Topic Date Due   • ZOSTER VACCINE (2 of 2) 12/05/2017   • ANNUAL WELLNESS VISIT  07/02/2021   • COLONOSCOPY  10/10/2024   • TDAP/TD VACCINES (3 - Td) 10/10/2027   • HEPATITIS C SCREENING  Completed   • INFLUENZA VACCINE  Completed   • Pneumococcal Vaccine 0-64  Aged Out   • MENINGOCOCCAL VACCINE  Aged Out       Current Meds:    Current Outpatient Medications:   •  atorvastatin (LIPITOR) 20 MG tablet, Take 1 tablet by mouth Daily., Disp: 30 tablet, Rfl: 5  •  fluticasone (FLONASE) 50 MCG/ACT nasal spray, 2 sprays into the nostril(s) as directed by provider Daily., Disp: 1 bottle, Rfl: 10  •  lidocaine (LIDODERM) 5 %, Place 1 patch on the skin as directed by provider Daily. Remove & Discard patch within 12 hours or as directed by MD, Disp: 30 each, Rfl: 3  •  lisinopril (PRINIVIL,ZESTRIL) 10 MG tablet, Take 2 tablets by mouth Daily., Disp: 60 tablet, Rfl: 5  •   loratadine (Claritin) 10 MG tablet, Take 1 tablet by mouth Daily., Disp: 30 tablet, Rfl: 11  •  meloxicam (MOBIC) 7.5 MG tablet, Take 2 tablets by mouth Daily., Disp: 60 tablet, Rfl: 5  •  pantoprazole (PROTONIX) 20 MG EC tablet, Take 1 tablet by mouth Daily., Disp: 30 tablet, Rfl: 11  •  pregabalin (LYRICA) 50 MG capsule, Take 1 capsule by mouth 3 (Three) Times a Day., Disp: 90 capsule, Rfl: 2  •  vitamin B-12 (CYANOCOBALAMIN) 1000 MCG tablet, Take 1 tablet by mouth Daily., Disp: 30 tablet, Rfl: 2    Current Facility-Administered Medications:   •  cyanocobalamin injection 1,000 mcg, 1,000 mcg, Intramuscular, Q28 Days, Stephania Head MD, 1,000 mcg at 20 0841  •  cyanocobalamin injection 1,000 mcg, 1,000 mcg, Intramuscular, Q28 Days, Pelon Stewart Jr., MD, 1,000 mcg at 20 1409  •  cyanocobalamin injection 1,000 mcg, 1,000 mcg, Intramuscular, Q28 Days, Chiqui Coker MD, 1,000 mcg at 20 1358    Allergies:  Patient has no known allergies.    Family Hx:  Family History   Problem Relation Age of Onset   • COPD Mother    • Hypertension Mother    • Diabetes type II Mother    • Cancer Father    • Heart failure Father         Social History:  Social History     Socioeconomic History   • Marital status:      Spouse name: Not on file   • Number of children: Not on file   • Years of education: Not on file   • Highest education level: Not on file   Occupational History   • Occupation: Disability   Tobacco Use   • Smoking status: Former Smoker     Packs/day: 1.00     Years: 25.00     Pack years: 25.00     Types: Pipe     Start date: 10/10/1972     Quit date: 10/31/2019     Years since quittin.3   • Smokeless tobacco: Current User     Types: Chew   Substance and Sexual Activity   • Alcohol use: Yes     Alcohol/week: 2.0 standard drinks     Types: 2 Cans of beer per week     Comment: Prior heavy drinker   • Drug use: No   • Sexual activity: Yes     Partners: Female   Social History Narrative     "Patient lives in [place] with his wife.  Transportation is an issue for the couple.       Review of Systems   Constitutional: Negative for activity change, appetite change, chills, fatigue and fever.   HENT: Negative for drooling, ear discharge, ear pain, facial swelling, hearing loss, mouth sores, rhinorrhea and sinus pain.    Eyes: Negative for pain, redness and itching.   Respiratory: Negative for cough, choking, chest tightness, shortness of breath and stridor.    Cardiovascular: Negative for chest pain, palpitations and leg swelling.   Gastrointestinal: Negative for abdominal distention, abdominal pain, anal bleeding, blood in stool, constipation, diarrhea and nausea.   Endocrine: Negative for heat intolerance, polydipsia and polyphagia.   Genitourinary: Negative for dysuria, flank pain, frequency and genital sores.   Musculoskeletal: Negative for back pain, gait problem, joint swelling and myalgias.   Skin: Negative for pallor and rash.   Neurological: Negative for seizures, facial asymmetry, speech difficulty, light-headedness, numbness and headaches.   Hematological: Negative for adenopathy. Does not bruise/bleed easily.   Psychiatric/Behavioral: Negative for confusion, decreased concentration, dysphoric mood and hallucinations. The patient is not nervous/anxious and is not hyperactive.            Objective:     /78   Pulse 86   Temp 98.6 °F (37 °C)   Ht 170.2 cm (67\")   Wt 88.3 kg (194 lb 11.2 oz)   SpO2 97%   BMI 30.49 kg/m²     Physical Exam  Constitutional:       Appearance: He is well-developed.   HENT:      Head: Normocephalic and atraumatic.      Right Ear: External ear normal.      Left Ear: External ear normal.      Nose: Nose normal.   Eyes:      Conjunctiva/sclera: Conjunctivae normal.      Pupils: Pupils are equal, round, and reactive to light.   Neck:      Musculoskeletal: Normal range of motion and neck supple.   Cardiovascular:      Rate and Rhythm: Normal rate and regular " rhythm.      Heart sounds: Normal heart sounds.   Pulmonary:      Effort: Pulmonary effort is normal.      Breath sounds: Normal breath sounds.   Abdominal:      General: Bowel sounds are normal.      Palpations: Abdomen is soft.   Musculoskeletal: Normal range of motion.   Skin:     General: Skin is warm and dry.   Neurological:      Mental Status: He is alert and oriented to person, place, and time.   Psychiatric:         Behavior: Behavior normal.         Thought Content: Thought content normal.         Judgment: Judgment normal.                Assessment/Plan:     Diagnoses and all orders for this visit:    1. Tendinitis of left rotator cuff (Primary)    Had a steroid shot last month and is doing well. Nothing to do at this time. No rx changes.       Follow-up:     3 months     Goals:   Goals     • Better Compliance with HTN Medications (pt-stated)      Barriers: None      • Medication management      Patient has been having trouble with medication compliance.  Goal now is to take medications everyday at the same time.    Barriers: patient has not been on medications consistently for sometime.  Has trouble with remembering to take medications.            Barriers to goals: 3 months     Health Maintenance   Topic Date Due   • ZOSTER VACCINE (2 of 2) 12/05/2017   • ANNUAL WELLNESS VISIT  07/02/2021   • COLONOSCOPY  10/10/2024   • TDAP/TD VACCINES (3 - Td) 10/10/2027   • HEPATITIS C SCREENING  Completed   • INFLUENZA VACCINE  Completed   • Pneumococcal Vaccine 0-64  Aged Out   • MENINGOCOCCAL VACCINE  Aged Out       Tobacco: nonsmoker  Alcohol: does not drink  Lifestyle: Patient's Body mass index is 30.49 kg/m². BMI is above normal parameters. Recommendations include: exercise counseling and nutrition counseling.   increase physical activity, cut out extra servings, plan meals and have 3 meals a day    RISK SCORE: 3         Chiqui Coker M.D. PGY3  [facility]  [address] [place] [address]  Office: [phone]    This document has been electronically signed by Chiqui Coker MD on March 3, 2021 13:49 CST            This document has been electronically signed by Chiqui Coker MD on March 3, 2021 13:48 CST

## 2021-03-08 NOTE — PROGRESS NOTES
I have reviewed the notes, assessments, and/or procedures performed by Dr. Coker, I concur with her/his documentation and assessment and plan for Jian Baker.       This document has been electronically signed by Ayaan Rowland MD on March 8, 2021 16:05 CST

## 2021-03-18 DIAGNOSIS — I10 ESSENTIAL HYPERTENSION: ICD-10-CM

## 2021-03-19 RX ORDER — ATORVASTATIN CALCIUM 20 MG/1
TABLET, FILM COATED ORAL
Qty: 90 TABLET | Refills: 1 | Status: SHIPPED | OUTPATIENT
Start: 2021-03-19 | End: 2021-08-25 | Stop reason: SDUPTHER

## 2021-03-23 ENCOUNTER — IMMUNIZATION (OUTPATIENT)
Dept: VACCINE CLINIC | Facility: HOSPITAL | Age: 66
End: 2021-03-23

## 2021-03-23 PROCEDURE — 91300 HC SARSCOV02 VAC 30MCG/0.3ML IM: CPT | Performed by: THORACIC SURGERY (CARDIOTHORACIC VASCULAR SURGERY)

## 2021-03-23 PROCEDURE — 0001A: CPT | Performed by: THORACIC SURGERY (CARDIOTHORACIC VASCULAR SURGERY)

## 2021-04-09 ENCOUNTER — OFFICE VISIT (OUTPATIENT)
Dept: FAMILY MEDICINE CLINIC | Facility: CLINIC | Age: 66
End: 2021-04-09

## 2021-04-09 VITALS
OXYGEN SATURATION: 96 % | HEART RATE: 81 BPM | HEIGHT: 67 IN | WEIGHT: 194 LBS | DIASTOLIC BLOOD PRESSURE: 82 MMHG | BODY MASS INDEX: 30.45 KG/M2 | SYSTOLIC BLOOD PRESSURE: 138 MMHG

## 2021-04-09 DIAGNOSIS — M75.82 TENDINITIS OF LEFT ROTATOR CUFF: ICD-10-CM

## 2021-04-09 DIAGNOSIS — M25.512 CHRONIC LEFT SHOULDER PAIN: Primary | ICD-10-CM

## 2021-04-09 DIAGNOSIS — G89.29 CHRONIC LEFT SHOULDER PAIN: Primary | ICD-10-CM

## 2021-04-09 PROCEDURE — 99213 OFFICE O/P EST LOW 20 MIN: CPT | Performed by: STUDENT IN AN ORGANIZED HEALTH CARE EDUCATION/TRAINING PROGRAM

## 2021-04-09 RX ORDER — NAPROXEN 500 MG/1
500 TABLET ORAL 2 TIMES DAILY WITH MEALS
Qty: 60 TABLET | Refills: 0 | Status: SHIPPED | OUTPATIENT
Start: 2021-04-09 | End: 2021-05-03

## 2021-04-13 ENCOUNTER — IMMUNIZATION (OUTPATIENT)
Dept: VACCINE CLINIC | Facility: HOSPITAL | Age: 66
End: 2021-04-13

## 2021-04-13 PROCEDURE — 0002A: CPT | Performed by: THORACIC SURGERY (CARDIOTHORACIC VASCULAR SURGERY)

## 2021-04-13 PROCEDURE — 91300 HC SARSCOV02 VAC 30MCG/0.3ML IM: CPT | Performed by: THORACIC SURGERY (CARDIOTHORACIC VASCULAR SURGERY)

## 2021-04-15 NOTE — PROGRESS NOTES
I have reviewed the notes, assessments, and/or procedures performed by Dr. MEHRAN Fried, I concur with her/his documentation and assessment and plan for Jian Baker.                This document has been electronically signed by Angel Rea MD on April 15, 2021 11:32 CDT

## 2021-04-16 DIAGNOSIS — I10 ESSENTIAL HYPERTENSION: ICD-10-CM

## 2021-04-16 RX ORDER — LISINOPRIL 10 MG/1
TABLET ORAL
Qty: 60 TABLET | Refills: 5 | Status: SHIPPED | OUTPATIENT
Start: 2021-04-16 | End: 2021-12-21 | Stop reason: SDUPTHER

## 2021-04-19 ENCOUNTER — TELEPHONE (OUTPATIENT)
Dept: FAMILY MEDICINE CLINIC | Facility: CLINIC | Age: 66
End: 2021-04-19

## 2021-04-19 NOTE — TELEPHONE ENCOUNTER
PATIENT CAME AND SAW DR COLLEEN CRUMP TODAY AND HE ORDERED A MRI AND THEY ARE GOING TO PUT HIM ALL THE WAY IN AND HE IS NEEDING SOMETHING TO RELAX HIM CALLED INTO [facility]. HIS NUMBER TO CALL BACK [phone]. HE IS HAVING THIS DONE Friday.          THANK YOU,        [place]

## 2021-04-23 ENCOUNTER — HOSPITAL ENCOUNTER (OUTPATIENT)
Dept: MRI IMAGING | Facility: HOSPITAL | Age: 66
Discharge: HOME | End: 2021-04-23
Admitting: ORTHOPAEDIC SURGERY

## 2021-04-23 DIAGNOSIS — M75.82 TENDINITIS OF LEFT ROTATOR CUFF: ICD-10-CM

## 2021-04-23 DIAGNOSIS — M25.512 CHRONIC LEFT SHOULDER PAIN: ICD-10-CM

## 2021-04-23 DIAGNOSIS — G89.29 CHRONIC LEFT SHOULDER PAIN: ICD-10-CM

## 2021-04-23 PROCEDURE — 73221 MRI JOINT UPR EXTREM W/O DYE: CPT

## 2021-04-25 DIAGNOSIS — S46.012A TRAUMATIC TEAR OF LEFT ROTATOR CUFF, UNSPECIFIED TEAR EXTENT, INITIAL ENCOUNTER: Primary | ICD-10-CM

## 2021-05-03 ENCOUNTER — OFFICE VISIT (OUTPATIENT)
Dept: ORTHOPEDIC SURGERY | Facility: CLINIC | Age: 66
End: 2021-05-03

## 2021-05-03 VITALS
HEART RATE: 82 BPM | RESPIRATION RATE: 22 BRPM | OXYGEN SATURATION: 97 % | HEIGHT: 67 IN | SYSTOLIC BLOOD PRESSURE: 148 MMHG | DIASTOLIC BLOOD PRESSURE: 98 MMHG | TEMPERATURE: 98.4 F | BODY MASS INDEX: 31.11 KG/M2 | WEIGHT: 198.2 LBS

## 2021-05-03 DIAGNOSIS — M25.512 ACUTE PAIN OF LEFT SHOULDER: Primary | ICD-10-CM

## 2021-05-03 DIAGNOSIS — M75.122 COMPLETE TEAR OF LEFT ROTATOR CUFF, UNSPECIFIED WHETHER TRAUMATIC: ICD-10-CM

## 2021-05-03 PROCEDURE — 99213 OFFICE O/P EST LOW 20 MIN: CPT | Performed by: ORTHOPAEDIC SURGERY

## 2021-05-03 RX ORDER — MELOXICAM 15 MG/1
15 TABLET ORAL DAILY
Qty: 30 TABLET | Refills: 2 | Status: SHIPPED | OUTPATIENT
Start: 2021-05-03 | End: 2021-05-07 | Stop reason: ALTCHOICE

## 2021-05-03 NOTE — PROGRESS NOTES
Jian Baker is a 65 y.o. male   Primary provider:  Chiqui Coker MD       Chief Complaint   Patient presents with   • Left Shoulder - Pain       HISTORY OF PRESENT ILLNESS: Patient is here today for new c/o left shoulder pain. He fell approximately 6 weeks ago on his shoulder. He states that his pain is 5/10. He has trouble picking up objects. He had MRI prior to visit today.    Mr. Baker had a good response to his subacromial injection.  He fell 1 or more times since I last saw him with subsequent worsening of his pain.  He continues to complain of pain radiating from the apex of the shoulder to the arm worse at night.        Pain  This is a recurrent problem. The current episode started more than 1 month ago. The problem occurs intermittently. The problem has been gradually worsening. Associated symptoms include arthralgias, joint swelling and myalgias. Associated symptoms comments: Cramps. Exacerbated by: Driving. Treatments tried: Injections. The treatment provided moderate relief.        CONCURRENT MEDICAL HISTORY:    Past Medical History:   Diagnosis Date   • Asthma     Previously diagnosed and previously prescribed inhaler medications.     • Essential hypertension    • Gastroesophageal reflux disease        No Known Allergies      Current Outpatient Medications:   •  atorvastatin (LIPITOR) 20 MG tablet, TAKE ONE TABLET BY MOUTH ONCE DAILY , Disp: 90 tablet, Rfl: 1  •  Diclofenac Sodium (VOLTAREN) 1 % gel gel, Apply 4 g topically to the appropriate area as directed 4 (Four) Times a Day As Needed (pain)., Disp: 150 g, Rfl: 0  •  fluticasone (FLONASE) 50 MCG/ACT nasal spray, 2 sprays into the nostril(s) as directed by provider Daily., Disp: 1 bottle, Rfl: 10  •  lidocaine (LIDODERM) 5 %, Place 1 patch on the skin as directed by provider Daily. Remove & Discard patch within 12 hours or as directed by MD, Disp: 30 each, Rfl: 3  •  lisinopril (PRINIVIL,ZESTRIL) 10 MG tablet, TAKE TWO TABLETS BY MOUTH DAILY ,  Disp: 60 tablet, Rfl: 5  •  loratadine (Claritin) 10 MG tablet, Take 1 tablet by mouth Daily., Disp: 30 tablet, Rfl: 11  •  naproxen (Naprosyn) 500 MG tablet, Take 1 tablet by mouth 2 (Two) Times a Day With Meals., Disp: 60 tablet, Rfl: 0  •  pantoprazole (PROTONIX) 20 MG EC tablet, Take 1 tablet by mouth Daily., Disp: 30 tablet, Rfl: 11  •  pregabalin (LYRICA) 50 MG capsule, Take 1 capsule by mouth 3 (Three) Times a Day., Disp: 90 capsule, Rfl: 2  •  vitamin B-12 (CYANOCOBALAMIN) 1000 MCG tablet, Take 1 tablet by mouth Daily., Disp: 30 tablet, Rfl: 2    Current Facility-Administered Medications:   •  cyanocobalamin injection 1,000 mcg, 1,000 mcg, Intramuscular, Q28 Days, Stephania Head MD, 1,000 mcg at 20 0841  •  cyanocobalamin injection 1,000 mcg, 1,000 mcg, Intramuscular, Q28 Days, Pelon Stewart Jr., MD, 1,000 mcg at 20 1409  •  cyanocobalamin injection 1,000 mcg, 1,000 mcg, Intramuscular, Q28 Days, Chiqui Coker MD, 1,000 mcg at 20 1358    Past Surgical History:   Procedure Laterality Date   • BONE GRAFT     • WRIST FRACTURE SURGERY Bilateral        Family History   Problem Relation Age of Onset   • COPD Mother    • Hypertension Mother    • Diabetes type II Mother    • Cancer Father    • Heart failure Father         Social History     Socioeconomic History   • Marital status:      Spouse name: Not on file   • Number of children: Not on file   • Years of education: Not on file   • Highest education level: Not on file   Tobacco Use   • Smoking status: Former Smoker     Packs/day: 1.00     Years: 25.00     Pack years: 25.00     Types: Pipe     Start date: 10/10/1972     Quit date: 10/31/2019     Years since quittin.5   • Smokeless tobacco: Current User     Types: Chew   Substance and Sexual Activity   • Alcohol use: Yes     Alcohol/week: 2.0 standard drinks     Types: 2 Cans of beer per week     Comment: Prior heavy drinker   • Drug use: No   • Sexual activity: Yes      "Partners: Female        Review of Systems   Constitutional: Negative.    HENT: Positive for hearing loss.    Eyes: Negative.    Respiratory: Positive for wheezing.    Cardiovascular: Negative.    Gastrointestinal: Negative.    Endocrine: Negative.    Genitourinary: Negative.    Musculoskeletal: Positive for arthralgias, joint swelling and myalgias.   Skin: Negative.    Allergic/Immunologic: Negative.    Neurological: Negative.    Hematological: Negative.    Psychiatric/Behavioral: Negative.    Review of systems is positive as noted above.    PHYSICAL EXAMINATION:       Vitals:    05/03/21 0845   BP: 148/98   BP Location: Right arm   Patient Position: Sitting   Cuff Size: Adult   Pulse: 82   Resp: 22   Temp: 98.4 °F (36.9 °C)   TempSrc: Temporal   SpO2: 97%   Weight: 89.9 kg (198 lb 3.2 oz)   Height: 170.2 cm (67\")   PainSc:   5   PainLoc: Shoulder       Physical Exam he is alert and in no apparent distress.    GAIT:     []  Normal  []  Antalgic    Assistive device: []  None  []  Walker     []  Crutches  []  Cane     []  Wheelchair  []  Stretcher    Ortho Exam he has full active elevation of left shoulder with little apparent pain.  There is moderate weakness internal and external rotation as well as abduction of the left shoulder.  Impingement testing produces mild discomfort.    MRI scan of the shoulder done recently was reviewed.  He has a massive rotator cuff tear with AC arthritis prominent supraspinatus atrophy.      MRI Shoulder Left Without Contrast    Result Date: 4/24/2021  Narrative: EXAM: MRI SHOULDER LEFT WO CONTRAST CLINICAL INDICATION: Chronic left shoulder pain COMPARISON: 2/3/2021 TECHNIQUE: The MRI was done using axial T1 and gradient echo, coronal T1 and T2 fat sat and sagittal T1 and T2 fat sat images. FINDINGS: There is a moderate glenohumeral joint effusion. There is full-thickness tear and retraction of the subscapularis by up to 3.6 cm. The long head of the biceps tendon demonstrates abnormal " signal consistent with partial tear. There is full-thickness tear and retraction of the supraspinatus and infraspinatus. The teres minor is intact. Moderate degenerative changes of the glenohumeral and AC joints are identified. There is bone marrow edema in the superolateral humeral head possibly representing a bone contusion with no fracture line. The visualized osseous structures otherwise have normal morphology and signal characteristics with no evidence of bone marrow edema, occult fractures, or marrow-replacing bone lesions. The glenoid labrum is grossly unremarkable.     Impression: 1. Massive rotator cuff tear with full-thickness tear and retraction of the subscapularis, supraspinatus and infraspinatus. 2. Subtle tear of the long head of the biceps tendon. 3. 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 4. Bone marrow edema, likely bone contusion in the superolateral humeral head. 5. Moderate glenohumeral joint effusion.  Electronically signed by:  Beltran Chow MD  4/24/2021 5:10 PM CDT Workstation: [id number]          ASSESSMENT: Rotator cuff tear left shoulder.  I do not feel his tear is repairable.    The natural history of the disorder and treatment options were reviewed.  He understands shoulder arthroplasty may eventually be needed.  He admits his symptoms are not severe enough at this point for him to consider surgery.    He was instructed in activity restriction.  I will call in a prescription for Mobic to be supplemented by Tylenol.    He understands I will consider repeat injections of his shoulder every 6 months or so.    Return here as needed.    Diagnoses and all orders for this visit:    Acute pain of left shoulder    Complete tear of left rotator cuff, unspecified whether traumatic          PLAN  Patient's Body mass index is 31.04 kg/m². BMI is within normal parameters. No follow-up required..  No follow-ups on file.    Adarsh Loco MD

## 2021-05-06 DIAGNOSIS — G89.29 CHRONIC LEFT SHOULDER PAIN: ICD-10-CM

## 2021-05-06 DIAGNOSIS — M75.82 TENDINITIS OF LEFT ROTATOR CUFF: ICD-10-CM

## 2021-05-06 DIAGNOSIS — M25.512 CHRONIC LEFT SHOULDER PAIN: ICD-10-CM

## 2021-05-07 ENCOUNTER — OFFICE VISIT (OUTPATIENT)
Dept: FAMILY MEDICINE CLINIC | Facility: CLINIC | Age: 66
End: 2021-05-07

## 2021-05-07 VITALS
TEMPERATURE: 96.4 F | DIASTOLIC BLOOD PRESSURE: 76 MMHG | BODY MASS INDEX: 31.19 KG/M2 | HEIGHT: 67 IN | OXYGEN SATURATION: 94 % | HEART RATE: 72 BPM | SYSTOLIC BLOOD PRESSURE: 142 MMHG | WEIGHT: 198.7 LBS

## 2021-05-07 DIAGNOSIS — M75.122 COMPLETE TEAR OF LEFT ROTATOR CUFF, UNSPECIFIED WHETHER TRAUMATIC: Primary | ICD-10-CM

## 2021-05-07 PROCEDURE — 99213 OFFICE O/P EST LOW 20 MIN: CPT | Performed by: STUDENT IN AN ORGANIZED HEALTH CARE EDUCATION/TRAINING PROGRAM

## 2021-05-07 RX ORDER — NAPROXEN 500 MG/1
TABLET ORAL
Qty: 60 TABLET | Refills: 0 | OUTPATIENT
Start: 2021-05-07

## 2021-05-07 RX ORDER — NAPROXEN 500 MG/1
500 TABLET ORAL 2 TIMES DAILY WITH MEALS
Qty: 60 TABLET | Refills: 1 | Status: SHIPPED | OUTPATIENT
Start: 2021-05-07 | End: 2021-06-29

## 2021-05-07 RX ORDER — CYCLOBENZAPRINE HCL 10 MG
10 TABLET ORAL 3 TIMES DAILY PRN
Qty: 60 TABLET | Refills: 1 | Status: SHIPPED | OUTPATIENT
Start: 2021-05-07 | End: 2021-06-10

## 2021-05-07 NOTE — PROGRESS NOTES
Family Medicine Residency  Elizabeth Fried MD    Subjective:     Jian Baker is a 65 y.o. male who presents for follow-up of left rotator cuff tear.  Date of injury: First week of April.  MRI showed a massive rotator cuff tear with degenerative changes of the AC joint.  He was seen by Ortho this week and they did not feel the tear was repairable.  Patient reports strength and pain have significantly improved.  Mostly has issues while driving as he is left-handed.  Infrequent nighttime symptoms mostly when lying on his left shoulder.  He was switched to Mobic but states the naproxen was more helpful.    The following portions of the patient's history were reviewed and updated as appropriate: allergies, current medications, past family history, past medical history, past social history, past surgical history and problem list.    Past Medical Hx:  Past Medical History:   Diagnosis Date   • Asthma     Previously diagnosed and previously prescribed inhaler medications.     • Essential hypertension    • Gastroesophageal reflux disease        Past Surgical Hx:  Past Surgical History:   Procedure Laterality Date   • BONE GRAFT     • WRIST FRACTURE SURGERY Bilateral        Current Meds:    Current Outpatient Medications:   •  atorvastatin (LIPITOR) 20 MG tablet, TAKE ONE TABLET BY MOUTH ONCE DAILY , Disp: 90 tablet, Rfl: 1  •  Diclofenac Sodium (VOLTAREN) 1 % gel gel, Apply 4 g topically to the appropriate area as directed 4 (Four) Times a Day As Needed (pain)., Disp: 150 g, Rfl: 0  •  fluticasone (FLONASE) 50 MCG/ACT nasal spray, 2 sprays into the nostril(s) as directed by provider Daily., Disp: 1 bottle, Rfl: 10  •  lidocaine (LIDODERM) 5 %, Place 1 patch on the skin as directed by provider Daily. Remove & Discard patch within 12 hours or as directed by MD, Disp: 30 each, Rfl: 3  •  lisinopril (PRINIVIL,ZESTRIL) 10 MG tablet, TAKE TWO TABLETS BY MOUTH DAILY , Disp: 60 tablet, Rfl: 5  •  loratadine (Claritin) 10 MG  tablet, Take 1 tablet by mouth Daily., Disp: 30 tablet, Rfl: 11  •  pantoprazole (PROTONIX) 20 MG EC tablet, Take 1 tablet by mouth Daily., Disp: 30 tablet, Rfl: 11  •  pregabalin (LYRICA) 50 MG capsule, Take 1 capsule by mouth 3 (Three) Times a Day., Disp: 90 capsule, Rfl: 2  •  vitamin B-12 (CYANOCOBALAMIN) 1000 MCG tablet, Take 1 tablet by mouth Daily., Disp: 30 tablet, Rfl: 2  •  cyclobenzaprine (FLEXERIL) 10 MG tablet, Take 1 tablet by mouth 3 (Three) Times a Day As Needed for Muscle Spasms., Disp: 60 tablet, Rfl: 1  •  naproxen (Naprosyn) 500 MG tablet, Take 1 tablet by mouth 2 (Two) Times a Day With Meals., Disp: 60 tablet, Rfl: 1    Current Facility-Administered Medications:   •  cyanocobalamin injection 1,000 mcg, 1,000 mcg, Intramuscular, Q28 Days, Stephania Head MD, 1,000 mcg at 20 0841  •  cyanocobalamin injection 1,000 mcg, 1,000 mcg, Intramuscular, Q28 Days, Pelon Stewart Jr., MD, 1,000 mcg at 20 1409  •  cyanocobalamin injection 1,000 mcg, 1,000 mcg, Intramuscular, Q28 Days, Chiqui Coker MD, 1,000 mcg at 20 1358    Allergies:  No Known Allergies    Family Hx:  Family History   Problem Relation Age of Onset   • COPD Mother    • Hypertension Mother    • Diabetes type II Mother    • Cancer Father    • Heart failure Father         Social History:  Social History     Socioeconomic History   • Marital status:      Spouse name: Not on file   • Number of children: Not on file   • Years of education: Not on file   • Highest education level: Not on file   Tobacco Use   • Smoking status: Former Smoker     Packs/day: 1.00     Years: 25.00     Pack years: 25.00     Types: Pipe     Start date: 10/10/1972     Quit date: 10/31/2019     Years since quittin.5   • Smokeless tobacco: Current User     Types: Chew   Substance and Sexual Activity   • Alcohol use: Yes     Alcohol/week: 2.0 standard drinks     Types: 2 Cans of beer per week     Comment: Prior heavy drinker   • Drug use:  "No   • Sexual activity: Yes     Partners: Female       Review of Systems  Review of Systems   Constitutional: Negative for activity change, appetite change, chills, fatigue and fever.   HENT: Negative for congestion, rhinorrhea, sinus pain and sore throat.    Eyes: Negative for pain, redness and visual disturbance.   Respiratory: Negative for cough, shortness of breath and wheezing.    Cardiovascular: Negative for chest pain, palpitations and leg swelling.   Gastrointestinal: Negative for abdominal pain, constipation, diarrhea, nausea and vomiting.   Genitourinary: Negative for dysuria and flank pain.   Musculoskeletal: Positive for arthralgias (L shoulder). Negative for joint swelling and myalgias.   Skin: Negative for color change, pallor and rash.   Neurological: Negative for dizziness, numbness and headaches.   Psychiatric/Behavioral: Negative for dysphoric mood and sleep disturbance. The patient is not nervous/anxious.        Objective:     /76   Pulse 72   Temp 96.4 °F (35.8 °C)   Ht 170.2 cm (67\")   Wt 90.1 kg (198 lb 11.2 oz)   SpO2 94%   BMI 31.12 kg/m²   Physical Exam  Constitutional:       General: He is not in acute distress.     Appearance: Normal appearance. He is well-developed. He is not diaphoretic.   HENT:      Head: Normocephalic and atraumatic.      Right Ear: Hearing normal.      Left Ear: Hearing normal.   Eyes:      General: Lids are normal.      Conjunctiva/sclera: Conjunctivae normal.   Cardiovascular:      Rate and Rhythm: Normal rate and regular rhythm.      Heart sounds: Normal heart sounds.   Pulmonary:      Effort: Pulmonary effort is normal. No respiratory distress.      Breath sounds: Normal breath sounds. No wheezing or rales.   Abdominal:      General: Bowel sounds are normal. There is no distension.      Palpations: Abdomen is soft.      Tenderness: There is no abdominal tenderness. There is no guarding.   Musculoskeletal:         General: No deformity. Normal range of " motion.      Left shoulder: Tenderness present.      Comments: ROM significantly improved though there is mild pain and ends of movement. Acevedo mildly positive. Empty can (-) though mildly painful.   Skin:     General: Skin is warm and dry.      Coloration: Skin is not pale.      Findings: No erythema or rash.   Neurological:      Mental Status: He is alert and oriented to person, place, and time. He is not disoriented.          Assessment/Plan:     Diagnoses and all orders for this visit:    1. Complete tear of left rotator cuff, unspecified whether traumatic (Primary)  -     cyclobenzaprine (FLEXERIL) 10 MG tablet; Take 1 tablet by mouth 3 (Three) Times a Day As Needed for Muscle Spasms.  Dispense: 60 tablet; Refill: 1  -     naproxen (Naprosyn) 500 MG tablet; Take 1 tablet by mouth 2 (Two) Times a Day With Meals.  Dispense: 60 tablet; Refill: 1    Seen by Ortho and recommending conservative management.  Pain and strength continue to improve.  Not currently interested in physical therapy.  Patient states a preference for naproxen so we will send a prescription back and then discontinue Mobic.  Continue using Tylenol and heat.  We will add on Flexeril.  Call with new or worsening symptoms.  Otherwise return to clinic in 3 months.    · Rx changes: d/c mobic, add naproxen, flexeril  · Patient Education: see a/p  · Compliance at present is estimated to be good.     Depression screening: Up to date; last screen 4/9/2021     Follow-up:     Return in about 3 months (around 8/7/2021) for Recheck- HTN, shoulder pain.    Preventative:  Health Maintenance   Topic Date Due   • LUNG CANCER SCREENING  Never done   • ZOSTER VACCINE (2 of 2) 12/05/2017   • ANNUAL WELLNESS VISIT  07/02/2021   • INFLUENZA VACCINE  08/01/2021   • COLORECTAL CANCER SCREENING  10/10/2024   • TDAP/TD VACCINES (3 - Td) 10/10/2027   • HEPATITIS C SCREENING  Completed   • COVID-19 Vaccine  Completed   • Pneumococcal Vaccine 0-64  Aged Out     Male  Preventative: lung cancer screen  Recommended: Varicella  Vaccine Counseling: N/A    Weight  -Class: Obese Class I: 30-34.9kg/m2  -Patient's Body mass index is 31.12 kg/m². indicating that he is obese (BMI >30). Obesity-related health conditions include the following: hypertension. Obesity is     Alcohol use:  reports current alcohol use of about 2.0 standard drinks of alcohol per week.  Nicotine status  reports that he quit smoking about 18 months ago. His smoking use included pipe. He started smoking about 48 years ago. He has a 25.00 pack-year smoking history. His smokeless tobacco use includes chew.    Goals     •  Better Compliance with HTN Medications (pt-stated)       Barriers: None      •  Medication management       Patient has been having trouble with medication compliance.  Goal now is to take medications everyday at the same time.    Barriers: patient has not been on medications consistently for sometime.  Has trouble with remembering to take medications.              RISK SCORE: 3      Elizabeth Fried M.D. PGY2  [facility] Family Medicine Residency  [address] [place] [address]  Office: [phone]  This document has been electronically signed by Elizabeth Fried MD on May 7, 2021 14:16 CDT

## 2021-06-10 DIAGNOSIS — M75.122 COMPLETE TEAR OF LEFT ROTATOR CUFF, UNSPECIFIED WHETHER TRAUMATIC: ICD-10-CM

## 2021-06-10 RX ORDER — CYCLOBENZAPRINE HCL 10 MG
10 TABLET ORAL 3 TIMES DAILY PRN
Qty: 60 TABLET | Refills: 11 | Status: SHIPPED | OUTPATIENT
Start: 2021-06-10 | End: 2021-08-25 | Stop reason: SDUPTHER

## 2021-06-14 DIAGNOSIS — M25.551 PAIN OF RIGHT HIP JOINT: ICD-10-CM

## 2021-06-14 NOTE — TELEPHONE ENCOUNTER
PATIENT CALLED ASKING FOR A MEDICATION REFILL ON HIS pregabalin (LYRICA) 50 MG capsule. PATIENT WOULD LIKE THAT SENT IN TO [facility].    CALL BACK NUMBER FOR HIM [phone].    THANKS,  ROD

## 2021-06-15 RX ORDER — PREGABALIN 50 MG/1
50 CAPSULE ORAL 3 TIMES DAILY
Qty: 90 CAPSULE | Refills: 2 | Status: SHIPPED | OUTPATIENT
Start: 2021-06-15 | End: 2021-10-04 | Stop reason: SDUPTHER

## 2021-06-29 DIAGNOSIS — M75.122 COMPLETE TEAR OF LEFT ROTATOR CUFF, UNSPECIFIED WHETHER TRAUMATIC: ICD-10-CM

## 2021-06-29 RX ORDER — NAPROXEN 500 MG/1
500 TABLET ORAL 2 TIMES DAILY WITH MEALS
Qty: 60 TABLET | Refills: 1 | Status: SHIPPED | OUTPATIENT
Start: 2021-06-29 | End: 2021-08-10

## 2021-08-10 DIAGNOSIS — M75.122 COMPLETE TEAR OF LEFT ROTATOR CUFF, UNSPECIFIED WHETHER TRAUMATIC: ICD-10-CM

## 2021-08-10 RX ORDER — MELOXICAM 15 MG/1
15 TABLET ORAL DAILY
Qty: 30 TABLET | Refills: 2 | Status: SHIPPED | OUTPATIENT
Start: 2021-08-10 | End: 2021-08-25 | Stop reason: ALTCHOICE

## 2021-08-11 RX ORDER — NAPROXEN 500 MG/1
TABLET ORAL
Qty: 60 TABLET | Refills: 1 | OUTPATIENT
Start: 2021-08-11

## 2021-08-11 RX ORDER — LORATADINE 10 MG/1
10 TABLET ORAL DAILY
Qty: 30 TABLET | Refills: 11 | Status: SHIPPED | OUTPATIENT
Start: 2021-08-11 | End: 2021-12-21 | Stop reason: SDUPTHER

## 2021-08-20 ENCOUNTER — OFFICE VISIT (OUTPATIENT)
Dept: FAMILY MEDICINE CLINIC | Facility: CLINIC | Age: 66
End: 2021-08-20

## 2021-08-20 ENCOUNTER — TELEPHONE (OUTPATIENT)
Dept: FAMILY MEDICINE CLINIC | Facility: CLINIC | Age: 66
End: 2021-08-20

## 2021-08-20 VITALS
HEART RATE: 87 BPM | BODY MASS INDEX: 31.39 KG/M2 | DIASTOLIC BLOOD PRESSURE: 80 MMHG | HEIGHT: 67 IN | OXYGEN SATURATION: 98 % | SYSTOLIC BLOOD PRESSURE: 140 MMHG | TEMPERATURE: 98.1 F | WEIGHT: 200 LBS

## 2021-08-20 DIAGNOSIS — M75.122 COMPLETE TEAR OF LEFT ROTATOR CUFF, UNSPECIFIED WHETHER TRAUMATIC: Primary | ICD-10-CM

## 2021-08-20 DIAGNOSIS — Z00.00 HEALTHCARE MAINTENANCE: ICD-10-CM

## 2021-08-20 PROCEDURE — 99213 OFFICE O/P EST LOW 20 MIN: CPT | Performed by: STUDENT IN AN ORGANIZED HEALTH CARE EDUCATION/TRAINING PROGRAM

## 2021-08-20 NOTE — TELEPHONE ENCOUNTER
PT WAS SEEN THIS MORNING BY DR. COLLEEN CRUMP AND WANTS TO KNOW IF A SHINGLES SHOT COULD BE CALLED INTO [facility].    HIS CALL BACK NUMBER [phone]

## 2021-08-25 DIAGNOSIS — I10 ESSENTIAL HYPERTENSION: ICD-10-CM

## 2021-08-25 RX ORDER — ATORVASTATIN CALCIUM 20 MG/1
20 TABLET, FILM COATED ORAL DAILY
Qty: 90 TABLET | Refills: 1 | Status: SHIPPED | OUTPATIENT
Start: 2021-08-25 | End: 2021-12-21 | Stop reason: SDUPTHER

## 2021-08-25 RX ORDER — NAPROXEN 500 MG/1
500 TABLET ORAL 2 TIMES DAILY WITH MEALS
Qty: 60 TABLET | Refills: 0 | Status: SHIPPED | OUTPATIENT
Start: 2021-08-25 | End: 2021-10-04

## 2021-08-25 RX ORDER — CYCLOBENZAPRINE HCL 10 MG
10 TABLET ORAL 3 TIMES DAILY PRN
Qty: 60 TABLET | Refills: 11 | Status: SHIPPED | OUTPATIENT
Start: 2021-08-25 | End: 2021-12-21 | Stop reason: SDUPTHER

## 2021-08-26 NOTE — PROGRESS NOTES
Family Medicine Residency  Elizabeth Fried MD    Subjective:     Jian Baker is a 66 y.o. male who presents for follow-up of left rotator cuff tear.  He has seen Ortho and the tear is not repairable.  Patient reports his range of motion has significantly improved.  He has been utilizing naproxen and Flexeril.    The following portions of the patient's history were reviewed and updated as appropriate: allergies, current medications, past family history, past medical history, past social history, past surgical history and problem list.    Past Medical Hx:  Past Medical History:   Diagnosis Date   • Asthma     Previously diagnosed and previously prescribed inhaler medications.     • Essential hypertension    • Gastroesophageal reflux disease        Past Surgical Hx:  Past Surgical History:   Procedure Laterality Date   • BONE GRAFT     • WRIST FRACTURE SURGERY Bilateral        Current Meds:    Current Outpatient Medications:   •  cyclobenzaprine (FLEXERIL) 10 MG tablet, Take 1 tablet by mouth 3 (Three) Times a Day As Needed for Muscle Spasms., Disp: 60 tablet, Rfl: 11  •  Diclofenac Sodium (VOLTAREN) 1 % gel gel, Apply 4 g topically to the appropriate area as directed 4 (Four) Times a Day As Needed (pain)., Disp: 150 g, Rfl: 0  •  fluticasone (FLONASE) 50 MCG/ACT nasal spray, 2 sprays into the nostril(s) as directed by provider Daily., Disp: 1 bottle, Rfl: 10  •  lidocaine (LIDODERM) 5 %, Place 1 patch on the skin as directed by provider Daily. Remove & Discard patch within 12 hours or as directed by MD, Disp: 30 each, Rfl: 3  •  lisinopril (PRINIVIL,ZESTRIL) 10 MG tablet, TAKE TWO TABLETS BY MOUTH DAILY , Disp: 60 tablet, Rfl: 5  •  loratadine (Claritin) 10 MG tablet, Take 1 tablet by mouth Daily., Disp: 30 tablet, Rfl: 11  •  pantoprazole (PROTONIX) 20 MG EC tablet, Take 1 tablet by mouth Daily., Disp: 30 tablet, Rfl: 11  •  pregabalin (LYRICA) 50 MG capsule, Take 1 capsule by mouth 3 (Three) Times a Day.,  Disp: 90 capsule, Rfl: 2  •  vitamin B-12 (CYANOCOBALAMIN) 1000 MCG tablet, Take 1 tablet by mouth Daily., Disp: 30 tablet, Rfl: 2  •  atorvastatin (LIPITOR) 20 MG tablet, Take 1 tablet by mouth Daily., Disp: 90 tablet, Rfl: 1  •  naproxen (Naprosyn) 500 MG tablet, Take 1 tablet by mouth 2 (Two) Times a Day With Meals., Disp: 60 tablet, Rfl: 0  •  Zoster Vac Recomb Adjuvanted 50 MCG/0.5ML reconstituted suspension, Inject 0.5 mL into the appropriate muscle as directed by prescriber 1 (One) Time for 1 dose., Disp: 1 each, Rfl: 0    Current Facility-Administered Medications:   •  cyanocobalamin injection 1,000 mcg, 1,000 mcg, Intramuscular, Q28 Days, Stephania Head MD, 1,000 mcg at 20 0841  •  cyanocobalamin injection 1,000 mcg, 1,000 mcg, Intramuscular, Q28 Days, Pelon Stewart Jr., MD, 1,000 mcg at 20 1409  •  cyanocobalamin injection 1,000 mcg, 1,000 mcg, Intramuscular, Q28 Days, Chiqui Coker MD, 1,000 mcg at 20 1358    Allergies:  No Known Allergies    Family Hx:  Family History   Problem Relation Age of Onset   • COPD Mother    • Hypertension Mother    • Diabetes type II Mother    • Cancer Father    • Heart failure Father         Social History:  Social History     Socioeconomic History   • Marital status:      Spouse name: Not on file   • Number of children: Not on file   • Years of education: Not on file   • Highest education level: Not on file   Tobacco Use   • Smoking status: Former Smoker     Packs/day: 1.00     Years: 25.00     Pack years: 25.00     Types: Pipe     Start date: 10/10/1972     Quit date: 10/31/2019     Years since quittin.8   • Smokeless tobacco: Current User     Types: Chew   Substance and Sexual Activity   • Alcohol use: Yes     Alcohol/week: 2.0 standard drinks     Types: 2 Cans of beer per week     Comment: Prior heavy drinker   • Drug use: No   • Sexual activity: Yes     Partners: Female       Review of Systems  Review of Systems   Constitutional:  "Negative for activity change, appetite change, chills, fatigue and fever.   HENT: Negative for congestion, rhinorrhea, sinus pain and sore throat.    Eyes: Negative for pain, redness and visual disturbance.   Respiratory: Negative for cough, shortness of breath and wheezing.    Cardiovascular: Negative for chest pain, palpitations and leg swelling.   Gastrointestinal: Negative for abdominal pain, constipation, diarrhea, nausea and vomiting.   Genitourinary: Negative for dysuria and flank pain.   Musculoskeletal: Positive for arthralgias (L shoulder). Negative for joint swelling and myalgias.   Skin: Negative for color change, pallor and rash.   Neurological: Negative for dizziness, numbness and headaches.   Psychiatric/Behavioral: Negative for dysphoric mood and sleep disturbance. The patient is not nervous/anxious.        Objective:     /80   Pulse 87   Temp 98.1 °F (36.7 °C)   Ht 170.2 cm (67\")   Wt 90.7 kg (200 lb)   SpO2 98%   BMI 31.32 kg/m²   Physical Exam  Constitutional:       General: He is not in acute distress.     Appearance: Normal appearance. He is well-developed. He is not diaphoretic.   HENT:      Head: Normocephalic and atraumatic.      Right Ear: Hearing normal.      Left Ear: Hearing normal.   Eyes:      General: Lids are normal.      Conjunctiva/sclera: Conjunctivae normal.   Cardiovascular:      Rate and Rhythm: Normal rate and regular rhythm.      Heart sounds: Normal heart sounds.   Pulmonary:      Effort: Pulmonary effort is normal. No respiratory distress.      Breath sounds: Normal breath sounds. No wheezing or rales.   Abdominal:      General: Bowel sounds are normal. There is no distension.      Palpations: Abdomen is soft.      Tenderness: There is no abdominal tenderness. There is no guarding.   Musculoskeletal:         General: No deformity. Normal range of motion.      Left shoulder: Tenderness present.      Right lower leg: No edema.      Left lower leg: No edema.      " Comments: L shoulder: ROM normal. Minimal pain with movement.   Skin:     General: Skin is warm and dry.      Coloration: Skin is not pale.      Findings: No erythema or rash.   Neurological:      Mental Status: He is alert and oriented to person, place, and time. He is not disoriented.   Psychiatric:         Speech: Speech normal.         Behavior: Behavior normal.          Assessment/Plan:     Diagnoses and all orders for this visit:    1. Complete tear of left rotator cuff, unspecified whether traumatic (Primary)  -     cyclobenzaprine (FLEXERIL) 10 MG tablet; Take 1 tablet by mouth 3 (Three) Times a Day As Needed for Muscle Spasms.  Dispense: 60 tablet; Refill: 11  -     naproxen (Naprosyn) 500 MG tablet; Take 1 tablet by mouth 2 (Two) Times a Day With Meals.  Dispense: 60 tablet; Refill: 0    2. Healthcare maintenance  -     Zoster Vac Recomb Adjuvanted 50 MCG/0.5ML reconstituted suspension; Inject 0.5 mL into the appropriate muscle as directed by prescriber 1 (One) Time for 1 dose.  Dispense: 1 each; Refill: 0    1.  Range of motion and pain is significantly improved.  Continue naproxen and Flexeril.  Had steroid injection in February 2021.  Per Ortho, he can have repeat injection every 6 months.  Can refer to PT if needed.    · Rx changes: none  · Patient Education: see a/p  · Compliance at present is estimated to be good.     Depression screening: Up to date; last screen 4/9/2021     Follow-up:     Return if symptoms worsen or fail to improve.    Preventative:  Health Maintenance   Topic Date Due   • LUNG CANCER SCREENING  Never done   • ZOSTER VACCINE (2 of 2) 12/05/2017   • ANNUAL WELLNESS VISIT  07/02/2021   • INFLUENZA VACCINE  10/01/2021   • COLORECTAL CANCER SCREENING  10/10/2024   • TDAP/TD VACCINES (3 - Td or Tdap) 10/10/2027   • HEPATITIS C SCREENING  Completed   • COVID-19 Vaccine  Completed   • Pneumococcal Vaccine 0-64  Aged Out     Male Preventative: lung cancer screen  Recommended:  Varicella  Vaccine Counseling: N/A    Weight  -Class: Obese Class I: 30-34.9kg/m2  -Patient's Body mass index is 31.32 kg/m². indicating that he is obese (BMI >30). Obesity-related health conditions include the following: hypertension. We discussed portion control and increasing exercise..   eat more fruits and vegetables, decrease soda or juice intake, increase water intake, increase physical activity, reduce screen time, reduce portion size and cut out extra servings    Alcohol use:  reports current alcohol use of about 2.0 standard drinks of alcohol per week.  Nicotine status  reports that he quit smoking about 21 months ago. His smoking use included pipe. He started smoking about 48 years ago. He has a 25.00 pack-year smoking history. His smokeless tobacco use includes chew.    Goals     •  Better Compliance with HTN Medications (pt-stated)       Barriers: None      •  Medication management       Patient has been having trouble with medication compliance.  Goal now is to take medications everyday at the same time.    Barriers: patient has not been on medications consistently for sometime.  Has trouble with remembering to take medications.              RISK SCORE: 3      Elizabeth Fried M.D. PGY3  [facility] Medicine [facility]  [address] [place] [address]  Office: [phone]  This document has been electronically signed by Elizabeth Fried MD on August 25, 2021 22:21 CDT

## 2021-08-31 NOTE — PROGRESS NOTES
I have reviewed the notes, assessments, and/or procedures performed by Dr. Pearl, I concur with her/his documentation of Jian Baker.

## 2021-10-04 DIAGNOSIS — M25.551 PAIN OF RIGHT HIP JOINT: ICD-10-CM

## 2021-10-04 DIAGNOSIS — M75.122 COMPLETE TEAR OF LEFT ROTATOR CUFF, UNSPECIFIED WHETHER TRAUMATIC: ICD-10-CM

## 2021-10-04 RX ORDER — NAPROXEN 500 MG/1
TABLET ORAL
Qty: 60 TABLET | Refills: 0 | Status: SHIPPED | OUTPATIENT
Start: 2021-10-04 | End: 2021-12-21 | Stop reason: SDUPTHER

## 2021-10-04 NOTE — TELEPHONE ENCOUNTER
Incoming Refill Request      Medication requested (name and dose): pregabalin (LYRICA) 50 MG capsule    Pharmacy where request should be sent: [facility]    Additional details provided by patient:     Best call back number: [phone]    Does the patient have less than a 3 day supply:  [x] Yes  [] No    Maddie Odell Workman  10/04/21, 14:57 CDT

## 2021-10-05 DIAGNOSIS — M25.551 PAIN OF RIGHT HIP JOINT: ICD-10-CM

## 2021-10-05 RX ORDER — PREGABALIN 50 MG/1
50 CAPSULE ORAL 3 TIMES DAILY
Qty: 90 CAPSULE | Refills: 2 | Status: CANCELLED | OUTPATIENT
Start: 2021-10-05

## 2021-10-06 RX ORDER — PREGABALIN 50 MG/1
50 CAPSULE ORAL 3 TIMES DAILY
Qty: 90 CAPSULE | Refills: 2 | Status: SHIPPED | OUTPATIENT
Start: 2021-10-06 | End: 2021-12-09

## 2021-12-09 DIAGNOSIS — M25.551 PAIN OF RIGHT HIP JOINT: ICD-10-CM

## 2021-12-09 RX ORDER — PREGABALIN 50 MG/1
CAPSULE ORAL
Qty: 90 CAPSULE | Refills: 2 | Status: SHIPPED | OUTPATIENT
Start: 2021-12-09 | End: 2022-12-07

## 2021-12-09 NOTE — PROGRESS NOTES
[facility] number [id number] reviewed and appropriate.  Will refill pregabalin.          PGY-3  [facility] Family Medicine Residency  This document has been electronically signed by Harika Morris MD on December 9, 2021 14:45 CST

## 2021-12-21 DIAGNOSIS — M25.551 PAIN OF RIGHT HIP JOINT: ICD-10-CM

## 2021-12-21 DIAGNOSIS — M75.82 TENDINITIS OF LEFT ROTATOR CUFF: ICD-10-CM

## 2021-12-21 DIAGNOSIS — M25.512 CHRONIC LEFT SHOULDER PAIN: ICD-10-CM

## 2021-12-21 DIAGNOSIS — I10 ESSENTIAL HYPERTENSION: ICD-10-CM

## 2021-12-21 DIAGNOSIS — G89.29 CHRONIC LEFT SHOULDER PAIN: ICD-10-CM

## 2021-12-21 DIAGNOSIS — J30.9 ALLERGIC RHINITIS, UNSPECIFIED SEASONALITY, UNSPECIFIED TRIGGER: ICD-10-CM

## 2021-12-21 DIAGNOSIS — K21.9 GASTROESOPHAGEAL REFLUX DISEASE: ICD-10-CM

## 2021-12-21 DIAGNOSIS — M75.122 COMPLETE TEAR OF LEFT ROTATOR CUFF, UNSPECIFIED WHETHER TRAUMATIC: ICD-10-CM

## 2021-12-21 RX ORDER — PREGABALIN 50 MG/1
50 CAPSULE ORAL 3 TIMES DAILY
Qty: 90 CAPSULE | Refills: 2 | Status: CANCELLED | OUTPATIENT
Start: 2021-12-21

## 2021-12-21 NOTE — TELEPHONE ENCOUNTER
PT STATED THAT DUE TO STRESS OF TORNADO HE HAS MISPLACED ALL OF HIS MEDICATIONS AND IS NEEDING REFILLS OF ALL OF THEM SENT TO [facility].    HIS CALL BACK NUMBER [phone]

## 2021-12-22 DIAGNOSIS — E53.8 VITAMIN B 12 DEFICIENCY: Primary | ICD-10-CM

## 2021-12-22 RX ORDER — LANOLIN ALCOHOL/MO/W.PET/CERES
1000 CREAM (GRAM) TOPICAL DAILY
Qty: 30 TABLET | Refills: 2 | Status: SHIPPED | OUTPATIENT
Start: 2021-12-22 | End: 2022-03-14

## 2021-12-22 RX ORDER — LISINOPRIL 10 MG/1
20 TABLET ORAL DAILY
Qty: 60 TABLET | Refills: 5 | Status: SHIPPED | OUTPATIENT
Start: 2021-12-22 | End: 2022-03-31

## 2021-12-22 RX ORDER — LORATADINE 10 MG/1
10 TABLET ORAL DAILY
Qty: 30 TABLET | Refills: 11 | Status: SHIPPED | OUTPATIENT
Start: 2021-12-22 | End: 2022-11-23 | Stop reason: HOSPADM

## 2021-12-22 RX ORDER — ATORVASTATIN CALCIUM 20 MG/1
20 TABLET, FILM COATED ORAL DAILY
Qty: 90 TABLET | Refills: 1 | Status: SHIPPED | OUTPATIENT
Start: 2021-12-22 | End: 2022-01-21

## 2021-12-22 RX ORDER — PANTOPRAZOLE SODIUM 20 MG/1
20 TABLET, DELAYED RELEASE ORAL DAILY
Qty: 30 TABLET | Refills: 11 | Status: SHIPPED | OUTPATIENT
Start: 2021-12-22 | End: 2023-03-02

## 2021-12-22 RX ORDER — NAPROXEN 500 MG/1
500 TABLET ORAL 2 TIMES DAILY WITH MEALS
Qty: 60 TABLET | Refills: 0 | Status: SHIPPED | OUTPATIENT
Start: 2021-12-22 | End: 2022-11-23 | Stop reason: HOSPADM

## 2021-12-22 RX ORDER — CYCLOBENZAPRINE HCL 10 MG
10 TABLET ORAL 3 TIMES DAILY PRN
Qty: 60 TABLET | Refills: 11 | Status: SHIPPED | OUTPATIENT
Start: 2021-12-22

## 2021-12-22 RX ORDER — LIDOCAINE 50 MG/G
1 PATCH TOPICAL EVERY 24 HOURS
Qty: 30 EACH | Refills: 3 | Status: SHIPPED | OUTPATIENT
Start: 2021-12-22 | End: 2022-05-31

## 2021-12-22 RX ORDER — FLUTICASONE PROPIONATE 50 MCG
2 SPRAY, SUSPENSION (ML) NASAL DAILY
Qty: 9.9 ML | Refills: 0 | Status: SHIPPED | OUTPATIENT
Start: 2021-12-22 | End: 2022-03-14

## 2021-12-27 ENCOUNTER — TELEPHONE (OUTPATIENT)
Dept: FAMILY MEDICINE CLINIC | Facility: CLINIC | Age: 66
End: 2021-12-27

## 2021-12-27 NOTE — TELEPHONE ENCOUNTER
I have called him and there was no answer.   ----- Message from Harika Morris MD sent at 12/22/2021  8:36 PM CST -----  Hey can you please call this patient and ask him to complete an updated Vit B12 check. I put an order for him

## 2022-01-21 DIAGNOSIS — I10 ESSENTIAL HYPERTENSION: ICD-10-CM

## 2022-01-21 RX ORDER — ATORVASTATIN CALCIUM 20 MG/1
TABLET, FILM COATED ORAL
Qty: 90 TABLET | Refills: 1 | Status: SHIPPED | OUTPATIENT
Start: 2022-01-21 | End: 2022-05-31

## 2022-01-31 ENCOUNTER — TELEPHONE (OUTPATIENT)
Dept: FAMILY MEDICINE CLINIC | Facility: CLINIC | Age: 67
End: 2022-01-31

## 2022-01-31 RX ORDER — ALBUTEROL SULFATE 90 UG/1
2 AEROSOL, METERED RESPIRATORY (INHALATION) EVERY 4 HOURS PRN
Qty: 18 G | Refills: 0 | Status: SHIPPED | OUTPATIENT
Start: 2022-01-31 | End: 2023-03-29 | Stop reason: SDUPTHER

## 2022-02-13 NOTE — PROGRESS NOTES
Procedure   Procedures    Procedure     Arthrocentesis  Date/Time: 5/14/2019 2:02 PM  Performed by: Pelon Stewart Jr., MD  Authorized by: Pelon Stewart Jr., MD   Consent: Verbal consent obtained. Written consent obtained.  Risks and benefits: risks, benefits and alternatives were discussed  Consent given by: patient  Patient understanding: patient states understanding of the procedure being performed  Patient consent: the patient's understanding of the procedure matches consent given  Procedure consent: procedure consent matches procedure scheduled  Relevant documents: relevant documents present and verified  Test results: test results available and properly labeled  Site marked: the operative site was marked  Patient identity confirmed: verbally with patient  Indications: pain   Body area: knee  Joint: left knee  Local anesthesia used: yes   Anesthesia:  Local anesthesia used: yes  Local Anesthetic: topical anesthetic   Sedation:  Patient sedated: no     Needle size: 18 G  Ultrasound guidance: no  Approach: anterior  Aspirate amount: 0 mL  Patient tolerance: Patient tolerated the procedure well with no immediate complications       Yes

## 2022-03-11 DIAGNOSIS — J30.9 ALLERGIC RHINITIS, UNSPECIFIED SEASONALITY, UNSPECIFIED TRIGGER: ICD-10-CM

## 2022-03-11 NOTE — TELEPHONE ENCOUNTER
Incoming Refill Request      Medication requested (name and dose):   vitamin B-12 (CYANOCOBALAMIN) 1000 MCG tablet 1,000 mcg, Oral, Daily       fluticasone (Flonase) 50 MCG/ACT nasal spray  Pharmacy where request should be sent:   [facility]    Additional details provided by patient: PATIENT WENT TO PHARMACY AND WAS TOLD HE IS OUT OF REFILLS  Best call back number: [phone]    Does the patient have less than a 3 day supply:  [x] Yes  [] No    Arabella Gambino Rep  03/11/22, 15:18 CST

## 2022-03-14 RX ORDER — CYANOCOBALAMIN (VITAMIN B-12) 1000 MCG
TABLET ORAL
Qty: 90 TABLET | Refills: 3 | Status: SHIPPED | OUTPATIENT
Start: 2022-03-14 | End: 2022-11-23 | Stop reason: HOSPADM

## 2022-03-14 RX ORDER — FLUTICASONE PROPIONATE 50 MCG
SPRAY, SUSPENSION (ML) NASAL
Qty: 16 G | Refills: 11 | Status: SHIPPED | OUTPATIENT
Start: 2022-03-14 | End: 2022-03-16 | Stop reason: SDUPTHER

## 2022-03-16 ENCOUNTER — OFFICE VISIT (OUTPATIENT)
Dept: FAMILY MEDICINE CLINIC | Facility: CLINIC | Age: 67
End: 2022-03-16

## 2022-03-16 VITALS
BODY MASS INDEX: 32.28 KG/M2 | HEIGHT: 67 IN | SYSTOLIC BLOOD PRESSURE: 150 MMHG | HEART RATE: 100 BPM | TEMPERATURE: 97.8 F | WEIGHT: 205.7 LBS | OXYGEN SATURATION: 97 % | DIASTOLIC BLOOD PRESSURE: 100 MMHG

## 2022-03-16 DIAGNOSIS — M54.32 BILATERAL SCIATICA: Primary | ICD-10-CM

## 2022-03-16 DIAGNOSIS — E53.8 VITAMIN B 12 DEFICIENCY: ICD-10-CM

## 2022-03-16 DIAGNOSIS — J30.9 ALLERGIC RHINITIS, UNSPECIFIED SEASONALITY, UNSPECIFIED TRIGGER: ICD-10-CM

## 2022-03-16 DIAGNOSIS — M54.31 BILATERAL SCIATICA: Primary | ICD-10-CM

## 2022-03-16 PROCEDURE — 99213 OFFICE O/P EST LOW 20 MIN: CPT | Performed by: STUDENT IN AN ORGANIZED HEALTH CARE EDUCATION/TRAINING PROGRAM

## 2022-03-16 RX ORDER — FLUTICASONE PROPIONATE 50 MCG
2 SPRAY, SUSPENSION (ML) NASAL DAILY
Qty: 16 G | Refills: 11 | Status: SHIPPED | OUTPATIENT
Start: 2022-03-16 | End: 2022-11-23 | Stop reason: HOSPADM

## 2022-03-16 NOTE — PROGRESS NOTES
Family Medicine Residency  Harika Morris MD    Subjective:     Jian Baker is a 66 y.o. male who presents for:    Vit b12 deficiency: Hasn't checked this since 2020. Was wondering if he needs B12 shots.    Seasonal allergies: Needs refill on flonase    Lumbar back pain w/ b/l sciatica: Complaining of spine and hip pain. Long term problem. Low back pain worse at night. No weight loss. No night sweats. No uncontrollable loss of urine or bowel movements. When stands up, pain is better. Declining physical therapy. Will consider pain management depending on imaging.    The following portions of the patient's history were reviewed and updated as appropriate: allergies, current medications, past family history, past medical history, past social history, past surgical history and problem list.    Past Medical Hx:  Past Medical History:   Diagnosis Date   • Asthma     Previously diagnosed and previously prescribed inhaler medications.     • Essential hypertension    • Gastroesophageal reflux disease        Past Surgical Hx:  Past Surgical History:   Procedure Laterality Date   • BONE GRAFT     • WRIST FRACTURE SURGERY Bilateral        Current Meds:    Current Outpatient Medications:   •  albuterol sulfate  (90 Base) MCG/ACT inhaler, Inhale 2 puffs Every 4 (Four) Hours As Needed for Wheezing or Shortness of Air., Disp: 18 g, Rfl: 0  •  atorvastatin (LIPITOR) 20 MG tablet, TAKE ONE TABLET BY MOUTH ONCE DAILY, Disp: 90 tablet, Rfl: 1  •  cyclobenzaprine (FLEXERIL) 10 MG tablet, Take 1 tablet by mouth 3 (Three) Times a Day As Needed for Muscle Spasms., Disp: 60 tablet, Rfl: 11  •  Diclofenac Sodium (VOLTAREN) 1 % gel gel, Apply 4 g topically to the appropriate area as directed 4 (Four) Times a Day As Needed (pain)., Disp: 150 g, Rfl: 0  •  fluticasone (FLONASE) 50 MCG/ACT nasal spray, 2 sprays by Each Nare route Daily., Disp: 16 g, Rfl: 11  •  lidocaine (LIDODERM) 5 %, Place 1 patch on the skin as directed by  provider Daily. Remove & Discard patch within 12 hours or as directed by MD, Disp: 30 each, Rfl: 3  •  lisinopril (PRINIVIL,ZESTRIL) 10 MG tablet, Take 2 tablets by mouth Daily., Disp: 60 tablet, Rfl: 5  •  loratadine (Claritin) 10 MG tablet, Take 1 tablet by mouth Daily., Disp: 30 tablet, Rfl: 11  •  naproxen (NAPROSYN) 500 MG tablet, Take 1 tablet by mouth 2 (Two) Times a Day With Meals., Disp: 60 tablet, Rfl: 0  •  pantoprazole (PROTONIX) 20 MG EC tablet, Take 1 tablet by mouth Daily., Disp: 30 tablet, Rfl: 11  •  pregabalin (LYRICA) 50 MG capsule, TAKE ONE CAPSULE BY MOUTH 3 TIMES DAILY, Disp: 90 capsule, Rfl: 2  •  Cyanocobalamin (B-12) 1000 MCG tablet, TAKE 1 TABLET BY MOUTH DAILY, Disp: 90 tablet, Rfl: 3    Allergies:  No Known Allergies    Family Hx:  Family History   Problem Relation Age of Onset   • COPD Mother    • Hypertension Mother    • Diabetes type II Mother    • Cancer Father    • Heart failure Father         Social History:  Social History     Socioeconomic History   • Marital status:    Tobacco Use   • Smoking status: Former Smoker     Packs/day: 1.00     Years: 25.00     Pack years: 25.00     Types: Pipe     Start date: 10/10/1972     Quit date: 10/31/2019     Years since quittin.3   • Smokeless tobacco: Current User     Types: Chew   Substance and Sexual Activity   • Alcohol use: Yes     Alcohol/week: 2.0 standard drinks     Types: 2 Cans of beer per week     Comment: Prior heavy drinker   • Drug use: No   • Sexual activity: Yes     Partners: Female       Review of Systems  Review of Systems   Constitutional: Negative for chills and fever.   HENT: Negative for facial swelling, nosebleeds and trouble swallowing.    Eyes: Negative for photophobia and visual disturbance.   Respiratory: Negative for choking and stridor.    Gastrointestinal: Negative for abdominal distention, diarrhea, nausea and vomiting.   Genitourinary: Negative for difficulty urinating and dysuria.   Musculoskeletal:  "Positive for arthralgias, back pain and gait problem. Negative for myalgias, neck pain and neck stiffness.   Skin: Negative for pallor and rash.   Neurological: Negative for seizures and syncope.   Psychiatric/Behavioral: Negative for dysphoric mood and hallucinations.       Objective:     /100   Pulse 100   Temp 97.8 °F (36.6 °C)   Ht 170.2 cm (67\")   Wt 93.3 kg (205 lb 11.2 oz)   SpO2 97%   BMI 32.22 kg/m²   Physical Exam  Constitutional:       General: He is not in acute distress.     Appearance: He is well-developed.   HENT:      Head: Normocephalic and atraumatic.      Nose: Nose normal.   Eyes:      Conjunctiva/sclera: Conjunctivae normal.      Pupils: Pupils are equal, round, and reactive to light.   Cardiovascular:      Rate and Rhythm: Normal rate and regular rhythm.      Pulses: Normal pulses.      Heart sounds: Normal heart sounds.   Pulmonary:      Effort: Pulmonary effort is normal.   Abdominal:      General: Bowel sounds are normal.      Palpations: Abdomen is soft.   Musculoskeletal:        Arms:       Cervical back: Normal range of motion and neck supple.      Lumbar back: Tenderness present. Positive right straight leg raise test and positive left straight leg raise test.      Comments: Lumber back pain worse w/ straight leg raise    Skin:     General: Skin is warm and dry.   Neurological:      Mental Status: He is alert. Mental status is at baseline.      Cranial Nerves: No cranial nerve deficit.      Coordination: Coordination normal.   Psychiatric:         Mood and Affect: Mood normal.         Behavior: Behavior normal.          Assessment/Plan:     Diagnoses and all orders for this visit:    1. Bilateral sciatica (Primary)  -     MRI Lumbar Spine With & Without Contrast; Future    2. Allergic rhinitis, unspecified seasonality, unspecified trigger  -     fluticasone (FLONASE) 50 MCG/ACT nasal spray; 2 sprays by Each Nare route Daily.  Dispense: 16 g; Refill: 11    3. Vitamin B 12 " deficiency      -We will obtain MRI lumbar spine.  He declined physical therapy.  He is open to pain management, depending on MRI result  -We will obtain vitamin B12 level.  -Refill Flonase for seasonal allergies      Follow-up:     Return in about 4 weeks (around 4/13/2022) for Next scheduled follow up.    Preventative:  Health Maintenance   Topic Date Due   • LUNG CANCER SCREENING  Never done   • ZOSTER VACCINE (2 of 2) 12/05/2017   • ANNUAL WELLNESS VISIT  07/02/2021   • INFLUENZA VACCINE  08/01/2021   • COVID-19 Vaccine (3 - Booster for Pfizer series) 09/13/2021   • COLORECTAL CANCER SCREENING  10/10/2024   • TDAP/TD VACCINES (3 - Td or Tdap) 10/10/2027   • HEPATITIS C SCREENING  Completed   • Pneumococcal Vaccine 0-64  Aged Out         Alcohol use:  reports current alcohol use of about 2.0 standard drinks of alcohol per week.  Nicotine status  reports that he quit smoking about 2 years ago. His smoking use included pipe. He started smoking about 49 years ago. He has a 25.00 pack-year smoking history. His smokeless tobacco use includes chew.     Goals     •  Better Compliance with HTN Medications (pt-stated)       Barriers: None        •  Medication management       Patient has been having trouble with medication compliance.  Goal now is to take medications everyday at the same time.    Barriers: patient has not been on medications consistently for sometime.  Has trouble with remembering to take medications.                RISK SCORE: 3          PGY-3  [facility]  This document has been electronically signed by Harika Morris MD on March 16, 2022 17:32 CDT

## 2022-03-21 ENCOUNTER — LAB (OUTPATIENT)
Dept: LAB | Facility: HOSPITAL | Age: 67
End: 2022-03-21

## 2022-03-21 DIAGNOSIS — E53.8 VITAMIN B 12 DEFICIENCY: ICD-10-CM

## 2022-03-21 PROCEDURE — 36415 COLL VENOUS BLD VENIPUNCTURE: CPT

## 2022-03-21 PROCEDURE — 82607 VITAMIN B-12: CPT

## 2022-03-22 DIAGNOSIS — M54.32 BILATERAL SCIATICA: Primary | ICD-10-CM

## 2022-03-22 DIAGNOSIS — M54.31 BILATERAL SCIATICA: Primary | ICD-10-CM

## 2022-03-22 LAB — VIT B12 BLD-MCNC: 488 PG/ML (ref 211–946)

## 2022-03-22 NOTE — PROGRESS NOTES
I have reviewed the notes, assessments, and/or procedures performed by Harika Morris MD during office visit. I concur with her/his documentation and assessment and plan for Jian Baker.      This document has been electronically signed by Juan Manuel Gentile MD on March 22, 2022 15:05 CDT

## 2022-03-31 DIAGNOSIS — I10 ESSENTIAL HYPERTENSION: ICD-10-CM

## 2022-03-31 RX ORDER — LISINOPRIL 10 MG/1
TABLET ORAL
Qty: 180 TABLET | Refills: 4 | Status: SHIPPED | OUTPATIENT
Start: 2022-03-31 | End: 2023-01-30

## 2022-04-01 ENCOUNTER — HOSPITAL ENCOUNTER (OUTPATIENT)
Dept: MRI IMAGING | Facility: HOSPITAL | Age: 67
Discharge: HOME | End: 2022-04-01
Admitting: FAMILY MEDICINE

## 2022-04-01 DIAGNOSIS — M54.32 BILATERAL SCIATICA: ICD-10-CM

## 2022-04-01 DIAGNOSIS — M54.31 BILATERAL SCIATICA: ICD-10-CM

## 2022-04-01 PROCEDURE — 72148 MRI LUMBAR SPINE W/O DYE: CPT

## 2022-04-14 ENCOUNTER — TELEPHONE (OUTPATIENT)
Dept: FAMILY MEDICINE CLINIC | Facility: CLINIC | Age: 67
End: 2022-04-14

## 2022-04-14 DIAGNOSIS — G89.29 CHRONIC PAIN OF LEFT KNEE: Primary | ICD-10-CM

## 2022-04-14 DIAGNOSIS — M25.562 CHRONIC PAIN OF LEFT KNEE: Primary | ICD-10-CM

## 2022-04-14 NOTE — TELEPHONE ENCOUNTER
Incoming Refill Request      Medication requested (name and dose):pregabalin (LYRICA) 50 MG capsule    Pharmacy where request should be sent: [place] [facility]    Additional details provided by patient:     Best call back number: [phone]    Does the patient have less than a 3 day supply:  [x] Yes  [] No    Maddie Odell Workman  04/14/22, 13:02 CDT

## 2022-04-15 NOTE — TELEPHONE ENCOUNTER
Was calling to let him know that urine gabapentin test first before I can refill this medicine. Kindly let him know he can stop by our lab at any time

## 2022-04-18 ENCOUNTER — LAB (OUTPATIENT)
Dept: LAB | Facility: HOSPITAL | Age: 67
End: 2022-04-18

## 2022-04-18 ENCOUNTER — OFFICE VISIT (OUTPATIENT)
Dept: FAMILY MEDICINE CLINIC | Facility: CLINIC | Age: 67
End: 2022-04-18

## 2022-04-18 VITALS
WEIGHT: 206.2 LBS | BODY MASS INDEX: 32.36 KG/M2 | OXYGEN SATURATION: 98 % | TEMPERATURE: 97.7 F | SYSTOLIC BLOOD PRESSURE: 140 MMHG | HEIGHT: 67 IN | DIASTOLIC BLOOD PRESSURE: 100 MMHG | HEART RATE: 69 BPM

## 2022-04-18 DIAGNOSIS — M54.31 BILATERAL SCIATICA: ICD-10-CM

## 2022-04-18 DIAGNOSIS — M25.551 PAIN OF RIGHT HIP JOINT: ICD-10-CM

## 2022-04-18 DIAGNOSIS — M25.551 PAIN OF RIGHT HIP JOINT: Primary | ICD-10-CM

## 2022-04-18 DIAGNOSIS — M54.32 BILATERAL SCIATICA: ICD-10-CM

## 2022-04-18 LAB — URATE SERPL-MCNC: 8.2 MG/DL (ref 3.4–7)

## 2022-04-18 PROCEDURE — 84550 ASSAY OF BLOOD/URIC ACID: CPT

## 2022-04-18 PROCEDURE — 99213 OFFICE O/P EST LOW 20 MIN: CPT | Performed by: STUDENT IN AN ORGANIZED HEALTH CARE EDUCATION/TRAINING PROGRAM

## 2022-04-18 PROCEDURE — 36415 COLL VENOUS BLD VENIPUNCTURE: CPT

## 2022-04-18 RX ORDER — PREGABALIN 50 MG/1
50 CAPSULE ORAL 3 TIMES DAILY
Qty: 90 CAPSULE | Refills: 2 | Status: CANCELLED | OUTPATIENT
Start: 2022-04-18

## 2022-04-18 NOTE — PROGRESS NOTES
Family Medicine Residency  Harika Morris MD    Subjective:     Jian Baker is a 66 y.o. male who presents for:    Lumbar back pain w/ b/l sciatica: Recently completed MRI for chronic low back pain. Declining physical therapy because every time he does sit it gets worse. Used to see pain management at [facility] 4-5 years ago. Wants a referral there.     The following portions of the patient's history were reviewed and updated as appropriate: allergies, current medications, past family history, past medical history, past social history, past surgical history and problem list.    Past Medical Hx:  Past Medical History:   Diagnosis Date   • Asthma     Previously diagnosed and previously prescribed inhaler medications.     • Essential hypertension    • Gastroesophageal reflux disease        Past Surgical Hx:  Past Surgical History:   Procedure Laterality Date   • BONE GRAFT     • WRIST FRACTURE SURGERY Bilateral        Current Meds:    Current Outpatient Medications:   •  albuterol sulfate  (90 Base) MCG/ACT inhaler, Inhale 2 puffs Every 4 (Four) Hours As Needed for Wheezing or Shortness of Air., Disp: 18 g, Rfl: 0  •  atorvastatin (LIPITOR) 20 MG tablet, TAKE ONE TABLET BY MOUTH ONCE DAILY, Disp: 90 tablet, Rfl: 1  •  Cyanocobalamin (B-12) 1000 MCG tablet, TAKE 1 TABLET BY MOUTH DAILY, Disp: 90 tablet, Rfl: 3  •  cyclobenzaprine (FLEXERIL) 10 MG tablet, Take 1 tablet by mouth 3 (Three) Times a Day As Needed for Muscle Spasms., Disp: 60 tablet, Rfl: 11  •  Diclofenac Sodium (VOLTAREN) 1 % gel gel, Apply 4 g topically to the appropriate area as directed 4 (Four) Times a Day As Needed (pain)., Disp: 150 g, Rfl: 0  •  fluticasone (FLONASE) 50 MCG/ACT nasal spray, 2 sprays by Each Nare route Daily., Disp: 16 g, Rfl: 11  •  lidocaine (LIDODERM) 5 %, Place 1 patch on the skin as directed by provider Daily. Remove & Discard patch within 12 hours or as directed by MD, Disp: 30 each, Rfl: 3  •  lisinopril  (PRINIVIL,ZESTRIL) 10 MG tablet, TAKE TWO TABLETS BY MOUTH ONCE DAILY, Disp: 180 tablet, Rfl: 4  •  loratadine (Claritin) 10 MG tablet, Take 1 tablet by mouth Daily., Disp: 30 tablet, Rfl: 11  •  naproxen (NAPROSYN) 500 MG tablet, Take 1 tablet by mouth 2 (Two) Times a Day With Meals., Disp: 60 tablet, Rfl: 0  •  pantoprazole (PROTONIX) 20 MG EC tablet, Take 1 tablet by mouth Daily., Disp: 30 tablet, Rfl: 11  •  pregabalin (LYRICA) 50 MG capsule, TAKE ONE CAPSULE BY MOUTH 3 TIMES DAILY, Disp: 90 capsule, Rfl: 2    Allergies:  No Known Allergies    Family Hx:  Family History   Problem Relation Age of Onset   • COPD Mother    • Hypertension Mother    • Diabetes type II Mother    • Cancer Father    • Heart failure Father         Social History:  Social History     Socioeconomic History   • Marital status:    Tobacco Use   • Smoking status: Former Smoker     Packs/day: 1.00     Years: 25.00     Pack years: 25.00     Types: Pipe     Start date: 10/10/1972     Quit date: 10/31/2019     Years since quittin.4   • Smokeless tobacco: Current User     Types: Chew   Substance and Sexual Activity   • Alcohol use: Yes     Alcohol/week: 2.0 standard drinks     Types: 2 Cans of beer per week     Comment: Prior heavy drinker   • Drug use: No   • Sexual activity: Yes     Partners: Female       Review of Systems  Review of Systems   Constitutional: Negative for chills and fever.   HENT: Negative for facial swelling, nosebleeds and trouble swallowing.    Eyes: Negative for photophobia and visual disturbance.   Respiratory: Negative for choking and stridor.    Gastrointestinal: Negative for abdominal distention, diarrhea, nausea and vomiting.   Genitourinary: Negative for difficulty urinating and dysuria.   Musculoskeletal: Positive for arthralgias, back pain and gait problem. Negative for myalgias, neck pain and neck stiffness.   Skin: Negative for pallor and rash.   Neurological: Negative for seizures and syncope.  "  Psychiatric/Behavioral: Negative for dysphoric mood and hallucinations.       Objective:     /100   Pulse 69   Temp 97.7 °F (36.5 °C)   Ht 170.2 cm (67\")   Wt 93.5 kg (206 lb 3.2 oz)   SpO2 98%   BMI 32.30 kg/m²   Physical Exam  Constitutional:       General: He is not in acute distress.     Appearance: He is well-developed.   HENT:      Head: Normocephalic and atraumatic.      Nose: Nose normal.   Eyes:      Conjunctiva/sclera: Conjunctivae normal.      Pupils: Pupils are equal, round, and reactive to light.   Cardiovascular:      Rate and Rhythm: Normal rate and regular rhythm.      Pulses: Normal pulses.      Heart sounds: Normal heart sounds.   Pulmonary:      Effort: Pulmonary effort is normal.   Abdominal:      General: Bowel sounds are normal.      Palpations: Abdomen is soft.   Musculoskeletal:        Arms:       Cervical back: Normal range of motion and neck supple.      Lumbar back: Tenderness present. Positive right straight leg raise test and positive left straight leg raise test.      Comments: Lumber back pain worse w/ straight leg raise    Skin:     General: Skin is warm and dry.   Neurological:      Mental Status: He is alert. Mental status is at baseline.      Cranial Nerves: No cranial nerve deficit.      Coordination: Coordination normal.   Psychiatric:         Mood and Affect: Mood normal.         Behavior: Behavior normal.          Assessment/Plan:     Diagnoses and all orders for this visit:    1. Pain of right hip joint (Primary)  -     Uric Acid; Future  -     Ambulatory Referral to Pain Management    2. Bilateral sciatica  -     Ambulatory Referral to Pain Management      -We will refer to pain management in [place]  -We will recheck uric acid level given that it was previously high.  -Needs to complete previous urine gabapentin test before pregabalin can be refilled.  Robert [id number] reviewed and appropriate but will wait after urine gabapentin test to " refill      Follow-up:     Return in about 4 weeks (around 5/16/2022) for Annual.    Preventative:  Health Maintenance   Topic Date Due   • LUNG CANCER SCREENING  Never done   • ZOSTER VACCINE (2 of 2) 12/05/2017   • ANNUAL WELLNESS VISIT  07/02/2021   • COVID-19 Vaccine (3 - Booster for Pfizer series) 09/13/2021   • INFLUENZA VACCINE  08/01/2022   • COLORECTAL CANCER SCREENING  10/10/2024   • TDAP/TD VACCINES (3 - Td or Tdap) 10/10/2027   • HEPATITIS C SCREENING  Completed   • Pneumococcal Vaccine 0-64  Aged Out         Alcohol use:  reports current alcohol use of about 2.0 standard drinks of alcohol per week.  Nicotine status  reports that he quit smoking about 2 years ago. His smoking use included pipe. He started smoking about 49 years ago. He has a 25.00 pack-year smoking history. His smokeless tobacco use includes chew.     Goals     •  Better Compliance with HTN Medications (pt-stated)       Barriers: None        •  Medication management       Patient has been having trouble with medication compliance.  Goal now is to take medications everyday at the same time.    Barriers: patient has not been on medications consistently for sometime.  Has trouble with remembering to take medications.                RISK SCORE: 3          PGY-3  [facility]  This document has been electronically signed by Harika Morris MD on April 18, 2022 11:38 CDT

## 2022-04-21 ENCOUNTER — TELEPHONE (OUTPATIENT)
Dept: FAMILY MEDICINE CLINIC | Facility: CLINIC | Age: 67
End: 2022-04-21

## 2022-04-21 NOTE — TELEPHONE ENCOUNTER
PT CALLED STATING HE STILL HAS NOT RECEIVED A REFILL OF HIS LYRICA AND WANTS TO KNOW IF DR. ALBARRAN STILL WANTS HIM ON THIS MEDICATION OR TO STOP IT. HIS CALL BACK NUMBER [phone]

## 2022-04-22 NOTE — PROGRESS NOTES
I have reviewed the notes, assessments, and/or procedures performed by Harika Morris MD, I concur with her/his documentation and assessment and plan for Jian Baker.                This document has been electronically signed by Angel Rea MD on April 22, 2022 15:34 CDT

## 2022-04-27 NOTE — PROGRESS NOTES
Discussed uric acid level of 8.2 with attending physician, pharmacist as well as patient.  Given that patient is asymptomatic, we will just continue to monitor for now.  In 6 months we will repeat uric acid level and a BMP.  Patient made aware and agrees with the plan.    Patient to complete urine gabapentin test soon.  If appropriate, we can refill Lyrica if Robert is appropriate.    He still has not heard back from pain management so he will try reaching out to the department.          PGY-3  [facility]  This document has been electronically signed by Harika Morris MD on April 27, 2022 14:37 CDT

## 2022-05-19 ENCOUNTER — OFFICE VISIT (OUTPATIENT)
Dept: FAMILY MEDICINE CLINIC | Facility: CLINIC | Age: 67
End: 2022-05-19

## 2022-05-19 VITALS
OXYGEN SATURATION: 96 % | SYSTOLIC BLOOD PRESSURE: 118 MMHG | BODY MASS INDEX: 33.74 KG/M2 | WEIGHT: 215 LBS | HEIGHT: 67 IN | HEART RATE: 91 BPM | DIASTOLIC BLOOD PRESSURE: 80 MMHG

## 2022-05-19 DIAGNOSIS — H93.13 TINNITUS AURIUM, BILATERAL: ICD-10-CM

## 2022-05-19 DIAGNOSIS — H91.90 HEARING LOSS, UNSPECIFIED HEARING LOSS TYPE, UNSPECIFIED LATERALITY: ICD-10-CM

## 2022-05-19 PROCEDURE — 96160 PT-FOCUSED HLTH RISK ASSMT: CPT | Performed by: STUDENT IN AN ORGANIZED HEALTH CARE EDUCATION/TRAINING PROGRAM

## 2022-05-19 PROCEDURE — G0439 PPPS, SUBSEQ VISIT: HCPCS | Performed by: STUDENT IN AN ORGANIZED HEALTH CARE EDUCATION/TRAINING PROGRAM

## 2022-05-19 NOTE — PROGRESS NOTES
Initial Medicare Wellness Visit   The ABC's of the Annual Wellness Visit    Chief Complaint   Patient presents with   • Medicare Wellness-subsequent       HPI:  Jian Baker, -1955, is a 66 y.o. male who presents for an Initial Medicare Wellness Visit.    Recent Hospitalizations:  No hospitalization(s) within the last year..    Current Medical Providers:  Patient Care Team:  Van Urbina MD as PCP - General (Family Medicine)    Health Habits and Functional and Cognitive Screening and Depression Screening:  Functional & Cognitive Status 2022   Do you have difficulty preparing food and eating? No   Do you have difficulty bathing yourself, getting dressed or grooming yourself? No   Do you have difficulty using the toilet? No   Do you have difficulty moving around from place to place? No   Do you have trouble with steps or getting out of a bed or a chair? No   Current Diet -   Dental Exam -   Eye Exam -   Exercise (times per week) -   Current Exercise Activities Include -   Do you need help using the phone?  No   Are you deaf or do you have serious difficulty hearing?  No   Do you need help with transportation? No   Do you need help shopping? No   Do you need help preparing meals?  No   Do you need help with housework?  No   Do you need help with laundry? No   Do you need help taking your medications? No   Do you need help managing money? No   Do you ever drive or ride in a car without wearing a seat belt? No   Have you felt unusual stress, anger or loneliness in the last month? No   Who do you live with? Spouse   If you need help, do you have trouble finding someone available to you? No   Have you been bothered in the last four weeks by sexual problems? No   Do you have difficulty concentrating, remembering or making decisions? No       Compared to one year ago, the patient feels his physical health is the same and his mental health is the same.    Depression Screen:  PHQ-2/PHQ-9 Depression  Screening 2022   Retired PHQ-9 Total Score -   Retired Total Score -   Little Interest or Pleasure in Doing Things 0-->not at all   Feeling Down, Depressed or Hopeless 0-->not at all   Trouble Falling or Staying Asleep, or Sleeping Too Much 1-->several days   Feeling Tired or Having Little Energy 1-->several days   Poor Appetite or Overeating 0-->not at all   Feeling Bad about Yourself - or that You are a Failure or Have Let Yourself or Your Family Down 0-->not at all   Trouble Concentrating on Things, Such as Reading the Newspaper or Watching Television 0-->not at all   Moving or Speaking So Slowly that Other People Could Have Noticed? Or the Opposite - Being So Fidgety 0-->not at all   Thoughts that You Would be Better Off Dead or of Hurting Yourself in Some Way 0-->not at all   PHQ-9: Brief Depression Severity Measure Score 2   If You Checked Off Any Problems, How Difficult Have These Problems Made It For You to Do Your Work, Take Care of Things at Home, or Get Along with Other People? not difficult at all         Past Medical/Family/Social History:  The following portions of the patient's history were reviewed and updated as appropriate: allergies, current medications, past family history, past medical history, past social history, past surgical history and problem list.      Aspirin use counseling: Does not need ASA (and currently is not on it)    Current medication list contains no high risk medications.  No harmful drug interactions have been identified.     Social History     Tobacco Use   • Smoking status: Former Smoker     Packs/day: 1.00     Years: 25.00     Pack years: 25.00     Types: Pipe     Start date: 10/10/1972     Quit date: 10/31/2019     Years since quittin.5   • Smokeless tobacco: Current User     Types: Chew   Substance Use Topics   • Alcohol use: Yes     Alcohol/week: 2.0 standard drinks     Types: 2 Cans of beer per week     Comment: Prior heavy drinker       Review of Systems  "  Constitutional: Negative for chills and fever.   HENT: Negative for facial swelling, nosebleeds and trouble swallowing.    Eyes: Negative for photophobia and visual disturbance.   Respiratory: Negative for choking and stridor.    Gastrointestinal: Negative for abdominal distention, diarrhea, nausea and vomiting.   Genitourinary: Negative for difficulty urinating and dysuria.   Musculoskeletal: Positive for gait problem. Negative for arthralgias and myalgias.   Skin: Negative for pallor and rash.   Neurological: Negative for seizures and syncope.   Psychiatric/Behavioral: Negative for dysphoric mood and hallucinations.       Objective     Vitals:    05/19/22 1546   BP: 118/80   Pulse: 91   SpO2: 96%   Weight: 97.5 kg (215 lb)   Height: 170.2 cm (67\")       BMI is >= 30 and <= 34.9 (Class 1 obesity). The following options were offered after discussion: exercise counseling/recommendations      No exam data present    The patient has potential evidence of mild cognitive impairment. 2/3 items were correctly remembered at 5 minutes.    Physical Exam  Constitutional:       General: He is not in acute distress.     Appearance: He is well-developed.   HENT:      Head: Normocephalic and atraumatic.      Nose: Nose normal.   Eyes:      Conjunctiva/sclera: Conjunctivae normal.      Pupils: Pupils are equal, round, and reactive to light.   Cardiovascular:      Rate and Rhythm: Normal rate and regular rhythm.      Pulses: Normal pulses.      Heart sounds: Normal heart sounds.   Pulmonary:      Effort: Pulmonary effort is normal.   Abdominal:      General: Bowel sounds are normal.      Palpations: Abdomen is soft.   Musculoskeletal:         General: Normal range of motion.      Cervical back: Normal range of motion and neck supple.   Skin:     General: Skin is warm and dry.   Neurological:      Mental Status: He is alert. Mental status is at baseline.      Cranial Nerves: No cranial nerve deficit.      Coordination: Coordination " normal.   Psychiatric:         Mood and Affect: Mood normal.         Behavior: Behavior normal.         Recent Lab Results:     Lab Results   Component Value Date    CHOL 143 01/02/2020    TRIG 38 01/02/2020    HDL 77 (H) 01/02/2020    VLDL 7.6 01/02/2020    LDLHDL 0.76 01/02/2020         Assessment & Plan   Age-appropriate Screening Schedule:  Refer to the list below for future screening recommendations based on patient's age, sex and/or medical conditions.      Health Maintenance   Topic Date Due   • ZOSTER VACCINE (2 of 2) 12/05/2017   • INFLUENZA VACCINE  08/01/2022   • TDAP/TD VACCINES (3 - Td or Tdap) 10/10/2027         Medicare Risks and Personalized Health Plan:  CMS-Preventive Services Quick Reference  Advance Directive Discussion  Dementia/Memory   Fall Risk  Hearing Problem    Advance Care Planning:  ACP discussion was held with the patient during this visit. Patient does not have an advance directive, information provided.    Diagnoses and all orders for this visit:    1. Hearing loss, unspecified hearing loss type, unspecified laterality  -     Ambulatory Referral to ENT (Otolaryngology)  -     Ambulatory Referral to Audiology    2. Tinnitus aurium, bilateral  -     Ambulatory Referral to ENT (Otolaryngology)    -Counseled patient about his fall risk.  He does not want to complete fall prove of his house or have PT OT at his house.  He agrees to exercise regularly.  -We will refer to audiology and ENT per patient request      An After Visit Summary and PPPS with all of these plans were given to the patient.      Follow Up:  Return in about 3 months (around 8/19/2022) for Next scheduled follow up.              PGY-3  [facility]  This document has been electronically signed by Harika Morris MD on May 19, 2022 16:25 CDT

## 2022-05-23 NOTE — PROGRESS NOTES
I have reviewed the notes, assessments, and/or procedures performed by Dr. Harika Morris, I concur with his  documentation and assessment and plan for Jian Baker        This document has been electronically signed by Hebert Anguiano MD on May 23, 2022 11:57 CDT

## 2022-05-31 DIAGNOSIS — I10 ESSENTIAL HYPERTENSION: ICD-10-CM

## 2022-05-31 RX ORDER — ATORVASTATIN CALCIUM 20 MG/1
TABLET, FILM COATED ORAL
Qty: 90 TABLET | Refills: 1 | Status: SHIPPED | OUTPATIENT
Start: 2022-05-31 | End: 2022-11-23 | Stop reason: HOSPADM

## 2022-05-31 RX ORDER — LIDOCAINE 50 MG/G
1 PATCH TOPICAL EVERY 24 HOURS
Qty: 30 EACH | Refills: 3 | Status: SHIPPED | OUTPATIENT
Start: 2022-05-31 | End: 2022-11-23 | Stop reason: HOSPADM

## 2022-05-31 NOTE — TELEPHONE ENCOUNTER
Patient requests refills. I have never, not even once, seen him in the clinic. He only sees other providers even though he has been assigned to me. Still, he appears to come to the clinic regularly to be seen. I will fill his medicines today, but I would like the opportunity to see him in the clinic if he is going to be assigned to me.       This document has been electronically signed by Van Urbina MD on May 31, 2022 14:32 CDT

## 2022-06-29 ENCOUNTER — OFFICE VISIT (OUTPATIENT)
Dept: FAMILY MEDICINE CLINIC | Facility: CLINIC | Age: 67
End: 2022-06-29

## 2022-06-29 VITALS
HEIGHT: 67 IN | WEIGHT: 200 LBS | OXYGEN SATURATION: 98 % | SYSTOLIC BLOOD PRESSURE: 130 MMHG | DIASTOLIC BLOOD PRESSURE: 80 MMHG | BODY MASS INDEX: 31.39 KG/M2 | HEART RATE: 91 BPM

## 2022-06-29 DIAGNOSIS — M94.262 CHONDROMALACIA, LEFT KNEE: Primary | ICD-10-CM

## 2022-06-29 DIAGNOSIS — M25.462 EFFUSION OF LEFT KNEE JOINT: ICD-10-CM

## 2022-06-29 PROCEDURE — 99213 OFFICE O/P EST LOW 20 MIN: CPT | Performed by: STUDENT IN AN ORGANIZED HEALTH CARE EDUCATION/TRAINING PROGRAM

## 2022-06-29 NOTE — PROGRESS NOTES
Family Medicine Residency  Van Urbina MD    Subjective:     Jian Baker is a 67 y.o. male who presents with left knee pain.    Patient has prior MRI of left knee demonstrating grade 3 chondromalacia.  He also has widening of quadriceps tendon consistent with a a chronic thickness tear.  He has been taking naproxen 500 mg for this as well as using Voltaren gel in the past. Patient has significant effusion of left knee that will likely require drainage.     Per up-to-date, patellofemoral pain is among the most common etiologies of knee pain in the general population.  Comment associated overuse.  Treatment involves physical therapy focused upon improving strength of the knee, hip, and core muscles such as hip abductors and quadriceps and patient would greatly benefit from this due to chronic tear of quadriceps.  Per the literature, no evidence to support long-term use of NSAID treatments.  Surgical interventions are usually only considered in rare cases where nonoperative therapies have failed.    Patient declines physical therapy.     I will send patient to sports medicine to get his effusion drained and for further management of chondromalacia.       The following portions of the patient's history were reviewed and updated as appropriate: allergies, current medications, past family history, past medical history, past social history, past surgical history and problem list.    Past Medical Hx:  Past Medical History:   Diagnosis Date   • Asthma     Previously diagnosed and previously prescribed inhaler medications.     • Essential hypertension    • Gastroesophageal reflux disease        Past Surgical Hx:  Past Surgical History:   Procedure Laterality Date   • BONE GRAFT     • WRIST FRACTURE SURGERY Bilateral        Current Meds:    Current Outpatient Medications:   •  albuterol sulfate  (90 Base) MCG/ACT inhaler, Inhale 2 puffs Every 4 (Four) Hours As Needed for Wheezing or Shortness of Air., Disp:  18 g, Rfl: 0  •  atorvastatin (LIPITOR) 20 MG tablet, TAKE ONE TABLET BY MOUTH ONCE DAILY, Disp: 90 tablet, Rfl: 1  •  Cyanocobalamin (B-12) 1000 MCG tablet, TAKE 1 TABLET BY MOUTH DAILY, Disp: 90 tablet, Rfl: 3  •  cyclobenzaprine (FLEXERIL) 10 MG tablet, Take 1 tablet by mouth 3 (Three) Times a Day As Needed for Muscle Spasms., Disp: 60 tablet, Rfl: 11  •  Diclofenac Sodium (VOLTAREN) 1 % gel gel, Apply 4 g topically to the appropriate area as directed 4 (Four) Times a Day As Needed (pain)., Disp: 150 g, Rfl: 0  •  fluticasone (FLONASE) 50 MCG/ACT nasal spray, 2 sprays by Each Nare route Daily., Disp: 16 g, Rfl: 11  •  lidocaine (LIDODERM) 5 %, PLACE 1 PATCH ON THE SKIN AS DIRECTED BY PROVIDER DAILY. REMOVE & DISCARD PATCH WITHIN 12 HOURS OR AS DIRECTED BY MD, Disp: 30 each, Rfl: 3  •  lisinopril (PRINIVIL,ZESTRIL) 10 MG tablet, TAKE TWO TABLETS BY MOUTH ONCE DAILY, Disp: 180 tablet, Rfl: 4  •  loratadine (Claritin) 10 MG tablet, Take 1 tablet by mouth Daily., Disp: 30 tablet, Rfl: 11  •  naproxen (NAPROSYN) 500 MG tablet, Take 1 tablet by mouth 2 (Two) Times a Day With Meals., Disp: 60 tablet, Rfl: 0  •  pantoprazole (PROTONIX) 20 MG EC tablet, Take 1 tablet by mouth Daily., Disp: 30 tablet, Rfl: 11  •  pregabalin (LYRICA) 50 MG capsule, TAKE ONE CAPSULE BY MOUTH 3 TIMES DAILY, Disp: 90 capsule, Rfl: 2    Allergies:  No Known Allergies    Family Hx:  Family History   Problem Relation Age of Onset   • COPD Mother    • Hypertension Mother    • Diabetes type II Mother    • Cancer Father    • Heart failure Father         Social History:  Social History     Socioeconomic History   • Marital status:    Tobacco Use   • Smoking status: Former Smoker     Packs/day: 1.00     Years: 25.00     Pack years: 25.00     Types: Pipe     Start date: 10/10/1972     Quit date: 10/31/2019     Years since quittin.6   • Smokeless tobacco: Current User     Types: Chew   Substance and Sexual Activity   • Alcohol use: Yes      "Alcohol/week: 2.0 standard drinks     Types: 2 Cans of beer per week     Comment: Prior heavy drinker   • Drug use: No   • Sexual activity: Yes     Partners: Female       Review of Systems  Review of Systems   Gastrointestinal: Diarrhea: left knee.   Musculoskeletal: Positive for arthralgias (left knee pain ) and joint swelling.       Objective:     /80   Pulse 91   Ht 170.2 cm (67\")   Wt 90.7 kg (200 lb)   SpO2 98%   BMI 31.32 kg/m²   Physical Exam  Constitutional:       General: He is not in acute distress.     Appearance: Normal appearance. He is normal weight. He is not ill-appearing, toxic-appearing or diaphoretic.   Skin:     Comments: Significant left knee effusion   Neurological:      Mental Status: He is alert.          Assessment/Plan:     Diagnoses and all orders for this visit:    1. Chondromalacia, left knee (Primary)    Patient has prior MRI of left knee demonstrating grade 3 chondromalacia.  He also has widening of quadriceps tendon consistent with a a chronic thickness tear.  He has been taking naproxen 500 mg for this as well as using Voltaren gel in the past. Patient has significant effusion of left knee that will likely require drainage.     Per up-to-date, patellofemoral pain is among the most common etiologies of knee pain in the general population.  Comment associated overuse.  Treatment involves physical therapy focused upon improving strength of the knee, hip, and core muscles such as hip abductors and quadriceps and patient would greatly benefit from this due to chronic tear of quadriceps.  Per the literature, no evidence to support long-term use of NSAID treatments.  Surgical interventions are usually only considered in rare cases where nonoperative therapies have failed.    Patient declines physical therapy.     I will send patient to sports medicine to get his effusion drained and for further management of chondromalacia.     -     Ambulatory Referral to Sports Medicine    2. " Effusion of left knee joint  See above     -     Ambulatory Referral to Sports Medicine       Follow-up:     Return if symptoms worsen or fail to improve.    Preventative:  Health Maintenance   Topic Date Due   • LUNG CANCER SCREENING  Never done   • ZOSTER VACCINE (2 of 2) 12/05/2017   • COVID-19 Vaccine (3 - Booster for Pfizer series) 09/13/2021   • Pneumococcal Vaccine 65+ (1 - PCV) Never done   • AAA SCREEN (ONE-TIME)  Never done   • INFLUENZA VACCINE  10/01/2022   • ANNUAL WELLNESS VISIT  05/19/2023   • COLORECTAL CANCER SCREENING  10/10/2024   • TDAP/TD VACCINES (3 - Td or Tdap) 10/10/2027   • HEPATITIS C SCREENING  Completed     Alcohol use:  reports current alcohol use of about 2.0 standard drinks of alcohol per week.  Nicotine status  reports that he quit smoking about 2 years ago. His smoking use included pipe. He started smoking about 49 years ago. He has a 25.00 pack-year smoking history. His smokeless tobacco use includes chew.     Goals     •  Better Compliance with HTN Medications (pt-stated)       Barriers: None        •  Medication management       Patient has been having trouble with medication compliance.  Goal now is to take medications everyday at the same time.    Barriers: patient has not been on medications consistently for sometime.  Has trouble with remembering to take medications.                RISK SCORE: 4      This document has been electronically signed by Van Urbina MD on June 29, 2022 13:40 CDT

## 2022-11-14 ENCOUNTER — LAB (OUTPATIENT)
Dept: LAB | Facility: HOSPITAL | Age: 67
End: 2022-11-14

## 2022-11-14 ENCOUNTER — OFFICE VISIT (OUTPATIENT)
Dept: FAMILY MEDICINE CLINIC | Facility: CLINIC | Age: 67
End: 2022-11-14

## 2022-11-14 VITALS
DIASTOLIC BLOOD PRESSURE: 80 MMHG | BODY MASS INDEX: 27.99 KG/M2 | WEIGHT: 178.3 LBS | HEART RATE: 92 BPM | TEMPERATURE: 97.1 F | HEIGHT: 67 IN | SYSTOLIC BLOOD PRESSURE: 120 MMHG | OXYGEN SATURATION: 98 %

## 2022-11-14 DIAGNOSIS — R29.6 FALLS FREQUENTLY: ICD-10-CM

## 2022-11-14 DIAGNOSIS — G25.2 RESTING TREMOR: ICD-10-CM

## 2022-11-14 DIAGNOSIS — R29.6 FALLS FREQUENTLY: Primary | ICD-10-CM

## 2022-11-14 PROCEDURE — 82977 ASSAY OF GGT: CPT | Performed by: STUDENT IN AN ORGANIZED HEALTH CARE EDUCATION/TRAINING PROGRAM

## 2022-11-14 PROCEDURE — 85025 COMPLETE CBC W/AUTO DIFF WBC: CPT

## 2022-11-14 PROCEDURE — 82607 VITAMIN B-12: CPT

## 2022-11-14 PROCEDURE — 82247 BILIRUBIN TOTAL: CPT | Performed by: STUDENT IN AN ORGANIZED HEALTH CARE EDUCATION/TRAINING PROGRAM

## 2022-11-14 PROCEDURE — 82746 ASSAY OF FOLIC ACID SERUM: CPT

## 2022-11-14 PROCEDURE — 82248 BILIRUBIN DIRECT: CPT | Performed by: STUDENT IN AN ORGANIZED HEALTH CARE EDUCATION/TRAINING PROGRAM

## 2022-11-14 PROCEDURE — 36415 COLL VENOUS BLD VENIPUNCTURE: CPT

## 2022-11-14 PROCEDURE — 99213 OFFICE O/P EST LOW 20 MIN: CPT | Performed by: STUDENT IN AN ORGANIZED HEALTH CARE EDUCATION/TRAINING PROGRAM

## 2022-11-14 PROCEDURE — 80053 COMPREHEN METABOLIC PANEL: CPT
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
## 2022-11-15 ENCOUNTER — TELEPHONE (OUTPATIENT)
Dept: FAMILY MEDICINE CLINIC | Facility: CLINIC | Age: 67
End: 2022-11-15

## 2022-11-15 DIAGNOSIS — R74.8 ALKALINE PHOSPHATASE ELEVATION: Primary | ICD-10-CM

## 2022-11-15 DIAGNOSIS — E80.6 HYPERBILIRUBINEMIA: Primary | ICD-10-CM

## 2022-11-15 DIAGNOSIS — E80.6 HYPERBILIRUBINEMIA: ICD-10-CM

## 2022-11-15 DIAGNOSIS — K83.1 BILIARY OBSTRUCTION: Primary | ICD-10-CM

## 2022-11-15 DIAGNOSIS — R74.8 ALKALINE PHOSPHATASE ELEVATION: ICD-10-CM

## 2022-11-15 DIAGNOSIS — R74.01 TRANSAMINITIS: Primary | ICD-10-CM

## 2022-11-15 LAB
ALBUMIN SERPL-MCNC: 3.4 G/DL (ref 3.5–5.2)
ALBUMIN/GLOB SERPL: 0.9 G/DL
ALP SERPL-CCNC: 1218 U/L (ref 39–117)
ALT SERPL W P-5'-P-CCNC: 302 U/L (ref 1–41)
ANION GAP SERPL CALCULATED.3IONS-SCNC: 13.4 MMOL/L (ref 5–15)
AST SERPL-CCNC: 404 U/L (ref 1–40)
BASOPHILS # BLD AUTO: 0.04 10*3/MM3 (ref 0–0.2)
BASOPHILS NFR BLD AUTO: 0.6 % (ref 0–1.5)
BILIRUB CONJ SERPL-MCNC: 8 MG/DL (ref 0–0.3)
BILIRUB INDIRECT SERPL-MCNC: 3.2 MG/DL
BILIRUB SERPL-MCNC: 11.2 MG/DL (ref 0–1.2)
BILIRUB SERPL-MCNC: 11.4 MG/DL (ref 0–1.2)
BUN SERPL-MCNC: 10 MG/DL (ref 8–23)
BUN/CREAT SERPL: 11.9 (ref 7–25)
CALCIUM SPEC-SCNC: 9.5 MG/DL (ref 8.6–10.5)
CHLORIDE SERPL-SCNC: 95 MMOL/L (ref 98–107)
CO2 SERPL-SCNC: 24.6 MMOL/L (ref 22–29)
CREAT SERPL-MCNC: 0.84 MG/DL (ref 0.76–1.27)
DEPRECATED RDW RBC AUTO: 48.7 FL (ref 37–54)
EGFRCR SERPLBLD CKD-EPI 2021: 96.8 ML/MIN/1.73
EOSINOPHIL # BLD AUTO: 0.09 10*3/MM3 (ref 0–0.4)
EOSINOPHIL NFR BLD AUTO: 1.3 % (ref 0.3–6.2)
ERYTHROCYTE [DISTWIDTH] IN BLOOD BY AUTOMATED COUNT: 13.8 % (ref 12.3–15.4)
FOLATE SERPL-MCNC: 8.06 NG/ML (ref 4.78–24.2)
GGT SERPL-CCNC: 2732 U/L (ref 8–61)
GLOBULIN UR ELPH-MCNC: 3.6 GM/DL
GLUCOSE SERPL-MCNC: 85 MG/DL (ref 65–99)
HCT VFR BLD AUTO: 33.7 % (ref 37.5–51)
HGB BLD-MCNC: 11.7 G/DL (ref 13–17.7)
IMM GRANULOCYTES # BLD AUTO: 0.08 10*3/MM3 (ref 0–0.05)
IMM GRANULOCYTES NFR BLD AUTO: 1.1 % (ref 0–0.5)
LYMPHOCYTES # BLD AUTO: 0.77 10*3/MM3 (ref 0.7–3.1)
LYMPHOCYTES NFR BLD AUTO: 10.8 % (ref 19.6–45.3)
MCH RBC QN AUTO: 34 PG (ref 26.6–33)
MCHC RBC AUTO-ENTMCNC: 34.7 G/DL (ref 31.5–35.7)
MCV RBC AUTO: 98 FL (ref 79–97)
MONOCYTES # BLD AUTO: 0.62 10*3/MM3 (ref 0.1–0.9)
MONOCYTES NFR BLD AUTO: 8.7 % (ref 5–12)
NEUTROPHILS NFR BLD AUTO: 5.55 10*3/MM3 (ref 1.7–7)
NEUTROPHILS NFR BLD AUTO: 77.5 % (ref 42.7–76)
NRBC BLD AUTO-RTO: 0 /100 WBC (ref 0–0.2)
PLATELET # BLD AUTO: 245 10*3/MM3 (ref 140–450)
PMV BLD AUTO: 11.4 FL (ref 6–12)
POTASSIUM SERPL-SCNC: 4.5 MMOL/L (ref 3.5–5.2)
PROT SERPL-MCNC: 7 G/DL (ref 6–8.5)
RBC # BLD AUTO: 3.44 10*6/MM3 (ref 4.14–5.8)
SODIUM SERPL-SCNC: 133 MMOL/L (ref 136–145)
VIT B12 BLD-MCNC: 1069 PG/ML (ref 211–946)
WBC NRBC COR # BLD: 7.15 10*3/MM3 (ref 3.4–10.8)

## 2022-11-15 NOTE — PROGRESS NOTES
The add-on labs I requested earlier today have no resulted. Direct bilirubin is significantly elevated at 8, suggesting biliary obstruction rather than hemolysis. GGT is extraordinarily high at 2,732, confirming hepatic source of alkaline phosphatase elevation noted in my previous result note.     STAT CT abdomen and pelvis required to characterize nature of biliary obstruction with suspicion for malignancy. (This has been ordered.) Urgent referral to GI has been initiated as we work to get this.     ?  This document has been electronically signed by Van Urbina MD on November 15, 2022 12:38 CST

## 2022-11-15 NOTE — PROGRESS NOTES
"I tried to contact this patient to go over his lab results.  Unfortunately, he did not answer and I received a message that says \"depression he called, does not have voicemail.\"    Mr. Baker has significant increase in his alkaline phosphatase compared to 2 years ago when it was 51.  Yesterday it was 1218.  His total bilirubin is also significantly increased at 11.4.  AST and ALT are significantly elevated as well at 302 and 404 respectively.  Previously these were within normal limits.    Obtaining gamma GGT given elevated alk phos.  Obtaining acute hepatitis panel and tylenol level.  Ordering ammonia due to history of falls.  Ordering STAT CT abdomen and pelvis to further characterize intra-abdominal process with suspicion for obstruction and very possibly malignancy.  Ordering repeat hepatic function panel for 2 weeks.  Have alerted  to get patient back for a much sooner appointment. Have alerted referral coordinator in regards to CT and will personally speak to insurance company if needed for pre-cert. Have called lab to get add-on labs added to the labwork we collected yesterday, and they are working on this.     New appointment set up for next week. We are still working to contact patient. He may very well need GI involvement in his course.     ?  This document has been electronically signed by Van Urbina MD on November 15, 2022 10:01 CST      "

## 2022-11-15 NOTE — TELEPHONE ENCOUNTER
Attempted to call pt in reference to ct referral. Got recording no voice mail & the phone disconnected
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
## 2022-11-17 ENCOUNTER — HOSPITAL ENCOUNTER (OUTPATIENT)
Dept: CT IMAGING | Facility: HOSPITAL | Age: 67
Discharge: HOME | End: 2022-11-17

## 2022-11-17 ENCOUNTER — OFFICE VISIT (OUTPATIENT)
Dept: FAMILY MEDICINE CLINIC | Facility: CLINIC | Age: 67
End: 2022-11-17

## 2022-11-17 ENCOUNTER — LAB (OUTPATIENT)
Dept: LAB | Facility: HOSPITAL | Age: 67
End: 2022-11-17

## 2022-11-17 VITALS
DIASTOLIC BLOOD PRESSURE: 70 MMHG | BODY MASS INDEX: 28.2 KG/M2 | TEMPERATURE: 97.5 F | WEIGHT: 179.7 LBS | HEIGHT: 67 IN | SYSTOLIC BLOOD PRESSURE: 120 MMHG | OXYGEN SATURATION: 99 % | HEART RATE: 88 BPM

## 2022-11-17 DIAGNOSIS — R17 JAUNDICE: ICD-10-CM

## 2022-11-17 DIAGNOSIS — E80.6 HYPERBILIRUBINEMIA: ICD-10-CM

## 2022-11-17 DIAGNOSIS — R29.6 FREQUENT FALLS: ICD-10-CM

## 2022-11-17 DIAGNOSIS — K83.1 BILIARY OBSTRUCTION: Primary | ICD-10-CM

## 2022-11-17 DIAGNOSIS — R74.01 TRANSAMINITIS: ICD-10-CM

## 2022-11-17 LAB — AMMONIA BLD-SCNC: 106 UMOL/L (ref 16–60)

## 2022-11-17 PROCEDURE — 82248 BILIRUBIN DIRECT: CPT

## 2022-11-17 PROCEDURE — 82140 ASSAY OF AMMONIA: CPT

## 2022-11-17 PROCEDURE — 82247 BILIRUBIN TOTAL: CPT

## 2022-11-17 PROCEDURE — 36415 COLL VENOUS BLD VENIPUNCTURE: CPT

## 2022-11-17 PROCEDURE — 80076 HEPATIC FUNCTION PANEL: CPT

## 2022-11-17 PROCEDURE — 74177 CT ABD & PELVIS W/CONTRAST: CPT

## 2022-11-17 PROCEDURE — 25010000002 IOPAMIDOL 61 % SOLUTION: Performed by: STUDENT IN AN ORGANIZED HEALTH CARE EDUCATION/TRAINING PROGRAM

## 2022-11-17 PROCEDURE — 80074 ACUTE HEPATITIS PANEL: CPT

## 2022-11-17 PROCEDURE — 80143 DRUG ASSAY ACETAMINOPHEN: CPT

## 2022-11-17 PROCEDURE — 99213 OFFICE O/P EST LOW 20 MIN: CPT | Performed by: STUDENT IN AN ORGANIZED HEALTH CARE EDUCATION/TRAINING PROGRAM

## 2022-11-17 RX ADMIN — IOPAMIDOL 90 ML: 612 INJECTION, SOLUTION INTRAVENOUS at 12:58

## 2022-11-18 ENCOUNTER — OFFICE VISIT (OUTPATIENT)
Dept: GASTROENTEROLOGY | Facility: CLINIC | Age: 67
End: 2022-11-18

## 2022-11-18 ENCOUNTER — OFFICE VISIT (OUTPATIENT)
Dept: FAMILY MEDICINE CLINIC | Facility: CLINIC | Age: 67
End: 2022-11-18

## 2022-11-18 ENCOUNTER — HOSPITAL ENCOUNTER (OUTPATIENT)
Dept: MRI IMAGING | Facility: HOSPITAL | Age: 67
Discharge: HOME | End: 2022-11-18
Admitting: FAMILY MEDICINE

## 2022-11-18 VITALS
HEIGHT: 67 IN | SYSTOLIC BLOOD PRESSURE: 142 MMHG | WEIGHT: 182.2 LBS | DIASTOLIC BLOOD PRESSURE: 86 MMHG | HEART RATE: 85 BPM | BODY MASS INDEX: 28.6 KG/M2

## 2022-11-18 DIAGNOSIS — K83.1 BILIARY OBSTRUCTION: ICD-10-CM

## 2022-11-18 DIAGNOSIS — K83.1 BILIARY OBSTRUCTION: Primary | ICD-10-CM

## 2022-11-18 DIAGNOSIS — K83.1 OBSTRUCTIVE JAUNDICE: Primary | ICD-10-CM

## 2022-11-18 LAB
ALBUMIN SERPL-MCNC: 3.6 G/DL (ref 3.5–5.2)
ALP SERPL-CCNC: 1393 U/L (ref 39–117)
ALT SERPL W P-5'-P-CCNC: 293 U/L (ref 1–41)
APAP SERPL-MCNC: <5 MCG/ML (ref 0–30)
AST SERPL-CCNC: 385 U/L (ref 1–40)
BILIRUB CONJ SERPL-MCNC: 9.9 MG/DL (ref 0–0.3)
BILIRUB CONJ SERPL-MCNC: >10 MG/DL (ref 0–0.3)
BILIRUB INDIRECT SERPL-MCNC: 3.1 MG/DL
BILIRUB INDIRECT SERPL-MCNC: ABNORMAL MG/DL
BILIRUB SERPL-MCNC: 13 MG/DL (ref 0–1.2)
BILIRUB SERPL-MCNC: 13.1 MG/DL (ref 0–1.2)
HAV IGM SERPL QL IA: NORMAL
HBV CORE IGM SERPL QL IA: NORMAL
HBV SURFACE AG SERPL QL IA: NORMAL
HCV AB SER DONR QL: NORMAL
PROT SERPL-MCNC: 6.9 G/DL (ref 6–8.5)

## 2022-11-18 PROCEDURE — A9579 GAD-BASE MR CONTRAST NOS,1ML: HCPCS | Performed by: STUDENT IN AN ORGANIZED HEALTH CARE EDUCATION/TRAINING PROGRAM

## 2022-11-18 PROCEDURE — 99213 OFFICE O/P EST LOW 20 MIN: CPT | Performed by: STUDENT IN AN ORGANIZED HEALTH CARE EDUCATION/TRAINING PROGRAM

## 2022-11-18 PROCEDURE — 99204 OFFICE O/P NEW MOD 45 MIN: CPT | Performed by: INTERNAL MEDICINE

## 2022-11-18 PROCEDURE — 25010000002 GADOTERIDOL PER 1 ML: Performed by: STUDENT IN AN ORGANIZED HEALTH CARE EDUCATION/TRAINING PROGRAM

## 2022-11-18 PROCEDURE — 74183 MRI ABD W/O CNTR FLWD CNTR: CPT

## 2022-11-18 RX ORDER — DEXTROSE AND SODIUM CHLORIDE 5; .45 G/100ML; G/100ML
30 INJECTION, SOLUTION INTRAVENOUS CONTINUOUS PRN
Status: CANCELLED | OUTPATIENT
Start: 2022-11-22

## 2022-11-18 RX ORDER — SODIUM CHLORIDE 9 MG/ML
40 INJECTION, SOLUTION INTRAVENOUS AS NEEDED
Status: CANCELLED | OUTPATIENT
Start: 2022-11-18

## 2022-11-18 RX ORDER — LEVOFLOXACIN 500 MG/1
500 TABLET, FILM COATED ORAL DAILY
Qty: 10 TABLET | Refills: 0 | Status: SHIPPED | OUTPATIENT
Start: 2022-11-18 | End: 2022-11-28

## 2022-11-18 RX ADMIN — GADOTERIDOL 18 ML: 279.3 INJECTION, SOLUTION INTRAVENOUS at 07:39

## 2022-11-18 NOTE — PROGRESS NOTES
[facility]      Chief Complaint:   Chief Complaint   Patient presents with   • Jaundice     Mrcp follow up  ref Dr. Bishop       Subjective     HPI:   Mr. Baker is a 67-year-old  male with past medical history of asthma, hypertension, restrictive surgery with bone graft, GERD presenting for evaluation for new onset jaundice and weight loss.  He reports 28 pound weight loss in the past 3 months with anorexia.  Denies abdominal pain, nausea, vomiting, diarrhea, constipation, rectal bleeding or melena.  Also denied NSAID usage.  Noted to have jaundice and subsequent MRCP was consistent with CBD lesion raising the possibility of cholangiocarcinoma.  Most recent bilirubin was 13.1 and direct bilirubin was over10.  He also has elevation of alk phos and GGT.    Past Medical History:   Past Medical History:   Diagnosis Date   • Asthma     Previously diagnosed and previously prescribed inhaler medications.     • Essential hypertension    • Gastroesophageal reflux disease        Past Surgical History:  Past Surgical History:   Procedure Laterality Date   • BONE GRAFT     • WRIST FRACTURE SURGERY Bilateral        Family History:  Family History   Problem Relation Age of Onset   • COPD Mother    • Hypertension Mother    • Diabetes type II Mother    • Cancer Father    • Heart failure Father        Social History:   reports that he quit smoking about 3 years ago. His smoking use included pipe and cigarettes. He started smoking about 50 years ago. He has a 25.00 pack-year smoking history. His smokeless tobacco use includes chew. He reports current alcohol use of about 2.0 standard drinks per week. He reports that he does not use drugs.    Medications:   Prior to Admission medications    Medication Sig Start Date End Date Taking? Authorizing Provider   albuterol sulfate  (90 Base) MCG/ACT inhaler Inhale 2 puffs Every 4 (Four) Hours As Needed for Wheezing or Shortness of Air. 1/31/22  Yes  Elizabeth Fried MD   atorvastatin (LIPITOR) 20 MG tablet TAKE ONE TABLET BY MOUTH ONCE DAILY 5/31/22  Yes Van Urbina MD   Cyanocobalamin (B-12) 1000 MCG tablet TAKE 1 TABLET BY MOUTH DAILY 3/14/22  Yes Harika Morris MD   cyclobenzaprine (FLEXERIL) 10 MG tablet Take 1 tablet by mouth 3 (Three) Times a Day As Needed for Muscle Spasms. 12/22/21  Yes Harika Morris MD   Diclofenac Sodium (VOLTAREN) 1 % gel gel Apply 4 g topically to the appropriate area as directed 4 (Four) Times a Day As Needed (pain). 12/22/21  Yes Harika Morris MD   fluticasone (FLONASE) 50 MCG/ACT nasal spray 2 sprays by Each Nare route Daily. 3/16/22  Yes Harika Morris MD   lidocaine (LIDODERM) 5 % PLACE 1 PATCH ON THE SKIN AS DIRECTED BY PROVIDER DAILY. REMOVE & DISCARD PATCH WITHIN 12 HOURS OR AS DIRECTED BY MD 5/31/22  Yes Van Urbina MD   lisinopril (PRINIVIL,ZESTRIL) 10 MG tablet TAKE TWO TABLETS BY MOUTH ONCE DAILY 3/31/22  Yes Harika Morris MD   loratadine (Claritin) 10 MG tablet Take 1 tablet by mouth Daily. 12/22/21  Yes Harika Morris MD   naproxen (NAPROSYN) 500 MG tablet Take 1 tablet by mouth 2 (Two) Times a Day With Meals. 12/22/21  Yes Harika Morris MD   pantoprazole (PROTONIX) 20 MG EC tablet Take 1 tablet by mouth Daily. 12/22/21  Yes Harika Morris MD   pregabalin (LYRICA) 50 MG capsule TAKE ONE CAPSULE BY MOUTH 3 TIMES DAILY 12/9/21  Yes Harika Morris MD   levoFLOXacin (Levaquin) 500 MG tablet Take 1 tablet by mouth Daily for 10 days. 11/18/22 11/28/22  Nabila Worthy MD       Allergies:  Patient has no known allergies.    ROS:    Review of Systems   Constitutional: Positive for appetite change and unexpected weight change. Negative for chills, fatigue and fever.   HENT: Negative for congestion, ear discharge, hearing loss, nosebleeds and sore throat.    Eyes: Negative for pain, discharge and redness.   Respiratory: Negative for cough, chest tightness, shortness of breath and  "wheezing.    Cardiovascular: Negative for chest pain and palpitations.   Gastrointestinal: Negative for abdominal distention, abdominal pain, blood in stool, constipation, diarrhea, nausea and vomiting.   Endocrine: Negative for cold intolerance, polydipsia, polyphagia and polyuria.   Genitourinary: Negative for dysuria, flank pain, frequency, hematuria and urgency.   Musculoskeletal: Negative for arthralgias, back pain, joint swelling and myalgias.   Skin: Negative for color change, pallor and rash.   Neurological: Negative for tremors, seizures, syncope, weakness and headaches.   Hematological: Negative for adenopathy. Does not bruise/bleed easily.   Psychiatric/Behavioral: Negative for behavioral problems, confusion, dysphoric mood, hallucinations and suicidal ideas. The patient is not nervous/anxious.      Objective     /86 (BP Location: Left arm)   Pulse 85   Ht 170.2 cm (67\")   Wt 82.6 kg (182 lb 3.2 oz)   BMI 28.54 kg/m²     Physical Exam  Constitutional:       Appearance: He is well-developed.   HENT:      Head: Normocephalic and atraumatic.   Eyes:      General: Scleral icterus present.      Conjunctiva/sclera: Conjunctivae normal.      Pupils: Pupils are equal, round, and reactive to light.   Neck:      Thyroid: No thyromegaly.   Cardiovascular:      Rate and Rhythm: Normal rate and regular rhythm.      Heart sounds: Normal heart sounds. No murmur heard.  Pulmonary:      Effort: Pulmonary effort is normal.      Breath sounds: Normal breath sounds. No wheezing.   Abdominal:      General: Bowel sounds are normal. There is no distension.      Palpations: Abdomen is soft. There is no mass.      Tenderness: There is no abdominal tenderness.      Hernia: No hernia is present.   Genitourinary:     Comments: No lesions noted  Musculoskeletal:         General: No tenderness. Normal range of motion.      Cervical back: Normal range of motion and neck supple.   Lymphadenopathy:      Cervical: No cervical " adenopathy.   Skin:     General: Skin is warm and dry.      Findings: No rash.   Neurological:      Mental Status: He is alert and oriented to person, place, and time.      Cranial Nerves: No cranial nerve deficit.   Psychiatric:         Thought Content: Thought content normal.        Extremities: No edema, cyanosis or clubbing.    Assessment & Plan    1.  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  Could also be due to choledocholithiasis.  Add Levaquin 500 mg p.o. daily.  Proceed with ERCP for further evaluation management.  2.  High BMI with recent weight loss, add p.o. nutritional supplements.  3.  Alcohol usage, recommend cessation.  Diagnoses and all orders for this visit:    1. Obstructive jaundice (Primary)  -     Case Request; Standing  -     sodium chloride 0.9 % infusion 40 mL  -     dextrose 5 % and sodium chloride 0.45 % infusion  -     Case Request    Other orders  -     Follow Anesthesia Guidelines / Protocol; Future  -     Obtain Informed Consent; Future  -     Implement Anesthesia Orders Day of Procedure; Standing  -     Obtain Informed Consent; Standing  -     POC Glucose Once; Standing  -     Insert Peripheral IV; Standing  -     levoFLOXacin (Levaquin) 500 MG tablet; Take 1 tablet by mouth Daily for 10 days.  Dispense: 10 tablet; Refill: 0        ENDOSCOPIC RETROGRADE CHOLANGIOPANCREATOGRAPHY (N/A)     Diagnosis Plan   1. Obstructive jaundice  Case Request    sodium chloride 0.9 % infusion 40 mL    dextrose 5 % and sodium chloride 0.45 % infusion    Case Request          Anticipated Surgical Procedure:  Orders Placed This Encounter   Procedures   • Obtain Informed Consent     Standing Status:   Future     Order Specific Question:   Informed Consent Given For     Answer:   ERCP       The risks, benefits, and alternatives of this procedure have been discussed with the patient or the responsible party- the patient understands and agrees to proceed.            This  document has been electronically signed by Nabila Worthy MD on November 18, 2022 14:21 CST
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
## 2022-11-18 NOTE — PROGRESS NOTES
I have reviewed the notes, assessments, and/or procedures performed by Van Urbina MD during office visit. I concur with her/his documentation and assessment and plan for Jian Baker.          This document has been electronically signed by Elizabeth Fried MD on November 18, 2022 14:50 CST

## 2022-11-18 NOTE — H&P (VIEW-ONLY)
[facility]      Chief Complaint:   Chief Complaint   Patient presents with   • Jaundice     Mrcp follow up  ref Dr. Bishop       Subjective     HPI:   Mr. Baker is a 67-year-old  male with past medical history of asthma, hypertension, restrictive surgery with bone graft, GERD presenting for evaluation for new onset jaundice and weight loss.  He reports 28 pound weight loss in the past 3 months with anorexia.  Denies abdominal pain, nausea, vomiting, diarrhea, constipation, rectal bleeding or melena.  Also denied NSAID usage.  Noted to have jaundice and subsequent MRCP was consistent with CBD lesion raising the possibility of cholangiocarcinoma.  Most recent bilirubin was 13.1 and direct bilirubin was over10.  He also has elevation of alk phos and GGT.    Past Medical History:   Past Medical History:   Diagnosis Date   • Asthma     Previously diagnosed and previously prescribed inhaler medications.     • Essential hypertension    • Gastroesophageal reflux disease        Past Surgical History:  Past Surgical History:   Procedure Laterality Date   • BONE GRAFT     • WRIST FRACTURE SURGERY Bilateral        Family History:  Family History   Problem Relation Age of Onset   • COPD Mother    • Hypertension Mother    • Diabetes type II Mother    • Cancer Father    • Heart failure Father        Social History:   reports that he quit smoking about 3 years ago. His smoking use included pipe and cigarettes. He started smoking about 50 years ago. He has a 25.00 pack-year smoking history. His smokeless tobacco use includes chew. He reports current alcohol use of about 2.0 standard drinks per week. He reports that he does not use drugs.    Medications:   Prior to Admission medications    Medication Sig Start Date End Date Taking? Authorizing Provider   albuterol sulfate  (90 Base) MCG/ACT inhaler Inhale 2 puffs Every 4 (Four) Hours As Needed for Wheezing or Shortness of Air. 1/31/22  Yes  Elizabeth Fried MD   atorvastatin (LIPITOR) 20 MG tablet TAKE ONE TABLET BY MOUTH ONCE DAILY 5/31/22  Yes Van Urbina MD   Cyanocobalamin (B-12) 1000 MCG tablet TAKE 1 TABLET BY MOUTH DAILY 3/14/22  Yes Harika Morris MD   cyclobenzaprine (FLEXERIL) 10 MG tablet Take 1 tablet by mouth 3 (Three) Times a Day As Needed for Muscle Spasms. 12/22/21  Yes Harika Morris MD   Diclofenac Sodium (VOLTAREN) 1 % gel gel Apply 4 g topically to the appropriate area as directed 4 (Four) Times a Day As Needed (pain). 12/22/21  Yes Hairka Morris MD   fluticasone (FLONASE) 50 MCG/ACT nasal spray 2 sprays by Each Nare route Daily. 3/16/22  Yes Harika Morris MD   lidocaine (LIDODERM) 5 % PLACE 1 PATCH ON THE SKIN AS DIRECTED BY PROVIDER DAILY. REMOVE & DISCARD PATCH WITHIN 12 HOURS OR AS DIRECTED BY MD 5/31/22  Yes Van Urbina MD   lisinopril (PRINIVIL,ZESTRIL) 10 MG tablet TAKE TWO TABLETS BY MOUTH ONCE DAILY 3/31/22  Yes Harika Morris MD   loratadine (Claritin) 10 MG tablet Take 1 tablet by mouth Daily. 12/22/21  Yes Harika Morris MD   naproxen (NAPROSYN) 500 MG tablet Take 1 tablet by mouth 2 (Two) Times a Day With Meals. 12/22/21  Yes Harika Morris MD   pantoprazole (PROTONIX) 20 MG EC tablet Take 1 tablet by mouth Daily. 12/22/21  Yes Harika Morris MD   pregabalin (LYRICA) 50 MG capsule TAKE ONE CAPSULE BY MOUTH 3 TIMES DAILY 12/9/21  Yes Harika Morris MD   levoFLOXacin (Levaquin) 500 MG tablet Take 1 tablet by mouth Daily for 10 days. 11/18/22 11/28/22  Nabila Worthy MD       Allergies:  Patient has no known allergies.    ROS:    Review of Systems   Constitutional: Positive for appetite change and unexpected weight change. Negative for chills, fatigue and fever.   HENT: Negative for congestion, ear discharge, hearing loss, nosebleeds and sore throat.    Eyes: Negative for pain, discharge and redness.   Respiratory: Negative for cough, chest tightness, shortness of breath and  "wheezing.    Cardiovascular: Negative for chest pain and palpitations.   Gastrointestinal: Negative for abdominal distention, abdominal pain, blood in stool, constipation, diarrhea, nausea and vomiting.   Endocrine: Negative for cold intolerance, polydipsia, polyphagia and polyuria.   Genitourinary: Negative for dysuria, flank pain, frequency, hematuria and urgency.   Musculoskeletal: Negative for arthralgias, back pain, joint swelling and myalgias.   Skin: Negative for color change, pallor and rash.   Neurological: Negative for tremors, seizures, syncope, weakness and headaches.   Hematological: Negative for adenopathy. Does not bruise/bleed easily.   Psychiatric/Behavioral: Negative for behavioral problems, confusion, dysphoric mood, hallucinations and suicidal ideas. The patient is not nervous/anxious.      Objective     /86 (BP Location: Left arm)   Pulse 85   Ht 170.2 cm (67\")   Wt 82.6 kg (182 lb 3.2 oz)   BMI 28.54 kg/m²     Physical Exam  Constitutional:       Appearance: He is well-developed.   HENT:      Head: Normocephalic and atraumatic.   Eyes:      General: Scleral icterus present.      Conjunctiva/sclera: Conjunctivae normal.      Pupils: Pupils are equal, round, and reactive to light.   Neck:      Thyroid: No thyromegaly.   Cardiovascular:      Rate and Rhythm: Normal rate and regular rhythm.      Heart sounds: Normal heart sounds. No murmur heard.  Pulmonary:      Effort: Pulmonary effort is normal.      Breath sounds: Normal breath sounds. No wheezing.   Abdominal:      General: Bowel sounds are normal. There is no distension.      Palpations: Abdomen is soft. There is no mass.      Tenderness: There is no abdominal tenderness.      Hernia: No hernia is present.   Genitourinary:     Comments: No lesions noted  Musculoskeletal:         General: No tenderness. Normal range of motion.      Cervical back: Normal range of motion and neck supple.   Lymphadenopathy:      Cervical: No cervical " adenopathy.   Skin:     General: Skin is warm and dry.      Findings: No rash.   Neurological:      Mental Status: He is alert and oriented to person, place, and time.      Cranial Nerves: No cranial nerve deficit.   Psychiatric:         Thought Content: Thought content normal.        Extremities: No edema, cyanosis or clubbing.    Assessment & Plan    1.  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  Could also be due to choledocholithiasis.  Add Levaquin 500 mg p.o. daily.  Proceed with ERCP for further evaluation management.  2.  High BMI with recent weight loss, add p.o. nutritional supplements.  3.  Alcohol usage, recommend cessation.  Diagnoses and all orders for this visit:    1. Obstructive jaundice (Primary)  -     Case Request; Standing  -     sodium chloride 0.9 % infusion 40 mL  -     dextrose 5 % and sodium chloride 0.45 % infusion  -     Case Request    Other orders  -     Follow Anesthesia Guidelines / Protocol; Future  -     Obtain Informed Consent; Future  -     Implement Anesthesia Orders Day of Procedure; Standing  -     Obtain Informed Consent; Standing  -     POC Glucose Once; Standing  -     Insert Peripheral IV; Standing  -     levoFLOXacin (Levaquin) 500 MG tablet; Take 1 tablet by mouth Daily for 10 days.  Dispense: 10 tablet; Refill: 0        ENDOSCOPIC RETROGRADE CHOLANGIOPANCREATOGRAPHY (N/A)     Diagnosis Plan   1. Obstructive jaundice  Case Request    sodium chloride 0.9 % infusion 40 mL    dextrose 5 % and sodium chloride 0.45 % infusion    Case Request          Anticipated Surgical Procedure:  Orders Placed This Encounter   Procedures   • Obtain Informed Consent     Standing Status:   Future     Order Specific Question:   Informed Consent Given For     Answer:   ERCP       The risks, benefits, and alternatives of this procedure have been discussed with the patient or the responsible party- the patient understands and agrees to proceed.            This  document has been electronically signed by Nabila Worthy MD on November 18, 2022 14:21 CST

## 2022-11-21 ENCOUNTER — APPOINTMENT (OUTPATIENT)
Dept: CT IMAGING | Facility: HOSPITAL | Age: 67
End: 2022-11-21

## 2022-11-21 ENCOUNTER — TELEPHONE (OUTPATIENT)
Dept: FAMILY MEDICINE CLINIC | Facility: CLINIC | Age: 67
End: 2022-11-21

## 2022-11-22 ENCOUNTER — TELEPHONE (OUTPATIENT)
Dept: GENERAL RADIOLOGY | Facility: HOSPITAL | Age: 67
End: 2022-11-22

## 2022-11-22 ENCOUNTER — HOSPITAL ENCOUNTER (OUTPATIENT)
Facility: HOSPITAL | Age: 67
Setting detail: HOSPITAL OUTPATIENT SURGERY
Discharge: HOME | End: 2022-11-22
Attending: INTERNAL MEDICINE | Admitting: INTERNAL MEDICINE

## 2022-11-22 ENCOUNTER — ANESTHESIA EVENT (OUTPATIENT)
Dept: GASTROENTEROLOGY | Facility: HOSPITAL | Age: 67
End: 2022-11-22

## 2022-11-22 ENCOUNTER — ANESTHESIA (OUTPATIENT)
Dept: GASTROENTEROLOGY | Facility: HOSPITAL | Age: 67
End: 2022-11-22

## 2022-11-22 ENCOUNTER — APPOINTMENT (OUTPATIENT)
Dept: GENERAL RADIOLOGY | Facility: HOSPITAL | Age: 67
End: 2022-11-22

## 2022-11-22 VITALS
DIASTOLIC BLOOD PRESSURE: 84 MMHG | TEMPERATURE: 97.8 F | BODY MASS INDEX: 27.47 KG/M2 | OXYGEN SATURATION: 98 % | SYSTOLIC BLOOD PRESSURE: 140 MMHG | WEIGHT: 175 LBS | RESPIRATION RATE: 18 BRPM | HEART RATE: 80 BPM | HEIGHT: 67 IN

## 2022-11-22 DIAGNOSIS — K83.1 BILIARY OBSTRUCTION: Primary | ICD-10-CM

## 2022-11-22 DIAGNOSIS — K83.1 OBSTRUCTIVE JAUNDICE: ICD-10-CM

## 2022-11-22 PROCEDURE — 82378 CARCINOEMBRYONIC ANTIGEN: CPT | Performed by: STUDENT IN AN ORGANIZED HEALTH CARE EDUCATION/TRAINING PROGRAM

## 2022-11-22 PROCEDURE — 25010000002 SUCCINYLCHOLINE PER 20 MG: Performed by: NURSE ANESTHETIST, CERTIFIED REGISTERED

## 2022-11-22 PROCEDURE — 43235 EGD DIAGNOSTIC BRUSH WASH: CPT | Performed by: INTERNAL MEDICINE

## 2022-11-22 PROCEDURE — 25010000002 ONDANSETRON PER 1 MG: Performed by: NURSE ANESTHETIST, CERTIFIED REGISTERED

## 2022-11-22 PROCEDURE — 25010000002 FENTANYL CITRATE PF 50 MCG/ML SOLUTION PREFILLED SYRINGE: Performed by: NURSE ANESTHETIST, CERTIFIED REGISTERED

## 2022-11-22 PROCEDURE — 25010000002 DEXAMETHASONE SODIUM PHOSPHATE 10 MG/ML SOLUTION: Performed by: NURSE ANESTHETIST, CERTIFIED REGISTERED

## 2022-11-22 PROCEDURE — 25010000002 MIDAZOLAM PER 1 MG: Performed by: NURSE ANESTHETIST, CERTIFIED REGISTERED

## 2022-11-22 PROCEDURE — 25010000002 PROPOFOL 200 MG/20ML EMULSION: Performed by: NURSE ANESTHETIST, CERTIFIED REGISTERED

## 2022-11-22 RX ORDER — PROMETHAZINE HYDROCHLORIDE 25 MG/1
25 SUPPOSITORY RECTAL ONCE AS NEEDED
Status: DISCONTINUED | OUTPATIENT
Start: 2022-11-22 | End: 2022-11-23 | Stop reason: HOSPADM

## 2022-11-22 RX ORDER — ONDANSETRON 2 MG/ML
INJECTION INTRAMUSCULAR; INTRAVENOUS AS NEEDED
Status: DISCONTINUED | OUTPATIENT
Start: 2022-11-22 | End: 2022-11-22 | Stop reason: SURG

## 2022-11-22 RX ORDER — ALBUTEROL SULFATE 2.5 MG/3ML
2.5 SOLUTION RESPIRATORY (INHALATION) ONCE AS NEEDED
Status: DISCONTINUED | OUTPATIENT
Start: 2022-11-22 | End: 2022-11-23 | Stop reason: HOSPADM

## 2022-11-22 RX ORDER — DEXAMETHASONE SODIUM PHOSPHATE 10 MG/ML
INJECTION, SOLUTION INTRAMUSCULAR; INTRAVENOUS AS NEEDED
Status: DISCONTINUED | OUTPATIENT
Start: 2022-11-22 | End: 2022-11-22 | Stop reason: SURG

## 2022-11-22 RX ORDER — ROCURONIUM BROMIDE 10 MG/ML
INJECTION, SOLUTION INTRAVENOUS AS NEEDED
Status: DISCONTINUED | OUTPATIENT
Start: 2022-11-22 | End: 2022-11-22 | Stop reason: SURG

## 2022-11-22 RX ORDER — MIDAZOLAM HYDROCHLORIDE 1 MG/ML
INJECTION INTRAMUSCULAR; INTRAVENOUS AS NEEDED
Status: DISCONTINUED | OUTPATIENT
Start: 2022-11-22 | End: 2022-11-22 | Stop reason: SURG

## 2022-11-22 RX ORDER — DIPHENHYDRAMINE HYDROCHLORIDE 50 MG/ML
12.5 INJECTION INTRAMUSCULAR; INTRAVENOUS
Status: DISCONTINUED | OUTPATIENT
Start: 2022-11-22 | End: 2022-11-23 | Stop reason: HOSPADM

## 2022-11-22 RX ORDER — FENTANYL CITRATE 50 UG/ML
INJECTION, SOLUTION INTRAMUSCULAR; INTRAVENOUS AS NEEDED
Status: DISCONTINUED | OUTPATIENT
Start: 2022-11-22 | End: 2022-11-22 | Stop reason: SURG

## 2022-11-22 RX ORDER — ONDANSETRON 2 MG/ML
4 INJECTION INTRAMUSCULAR; INTRAVENOUS ONCE AS NEEDED
Status: DISCONTINUED | OUTPATIENT
Start: 2022-11-22 | End: 2022-11-23 | Stop reason: HOSPADM

## 2022-11-22 RX ORDER — NALOXONE HCL 0.4 MG/ML
0.4 VIAL (ML) INJECTION AS NEEDED
Status: DISCONTINUED | OUTPATIENT
Start: 2022-11-22 | End: 2022-11-23 | Stop reason: HOSPADM

## 2022-11-22 RX ORDER — SUCCINYLCHOLINE CHLORIDE 20 MG/ML
INJECTION INTRAMUSCULAR; INTRAVENOUS AS NEEDED
Status: DISCONTINUED | OUTPATIENT
Start: 2022-11-22 | End: 2022-11-22 | Stop reason: SURG

## 2022-11-22 RX ORDER — PROMETHAZINE HYDROCHLORIDE 25 MG/1
25 TABLET ORAL ONCE AS NEEDED
Status: DISCONTINUED | OUTPATIENT
Start: 2022-11-22 | End: 2022-11-23 | Stop reason: HOSPADM

## 2022-11-22 RX ORDER — ACETAMINOPHEN 325 MG/1
650 TABLET ORAL ONCE AS NEEDED
Status: DISCONTINUED | OUTPATIENT
Start: 2022-11-22 | End: 2022-11-23 | Stop reason: HOSPADM

## 2022-11-22 RX ORDER — DEXTROSE AND SODIUM CHLORIDE 5; .45 G/100ML; G/100ML
30 INJECTION, SOLUTION INTRAVENOUS CONTINUOUS PRN
Status: DISCONTINUED | OUTPATIENT
Start: 2022-11-22 | End: 2022-11-23 | Stop reason: HOSPADM

## 2022-11-22 RX ORDER — FLUMAZENIL 0.1 MG/ML
0.2 INJECTION INTRAVENOUS AS NEEDED
Status: DISCONTINUED | OUTPATIENT
Start: 2022-11-22 | End: 2022-11-23 | Stop reason: HOSPADM

## 2022-11-22 RX ORDER — EPHEDRINE SULFATE 50 MG/ML
5 INJECTION, SOLUTION INTRAVENOUS ONCE AS NEEDED
Status: DISCONTINUED | OUTPATIENT
Start: 2022-11-22 | End: 2022-11-23 | Stop reason: HOSPADM

## 2022-11-22 RX ORDER — ACETAMINOPHEN 650 MG/1
650 SUPPOSITORY RECTAL ONCE AS NEEDED
Status: DISCONTINUED | OUTPATIENT
Start: 2022-11-22 | End: 2022-11-23 | Stop reason: HOSPADM

## 2022-11-22 RX ORDER — PROPOFOL 10 MG/ML
INJECTION, EMULSION INTRAVENOUS AS NEEDED
Status: DISCONTINUED | OUTPATIENT
Start: 2022-11-22 | End: 2022-11-22 | Stop reason: SURG

## 2022-11-22 RX ORDER — LIDOCAINE HYDROCHLORIDE 20 MG/ML
INJECTION, SOLUTION EPIDURAL; INFILTRATION; INTRACAUDAL; PERINEURAL AS NEEDED
Status: DISCONTINUED | OUTPATIENT
Start: 2022-11-22 | End: 2022-11-22 | Stop reason: SURG

## 2022-11-22 RX ADMIN — LIDOCAINE HYDROCHLORIDE 80 MG: 20 INJECTION, SOLUTION EPIDURAL; INFILTRATION; INTRACAUDAL; PERINEURAL at 16:51

## 2022-11-22 RX ADMIN — DEXAMETHASONE SODIUM PHOSPHATE 8 MG: 10 INJECTION, SOLUTION INTRAMUSCULAR; INTRAVENOUS at 17:07

## 2022-11-22 RX ADMIN — PROPOFOL 140 MG: 10 INJECTION, EMULSION INTRAVENOUS at 16:51

## 2022-11-22 RX ADMIN — FENTANYL CITRATE 25 MCG: 50 INJECTION, SOLUTION INTRAMUSCULAR; INTRAVENOUS at 17:21

## 2022-11-22 RX ADMIN — ONDANSETRON 4 MG: 2 INJECTION INTRAMUSCULAR; INTRAVENOUS at 17:07

## 2022-11-22 RX ADMIN — FENTANYL CITRATE 25 MCG: 50 INJECTION, SOLUTION INTRAMUSCULAR; INTRAVENOUS at 16:51

## 2022-11-22 RX ADMIN — ROCURONIUM BROMIDE 5 MG: 10 INJECTION INTRAVENOUS at 16:51

## 2022-11-22 RX ADMIN — SUCCINYLCHOLINE CHLORIDE 140 MG: 20 INJECTION, SOLUTION INTRAMUSCULAR; INTRAVENOUS at 16:52

## 2022-11-22 RX ADMIN — DEXTROSE AND SODIUM CHLORIDE 30 ML/HR: 5; 450 INJECTION, SOLUTION INTRAVENOUS at 15:54

## 2022-11-22 RX ADMIN — MIDAZOLAM HYDROCHLORIDE 2 MG: 2 INJECTION, SOLUTION INTRAMUSCULAR; INTRAVENOUS at 16:47

## 2022-11-22 NOTE — ANESTHESIA PROCEDURE NOTES
Airway  Urgency: elective    Date/Time: 11/22/2022 4:53 PM  Airway not difficult    General Information and Staff    Patient location during procedure: OR  CRNA/CAA: Carl Britton, ROSELINE    Indications and Patient Condition  Indications for airway management: airway protection    Preoxygenated: yes  MILS not maintained throughout  Mask difficulty assessment: 2 - vent by mask + OA or adjuvant +/- NMBA    Final Airway Details  Final airway type: endotracheal airway      Successful airway: ETT  Cuffed: yes   Successful intubation technique: direct laryngoscopy  Facilitating devices/methods: intubating stylet  Endotracheal tube insertion site: oral  Blade: Shankar  Blade size: 3  ETT size (mm): 7.5  Cormack-Lehane Classification: grade I - full view of glottis  Placement verified by: chest auscultation and capnometry   Measured from: lips  ETT/EBT  to lips (cm): 23  Number of attempts at approach: 1  Assessment: lips, teeth, and gum same as pre-op and atraumatic intubation    Additional Comments  Pt has zero movement or extention of the neck.

## 2022-11-22 NOTE — ANESTHESIA PREPROCEDURE EVALUATION
Anesthesia Evaluation     Patient summary reviewed and Nursing notes reviewed   NPO Solid Status: > 8 hours  NPO Liquid Status: > 8 hours           Airway   Mallampati: II  TM distance: >3 FB  Neck ROM: full  Possible difficult intubation  Dental    (+) poor dentition    Pulmonary - normal exam   (+) a smoker Former, asthma,  Cardiovascular - normal exam    (+) hypertension,       Neuro/Psych  (+) numbness,    GI/Hepatic/Renal/Endo    (+)  GERD,      ROS Comment: Obstructive jaundice    Musculoskeletal     (+) chronic pain,       ROS comment: Bilateral sciatica  Abdominal  - normal exam   Substance History   (+) alcohol use (Yes; 2.0 standard drinks per week),      OB/GYN          Other   arthritis,                    Anesthesia Plan    ASA 3     general   total IV anesthesia  intravenous induction     Anesthetic plan, risks, benefits, and alternatives have been provided, discussed and informed consent has been obtained with: patient.        CODE STATUS:

## 2022-11-22 NOTE — H&P
Patient has had worsening jaundice and increased total and direct bilirubin. MRCP showed mass consistent with possible cholangiocarcinoma. GI was consulted for ERCP but ERCP was unable to be performed due to anatomic difficulties. On advice of GI Dr. Worthy and Radiologist Dr. Irby, will refer patient to Dr. Rashawn Pollard of Surgery in [facility] for percutaneous transhepatic cholangiography for purposes of biopsy to confirm diagnosis and if patient will require surgery. Have placed this today as urgent referral.       This document has been electronically signed by Van Urbina MD on November 22, 2022 17:41 CST

## 2022-11-23 ENCOUNTER — TELEPHONE (OUTPATIENT)
Dept: FAMILY MEDICINE CLINIC | Facility: CLINIC | Age: 67
End: 2022-11-23

## 2022-11-23 ENCOUNTER — HOSPITAL ENCOUNTER (EMERGENCY)
Facility: HOSPITAL | Age: 67
End: 2022-11-23

## 2022-11-23 ENCOUNTER — APPOINTMENT (OUTPATIENT)
Dept: MRI IMAGING | Facility: HOSPITAL | Age: 67
End: 2022-11-23

## 2022-11-23 ENCOUNTER — APPOINTMENT (OUTPATIENT)
Dept: GENERAL RADIOLOGY | Facility: HOSPITAL | Age: 67
End: 2022-11-23

## 2022-11-23 ENCOUNTER — HOSPITAL ENCOUNTER (OUTPATIENT)
Facility: HOSPITAL | Age: 67
Discharge: HOME | End: 2022-11-23
Attending: HOSPITALIST | Admitting: HOSPITALIST

## 2022-11-23 VITALS
HEART RATE: 106 BPM | HEIGHT: 67 IN | DIASTOLIC BLOOD PRESSURE: 82 MMHG | WEIGHT: 181.5 LBS | RESPIRATION RATE: 20 BRPM | BODY MASS INDEX: 28.49 KG/M2 | TEMPERATURE: 98.3 F | OXYGEN SATURATION: 97 % | SYSTOLIC BLOOD PRESSURE: 137 MMHG

## 2022-11-23 VITALS
HEIGHT: 67 IN | WEIGHT: 185.7 LBS | DIASTOLIC BLOOD PRESSURE: 87 MMHG | OXYGEN SATURATION: 98 % | RESPIRATION RATE: 22 BRPM | TEMPERATURE: 99 F | HEART RATE: 109 BPM | BODY MASS INDEX: 29.15 KG/M2 | SYSTOLIC BLOOD PRESSURE: 140 MMHG

## 2022-11-23 PROBLEM — R74.01 TRANSAMINITIS: Status: ACTIVE | Noted: 2022-11-23

## 2022-11-23 PROBLEM — Z91.81 HISTORY OF FALL: Status: ACTIVE | Noted: 2022-11-23

## 2022-11-23 PROBLEM — I10 ESSENTIAL HYPERTENSION: Status: ACTIVE | Noted: 2022-11-23

## 2022-11-23 PROBLEM — R13.19 OTHER DYSPHAGIA: Status: ACTIVE | Noted: 2022-11-23

## 2022-11-23 LAB
ALBUMIN SERPL-MCNC: 3.5 G/DL (ref 3.5–5.2)
ALBUMIN/GLOB SERPL: 1 G/DL
ALP SERPL-CCNC: 1182 U/L (ref 39–117)
ALT SERPL W P-5'-P-CCNC: 170 U/L (ref 1–41)
AMMONIA BLD-SCNC: 21 UMOL/L (ref 16–60)
ANION GAP SERPL CALCULATED.3IONS-SCNC: 14 MMOL/L (ref 5–15)
AST SERPL-CCNC: 147 U/L (ref 1–40)
BASOPHILS # BLD AUTO: 0.02 10*3/MM3 (ref 0–0.2)
BASOPHILS NFR BLD AUTO: 0.2 % (ref 0–1.5)
BILIRUB CONJ SERPL-MCNC: 6.2 MG/DL (ref 0–0.3)
BILIRUB SERPL-MCNC: 9.3 MG/DL (ref 0–1.2)
BUN SERPL-MCNC: 10 MG/DL (ref 8–23)
BUN/CREAT SERPL: 13 (ref 7–25)
CALCIUM SPEC-SCNC: 9.4 MG/DL (ref 8.6–10.5)
CEA SERPL-MCNC: 2.92 NG/ML
CHLORIDE SERPL-SCNC: 97 MMOL/L (ref 98–107)
CO2 SERPL-SCNC: 22 MMOL/L (ref 22–29)
CREAT SERPL-MCNC: 0.77 MG/DL (ref 0.76–1.27)
DEPRECATED RDW RBC AUTO: 50.3 FL (ref 37–54)
EGFRCR SERPLBLD CKD-EPI 2021: 99.4 ML/MIN/1.73
EOSINOPHIL # BLD AUTO: 0 10*3/MM3 (ref 0–0.4)
EOSINOPHIL NFR BLD AUTO: 0 % (ref 0.3–6.2)
ERYTHROCYTE [DISTWIDTH] IN BLOOD BY AUTOMATED COUNT: 13.4 % (ref 12.3–15.4)
GLOBULIN UR ELPH-MCNC: 3.6 GM/DL
GLUCOSE SERPL-MCNC: 115 MG/DL (ref 65–99)
HCT VFR BLD AUTO: 32.5 % (ref 37.5–51)
HGB BLD-MCNC: 11.1 G/DL (ref 13–17.7)
IMM GRANULOCYTES # BLD AUTO: 0.15 10*3/MM3 (ref 0–0.05)
IMM GRANULOCYTES NFR BLD AUTO: 1.1 % (ref 0–0.5)
INR PPP: 0.93 (ref 0.8–1.2)
LYMPHOCYTES # BLD AUTO: 0.72 10*3/MM3 (ref 0.7–3.1)
LYMPHOCYTES NFR BLD AUTO: 5.5 % (ref 19.6–45.3)
MCH RBC QN AUTO: 34.6 PG (ref 26.6–33)
MCHC RBC AUTO-ENTMCNC: 34.2 G/DL (ref 31.5–35.7)
MCV RBC AUTO: 101.2 FL (ref 79–97)
MONOCYTES # BLD AUTO: 0.66 10*3/MM3 (ref 0.1–0.9)
MONOCYTES NFR BLD AUTO: 5 % (ref 5–12)
NEUTROPHILS NFR BLD AUTO: 11.58 10*3/MM3 (ref 1.7–7)
NEUTROPHILS NFR BLD AUTO: 88.2 % (ref 42.7–76)
NRBC BLD AUTO-RTO: 0 /100 WBC (ref 0–0.2)
PLATELET # BLD AUTO: 313 10*3/MM3 (ref 140–450)
PMV BLD AUTO: 10.2 FL (ref 6–12)
POTASSIUM SERPL-SCNC: 3.9 MMOL/L (ref 3.5–5.2)
PROT SERPL-MCNC: 7.1 G/DL (ref 6–8.5)
PROTHROMBIN TIME: 12.4 SECONDS (ref 11.1–15.3)
RBC # BLD AUTO: 3.21 10*6/MM3 (ref 4.14–5.8)
SODIUM SERPL-SCNC: 133 MMOL/L (ref 136–145)
WBC NRBC COR # BLD: 13.13 10*3/MM3 (ref 3.4–10.8)

## 2022-11-23 PROCEDURE — 63710000001 LEVOFLOXACIN 500 MG TABLET: Performed by: STUDENT IN AN ORGANIZED HEALTH CARE EDUCATION/TRAINING PROGRAM

## 2022-11-23 PROCEDURE — 63710000001 PANTOPRAZOLE 20 MG TABLET DELAYED-RELEASE: Performed by: STUDENT IN AN ORGANIZED HEALTH CARE EDUCATION/TRAINING PROGRAM

## 2022-11-23 PROCEDURE — G0378 HOSPITAL OBSERVATION PER HR: HCPCS

## 2022-11-23 PROCEDURE — A9579 GAD-BASE MR CONTRAST NOS,1ML: HCPCS | Performed by: STUDENT IN AN ORGANIZED HEALTH CARE EDUCATION/TRAINING PROGRAM

## 2022-11-23 PROCEDURE — 82140 ASSAY OF AMMONIA: CPT | Performed by: STUDENT IN AN ORGANIZED HEALTH CARE EDUCATION/TRAINING PROGRAM

## 2022-11-23 PROCEDURE — 63710000001 LISINOPRIL 20 MG TABLET: Performed by: STUDENT IN AN ORGANIZED HEALTH CARE EDUCATION/TRAINING PROGRAM

## 2022-11-23 PROCEDURE — 82248 BILIRUBIN DIRECT: CPT | Performed by: STUDENT IN AN ORGANIZED HEALTH CARE EDUCATION/TRAINING PROGRAM

## 2022-11-23 PROCEDURE — A9270 NON-COVERED ITEM OR SERVICE: HCPCS | Performed by: STUDENT IN AN ORGANIZED HEALTH CARE EDUCATION/TRAINING PROGRAM

## 2022-11-23 PROCEDURE — 80053 COMPREHEN METABOLIC PANEL: CPT | Performed by: STUDENT IN AN ORGANIZED HEALTH CARE EDUCATION/TRAINING PROGRAM

## 2022-11-23 PROCEDURE — 72040 X-RAY EXAM NECK SPINE 2-3 VW: CPT

## 2022-11-23 PROCEDURE — 85610 PROTHROMBIN TIME: CPT | Performed by: STUDENT IN AN ORGANIZED HEALTH CARE EDUCATION/TRAINING PROGRAM

## 2022-11-23 PROCEDURE — 25010000002 GADOTERIDOL PER 1 ML: Performed by: STUDENT IN AN ORGANIZED HEALTH CARE EDUCATION/TRAINING PROGRAM

## 2022-11-23 PROCEDURE — 70553 MRI BRAIN STEM W/O & W/DYE: CPT

## 2022-11-23 PROCEDURE — 85025 COMPLETE CBC W/AUTO DIFF WBC: CPT | Performed by: STUDENT IN AN ORGANIZED HEALTH CARE EDUCATION/TRAINING PROGRAM

## 2022-11-23 PROCEDURE — 63710000001 PREGABALIN 50 MG CAPSULE: Performed by: STUDENT IN AN ORGANIZED HEALTH CARE EDUCATION/TRAINING PROGRAM

## 2022-11-23 RX ORDER — LEVOFLOXACIN 500 MG/1
500 TABLET, FILM COATED ORAL DAILY
Status: DISCONTINUED | OUTPATIENT
Start: 2022-11-23 | End: 2022-11-23 | Stop reason: HOSPADM

## 2022-11-23 RX ORDER — SODIUM CHLORIDE 0.9 % (FLUSH) 0.9 %
10 SYRINGE (ML) INJECTION EVERY 12 HOURS SCHEDULED
Status: DISCONTINUED | OUTPATIENT
Start: 2022-11-23 | End: 2022-11-23 | Stop reason: HOSPADM

## 2022-11-23 RX ORDER — SODIUM CHLORIDE 9 MG/ML
40 INJECTION, SOLUTION INTRAVENOUS AS NEEDED
Start: 2022-11-23 | End: 2022-12-07

## 2022-11-23 RX ORDER — CYCLOBENZAPRINE HCL 10 MG
10 TABLET ORAL 3 TIMES DAILY PRN
Status: DISCONTINUED | OUTPATIENT
Start: 2022-11-23 | End: 2022-11-23 | Stop reason: HOSPADM

## 2022-11-23 RX ORDER — SODIUM CHLORIDE 0.9 % (FLUSH) 0.9 %
10 SYRINGE (ML) INJECTION AS NEEDED
Status: DISCONTINUED | OUTPATIENT
Start: 2022-11-23 | End: 2022-11-23 | Stop reason: HOSPADM

## 2022-11-23 RX ORDER — PREGABALIN 50 MG/1
50 CAPSULE ORAL 3 TIMES DAILY
Status: DISCONTINUED | OUTPATIENT
Start: 2022-11-23 | End: 2022-11-23 | Stop reason: HOSPADM

## 2022-11-23 RX ORDER — SODIUM CHLORIDE 9 MG/ML
40 INJECTION, SOLUTION INTRAVENOUS AS NEEDED
Status: DISCONTINUED | OUTPATIENT
Start: 2022-11-23 | End: 2022-11-23 | Stop reason: HOSPADM

## 2022-11-23 RX ORDER — PANTOPRAZOLE SODIUM 20 MG/1
20 TABLET, DELAYED RELEASE ORAL DAILY
Status: DISCONTINUED | OUTPATIENT
Start: 2022-11-23 | End: 2022-11-23 | Stop reason: HOSPADM

## 2022-11-23 RX ORDER — LISINOPRIL 20 MG/1
20 TABLET ORAL DAILY
Status: DISCONTINUED | OUTPATIENT
Start: 2022-11-23 | End: 2022-11-23 | Stop reason: HOSPADM

## 2022-11-23 RX ADMIN — LISINOPRIL 20 MG: 20 TABLET ORAL at 16:09

## 2022-11-23 RX ADMIN — PREGABALIN 50 MG: 50 CAPSULE ORAL at 16:10

## 2022-11-23 RX ADMIN — LEVOFLOXACIN 500 MG: 500 TABLET, FILM COATED ORAL at 16:09

## 2022-11-23 RX ADMIN — GADOTERIDOL 18 ML: 279.3 INJECTION, SOLUTION INTRAVENOUS at 15:05

## 2022-11-23 RX ADMIN — Medication 10 ML: at 16:09

## 2022-11-23 RX ADMIN — PANTOPRAZOLE 20 MG: 20 TABLET, DELAYED RELEASE ORAL at 16:10

## 2022-11-30 DIAGNOSIS — C22.1 CHOLANGIOCARCINOMA: Primary | ICD-10-CM

## 2022-12-01 ENCOUNTER — OFFICE VISIT (OUTPATIENT)
Dept: FAMILY MEDICINE CLINIC | Facility: CLINIC | Age: 67
End: 2022-12-01

## 2022-12-01 VITALS
SYSTOLIC BLOOD PRESSURE: 140 MMHG | DIASTOLIC BLOOD PRESSURE: 80 MMHG | HEART RATE: 104 BPM | BODY MASS INDEX: 27.61 KG/M2 | OXYGEN SATURATION: 100 % | WEIGHT: 175.9 LBS | TEMPERATURE: 96.6 F | HEIGHT: 67 IN

## 2022-12-01 DIAGNOSIS — C24.9 MALIGNANT TUMOR OF BILIARY TRACT: Primary | ICD-10-CM

## 2022-12-01 DIAGNOSIS — Z09 HOSPITAL DISCHARGE FOLLOW-UP: ICD-10-CM

## 2022-12-01 PROCEDURE — 99213 OFFICE O/P EST LOW 20 MIN: CPT | Performed by: STUDENT IN AN ORGANIZED HEALTH CARE EDUCATION/TRAINING PROGRAM

## 2022-12-01 NOTE — PROGRESS NOTES
Family Medicine Residency  Van Urbina MD    Subjective:     Jian Baker is a 67 y.o. male who presents for hospital discharge follow up.     I saw Mr. Baker in clinic on 11/14. His laboratory workup, detailed in my previous notes, was concerning for biliary tract obstruction with elevated total and direct bilirubin with concerning transaminase levels and alkaline phosphatase elevation. Imaging was sought, and MRCP demonstrated concern for potential cholangiocarcinoma. GI was brought in to perform ERCP to relieve the obstruction and obtain tissue sample for diagnosis; however, this could not be obtained due to extrinsic compression of the patient's esophagus.     Based on this, I discussed the case with Biliary Surgeon Dr. Rashawn Pollard, who accepted the patient at [facility] to the Surgical Oncology service. Mr. Baker was admitted to our facility at [facility] and then transferred to [facility] on 11/13. He was discharged yesterday and has follow up with me today.     I have reviewed the discharged summary from [facility] signed by Biliary Surgeon Dr. Barboza. At [place], the patient twice received percutaneous transhepatic biliary drainage; his drains were clamped to drain into the small bowel. A brush biopsy was performed; pathology was pended at the time of discharge. Additionally, specialized imaging was performed. The most pertinent of these was a three phase abdominal CT that demonstrated the 3.7 x 3.0 x 3.8 cm mass, which involves the haptic hilum, central liver, gallbladder, and is inseparable from the right hepatic artery and the anterior branch of the right portal vein. Dr. Varner noted in his attestation in the progress note on the day of discharge that the tumor was unresectable.     One change to his medicine was made by the team at [facility]: the prophylactic Levaquin started by our GI physician in advance his stent placement was discontinued at discharge.     The  "Surgical Oncology team has asked the patient to call for chills, shortness of breath/dyspnea, fever, dizziness/light headedness, nausea/vomiting, signs of infection, and severe/abdominal pain. They have instructed him not to  a car and not to take a tub bath or take part in heavy lifting. I reviewed these today with the patient as well and instructed him to call me if he develops any of these symptoms.    Today, Mr. Baker tells me Surgical Oncology informed him that his tumor was not resectable. His follow up to discuss these results is on Tuesday of next week. Surgery has also stated patient may require ENT evaluation of neck anatomy as he would likely require EUS for additional evaluation of his mass; I have read that brush biopsy may sometimes not yield conclusive results.      As we await referral to Oncology Social Work, I spoke with our  Rusty Drummond to see if there are options for transport to [place] for patient to have his appointment.    According to Cholangiocarcinoma: A compact review of the literature, <5% of cases survive for five years. In cases of unresectable disease, the recommended approach is supportive care, including biliary drainage when needed. Per the literature, palliative treatments (there are palliative radiation and chemotherapy options) are associated with <18 months of survival. \"Biliary tract sepsis, liver failure and/or cancer cachexia and malnutrition are the most important causes of death associated with these tumors.\"     I have provided Mr. Baker a referral to Palliative Care and have discussed his case with Stefanie Wilde. I am providing an urgent referral to our Oncologist; if his Surgical Oncologist in [place] believes he will benefit from palliative chemotherapy and/or radiation, he will need an oncologist here. He will also need Oncology Social Work services for transportation, as he and his wife live alone and she does not have a licence and cannot " drive, and Surgical Oncology has asked him not to drive. Given his prognosis and depending on his wishes, he may very well ultimately benefit from Hospice services. I have spoken with the hospice office about him as well.     I will see Mr. Baker back on Wednesday, the day after his appointment with Surgical Oncology. We will repeat CBC and CMP at that time.     The following portions of the patient's history were reviewed and updated as appropriate: allergies, current medications, past family history, past medical history, past social history, past surgical history and problem list.    Past Medical Hx:  Past Medical History:   Diagnosis Date   • Asthma     Previously diagnosed and previously prescribed inhaler medications.     • Essential hypertension    • Gastroesophageal reflux disease        Past Surgical Hx:  Past Surgical History:   Procedure Laterality Date   • BONE GRAFT     • WRIST FRACTURE SURGERY Bilateral        Current Meds:    Current Outpatient Medications:   •  albuterol sulfate  (90 Base) MCG/ACT inhaler, Inhale 2 puffs Every 4 (Four) Hours As Needed for Wheezing or Shortness of Air., Disp: 18 g, Rfl: 0  •  cyclobenzaprine (FLEXERIL) 10 MG tablet, Take 1 tablet by mouth 3 (Three) Times a Day As Needed for Muscle Spasms., Disp: 60 tablet, Rfl: 11  •  lisinopril (PRINIVIL,ZESTRIL) 10 MG tablet, TAKE TWO TABLETS BY MOUTH ONCE DAILY, Disp: 180 tablet, Rfl: 4  •  pantoprazole (PROTONIX) 20 MG EC tablet, Take 1 tablet by mouth Daily., Disp: 30 tablet, Rfl: 11  •  pregabalin (LYRICA) 50 MG capsule, TAKE ONE CAPSULE BY MOUTH 3 TIMES DAILY, Disp: 90 capsule, Rfl: 2  •  sodium chloride 0.9 % solution, Infuse 40 mL into a venous catheter As Needed (IV)., Disp: , Rfl:     Allergies:  No Known Allergies    Family Hx:  Family History   Problem Relation Age of Onset   • COPD Mother    • Hypertension Mother    • Diabetes type II Mother    • Cancer Father    • Heart failure Father         Social  "History:  Social History     Socioeconomic History   • Marital status:    Tobacco Use   • Smoking status: Former     Packs/day: 1.00     Years: 25.00     Pack years: 25.00     Types: Pipe, Cigarettes     Start date: 10/10/1972     Quit date: 10/31/2019     Years since quitting: 3.0   • Smokeless tobacco: Current     Types: Chew   Vaping Use   • Vaping Use: Never used   Substance and Sexual Activity   • Alcohol use: Yes     Alcohol/week: 2.0 standard drinks     Types: 2 Cans of beer per week     Comment: Prior heavy drinker   • Drug use: No   • Sexual activity: Yes     Partners: Female       Review of Systems  Review of Systems   Constitutional: Negative for chills and fever.   Respiratory: Negative for shortness of breath and wheezing.    Gastrointestinal: Negative for abdominal pain, nausea and vomiting.   Skin: Positive for color change (jaundice ).       Objective:     /80   Pulse 104   Temp 96.6 °F (35.9 °C)   Ht 170.2 cm (67\")   Wt 79.8 kg (175 lb 14.4 oz)   SpO2 100%   BMI 27.55 kg/m²   Physical Exam  Constitutional:       General: He is not in acute distress.     Appearance: Normal appearance. He is ill-appearing. He is not toxic-appearing or diaphoretic.   Eyes:      General: Scleral icterus present.      Extraocular Movements: Extraocular movements intact.   Cardiovascular:      Rate and Rhythm: Normal rate and regular rhythm.   Pulmonary:      Effort: Pulmonary effort is normal. No respiratory distress.      Breath sounds: Normal breath sounds.   Abdominal:      Comments: Biliary drains in place X2.    Skin:     Coloration: Skin is jaundiced.   Neurological:      Mental Status: He is alert.   Psychiatric:         Mood and Affect: Mood normal.         Behavior: Behavior normal.          Assessment/Plan:     Diagnoses and all orders for this visit:    1. 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 Baker in clinic on 11/14. His laboratory workup, detailed in my previous " notes, was concerning for biliary tract obstruction with elevated total and direct bilirubin with concerning transaminase levels and alkaline phosphatase elevation. Imaging was sought, and MRCP demonstrated concern for potential cholangiocarcinoma. GI was brought in to perform ERCP to relieve the obstruction and obtain tissue sample for diagnosis; however, this could not be obtained due to extrinsic compression of the patient's esophagus.     Based on this, I discussed the case with Biliary Surgeon Dr. Rashawn Pollard, who accepted the patient at [facility] to the Surgical Oncology service. Mr. Baker was admitted to our facility at [facility] and then transferred to [facility] on 11/13. He was discharged yesterday and has follow up with me today.     I have reviewed the discharged summary from [facility] signed by Biliary Surgeon Dr. Barboza. At [place], the patient twice received percutaneous transhepatic biliary drainage; his drains were clamped to drain into the small bowel. A brush biopsy was performed; pathology was pended at the time of discharge. Additionally, specialized imaging was performed. The most pertinent of these was a three phase abdominal CT that demonstrated the 3.7 x 3.0 x 3.8 cm mass, which involves the haptic hilum, central liver, gallbladder, and is inseparable from the right hepatic artery and the anterior branch of the right portal vein. Dr. Varner noted in his attestation in the progress note on the day of discharge that the tumor was unresectable.     One change to his medicine was made by the team at [facility]: the prophylactic Levaquin started by our GI physician in advance his stent placement was discontinued at discharge.     The Surgical Oncology team has asked the patient to call for chills, shortness of breath/dyspnea, fever, dizziness/light headedness, nausea/vomiting, signs of infection, and severe/abdominal pain. They have instructed him not to  a car  "and not to take a tub bath or take part in heavy lifting. I reviewed these today with the patient as well and instructed him to call me if he develops any of these symptoms.    Today, Mr. Baekr tells me Surgical Oncology informed him that his tumor was not resectable. His follow up to discuss these results is on Tuesday of next week. Surgery has also stated patient may require ENT evaluation of neck anatomy as he would likely require EUS for additional evaluation of his mass; I have read that brush biopsy may sometimes not yield conclusive results.      As we await referral to Oncology Social Work, I spoke with our  Rusty Drummond to see if there are options for transport to [place] for patient to have his appointment.    According to Cholangiocarcinoma: A compact review of the literature, <5% of cases survive for five years. In cases of unresectable disease, the recommended approach is supportive care, including biliary drainage when needed. Per the literature, palliative treatments (there are palliative radiation and chemotherapy options) are associated with <18 months of survival. \"Biliary tract sepsis, liver failure and/or cancer cachexia and malnutrition are the most important causes of death associated with these tumors.\"     I have provided Mr. Baker a referral to Palliative Care and have discussed his case with Stefanie Wilde. I am providing an urgent referral to our Oncologist; if his Surgical Oncologist in [place] believes he will benefit from palliative chemotherapy and/or radiation, he will need an oncologist here. He will also need Oncology Social Work services for transportation, as he and his wife live alone and she does not have a licence and cannot drive, and Surgical Oncology has asked him not to drive. Given his prognosis and depending on his wishes, he may very well ultimately benefit from Hospice services. I have spoken with the hospice office about him as well.     I will see Mr. " Samuel back on Wednesday, the day after his appointment with Surgical Oncology. We will repeat CBC and CMP at that time.       -     Ambulatory Referral to Hematology / Oncology  -     Ambulatory Referral to ONC Social Work  -     Comprehensive metabolic panel; Future  -     CBC w AUTO Differential; Future    2. Hospital discharge follow-up        · Rx changes:      Follow-up:     Return in 6 days (on 12/7/2022) for Discuss Biopsy Results; Coordination of Care.    Preventative:  Health Maintenance   Topic Date Due   • LUNG CANCER SCREENING  Never done   • ZOSTER VACCINE (2 of 2) 12/05/2017   • COVID-19 Vaccine (3 - Booster for Pfizer series) 06/08/2021   • Pneumococcal Vaccine 65+ (1 - PCV) Never done   • INFLUENZA VACCINE  08/01/2022   • ANNUAL WELLNESS VISIT  05/19/2023   • COLORECTAL CANCER SCREENING  10/10/2024   • TDAP/TD VACCINES (3 - Td or Tdap) 10/10/2027   • HEPATITIS C SCREENING  Completed   • AAA SCREEN (ONE-TIME)  Completed       Alcohol use:  reports current alcohol use of about 2.0 standard drinks per week.  Nicotine status  reports that he quit smoking about 3 years ago. His smoking use included pipe and cigarettes. He started smoking about 50 years ago. He has a 25.00 pack-year smoking history. His smokeless tobacco use includes chew.     Goals     •  Better Compliance with HTN Medications (pt-stated)       Barriers: None      •  Medication management       Patient has been having trouble with medication compliance.  Goal now is to take medications everyday at the same time.    Barriers: patient has not been on medications consistently for sometime.  Has trouble with remembering to take medications.              RISK SCORE: 3      This document has been electronically signed by Van Urbina MD on December 1, 2022 11:10 CST

## 2022-12-02 ENCOUNTER — TELEPHONE (OUTPATIENT)
Dept: ONCOLOGY | Facility: HOSPITAL | Age: 67
End: 2022-12-02

## 2022-12-02 DIAGNOSIS — L76.82 INCISIONAL PAIN: Primary | ICD-10-CM

## 2022-12-02 RX ORDER — MELOXICAM 15 MG/1
15 TABLET ORAL DAILY
Qty: 14 TABLET | Refills: 0 | Status: ON HOLD | OUTPATIENT
Start: 2022-12-02 | End: 2023-02-02

## 2022-12-02 NOTE — TELEPHONE ENCOUNTER
Contacted pt and advised of appt time. PT verbalizes understanding and denies any further questions.

## 2022-12-05 NOTE — PROGRESS NOTES
I have reviewed the notes, assessments, and/or procedures performed by Van Urbina MDduring office visit. I concur with her/his documentation and assessment and plan for Jian Baker.           This document has been electronically signed by Angel Rea MD on December 5, 2022 10:21 CST

## 2022-12-06 ENCOUNTER — LAB (OUTPATIENT)
Dept: LAB | Facility: HOSPITAL | Age: 67
End: 2022-12-06

## 2022-12-06 ENCOUNTER — OFFICE VISIT (OUTPATIENT)
Dept: FAMILY MEDICINE CLINIC | Facility: CLINIC | Age: 67
End: 2022-12-06

## 2022-12-06 VITALS
WEIGHT: 175.8 LBS | DIASTOLIC BLOOD PRESSURE: 80 MMHG | BODY MASS INDEX: 27.59 KG/M2 | HEIGHT: 67 IN | TEMPERATURE: 97.1 F | HEART RATE: 76 BPM | OXYGEN SATURATION: 98 % | SYSTOLIC BLOOD PRESSURE: 140 MMHG

## 2022-12-06 DIAGNOSIS — C24.9 MALIGNANT TUMOR OF BILIARY TRACT: ICD-10-CM

## 2022-12-06 DIAGNOSIS — Z71.89 COORDINATION OF COMPLEX CARE: Primary | ICD-10-CM

## 2022-12-06 PROBLEM — K82.8 GALLBLADDER MASS: Status: ACTIVE | Noted: 2022-12-06

## 2022-12-06 PROCEDURE — 80053 COMPREHEN METABOLIC PANEL: CPT

## 2022-12-06 PROCEDURE — 36415 COLL VENOUS BLD VENIPUNCTURE: CPT

## 2022-12-06 PROCEDURE — 85025 COMPLETE CBC W/AUTO DIFF WBC: CPT

## 2022-12-06 PROCEDURE — 99213 OFFICE O/P EST LOW 20 MIN: CPT | Performed by: STUDENT IN AN ORGANIZED HEALTH CARE EDUCATION/TRAINING PROGRAM

## 2022-12-06 RX ORDER — LISINOPRIL 10 MG/1
10 TABLET ORAL DAILY
COMMUNITY
End: 2022-12-07

## 2022-12-06 RX ORDER — PREGABALIN 50 MG/1
50 CAPSULE ORAL 3 TIMES DAILY
COMMUNITY
End: 2022-12-07

## 2022-12-06 NOTE — PROGRESS NOTES
Family Medicine Residency  Van Urbina MD    Subjective:     Jian Baker is a 67 y.o. male who presents for coordination of care.     I first saw Mr. Baker on 11/14/2022 for a history of frequent falls.  Patient was jaundiced and had history of unexplained weight loss, and laboratory values suggested biliary tract obstruction.  Patient was seen by GI and unfortunately attempted ERCP could not be completed due to extrinsic compression of the esophagus with patient having had prior neck surgery.  Patient was then transferred to [facility] under the care of Dr. Barboza of surgical oncology. Two biliary drains were placed and brush biopsy was obtained. (Please refer to my prior notes for more thorough documentation.)      Mr. Baker was scheduled to follow-up with surgical oncology in [place] today.  However, we were not able to secure transportation for him.  Fortunately, the surgery office was able to set up a virtual visit with the patient today and we conducted this in our office with the patient's wife present.    Dr. Barboza began by going over the patient's history and the history of his hospitalization at [facility]. He noted that the patient's tumor was not surgically resectable. The biopsy results had resulted and he told us that the cells were highly atypical but not definitive of cholangiocarcinoma. Dr. Barboza requested that Mr. Baker be set up with local oncologist -in fact, he has appointment with our oncologist Dr. Sosa tomorrow at 9:30 AM -to determine if the patient will need a repeat biopsy to obtain more cells or if Dr. Sosa the oncologist already has enough information to discuss prognosis and options such as chemotherapy or radiation with Mr. Baker.  Dr. Barboza has tentatively scheduled the patient for a repeat biopsy but told me that if Dr. Sosa did not feel a repeat biopsy was necessary, they would cancel this scheduled procedure, as it would necessitate  patient traveling to [place]. I communicated this with Dr. Sosa's nurse practitioner, Stefanie Wilde, today. Dr. Barboza also requested repeat labs, and we will repeat this today.     As I documented in my last note, surgery asked the patient to call if he develops chills, fever, shortness of breath or worsening dyspnea, dizziness or lightheadedness, nausea or vomiting, signs of infection and severe abdominal pain.  I again reviewed the symptoms with the patient and continue to instruct him to call me if he develops any of the symptoms.    Also of note, SPO2 initially 86% on room air. Increased to 98% on recheck.  Patient did have some wheezing in his lower lung fields bilaterally, worse on the right, but did have overall good aeration.     I will see the patient back in the office in 1 week after his appointment with Oncology.    The following portions of the patient's history were reviewed and updated as appropriate: allergies, current medications, past family history, past medical history, past social history, past surgical history and problem list.    Past Medical Hx:  Past Medical History:   Diagnosis Date   • Asthma     Previously diagnosed and previously prescribed inhaler medications.     • Cancer (HCC)    • Essential hypertension    • Gastroesophageal reflux disease        Past Surgical Hx:  Past Surgical History:   Procedure Laterality Date   • BONE GRAFT     • WRIST FRACTURE SURGERY Bilateral        Current Meds:    Current Outpatient Medications:   •  albuterol sulfate  (90 Base) MCG/ACT inhaler, Inhale 2 puffs Every 4 (Four) Hours As Needed for Wheezing or Shortness of Air., Disp: 18 g, Rfl: 0  •  cyclobenzaprine (FLEXERIL) 10 MG tablet, Take 1 tablet by mouth 3 (Three) Times a Day As Needed for Muscle Spasms., Disp: 60 tablet, Rfl: 11  •  lisinopril (PRINIVIL,ZESTRIL) 10 MG tablet, TAKE TWO TABLETS BY MOUTH ONCE DAILY, Disp: 180 tablet, Rfl: 4  •  lisinopril (PRINIVIL,ZESTRIL) 10 MG tablet,  Take 10 mg by mouth Daily., Disp: , Rfl:   •  meloxicam (Mobic) 15 MG tablet, Take 1 tablet by mouth Daily., Disp: 14 tablet, Rfl: 0  •  pantoprazole (PROTONIX) 20 MG EC tablet, Take 1 tablet by mouth Daily., Disp: 30 tablet, Rfl: 11  •  pregabalin (LYRICA) 50 MG capsule, TAKE ONE CAPSULE BY MOUTH 3 TIMES DAILY, Disp: 90 capsule, Rfl: 2  •  pregabalin (LYRICA) 50 MG capsule, Take 50 mg by mouth 3 (Three) Times a Day., Disp: , Rfl:   •  sodium chloride 0.9 % solution, Infuse 40 mL into a venous catheter As Needed (IV)., Disp: , Rfl:     Allergies:  No Known Allergies    Family Hx:  Family History   Problem Relation Age of Onset   • COPD Mother    • Hypertension Mother    • Diabetes type II Mother    • Cancer Father    • Heart failure Father         Social History:  Social History     Socioeconomic History   • Marital status:    Tobacco Use   • Smoking status: Former     Packs/day: 1.00     Years: 25.00     Pack years: 25.00     Types: Pipe, Cigarettes     Start date: 10/10/1972     Quit date: 10/31/2019     Years since quitting: 3.1   • Smokeless tobacco: Current     Types: Chew   Vaping Use   • Vaping Use: Never used   Substance and Sexual Activity   • Alcohol use: Yes     Alcohol/week: 2.0 standard drinks     Types: 2 Cans of beer per week     Comment: Prior heavy drinker   • Drug use: No   • Sexual activity: Yes     Partners: Female       Review of Systems  Review of Systems   Constitutional: Positive for appetite change (decreased), fatigue (increased) and unexpected weight change (continues to have weight loss). Negative for activity change, chills and fever.   Respiratory: Positive for shortness of breath (intermittent, worse with activity). Negative for apnea, cough, choking, chest tightness, wheezing and stridor.    Cardiovascular: Negative for chest pain, palpitations and leg swelling.   Gastrointestinal: Negative for abdominal distention, abdominal pain, constipation, diarrhea, nausea and vomiting.  "  Skin: Positive for color change (jaundice ).   Neurological: Negative for dizziness, weakness and light-headedness.       Objective:     /80   Pulse 76   Temp 97.1 °F (36.2 °C)   Ht 170.2 cm (67\")   Wt 79.7 kg (175 lb 12.8 oz)   SpO2 98%   BMI 27.53 kg/m²   Physical Exam  Constitutional:       General: He is not in acute distress.     Appearance: He is not toxic-appearing or diaphoretic.   HENT:      Head: Normocephalic.   Eyes:      General: Scleral icterus (improved signficantly ) present.   Cardiovascular:      Rate and Rhythm: Normal rate and regular rhythm.   Pulmonary:      Effort: Pulmonary effort is normal. No respiratory distress.      Breath sounds: Wheezing (expiratory wheeze right lung) present.   Abdominal:      General: Bowel sounds are normal. There is no distension.      Palpations: Abdomen is soft.      Tenderness: There is no abdominal tenderness. There is no guarding.      Comments: Biliary drains in place bilaterally    Musculoskeletal:      Cervical back: Rigidity present.   Skin:     Coloration: Skin is jaundiced (improved with biliary drain ).   Neurological:      Mental Status: He is alert.   Psychiatric:         Mood and Affect: Mood normal.         Behavior: Behavior normal.          Assessment/Plan:     Diagnoses and all orders for this visit:    1. Coordination of complex care (Primary)    I first saw Mr. Baker on 11/14/2022 for a history of frequent falls.  Patient was jaundiced and had history of unexplained weight loss, and laboratory values suggested biliary tract obstruction.  Patient was seen by GI and unfortunately attempted ERCP could not be completed due to extrinsic compression of the esophagus with patient having had prior neck surgery.  Patient was then transferred to [facility] under the care of Dr. Barboza of surgical oncology. Two biliary drains were placed and brush biopsy was obtained. (Please refer to my prior notes for more thorough documentation.)  "     Mr. Baker was scheduled to follow-up with surgical oncology in [place] today.  However, we were not able to secure transportation for him.  Fortunately, the surgery office was able to set up a virtual visit with the patient today and we conducted this in our office with the patient's wife present.    Dr. Barboza began by going over the patient's history and the history of his hospitalization at [facility]. He noted that the patient's tumor was not surgically resectable. The biopsy results had resulted and he told us that the cells were highly atypical but not definitive of cholangiocarcinoma. Dr. Barboza requested that Mr. Baker be set up with local oncologist -in fact, he has appointment with our oncologist Dr. Sosa tomorrow at 9:30 AM -to determine if the patient will need a repeat biopsy to obtain more cells or if Dr. Sosa the oncologist already has enough information to discuss prognosis and options such as chemotherapy or radiation with Mr. Baker.  Dr. Barboza has tentatively scheduled the patient for a repeat biopsy but told me that if Dr. Sosa did not feel a repeat biopsy was necessary, they would cancel this scheduled procedure, as it would necessitate patient traveling to [place]. I communicated this with Dr. Ssoa's nurse practitioner, Stefanie Wilde, today. Dr. Barboza also requested repeat labs, and we will repeat this today.     As I documented in my last note, surgery asked the patient to call if he develops chills, fever, shortness of breath or worsening dyspnea, dizziness or lightheadedness, nausea or vomiting, signs of infection and severe abdominal pain.  I again reviewed the symptoms with the patient and continue to instruct him to call me if he develops any of the symptoms.    I will see the patient back in the office in 1 week after his appointment with Oncology.    2. Malignant tumor of biliary tract (HCC)      Follow-up:     Return in about 1 week (around 12/13/2022)  for Coordination of Care.    Preventative:  Health Maintenance   Topic Date Due   • LUNG CANCER SCREENING  Never done   • ZOSTER VACCINE (2 of 2) 12/05/2017   • COVID-19 Vaccine (3 - Booster for Pfizer series) 06/08/2021   • Pneumococcal Vaccine 65+ (1 - PCV) Never done   • INFLUENZA VACCINE  08/01/2022   • ANNUAL WELLNESS VISIT  05/19/2023   • COLORECTAL CANCER SCREENING  10/10/2024   • TDAP/TD VACCINES (3 - Td or Tdap) 10/10/2027   • HEPATITIS C SCREENING  Completed   • AAA SCREEN (ONE-TIME)  Completed       Alcohol use:  reports current alcohol use of about 2.0 standard drinks per week.  Nicotine status  reports that he quit smoking about 3 years ago. His smoking use included pipe and cigarettes. He started smoking about 50 years ago. He has a 25.00 pack-year smoking history. His smokeless tobacco use includes chew.     Goals     •  Better Compliance with HTN Medications (pt-stated)       Barriers: None      •  Medication management       Patient has been having trouble with medication compliance.  Goal now is to take medications everyday at the same time.    Barriers: patient has not been on medications consistently for sometime.  Has trouble with remembering to take medications.              RISK SCORE: 6      This document has been electronically signed by Van Urbina MD on December 6, 2022 11:54 CST

## 2022-12-07 ENCOUNTER — LAB (OUTPATIENT)
Dept: ONCOLOGY | Facility: HOSPITAL | Age: 67
End: 2022-12-07
Payer: MEDICARE

## 2022-12-07 ENCOUNTER — CONSULT (OUTPATIENT)
Dept: ONCOLOGY | Facility: CLINIC | Age: 67
End: 2022-12-07
Payer: MEDICARE

## 2022-12-07 VITALS
OXYGEN SATURATION: 95 % | HEIGHT: 67 IN | SYSTOLIC BLOOD PRESSURE: 150 MMHG | TEMPERATURE: 97.1 F | WEIGHT: 176.4 LBS | HEART RATE: 91 BPM | DIASTOLIC BLOOD PRESSURE: 92 MMHG | BODY MASS INDEX: 27.69 KG/M2

## 2022-12-07 DIAGNOSIS — K82.8 GALLBLADDER MASS: Primary | ICD-10-CM

## 2022-12-07 DIAGNOSIS — E87.1 HYPONATREMIA: Primary | ICD-10-CM

## 2022-12-07 DIAGNOSIS — G89.3 CANCER ASSOCIATED PAIN: ICD-10-CM

## 2022-12-07 DIAGNOSIS — C24.9 MALIGNANT NEOPLASM OF BILIARY TRACT, UNSPECIFIED: ICD-10-CM

## 2022-12-07 DIAGNOSIS — K82.8 GALLBLADDER MASS: ICD-10-CM

## 2022-12-07 LAB
ALBUMIN SERPL-MCNC: 3.5 G/DL (ref 3.5–5.2)
ALBUMIN/GLOB SERPL: 1.1 G/DL
ALP SERPL-CCNC: 584 U/L (ref 39–117)
ALT SERPL W P-5'-P-CCNC: 32 U/L (ref 1–41)
ANION GAP SERPL CALCULATED.3IONS-SCNC: 12.2 MMOL/L (ref 5–15)
AST SERPL-CCNC: 41 U/L (ref 1–40)
BASOPHILS # BLD AUTO: 0.07 10*3/MM3 (ref 0–0.2)
BASOPHILS NFR BLD AUTO: 0.6 % (ref 0–1.5)
BILIRUB SERPL-MCNC: 2.9 MG/DL (ref 0–1.2)
BUN SERPL-MCNC: 8 MG/DL (ref 8–23)
BUN/CREAT SERPL: 14.3 (ref 7–25)
CALCIUM SPEC-SCNC: 9 MG/DL (ref 8.6–10.5)
CANCER AG19-9 SERPL-ACNC: 705 U/ML
CHLORIDE SERPL-SCNC: 92 MMOL/L (ref 98–107)
CO2 SERPL-SCNC: 24.8 MMOL/L (ref 22–29)
CREAT SERPL-MCNC: 0.56 MG/DL (ref 0.76–1.27)
DEPRECATED RDW RBC AUTO: 41 FL (ref 37–54)
EGFRCR SERPLBLD CKD-EPI 2021: 109.4 ML/MIN/1.73
EOSINOPHIL # BLD AUTO: 0.13 10*3/MM3 (ref 0–0.4)
EOSINOPHIL NFR BLD AUTO: 1.1 % (ref 0.3–6.2)
ERYTHROCYTE [DISTWIDTH] IN BLOOD BY AUTOMATED COUNT: 11.9 % (ref 12.3–15.4)
GLOBULIN UR ELPH-MCNC: 3.3 GM/DL
GLUCOSE SERPL-MCNC: 79 MG/DL (ref 65–99)
HCT VFR BLD AUTO: 26.4 % (ref 37.5–51)
HGB BLD-MCNC: 9.3 G/DL (ref 13–17.7)
IMM GRANULOCYTES # BLD AUTO: 0.14 10*3/MM3 (ref 0–0.05)
IMM GRANULOCYTES NFR BLD AUTO: 1.2 % (ref 0–0.5)
LYMPHOCYTES # BLD AUTO: 0.93 10*3/MM3 (ref 0.7–3.1)
LYMPHOCYTES NFR BLD AUTO: 7.9 % (ref 19.6–45.3)
MCH RBC QN AUTO: 33 PG (ref 26.6–33)
MCHC RBC AUTO-ENTMCNC: 35.2 G/DL (ref 31.5–35.7)
MCV RBC AUTO: 93.6 FL (ref 79–97)
MONOCYTES # BLD AUTO: 0.61 10*3/MM3 (ref 0.1–0.9)
MONOCYTES NFR BLD AUTO: 5.2 % (ref 5–12)
NEUTROPHILS NFR BLD AUTO: 84 % (ref 42.7–76)
NEUTROPHILS NFR BLD AUTO: 9.92 10*3/MM3 (ref 1.7–7)
NRBC BLD AUTO-RTO: 0 /100 WBC (ref 0–0.2)
PLATELET # BLD AUTO: 391 10*3/MM3 (ref 140–450)
PMV BLD AUTO: 10.9 FL (ref 6–12)
POTASSIUM SERPL-SCNC: 4.7 MMOL/L (ref 3.5–5.2)
PROT SERPL-MCNC: 6.8 G/DL (ref 6–8.5)
RBC # BLD AUTO: 2.82 10*6/MM3 (ref 4.14–5.8)
SODIUM SERPL-SCNC: 129 MMOL/L (ref 136–145)
WBC NRBC COR # BLD: 11.8 10*3/MM3 (ref 3.4–10.8)

## 2022-12-07 PROCEDURE — G0463 HOSPITAL OUTPT CLINIC VISIT: HCPCS | Performed by: INTERNAL MEDICINE

## 2022-12-07 PROCEDURE — 99204 OFFICE O/P NEW MOD 45 MIN: CPT | Performed by: INTERNAL MEDICINE

## 2022-12-07 PROCEDURE — 82787 IGG 1 2 3 OR 4 EACH: CPT

## 2022-12-07 PROCEDURE — 86301 IMMUNOASSAY TUMOR CA 19-9: CPT

## 2022-12-07 RX ORDER — POLYETHYLENE GLYCOL 3350 17 G/17G
17 POWDER, FOR SOLUTION ORAL DAILY PRN
Qty: 20 EACH | Refills: 0 | Status: SHIPPED | OUTPATIENT
Start: 2022-12-07

## 2022-12-07 RX ORDER — OXYCODONE HYDROCHLORIDE 5 MG/1
5 CAPSULE ORAL EVERY 4 HOURS PRN
Qty: 60 CAPSULE | Refills: 0 | Status: SHIPPED | OUTPATIENT
Start: 2022-12-07 | End: 2022-12-20 | Stop reason: SDUPTHER

## 2022-12-07 NOTE — PROGRESS NOTES
Called patient to go over lab results. He did not answer, and he does not have a voice mail that has been set up.     Overall, remarkable improvement in liver enzymes and alk phos and total bilirubin since biliary drains put in. Patient does have some new hyponatremia. Will order further studies to further characterize.     I also received notification from [facility] about a potential medication interaction between his albuterol inhaler and his cyclobenzaprine muscle relaxer, with the interaction listed as a change in heart rate. I have only been seeing Mr. Baker for a short time for symptoms related to his biliary tract malignancy, so we have not had the opportunity to do a medication reconciliation visit yet. I do know that he has a history of neck surgery in the past with some rigidity as a side effect, which might explain the cyclobenzaprine. Albuterol inhaler appears to have been prescribed for asthma. HR in office yesterday was 76. Will discuss with patient in future encounters.     ?  This document has been electronically signed by Van Urbina MD on December 7, 2022 08:49 CST

## 2022-12-07 NOTE — PROGRESS NOTES
REASON FOR CONSULTATION: Gallbladder mass  Provide an opinion on any further workup or treatment                             REQUESTING PHYSICIAN:  Van Urbina MD      RECORDS OBTAINED:  Records of the patients history including those obtained from the referring provider were reviewed and summarized in detail.      History of Present Illness       Patient was followed with history primary care doctor as outpatient on November 14, 2022 and had extensive laboratory testing performed which showed obstructive jaundice with total bilirubin of 11.4, direct bilirubin of 8, AST ALT of 302 and 404, alkaline phosphatase of 1218.  Patient underwent MRCP which showed 3.1 x 2.7 x 3.9 cm obstructing mass at the biliary confluence concerning for cholangiocarcinoma.  GI was consulted and he was started on antibiotic and ERCP was attempted on November 22, 2022.  Unable to do ERCP due to extension compression from tumor.  Patient was subsequently transferred to [place] where he went IR guided biliary drainage.  Breast biopsy was also performed which showed highly atypical cell however no definitive malignancy was identified.  Patient has been referred to me for further evaluation and management of his cholangiocarcinoma.      Other health issues  -Hypertension that is well controlled on antihypertensive medication, gastroesophageal reflux disease, asthma, cancer associated pain, work-related tinnitus and hearing loss.      Past Medical History:   Diagnosis Date   • Asthma     Previously diagnosed and previously prescribed inhaler medications.     • Cancer (HCC)    • Essential hypertension    • Gastroesophageal reflux disease         Past Surgical History:   Procedure Laterality Date   • BONE GRAFT     • WRIST FRACTURE SURGERY Bilateral         Current Outpatient Medications on File Prior to Visit   Medication Sig Dispense Refill   • albuterol sulfate  (90 Base) MCG/ACT inhaler Inhale 2 puffs Every 4 (Four) Hours As  Needed for Wheezing or Shortness of Air. 18 g 0   • cyclobenzaprine (FLEXERIL) 10 MG tablet Take 1 tablet by mouth 3 (Three) Times a Day As Needed for Muscle Spasms. 60 tablet 11   • lisinopril (PRINIVIL,ZESTRIL) 10 MG tablet TAKE TWO TABLETS BY MOUTH ONCE DAILY 180 tablet 4   • lisinopril (PRINIVIL,ZESTRIL) 10 MG tablet Take 10 mg by mouth Daily.     • meloxicam (Mobic) 15 MG tablet Take 1 tablet by mouth Daily. 14 tablet 0   • pantoprazole (PROTONIX) 20 MG EC tablet Take 1 tablet by mouth Daily. 30 tablet 11   • pregabalin (LYRICA) 50 MG capsule TAKE ONE CAPSULE BY MOUTH 3 TIMES DAILY 90 capsule 2   • pregabalin (LYRICA) 50 MG capsule Take 50 mg by mouth 3 (Three) Times a Day.     • sodium chloride 0.9 % solution Infuse 40 mL into a venous catheter As Needed (IV).       No current facility-administered medications on file prior to visit.        ALLERGIES:  No Known Allergies     Social History     Socioeconomic History   • Marital status:    Tobacco Use   • Smoking status: Former     Packs/day: 1.00     Years: 25.00     Pack years: 25.00     Types: Pipe, Cigarettes     Start date: 10/10/1972     Quit date: 10/31/2019     Years since quitting: 3.1   • Smokeless tobacco: Current     Types: Chew   Vaping Use   • Vaping Use: Never used   Substance and Sexual Activity   • Alcohol use: Yes     Alcohol/week: 2.0 standard drinks     Types: 2 Cans of beer per week     Comment: Prior heavy drinker   • Drug use: No   • Sexual activity: Yes     Partners: Female        Family History   Problem Relation Age of Onset   • COPD Mother    • Hypertension Mother    • Diabetes type II Mother    • Cancer Father    • Heart failure Father             Objective     Vitals:    12/07/22 0919   BP: 150/92   Pulse: 91   Temp: 97.1 °F (36.2 °C)   SpO2: 95%         Physical Exam  Vitals and nursing note reviewed.   Constitutional:       Appearance: Normal appearance.   Abdominal:      General: There is no distension.      Palpations:  Abdomen is soft. There is no mass.      Tenderness: There is no abdominal tenderness.   Neurological:      General: No focal deficit present.      Mental Status: He is alert and oriented to person, place, and time. Mental status is at baseline.   Psychiatric:         Mood and Affect: Mood normal.         Behavior: Behavior normal.         Thought Content: Thought content normal.               RECENT LABS:Independently reviewed and summarized  Hematology WBC   Date Value Ref Range Status   12/06/2022 11.80 (H) 3.40 - 10.80 10*3/mm3 Final     RBC   Date Value Ref Range Status   12/06/2022 2.82 (L) 4.14 - 5.80 10*6/mm3 Final     Hemoglobin   Date Value Ref Range Status   12/06/2022 9.3 (L) 13.0 - 17.7 g/dL Final     Hematocrit   Date Value Ref Range Status   12/06/2022 26.4 (L) 37.5 - 51.0 % Final     Platelets   Date Value Ref Range Status   12/06/2022 391 140 - 450 10*3/mm3 Final        Lab Results   Component Value Date    GLUCOSE 79 12/06/2022    BUN 8 12/06/2022    CREATININE 0.56 (L) 12/06/2022    EGFRIFNONA 81 01/02/2020    BCR 14.3 12/06/2022    K 4.7 12/06/2022    CO2 24.8 12/06/2022    CALCIUM 9.0 12/06/2022    ALBUMIN 3.50 12/06/2022    AST 41 (H) 12/06/2022    ALT 32 12/06/2022           Imaging (independently reviewed and summarized):     XR Spine Cervical 2 or 3 View    Result Date: 11/23/2022  1. Large hypertrophic osteophytes span the mid and lower cervical vertebral bodies anteriorly. This configuration can be seen with diffuse idiopathic skeletal hyperostosis (i.e. DISH). 2. No malalignment. Electronically signed by:  Rodney York MD  11/23/2022 2:47 PM CST Workstation: [id number]    MRI Brain With & Without Contrast    Result Date: 11/23/2022  1. No evidence for intracranial metastatic disease. 2. No acute infarct. 3. No hydrocephalus. Electronically signed by:  Rodney York MD  11/23/2022 3:56 PM CST Workstation: [id number]    MRI Abdomen With & Without Contrast    Result Date:  11/18/2022  Obstructing mass at the biliary confluence concerning for cholangiocarcinoma. No regional adenopathy Electronically signed by:  Crescencio Meyer   11/18/2022 8:11 AM CST Workstation: [id number]    CT Abdomen Pelvis With Contrast    Result Date: 11/17/2022  Intrahepatic biliary ductal prominence with no significant extrahepatic involvement. The gallbladder is within normal. Correlation with MRCP is recommended as follow-up. Appendix is normal. Grade 1 spondylolisthesis of L5 on S1. Bilateral pars defects of L5..  Electronically signed by:  Cullen Salas MD  11/17/2022 1:17 PM CST Workstation: [id number]      Jian Baker reports a pain score of 6.  Given his pain assessment as noted, treatment options were discussed and the following options were decided upon as a follow-up plan to address the patient's pain: prescription for opiod analgesics.    Patient screened negative for depression based on a PHQ-9 score of 0 on 12/7/2022.     Advance Care Planning   ACP discussion was declined by the patient. Patient does not have an advance directive, declines further assistance.         Diagnosis:   (1) Cholangiocarcinoma   (2) Cancer associated pain     Assessment & Plan     (1) Cholangiocarcinoma     New diagnosis for me.  Patient presented with obstructive jaundice and imaging showed cholangiocarcinoma involving proximal biliary duct/gallbladder.  Patient had PTCA and brushing performed at [place] by Dr. Barboza however was nondiagnostic.    I discussed his case personally with Dr. Barboza.    Patient will need repeat biopsy however given recent PTCA will need to wait 2 weeks before we can attempt another biopsy.    Patient was counseled about keeping appointment with Dr. Barboza for another biopsy to establish definite diagnosis.    Masses involving hepatic artery and portal vein is not resectable.  He could benefit from palliative chemotherapy however will need definitive tissue diagnosis.      If tissue is  not enough we will do blood based test to check for targetable mutation.            (2) Cancer associated pain     New diagnosis for me.  He is currently taking meloxicam which is not helping for pain.  His pain is related to gallbladder/bile duct mass.  Pain reported as severe.  Denies any prior history of substance abuse.  Does not smoke or drink.  Opioid pain medication safety discussed at length.  Recommend oxycodone 5 mg every 4 hours as needed for severe pain.  He was instructed not to drive after taking opioid pain medication.  Tyson that we will do a random urine drug screen while he is on opiate pain medication.  Counseled about opioid-induced constipation.  New prescription of MiraLAX sent to the pharmacy.

## 2022-12-08 LAB — IGG4 SER-MCNC: 88 MG/DL (ref 2–96)

## 2022-12-12 ENCOUNTER — TELEPHONE (OUTPATIENT)
Dept: NUTRITION | Facility: HOSPITAL | Age: 67
End: 2022-12-12

## 2022-12-12 NOTE — PROGRESS NOTES
Adult Outpatient Nutrition  Assessment    Patient Name:  Jian Baker  YOB: 1955  MRN: [id number]    Assessment Date:  Entry from 12/9/22    CHART REVIEW  Jian Baker  1955  67 y.o.  Wt: 176.6 lb  Ht: 67 in  Dx: gall bladder carcinoma  Tx: work-up in progress  Labs: alb 3.5    PATIENT/FAMILY COMMENTS  Usual Body Weight: 210 lb in May  Weight Comments: trending down  Diet: regular  Food Allergies: nkfa  Appetite/Intake: decreased  Supplements: willing to try  Meals: 2  Food Preparation: pt or eats out   (wife Randi does not cook)  Nutrition Concerns:  * lost 16% body weight/6 mon  * early satiety  * financial limitations  No problems with chewing/swallowing/nausea/vomiting/  constipation/diarrhea.    GOAL(s)  Optimize nutrition in attempt to prevent further loss of LBM      EDUCATION  -High calorie high protein diet  -Supplementation        * Offered samples of commercial supplements & discount coupons--recipes          for homemade supplements.   Recommended Instant Breakfast Essentials made with whole milk as supplement  -Importance of staying hydrated      To reinforce education, written information with RD's name & number mailed.  Pt receptive to suggestions, and agreed to call on dietitian as needed.                      Electronically signed by:  Isis Alvarez RD  12/12/22 07:30 CST

## 2022-12-14 ENCOUNTER — DOCUMENTATION (OUTPATIENT)
Dept: ONCOLOGY | Facility: CLINIC | Age: 67
End: 2022-12-14

## 2022-12-14 ENCOUNTER — CONSULT (OUTPATIENT)
Dept: PALLIATIVE CARE | Facility: CLINIC | Age: 67
End: 2022-12-14

## 2022-12-14 ENCOUNTER — OFFICE VISIT (OUTPATIENT)
Dept: FAMILY MEDICINE CLINIC | Facility: CLINIC | Age: 67
End: 2022-12-14

## 2022-12-14 VITALS
WEIGHT: 174 LBS | BODY MASS INDEX: 27.31 KG/M2 | OXYGEN SATURATION: 97 % | HEART RATE: 98 BPM | HEIGHT: 67 IN | DIASTOLIC BLOOD PRESSURE: 89 MMHG | SYSTOLIC BLOOD PRESSURE: 150 MMHG

## 2022-12-14 DIAGNOSIS — C24.9 MALIGNANT TUMOR OF BILIARY TRACT: ICD-10-CM

## 2022-12-14 DIAGNOSIS — Z71.89 COORDINATION OF COMPLEX CARE: Primary | ICD-10-CM

## 2022-12-14 DIAGNOSIS — C24.9 MALIGNANT NEOPLASM OF BILIARY TRACT, UNSPECIFIED: Primary | ICD-10-CM

## 2022-12-14 PROCEDURE — 99213 OFFICE O/P EST LOW 20 MIN: CPT | Performed by: NURSE PRACTITIONER

## 2022-12-14 PROCEDURE — 99442 PR PHYS/QHP TELEPHONE EVALUATION 11-20 MIN: CPT | Performed by: STUDENT IN AN ORGANIZED HEALTH CARE EDUCATION/TRAINING PROGRAM

## 2022-12-14 RX ORDER — OXYCODONE HYDROCHLORIDE 5 MG/1
TABLET ORAL
COMMUNITY
Start: 2022-12-07 | End: 2022-12-14 | Stop reason: SDUPTHER

## 2022-12-14 NOTE — PROGRESS NOTES
Family Medicine Residency  Van Urbina MD    Subjective:     You have chosen to receive care through a telephone visit. Do you consent to use a telephone visit for your medical care today? Yes  I made call from resident clinic. Patient took call from home.   Encounter lasted approximately 10-15 minutes in total.      Jian Baker is a 67 y.o. male who presents for coordination of care.    Mr. Baker has mass of his biliary tract. (Please refer to my previous notes for extensive discussion of his course, including pertinent labs, imaging, and specialist involvement).     Most recently, Mr. Baker saw Oncologist Dr. Sosa for the first time. Dr. Sosa discussed the case with patient's Surgical Oncologist Dr. Barboza. The patient's tumor is not resectable. Dr. Sosa noted that the patient's brush biopsy was nondiagnostic and that he would need a repeat biopsy before consideration of palliative chemotherapy.     Mr. Montero told me today that Dr. Sosa is waiting for repeat biopsy results before discussing prognosis. He will need to wait two weeks post PTC before this can be attempted again. He tells me he has not yet had a date for this second biopsy. He will need arrangements for transportation and I believe the cancer center  is working to arrange this. Mr. Baker provided me with two numbers: transportation [phone] , need a three day notice, [phone] member service.     Dr. Sosa also started Mr. Baker on Oxycodone for pain.     Mr. Baker has an upcoming appointment with Palliative Care (his first appointment with them) later today.       The following portions of the patient's history were reviewed and updated as appropriate: allergies, current medications, past family history, past medical history, past social history, past surgical history and problem list.    Past Medical Hx:  Past Medical History:   Diagnosis Date   • Asthma     Previously diagnosed and previously prescribed  inhaler medications.     • Essential hypertension    • Gastroesophageal reflux disease        Past Surgical Hx:  Past Surgical History:   Procedure Laterality Date   • BONE GRAFT      Neck surgery x3   • ERCP N/A 11/22/2022    Procedure: ENDOSCOPIC RETROGRADE CHOLANGIOPANCREATOGRAPHY;  Surgeon: Nabila Worthy MD;  Location: [facility] ENDOSCOPY;  Service: Gastroenterology;  Laterality: N/A;   • WRIST FRACTURE SURGERY Bilateral        Current Meds:    Current Outpatient Medications:   •  albuterol sulfate  (90 Base) MCG/ACT inhaler, Inhale 2 puffs Every 4 (Four) Hours As Needed for Wheezing or Shortness of Air., Disp: 18 g, Rfl: 0  •  cyclobenzaprine (FLEXERIL) 10 MG tablet, Take 1 tablet by mouth 3 (Three) Times a Day As Needed for Muscle Spasms., Disp: 60 tablet, Rfl: 11  •  lisinopril (PRINIVIL,ZESTRIL) 10 MG tablet, TAKE TWO TABLETS BY MOUTH ONCE DAILY, Disp: 180 tablet, Rfl: 4  •  meloxicam (Mobic) 15 MG tablet, Take 1 tablet by mouth Daily., Disp: 14 tablet, Rfl: 0  •  oxyCODONE (OXY-IR) 5 MG capsule, Take 1 capsule by mouth Every 4 (Four) Hours As Needed for Moderate Pain., Disp: 60 capsule, Rfl: 0  •  pantoprazole (PROTONIX) 20 MG EC tablet, Take 1 tablet by mouth Daily., Disp: 30 tablet, Rfl: 11  •  polyethylene glycol (MIRALAX) 17 g packet, Take 17 g by mouth Daily As Needed (constipation)., Disp: 20 each, Rfl: 0    Allergies:  No Known Allergies    Family Hx:  Family History   Problem Relation Age of Onset   • COPD Mother    • Hypertension Mother    • Diabetes type II Mother    • Cancer Father    • Heart failure Father    • Cancer Brother    • Cancer Maternal Uncle         Social History:  Social History     Socioeconomic History   • Marital status:    Tobacco Use   • Smoking status: Former     Packs/day: 1.00     Years: 45.00     Pack years: 45.00     Types: Pipe, Cigarettes     Start date: 10/10/1972     Quit date: 10/31/2019     Years since quitting: 3.1   • Smokeless tobacco: Current      Types: Chew   Vaping Use   • Vaping Use: Never used   Substance and Sexual Activity   • Alcohol use: Yes     Alcohol/week: 7.0 standard drinks     Types: 7 Cans of beer per week     Comment: Prior heavy drinker   • Drug use: No   • Sexual activity: Yes     Partners: Female       Review of Systems  Review of Systems   Constitutional: Negative for chills and fever.   Respiratory: Negative for shortness of breath and wheezing.    Gastrointestinal: Negative for abdominal pain, nausea and vomiting.   Neurological: Negative for dizziness and light-headedness.       Objective:     Telephone visit - No vitals and physical exam could be performed.     Assessment/Plan:     Diagnoses and all orders for this visit:    1. Coordination of complex care (Primary)    Jian Baker is a 67 y.o. male who presents for coordination of care.    Mr. Baker has mass of his biliary tract. (Please refer to my previous notes for extensive discussion of his course, including pertinent labs, imaging, and specialist involvement).     Most recently, Mr. Baker saw Oncologist Dr. Sosa for the first time. Dr. Sosa discussed the case with patient's Surgical Oncologist Dr. Barboza. The patient's tumor is not resectable. Dr. Sosa noted that the patient's brush biopsy was nondiagnostic and that he would need a repeat biopsy before consideration of palliative chemotherapy.     Mr. Montero told me today that Dr. Sosa is waiting for repeat biopsy results before discussing prognosis. He will need to wait two weeks post PTC before this can be attempted again. He tells me he has not yet had a date for this second biopsy. He will need arrangements for transportation and I believe the cancer center  is working to arrange this. Mr. Baker provided me with two numbers: transportation [phone] , need a three day notice, [phone] member service.     Dr. Sosa also started Mr. Baker on Oxycodone for pain.     Mr. Baker has an upcoming  appointment with Palliative Care (his first appointment with them) later today.     2. Malignant tumor of biliary tract (HCC)    Once the second biopsy has been done and results, and Oncologist is able to share prognosis, will discuss treatment options, including hospice care, with patient. Everything I have read (data cited in my previous notes) suggests prognosis is grim, and goal will be for patient to have the best quality of life possible in the time he has left, consistent with his goals and wishes. We again discussed reasons for him to call me urgently, also discussed by surgery, including fever, dyspnea, and worsening abdominal pain, among others. Patient voiced understanding.         Follow-up:     Return in about 4 years (around 12/14/2026) for Recheck.    Preventative:  Health Maintenance   Topic Date Due   • LUNG CANCER SCREENING  Never done   • ZOSTER VACCINE (2 of 2) 12/05/2017   • COVID-19 Vaccine (3 - Booster for Pfizer series) 06/08/2021   • ANNUAL WELLNESS VISIT  07/02/2021   • Pneumococcal Vaccine 65+ (1 - PCV) Never done   • INFLUENZA VACCINE  08/01/2022   • COLORECTAL CANCER SCREENING  10/10/2024   • TDAP/TD VACCINES (3 - Td or Tdap) 10/10/2027   • HEPATITIS C SCREENING  Completed   • AAA SCREEN (ONE-TIME)  Completed     Alcohol use:  reports current alcohol use of about 7.0 standard drinks per week.  Nicotine status  reports that he quit smoking about 3 years ago. His smoking use included pipe and cigarettes. He started smoking about 50 years ago. He has a 45.00 pack-year smoking history. His smokeless tobacco use includes chew.     Goals     •  Better Compliance with HTN Medications (pt-stated)       Barriers: None      •  Medication management       Patient has been having trouble with medication compliance.  Goal now is to take medications everyday at the same time.    Barriers: patient has not been on medications consistently for sometime.  Has trouble with remembering to take  medications.              RISK SCORE: 6      This document has been electronically signed by Van Urbina MD on December 14, 2022 12:15 CST

## 2022-12-14 NOTE — PROGRESS NOTES
"DATE OF VISIT: 12/14/2022      REASON FOR VISIT:  Palliative care       HISTORY OF PRESENT ILLNESS:   67 yr old male pt who is being seen today in palliative outpatient clinic.   He was recently seen by Dr. العلي in outpatient medical oncology clinic with new diagnosis of cholangiocarcinoma.  He present initially with obstructive jaundice with total bilirubin 11.4. Patient underwent MRCP which showed 3.1 x 2.7 x 3.9 cm obstructing mass at the biliary confluence concerning for cholangiocarcinoma.  GI was consulted and he was started on antibiotic and ERCP was attempted on November 22, 2022.  Unable to do ERCP due to extension compression from tumor.  Patient was subsequently transferred to [place] where he went IR guided biliary drainage.   Brush biopsy was atypical cell however no definitive malignancy was identified.   Dr. العلي discussed palliative chemotherapy with patient but is referring patient back to Dr. Barboza at [place] for repeat biopsy; per Dr. العلي note he has personally discussed case with Dr. Barboza.    Past Medical History, Past Surgical History, Social History, Family History have been reviewed and are without significant changes except as mentioned.    Review of Systems   A comprehensive 14 point review of systems was performed and was negative except as mentioned.  Patient states at this time he feels like his pain is fairly controlled on current regimen.  Medications:  The current medication list was reviewed in the EMR    ALLERGIES:  No Known Allergies    Objective      Vitals:    12/14/22 1505   BP: 150/89   Pulse: 98   SpO2: 97%   Weight: 78.9 kg (174 lb)   Height: 170.2 cm (67\")   PainSc:   6   PainLoc: Chest     No flowsheet data found.    Physical Exam  Constitutional:       Appearance: Normal appearance.   Abdominal:      General: There is no distension.      Palpations: Abdomen is soft. There is no mass.      Tenderness: There is no abdominal tenderness.   Neurological:      General: " No focal deficit present.      Mental Status: He is alert and oriented to person, place, and time. Mental status is at baseline.   Psychiatric:         Mood and Affect: Mood normal.         Behavior: Behavior normal.         Thought Content: Thought content normal.        I have reexamined the patient and the results are consistent with the previously documented exam. NOY Cr     RECENT LABS:  Glucose   Date Value Ref Range Status   12/06/2022 79 65 - 99 mg/dL Final     Sodium   Date Value Ref Range Status   12/06/2022 129 (L) 136 - 145 mmol/L Final     Potassium   Date Value Ref Range Status   12/06/2022 4.7 3.5 - 5.2 mmol/L Final     CO2   Date Value Ref Range Status   12/06/2022 24.8 22.0 - 29.0 mmol/L Final     Chloride   Date Value Ref Range Status   12/06/2022 92 (L) 98 - 107 mmol/L Final     Anion Gap   Date Value Ref Range Status   12/06/2022 12.2 5.0 - 15.0 mmol/L Final     Creatinine   Date Value Ref Range Status   12/06/2022 0.56 (L) 0.76 - 1.27 mg/dL Final     BUN   Date Value Ref Range Status   12/06/2022 8 8 - 23 mg/dL Final     BUN/Creatinine Ratio   Date Value Ref Range Status   12/06/2022 14.3 7.0 - 25.0 Final     Calcium   Date Value Ref Range Status   12/06/2022 9.0 8.6 - 10.5 mg/dL Final     eGFR Non  Amer   Date Value Ref Range Status   01/02/2020 81 >60 mL/min/1.73 Final     Alkaline Phosphatase   Date Value Ref Range Status   12/06/2022 584 (H) 39 - 117 U/L Final     Total Protein   Date Value Ref Range Status   12/06/2022 6.8 6.0 - 8.5 g/dL Final     ALT (SGPT)   Date Value Ref Range Status   12/06/2022 32 1 - 41 U/L Final     AST (SGOT)   Date Value Ref Range Status   12/06/2022 41 (H) 1 - 40 U/L Final     Total Bilirubin   Date Value Ref Range Status   12/06/2022 2.9 (H) 0.0 - 1.2 mg/dL Final     Albumin   Date Value Ref Range Status   12/06/2022 3.50 3.50 - 5.20 g/dL Final     Globulin   Date Value Ref Range Status   12/06/2022 3.3 gm/dL Final     Lab Results   Component  Value Date    WBC 11.80 (H) 12/06/2022    HGB 9.3 (L) 12/06/2022    HCT 26.4 (L) 12/06/2022    MCV 93.6 12/06/2022     12/06/2022     Lab Results   Component Value Date    NEUTROABS 9.92 (H) 12/06/2022    IRON 111 10/10/2017    TIBC 357 10/10/2017    LABIRON 31 10/10/2017    FERRITIN 215.00 10/10/2017    NCRTZKUN68 1,069 (H) 11/14/2022    VQTIKFWI26 488 03/21/2022    MJAAJXPJ17 160 (L) 01/02/2020    FOLATE 8.06 11/14/2022     Lab Results   Component Value Date     705.0 (H) 12/07/2022    CEA 2.92 11/22/2022               RADIOLOGY DATA :  No radiology results for the last 7 days        Assessment & Plan     1) Cholangiocarcinoma; initally brush biopsy was non diagnostic; Dr. العلي has spoken with Dr. Barboza at [place] regarding repeat biopsy; due to recent PTC will need to wait 2 weeks to attempt repeat biopsy.  -Katie Hartman LCSW will meet with patient today to discuss arrangements for transportation to [place]; will also discuss advance directive as well.   -I explained my role in his care from a palliative care standpoint and told him I would follow along side of Dr. العلي and if I can be of any assistance.              (2) Cancer associated pain   -pt states pain is controlled at present time on oxycodone 5 mg one every 4 hrs as needed.   Counseled  about opioid induced constipation.                    PHQ-9 Total Score: 0     Jian Baker reports a pain score of 6.  Given his pain assessment as noted, treatment options were discussed and the following options were decided upon as a follow-up plan to address the patient's pain: continuation of current treatment plan for pain.         Stefanie Wilde, APRN  12/14/2022  15:29 CST        Part of this note may be an electronic transcription/translation of spoken language to printed text using the Dragon Dictation System.          CC:

## 2022-12-15 ENCOUNTER — OFFICE VISIT (OUTPATIENT)
Dept: OTOLARYNGOLOGY | Facility: CLINIC | Age: 67
End: 2022-12-15

## 2022-12-15 ENCOUNTER — CLINICAL SUPPORT (OUTPATIENT)
Dept: AUDIOLOGY | Facility: CLINIC | Age: 67
End: 2022-12-15

## 2022-12-15 ENCOUNTER — TELEPHONE (OUTPATIENT)
Dept: ONCOLOGY | Facility: CLINIC | Age: 67
End: 2022-12-15

## 2022-12-15 VITALS — WEIGHT: 175.2 LBS | HEIGHT: 67 IN | OXYGEN SATURATION: 94 % | BODY MASS INDEX: 27.5 KG/M2

## 2022-12-15 DIAGNOSIS — H93.13 TINNITUS OF BOTH EARS: Primary | ICD-10-CM

## 2022-12-15 DIAGNOSIS — H90.3 SENSORINEURAL HEARING LOSS (SNHL) OF BOTH EARS: ICD-10-CM

## 2022-12-15 DIAGNOSIS — H90.3 SENSORINEURAL HEARING LOSS OF BOTH EARS: ICD-10-CM

## 2022-12-15 PROCEDURE — 92557 COMPREHENSIVE HEARING TEST: CPT | Performed by: AUDIOLOGIST

## 2022-12-15 PROCEDURE — 99203 OFFICE O/P NEW LOW 30 MIN: CPT | Performed by: OTOLARYNGOLOGY

## 2022-12-15 PROCEDURE — 92567 TYMPANOMETRY: CPT | Performed by: AUDIOLOGIST

## 2022-12-15 NOTE — PROGRESS NOTES
I have reviewed the notes, assessments, and/or procedures performed by Van Urbina MD  , I concur with her/his documentation of Jian Baker.
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
## 2022-12-15 NOTE — TELEPHONE ENCOUNTER
Jessica called and states procedure has been scheduled for Jan 11, 2023 with arrival time of 9am CST. PT will be a 23 hour observation post procedure. Jessica will call pt and explain all preop instructions. Katie MOORE will be informed to help arrange transportation.

## 2022-12-15 NOTE — PROGRESS NOTES
Oncology SW met with patient and his wife in the exam room for the first time as he presents for palliative care visit with APRN.  Pt. With new diagnosis of cholangiocarcinoma.  Initial distress screened on 12-7-22 with pt scoring a 4 with indicators of physical stress, and transportation needs.  Pt. Recently transferred to [place] an underwent IR guided brush biopsy with no definitive malignancy identified.  Plat at present is referral back to Dr. Barboza at [place] for repeat biopsy.  SW advised of pt need for transportation to [place].   SW introduced self and explained role and support. Pt. offers feedback regarding his insurance coverage and provides a number to contact for transportation via his [employer] Medicare replacement ( no provided [phone]    Member services   [phone])  SW offered feedback as to similar coverage and criteria of advance notice. Presently, pt awaits definitive call back from [place] for date and time and process of procedure.  SW to assist with contacting insurance and arranging transportation when arrangements conformed.    Pt. States his primary support is his wife. She would be riding with him, unless it is an overnight trip in which case she would need to stay to care for pet.   Pt. Contact information confirmed and SW to reach out to pt when advised of apt.    ADVANCE CARE Planning.  SW spent time with pt offering support and education in regard to living will.   Pt. Was receptive to ACP brochure stating he would like to complete LW but at another visit. Ongoing support of this SW reinforced.

## 2022-12-15 NOTE — TELEPHONE ENCOUNTER
Spoke with Jessica, Dr Jabari VALVERDE, who states pt's procedures have not been scheduled. Explained that pt may need assistance with transportation and asked that office be informed when procedures are scheduled. Jessica was given RN direct extension and states she will call as soon as scheduled.

## 2022-12-16 ENCOUNTER — TELEPHONE (OUTPATIENT)
Dept: ONCOLOGY | Facility: CLINIC | Age: 67
End: 2022-12-16

## 2022-12-16 NOTE — TELEPHONE ENCOUNTER
Patient called needing a call back stated he has not heard from [facility] to schedule for a biopsy

## 2022-12-16 NOTE — TELEPHONE ENCOUNTER
Returned call to pt and advised Dr Vasquez's office would call but per conversation yesterday, procedure was scheduled on 1/11/23. PT verbalizes understanding and denies any further questions.

## 2022-12-19 ENCOUNTER — TELEPHONE (OUTPATIENT)
Dept: ONCOLOGY | Facility: CLINIC | Age: 67
End: 2022-12-19

## 2022-12-19 NOTE — TELEPHONE ENCOUNTER
Oncology SW reached out by phone in follow up to transportation arrangements. Pt. Oncology surgery apt has been scheduled with Dr. Heath Barboza in [place] for Jan  at 10:00 am ET.  Location;  [address], [address]nd [place]  [address].  This address is SUBJECT TO CHANGE per discussion with ABRAM Lopez of Dr. Barboza.    SW contacted pt transportation service via insurance.    Advised that transportation cannot be scheduled prior to two weeks from apt. Date and no later than 3 days prior to apt.  First available date to call per jim is 12-28-22.  SW will follow up on that date with call to provider.  Attempted to reach pt by phone but no voice message capacity.  Advised team in the event pt calls back.

## 2022-12-20 ENCOUNTER — TELEPHONE (OUTPATIENT)
Dept: ONCOLOGY | Facility: CLINIC | Age: 67
End: 2022-12-20
Payer: MEDICARE

## 2022-12-20 DIAGNOSIS — G89.3 CANCER ASSOCIATED PAIN: ICD-10-CM

## 2022-12-20 RX ORDER — OXYCODONE HYDROCHLORIDE 5 MG/1
5 CAPSULE ORAL EVERY 4 HOURS PRN
Qty: 60 CAPSULE | Refills: 0 | Status: SHIPPED | OUTPATIENT
Start: 2022-12-20 | End: 2023-01-20 | Stop reason: SDUPTHER

## 2022-12-20 NOTE — TELEPHONE ENCOUNTER
Rx Refill Note  Requested Prescriptions     Pending Prescriptions Disp Refills   • oxyCODONE (OXY-IR) 5 MG capsule 60 capsule 0     Sig: Take 1 capsule by mouth Every 4 (Four) Hours As Needed for Moderate Pain.      Last office visit with prescribing clinician: Visit date not found   Last telemedicine visit with prescribing clinician: 1/25/2023   Next office visit with prescribing clinician: 1/25/2023   {TIP  Encounters:23}                      Would you like a call back once the refill request has been completed: [] Yes [] No    If the office needs to give you a call back, can they leave a voicemail: [] Yes [] No    Arabella Castillo Rep  12/20/22, 09:12 CST

## 2022-12-20 NOTE — TELEPHONE ENCOUNTER
Incoming Refill Request      Medication requested (name and dose): oxyCODONE (OXY-IR) 5 MG capsule [45014] (Order [id number])        Pharmacy where request should be sent: [facility] - [place], [place] - [address] - [phone]  - [phone]    [address] [address]   Phone:  [phone]  Fax:  [phone]    Additional details provided by patient:     Best call back number: [phone]    Does the patient have less than a 3 day supply:  [] Yes  [] No    Ashli Kahn MA  12/20/22, 09:03 CST

## 2022-12-21 ENCOUNTER — TELEPHONE (OUTPATIENT)
Dept: ONCOLOGY | Facility: CLINIC | Age: 67
End: 2022-12-21

## 2022-12-21 NOTE — TELEPHONE ENCOUNTER
SW spoke with patient by phone to advise current transportation feedback.  Pt. Encouraged to reach out to SW on or shortly after date to call for transportation  12-28-22 to arrange for 1-11-23 trip and return. Pt states understanding and appreciation for team support.

## 2022-12-22 ENCOUNTER — TELEPHONE (OUTPATIENT)
Dept: FAMILY MEDICINE CLINIC | Facility: CLINIC | Age: 67
End: 2022-12-22

## 2022-12-22 NOTE — TELEPHONE ENCOUNTER
Pat with [employer] called and is needing a office note that has the cancer diagnosis on it.    Her#[phone]

## 2022-12-27 NOTE — TELEPHONE ENCOUNTER
Pat with [employer] requested an office note about the patient's cancer diagnosis. I printed out a copy of the note from palliative care and placed it in the box to be faxed. I called Pat back to get the fax number and to tell her to let us know if she needed any additional documentation.      This document has been electronically signed by Van Urbina MD on December 27, 2022 14:01 CST

## 2022-12-28 ENCOUNTER — APPOINTMENT (OUTPATIENT)
Dept: ONCOLOGY | Facility: CLINIC | Age: 67
End: 2022-12-28
Payer: MEDICARE

## 2023-01-05 ENCOUNTER — TELEPHONE (OUTPATIENT)
Dept: ONCOLOGY | Facility: CLINIC | Age: 68
End: 2023-01-05
Payer: MEDICARE
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
## 2023-01-09 ENCOUNTER — TELEPHONE (OUTPATIENT)
Dept: ONCOLOGY | Facility: CLINIC | Age: 68
End: 2023-01-09
Payer: MEDICARE

## 2023-01-09 NOTE — TELEPHONE ENCOUNTER
Oncology SW contacted by  Samuel in the am seeking feedback as to transportation arrangements.  Per last conversation, pt to advise SW last week of December to arrange transportation for his 1-11-23 apt.  Regretfully, pt calls this day with requirement of 72 hour notice.  SW reached out to transportation provider through [employer]  [employer]  [phone], 3 day notice was waived due to pt urgency of need. However, SW advised that pt has a 50 mile round trip restriction and they will not transport out of that range.  SW contacted 3 separate entities within [facility] with reference number for two encounters  # [id number]    #[id number]  SW final advisement that no exceptions exist on this policy.  SW in contact with pt by phone and asked him to call with feedback that he was advised the same.  At this point, he seeks someone to take him,knowing will have to stay the night as procedure is 24 hr obv.    Pt. States he will advise next day if successful.  SW has exhausted all other options.    Dr. Davies and nurse, EJ Duffy advised of above.

## 2023-01-10 ENCOUNTER — DOCUMENTATION (OUTPATIENT)
Dept: ONCOLOGY | Facility: CLINIC | Age: 68
End: 2023-01-10
Payer: MEDICARE

## 2023-01-10 NOTE — PROGRESS NOTES
ISA resumed multiple calls to patient insurance. [facility] Medicare and travel provider via insurance [employer]. Advised at 1400 hr by Viviana at [facility] that there are no exceptions to pt travel contract and additional miles are not approved. Also, spoke with [employer] and advised they cannot over ride this. ISA working with the assistance of outside sources, to include patient's PCP, [facility], [employer] and [facility] to obtain services.  Pt transportation to be provided for this visit by a benevolent source.  Pt. Is aware and agreeable.  ISA left message for ABRAM Lopez in Dr. Barboza  office to assure pt will be making this procedure.

## 2023-01-13 ENCOUNTER — TELEPHONE (OUTPATIENT)
Dept: ONCOLOGY | Facility: CLINIC | Age: 68
End: 2023-01-13
Payer: MEDICARE

## 2023-01-13 NOTE — TELEPHONE ENCOUNTER
Oncology SW reached out to pt by phone in follow up to his procedure in [place].  Pt. Shared that it was a very long trip, exhausting and that he has had excessive drainage from his tube.  HE states he was advised this is to be expected and that he has syringes and does know how to care for site.  Pt. States he awaits biopsy report and understands date of follow up with Dr. العلي on 1-25-23.  No other needs expressed.ongoing support reinforced.

## 2023-01-20 DIAGNOSIS — G89.3 CANCER ASSOCIATED PAIN: ICD-10-CM

## 2023-01-20 RX ORDER — OXYCODONE HYDROCHLORIDE 5 MG/1
5 CAPSULE ORAL EVERY 4 HOURS PRN
Qty: 60 CAPSULE | Refills: 0 | Status: SHIPPED | OUTPATIENT
Start: 2023-01-20 | End: 2023-02-24 | Stop reason: SDUPTHER

## 2023-01-20 NOTE — TELEPHONE ENCOUNTER
Rx Refill Note  Requested Prescriptions     Pending Prescriptions Disp Refills   • oxyCODONE (OXY-IR) 5 MG capsule 60 capsule 0     Sig: Take 1 capsule by mouth Every 4 (Four) Hours As Needed for Moderate Pain.      Last office visit with prescribing clinician: Visit date not found   Last telemedicine visit with prescribing clinician: 1/25/2023   Next office visit with prescribing clinician: 1/25/2023                         Would you like a call back once the refill request has been completed: [] Yes [] No    If the office needs to give you a call back, can they leave a voicemail: [] Yes [] No    Clementine Lainez, Arabella Rep  01/20/23, 10:45 CST

## 2023-01-25 ENCOUNTER — OFFICE VISIT (OUTPATIENT)
Dept: ONCOLOGY | Facility: CLINIC | Age: 68
End: 2023-01-25
Payer: MEDICARE

## 2023-01-25 VITALS
WEIGHT: 168 LBS | BODY MASS INDEX: 26.31 KG/M2 | HEART RATE: 85 BPM | RESPIRATION RATE: 18 BRPM | OXYGEN SATURATION: 98 % | DIASTOLIC BLOOD PRESSURE: 80 MMHG | SYSTOLIC BLOOD PRESSURE: 132 MMHG

## 2023-01-25 DIAGNOSIS — C24.9 MALIGNANT NEOPLASM OF BILIARY TRACT, UNSPECIFIED: Primary | ICD-10-CM

## 2023-01-25 DIAGNOSIS — G89.3 CANCER ASSOCIATED PAIN: ICD-10-CM

## 2023-01-25 PROCEDURE — G0463 HOSPITAL OUTPT CLINIC VISIT: HCPCS | Performed by: INTERNAL MEDICINE

## 2023-01-25 PROCEDURE — 99215 OFFICE O/P EST HI 40 MIN: CPT | Performed by: INTERNAL MEDICINE

## 2023-01-25 PROCEDURE — 99214 OFFICE O/P EST MOD 30 MIN: CPT | Performed by: NURSE PRACTITIONER

## 2023-01-25 RX ORDER — OLANZAPINE 5 MG/1
5 TABLET ORAL NIGHTLY
Qty: 6 TABLET | Refills: 7 | Status: SHIPPED | OUTPATIENT
Start: 2023-01-25 | End: 2023-03-02

## 2023-01-25 RX ORDER — FLUCONAZOLE 200 MG/1
200 TABLET ORAL DAILY
Qty: 30 TABLET | Refills: 0 | Status: SHIPPED | OUTPATIENT
Start: 2023-01-25

## 2023-01-25 RX ORDER — ONDANSETRON HYDROCHLORIDE 8 MG/1
8 TABLET, FILM COATED ORAL 3 TIMES DAILY PRN
Qty: 30 TABLET | Refills: 5 | Status: SHIPPED | OUTPATIENT
Start: 2023-01-25 | End: 2023-02-24 | Stop reason: SDUPTHER

## 2023-01-25 NOTE — PROGRESS NOTES
Subjective     Jian Baker was seen in follow-up for cholangiocarcinoma  He underwent repeat biopsy which confirmed the presence of cholangiocarcinoma.  We discussed the diagnosis, prognosis and treatment options at length.  Alternative options were discussed as well.    Past Medical History, Past Surgical History, Social History, Family History have been reviewed and are without significant changes except as mentioned.        Medications:  The current medication list was reviewed in the EMR    ALLERGIES:  No Known Allergies    Objective      Vitals:    01/25/23 1008   BP: 132/80   Pulse: 85   Resp: 18   SpO2: 98%   Weight: 76.2 kg (168 lb)   PainSc: 0-No pain     No flowsheet data found.    Physical Exam  Vitals and nursing note reviewed.   Constitutional:       Appearance: Normal appearance.   Abdominal:      General: There is no distension.      Palpations: Abdomen is soft.      Tenderness: There is no abdominal tenderness.      Comments: PCBD - draining bile.    Neurological:      General: No focal deficit present.      Mental Status: He is alert and oriented to person, place, and time. Mental status is at baseline.   Psychiatric:         Mood and Affect: Mood normal.         Behavior: Behavior normal.         Thought Content: Thought content normal.               RECENT LABS:Independently reviewed and summarized  Hematology WBC   Date Value Ref Range Status   12/06/2022 11.80 (H) 3.40 - 10.80 10*3/mm3 Final     RBC   Date Value Ref Range Status   12/06/2022 2.82 (L) 4.14 - 5.80 10*6/mm3 Final     Hemoglobin   Date Value Ref Range Status   12/06/2022 9.3 (L) 13.0 - 17.7 g/dL Final     Hematocrit   Date Value Ref Range Status   12/06/2022 26.4 (L) 37.5 - 51.0 % Final     Platelets   Date Value Ref Range Status   12/06/2022 391 140 - 450 10*3/mm3 Final     WBC   Date Value Ref Range Status   12/06/2022 11.80 (H) 3.40 - 10.80 10*3/mm3 Final     RBC   Date Value Ref Range Status   12/06/2022 2.82 (L) 4.14 -  5.80 10*6/mm3 Final     Hemoglobin   Date Value Ref Range Status   12/06/2022 9.3 (L) 13.0 - 17.7 g/dL Final     Hematocrit   Date Value Ref Range Status   12/06/2022 26.4 (L) 37.5 - 51.0 % Final     MCV   Date Value Ref Range Status   12/06/2022 93.6 79.0 - 97.0 fL Final     MCH   Date Value Ref Range Status   12/06/2022 33.0 26.6 - 33.0 pg Final     MCHC   Date Value Ref Range Status   12/06/2022 35.2 31.5 - 35.7 g/dL Final     RDW   Date Value Ref Range Status   12/06/2022 11.9 (L) 12.3 - 15.4 % Final     RDW-SD   Date Value Ref Range Status   12/06/2022 41.0 37.0 - 54.0 fl Final     MPV   Date Value Ref Range Status   12/06/2022 10.9 6.0 - 12.0 fL Final     Platelets   Date Value Ref Range Status   12/06/2022 391 140 - 450 10*3/mm3 Final     Neutrophil %   Date Value Ref Range Status   12/06/2022 84.0 (H) 42.7 - 76.0 % Final     Lymphocyte %   Date Value Ref Range Status   12/06/2022 7.9 (L) 19.6 - 45.3 % Final     Monocyte %   Date Value Ref Range Status   12/06/2022 5.2 5.0 - 12.0 % Final     Eosinophil %   Date Value Ref Range Status   12/06/2022 1.1 0.3 - 6.2 % Final     Basophil %   Date Value Ref Range Status   12/06/2022 0.6 0.0 - 1.5 % Final     Immature Grans %   Date Value Ref Range Status   12/06/2022 1.2 (H) 0.0 - 0.5 % Final     Neutrophils, Absolute   Date Value Ref Range Status   12/06/2022 9.92 (H) 1.70 - 7.00 10*3/mm3 Final     Lymphocytes, Absolute   Date Value Ref Range Status   12/06/2022 0.93 0.70 - 3.10 10*3/mm3 Final     Monocytes, Absolute   Date Value Ref Range Status   12/06/2022 0.61 0.10 - 0.90 10*3/mm3 Final     Eosinophils, Absolute   Date Value Ref Range Status   12/06/2022 0.13 0.00 - 0.40 10*3/mm3 Final     Basophils, Absolute   Date Value Ref Range Status   12/06/2022 0.07 0.00 - 0.20 10*3/mm3 Final     Immature Grans, Absolute   Date Value Ref Range Status   12/06/2022 0.14 (H) 0.00 - 0.05 10*3/mm3 Final     nRBC   Date Value Ref Range Status   12/06/2022 0.0 0.0 - 0.2 /100 WBC  Final       Lab Results   Component Value Date    GLUCOSE 79 12/06/2022    BUN 8 12/06/2022    CREATININE 0.56 (L) 12/06/2022    EGFRIFNONA 81 01/02/2020    BCR 14.3 12/06/2022    K 4.7 12/06/2022    CO2 24.8 12/06/2022    CALCIUM 9.0 12/06/2022    ALBUMIN 3.50 12/06/2022    AST 41 (H) 12/06/2022    ALT 32 12/06/2022                Imaging:     CT 3 Phase Liver, A/P: 11/29/2022  IMPRESSION:   1. Mass involving the hepatic hilum, central liver and gallbladder with adjacent vascular involvement measures at least 3.8 cm and concerning for cholangiocarcinoma.   2. Placement of external and internal biliary stents with improved biliary duct dilation with minimal remaining. Expected pneumobilia.   3. Abnormal wall thickening of the gallbladder presumably related to involvement by tumor. Infection/inflammation is not excluded.   4. Mild acute pancreatitis.   5. Hepatic cyst.      IR Biliary Drain Catheter Placement: 11/28/2022  IMPRESSION:     1. Left-sided tube cholangiogram demonstrates opacification of only the left intrahepatic bile ducts and there is a severe stricture of the main left hepatic duct. Previously placed tube was patent.   2. Technically successful brush biopsy of central aspect of the main left hepatic bile duct extending into the common hepatic duct.   3. Right-sided PTC demonstrates moderate to severe dilation of the right intrahepatic bile ducts and complete obstruction of the central aspect of the main right hepatic duct.   4. Successful placement of a new right 10 [place] internal/external percutaneous biliary drain as described.   5. Successful replacement of left 10 [place] internal/external percutaneous biliary drain as described.      IR Biliary Drain Catheter Placement: 11/25/2022  IMPRESSION:   Successful placement of a left side PTBD. Central biliary obstruction. Final contrast injection shows some reflux into a right directed duct. If bilirubin does not improve, patient may need a right side  stent or drain.      CT Esophagram: 11/24/2022  IMPRESSION:   1. Unremarkable esophagus with no evidence for stricture or esophageal leak or significant hiatal hernia.   2. No active disease in the chest. There is remote granulomatous disease, coronary artery calcification, and bilateral gynecomastia.      MRI Brain: 11/23/2022  IMPRESSION:   1. No evidence for intracranial metastatic disease.   2. No acute infarct.   3. No hydrocephalus.      MRI Abd: 11/18/2022  IMPRESSION:   Obstructing mass at the biliary confluence concerning for   cholangiocarcinoma. No regional adenopathy      CT A/P: 11/17/2022  IMPRESSION:   Intrahepatic biliary ductal prominence with no significant   extrahepatic involvement.   The gallbladder is within normal.   Correlation with MRCP is recommended as follow-up.   Appendix is normal.   Grade 1 spondylolisthesis of L5 on S1. Bilateral pars defects of   L5.     ERCP: 11/22/2022  FINDINGS:  The esophagus was successfully intubated under direct vision w/o detailed examination of the pharynx, larynx, and associated structures. Significant extrinsic compression noted at the upper esophagus likely due to cervical spine pathology.     Cytology: 11/28/2022  DIAGNOSIS:   COMMON BILE DUCT BRUSHINGS (SMEARS, THINPREP AND CELL BLOCK):           SATISFACTORY FOR EVALUATION.           HIGHLY ATYPICAL CELLS PRESENT (SEE COMMENT).    PATHOLOGIST COMMENT:   Differential diagnosis includes a neoplastic process vs. reactive atypia.       Jian Baker reports a pain score of 0.  Given his pain assessment as noted, treatment options were discussed and the following options were decided upon as a follow-up plan to address the patient's pain: continuation of current treatment plan for pain.    Patient screened negative for depression based on a PHQ-9 score of 0 on 1/25/2023.     Advance Care Planning   ACP discussion was declined by the patient. Patient does not have an advance directive, declines further  assistance.         Diagnosis:   (1) Cholangiocarcinoma, locally advance, unresectable   At least Stage IIIB (cT4, cN1, cM0)     (2) Biliary obstruction   (3) Candidiasis   (4) Cancer associated pain     Assessment & Plan     (1) Cholangiocarcinoma, locally advance, unresectable     New diagnosis.  Extensive old medical records reviewed.  Dr. Barboza consultation note, procedure notes, biopsy results reviewed at length.  Patient with diagnosis of biliary tree/gallbladder mass for which he underwent unsuccessful ERCP in November 2022.  Subsequently he underwent percutaneous biliary drainage x2 however the biopsy was nondiagnostic.    He was sent to [place] for repeat biopsy which was delayed due to recent percutaneous biliary drainage as well as transportation issues.  He eventually had biopsy performed which showed scant cells suggestive of adenocarcinoma confirming the diagnosis of cholangiocarcinoma.    There was not enough tissue to order molecular profiling.    I had a very lengthy discussion with patient and family about his diagnosis, prognosis and treatment options.  His case was discussed with Dr. Barboza and Dr Arevalo.      I also discussed with patient that this is not a curable disease and goal of treatment is strictly palliative in nature.  He understand this very clearly.  Alternative options including no treatment were also discussed.    Foremost patient with locally advanced unresectable cholangiocarcinoma I personally suggest systemic chemotherapy rather than local regional therapy.  1 reason to start chemotherapy is to allow the natural history of disease to become manifest before embarking on local regional therapy.    I will order foundation 1 -liquid test to see if he has any targetable mutation.    However while awaiting the result of foundation 1 testing I recommend palliative systemic therapy with cisplatin, gemcitabine and durvalumab which is category 1 NCCN recommendation.    Side  effects associated with chemotherapy including fatigue, nausea, emesis, constipation, diarrhea, hair loss, peripheral neuropathy, ototoxicity, nephrotoxicity, electrolyte disturbance, cytopenias, increased risk of infection, potential risk of bleeding, need for blood transfusion, rare risk of cardiotoxicity/hepatotoxicity/nephrotoxicity discussed at length.    After lengthy discussion patient understood and was agreeable to start systemic therapy.    I will send referral to surgery for chemotherapy port placement.  I will plan to get him started next week    Recommendations:   · Discussed diagnosis, prognosis and treatment options at length.  · Recommend referral to surgery for chemotherapy port placement.  · Recommend palliative systemic therapy with cisplatin, gemcitabine and durvalumab.  · CBC, CMP, CA 19-9 next week prior to day 1 cycle 1.  · Foundation 1 liquid molecular profile testing given we do not have enough tissue to run molecular testing on biopsy.      (2) Biliary obstruction     Chronic, stable.  Patient s/p PTBD x 2.   He is currently not jaundiced.  Continue biliary drainage.  Closely monitor for obstruction of biliary duct in which case he might need exchange    (3) Candidiasis     New diagnosis.  Patient appears to have fungal infection where percutaneous biliary drain is entering the bile duct.  He is not having any fever or chills however given his current immunocompromised I will start him on fluconazole 200 mg daily.    (4) Cancer associated pain     His pain has been well controlled on oxycodone as needed.  He will continue this        Case personally discussed with Dr Barboza and Dr Arevalo.         1/25/2023      CC:

## 2023-01-26 ENCOUNTER — OFFICE VISIT (OUTPATIENT)
Dept: SURGERY | Facility: CLINIC | Age: 68
End: 2023-01-26
Payer: MEDICARE

## 2023-01-26 ENCOUNTER — TELEPHONE (OUTPATIENT)
Dept: ONCOLOGY | Facility: CLINIC | Age: 68
End: 2023-01-26
Payer: MEDICARE

## 2023-01-26 VITALS
HEIGHT: 67 IN | TEMPERATURE: 98.7 F | WEIGHT: 166.4 LBS | SYSTOLIC BLOOD PRESSURE: 120 MMHG | HEART RATE: 98 BPM | OXYGEN SATURATION: 97 % | BODY MASS INDEX: 26.12 KG/M2 | DIASTOLIC BLOOD PRESSURE: 70 MMHG

## 2023-01-26 DIAGNOSIS — C24.9 MALIGNANT NEOPLASM OF BILIARY TRACT, UNSPECIFIED: Primary | ICD-10-CM

## 2023-01-26 PROCEDURE — 99204 OFFICE O/P NEW MOD 45 MIN: CPT | Performed by: SURGERY

## 2023-01-26 RX ORDER — LORATADINE 10 MG/1
10 TABLET ORAL DAILY PRN
COMMUNITY
Start: 2022-10-17

## 2023-01-26 RX ORDER — BUPIVACAINE HCL/0.9 % NACL/PF 0.1 %
2 PLASTIC BAG, INJECTION (ML) EPIDURAL ONCE
Status: CANCELLED | OUTPATIENT
Start: 2023-02-02 | End: 2023-01-26

## 2023-01-26 RX ORDER — ATORVASTATIN CALCIUM 20 MG/1
20 TABLET, FILM COATED ORAL DAILY
COMMUNITY
Start: 2022-11-04

## 2023-01-26 NOTE — PROGRESS NOTES
Chief Complaint   Patient presents with   • Consult     Ref: chasity Davies        67-year-old male with recently diagnosed cholangiocarcinoma in need of a port for chemotherapy.  He has chronic abdominal pain associated with his cholangiocarcinoma.  He also has a known umbilical hernia that does not cause him any discomfort.      Past Medical History:   Diagnosis Date   • Asthma     Previously diagnosed and previously prescribed inhaler medications.     • Cancer (HCC)    • Essential hypertension    • Gastroesophageal reflux disease        Past Surgical History:   Procedure Laterality Date   • BONE GRAFT      Neck surgery x3   • CERVICAL SPINE SURGERY      x3   • DENTAL PROCEDURE      3 wisdom teeth and 2+ other teeth removed   • ERCP N/A 11/22/2022    Procedure: ENDOSCOPIC RETROGRADE CHOLANGIOPANCREATOGRAPHY;  Surgeon: Nabila Worthy MD;  Location: [facility] ENDOSCOPY;  Service: Gastroenterology;  Laterality: N/A;   • WRIST FRACTURE SURGERY Bilateral          Current Outpatient Medications:   •  albuterol sulfate  (90 Base) MCG/ACT inhaler, Inhale 2 puffs Every 4 (Four) Hours As Needed for Wheezing or Shortness of Air., Disp: 18 g, Rfl: 0  •  atorvastatin (LIPITOR) 20 MG tablet, Take 20 mg by mouth Daily., Disp: , Rfl:   •  cyanocobalamin (VITAMIN B-12) 1000 MCG tablet, Take 1 tablet by mouth Daily., Disp: , Rfl:   •  cyclobenzaprine (FLEXERIL) 10 MG tablet, Take 1 tablet by mouth 3 (Three) Times a Day As Needed for Muscle Spasms., Disp: 60 tablet, Rfl: 11  •  fluconazole (DIFLUCAN) 200 MG tablet, Take 1 tablet by mouth Daily., Disp: 30 tablet, Rfl: 0  •  lisinopril (PRINIVIL,ZESTRIL) 10 MG tablet, TAKE TWO TABLETS BY MOUTH ONCE DAILY, Disp: 180 tablet, Rfl: 4  •  loratadine (CLARITIN) 10 MG tablet, Take 10 mg by mouth Daily., Disp: , Rfl:   •  meloxicam (Mobic) 15 MG tablet, Take 1 tablet by mouth Daily., Disp: 14 tablet, Rfl: 0  •  ondansetron (ZOFRAN) 8 MG tablet, Take 1 tablet by mouth 3 (Three)  Times a Day As Needed for Nausea or Vomiting., Disp: 30 tablet, Rfl: 5  •  oxyCODONE (OXY-IR) 5 MG capsule, Take 1 capsule by mouth Every 4 (Four) Hours As Needed for Moderate Pain., Disp: 60 capsule, Rfl: 0  •  pantoprazole (PROTONIX) 20 MG EC tablet, Take 1 tablet by mouth Daily., Disp: 30 tablet, Rfl: 11  •  polyethylene glycol (MIRALAX) 17 g packet, Take 17 g by mouth Daily As Needed (constipation)., Disp: 20 each, Rfl: 0  •  OLANZapine (zyPREXA) 5 MG tablet, Take 1 tablet by mouth Every Night. Take on day 2, 3, and 4 and Days 9, 10, 11 after chemotherapy., Disp: 6 tablet, Rfl: 7    No Known Allergies    Family History   Problem Relation Age of Onset   • COPD Mother    • Hypertension Mother    • Diabetes type II Mother    • Cancer Father    • Heart failure Father    • Cancer Brother    • Early death Son    • Lung cancer Maternal Uncle    • Early death Sister        Social History     Socioeconomic History   • Marital status:    Tobacco Use   • Smoking status: Former     Packs/day: 1.00     Years: 45.00     Pack years: 45.00     Types: Pipe, Cigarettes     Start date: 10/10/1972     Quit date: 10/31/2019     Years since quitting: 3.2     Passive exposure: Past   • Smokeless tobacco: Current     Types: Chew   • Tobacco comments:     Passive for 12 years as a child   Vaping Use   • Vaping Use: Never used   Substance and Sexual Activity   • Alcohol use: Yes     Alcohol/week: 7.0 standard drinks     Types: 7 Cans of beer per week     Comment: Prior heavy drinker   • Drug use: Yes     Types: Oxycodone   • Sexual activity: Defer     Partners: Female       Review of Systems   HENT: Positive for hearing loss.    Respiratory: Positive for shortness of breath.    Gastrointestinal: Positive for vomiting.   Musculoskeletal: Positive for back pain.   Allergic/Immunologic: Positive for environmental allergies.   Neurological: Positive for headaches.   Hematological: Bruises/bleeds easily.   All other systems reviewed  and are negative.      Vitals:    01/26/23 1435   BP: 120/70   Pulse: 98   Temp: 98.7 °F (37.1 °C)   SpO2: 97%       Physical Exam  Constitutional:       Appearance: Normal appearance.   HENT:      Head: Normocephalic and atraumatic.   Cardiovascular:      Rate and Rhythm: Normal rate and regular rhythm.   Abdominal:      General: There is no distension.      Palpations: Abdomen is soft.      Comments: Mildly tender to palpation in the right upper quadrant without rebound or guarding.  Umbilical hernia with incarcerated fat   Neurological:      General: No focal deficit present.      Mental Status: He is alert and oriented to person, place, and time.   Psychiatric:         Mood and Affect: Mood normal.         Behavior: Behavior normal.           ASSESSMENT    Diagnoses and all orders for this visit:    1. Malignant neoplasm of biliary tract, unspecified (HCC) (Primary)  -     Case Request; Standing  -     ceFAZolin (ANCEF) 2 g in sodium chloride 0.9 % 100 mL IVPB  -     Case Request    Other orders  -     Follow Anesthesia Guidelines / Protocol; Future  -     Provide NPO Instructions to Patient; Future  -     Chlorhexidine Skin Prep; Future  -     Follow Anesthesia Guidelines / Protocol; Standing  -     Verify NPO Status; Standing  -     Obtain Informed Consent; Standing  -     SCD (Sequential Compression Device) - Place on Patient in Pre-Op; Standing  -     Verify / Perform Chlorhexidine Skin Prep; Standing  -     Verify / Perform Chlorhexidine Skin Prep if Indicated (If Not Already Completed); Standing        PLAN    1.  I counseled the patient on port placement.  We discussed the risk the procedure including bleeding, infection, pneumothorax.  He expressed understanding wishes to proceed with surgery.              This document has been electronically signed by Van Austin MD on January 26, 2023 15:07 CST

## 2023-01-27 ENCOUNTER — TELEPHONE (OUTPATIENT)
Dept: NUTRITION | Facility: HOSPITAL | Age: 68
End: 2023-01-27

## 2023-01-27 NOTE — PROGRESS NOTES
1/25/2023    CHEMOTHERAPY PREPARATION    Jian Baker  [id number]  1955    Chief Complaint: chemo education     History of present illness:  Jian Baker is a 67 y.o. year old male who is here today for chemotherapy preparation and needs assessment. The patient has been diagnosed with cholangiocarcinoma and is scheduled to begin treatment with Cisplatin, Gemzar and imfinizi.     Oncology History:    Oncology/Hematology History   Malignant neoplasm of biliary tract, unspecified (HCC)   12/7/2022 Initial Diagnosis    Malignant neoplasm of biliary tract, unspecified (HCC)     1/25/2023 Cancer Staged    Staging form: Distal Bile Duct, AJCC 8th Edition  - Clinical stage from 1/25/2023: Stage IIIB (cT4, cN1, cM0) - Signed by Ivonne Davies MD on 1/25/2023     2/3/2023 -  Chemotherapy    OP HEPATOBILIARY CISplatin + Gemcitabine Days 1,8 / Durvalumab Q21D         Past Medical History:   Diagnosis Date   • Asthma     Previously diagnosed and previously prescribed inhaler medications.     • Cancer (HCC)    • Essential hypertension    • Gastroesophageal reflux disease        Past Surgical History:   Procedure Laterality Date   • BONE GRAFT      Neck surgery x3   • CERVICAL SPINE SURGERY      x3   • DENTAL PROCEDURE      3 wisdom teeth and 2+ other teeth removed   • ERCP N/A 11/22/2022    Procedure: ENDOSCOPIC RETROGRADE CHOLANGIOPANCREATOGRAPHY;  Surgeon: Nabila Worthy MD;  Location: [facility] ENDOSCOPY;  Service: Gastroenterology;  Laterality: N/A;   • WRIST FRACTURE SURGERY Bilateral        MEDICATIONS: The current medication list was reviewed and reconciled.     Allergies:  has No Known Allergies.    Family History   Problem Relation Age of Onset   • COPD Mother    • Hypertension Mother    • Diabetes type II Mother    • Cancer Father    • Heart failure Father    • Cancer Brother    • Early death Son    • Lung cancer Maternal Uncle    • Early death Sister          Review of Systems    Physical  "Exam  Vital Signs: There were no vitals taken for this visit.   General Appearance:  alert, cooperative, no apparent distress, appears stated age and normal weight   Neurologic/Psychiatric: A&O x 3, gait steady, appropriate affect   HEENT:  Normocephalic, without obvious abnormality, mucous membranes moist   Lungs:   Clear to auscultation bilaterally; respirations regular, even, and unlabored bilaterally   Heart:  Regular rate and rhythm, no murmurs appreciated   Extremities: Normal, atraumatic; no clubbing, cyanosis, or edema    Skin: No rashes, lesions, or abnormal coloration noted     ECOG Performance Status: (1) Restricted in Physically Strenuous Activity, Ambulatory & Able to Do Work of Light Nature          NEEDS ASSESSMENTS    Genetics  The patient's new diagnosis and family history have been reviewed for genetic counseling needs. A genetic referral is not recommended.     Psychosocial  The patient has completed a PHQ-9 Depression Screening today.   PHQ-9 results show 0 (No Depression).   Copies of patient's questionnaires will be scanned into EMR for details and further reference.    Barriers to care  Patient is requesting to speak with Katie Hartman LCSW regarding needs in transportation. She is not available at this time but will leave message for her to reach out to patient.    VAD Assessment  The patient is scheduled to meet with general surgery to discuss port placement.      Advanced Care Planning  The patient and I discussed advanced care planning, \"Conversations that Matter\".   This service was offered, free of charge, for development of advance directives with a certified ACP facilitator.  The patient does not have an up-to-date advanced directive. This document is not on file with our office. The patient is not interested in an appointment with one of our facilitators to create or update their advanced directives.      Palliative Care  Oncology criteria for palliative care referral is met at this time. " The patient is not interested in a palliative care consultation.     Additional Referral needs  none      CHEMOTHERAPY EDUCATION    Booklets Given: Chemo cares education sheets on Cisplatin, Gemzar and Imfinizi  Consent form signed today.     Chemotherapy/Biotherapy Education Sheets: (list all that apply)  nausea management, acid reflux management, diarrhea management, Cancer resourse contacts information and skin and mouth care                                                                                                                                                                 Chemotherapy Regimen:   Treatment Plans     Name Type Plan Dates Plan Provider         Active    OP HEPATOBILIARY CISplatin + Gemcitabine Days 1,8 / Durvalumab Q21D ONCOLOGY TREATMENT  1/25/2023 - Present Ivonne Davies MD                    TOPICS EDUCATION PROVIDED COMMENTS   ANEMIA:  role of RBC, cause, s/s, ways to manage, role of transfusion [x]    THROMBOCYTOPENIA:  role of platelet, cause, s/s, ways to prevent bleeding, things to avoid, when to seek help [x]    NEUTROPENIA:  role of WBC, cause, infection precautions, s/s of infection, when to call MD [x]    NUTRITION & APPETITE CHANGES:  importance of maintaining healthy diet & weight, ways to manage to improve intake, dietary consult, exercise regimen [x]    DIARRHEA:  causes, s/s of dehydration, ways to manage, dietary changes, when to call MD [x]    CONSTIPATION:  causes, ways to manage, dietary changes, when to call MD [x]    NAUSEA & VOMITING:  cause, use of antiemetics, dietary changes, when to call MD [x]    MOUTH SORES:  causes, oral care, ways to manage [x]    ALOPECIA:  cause, ways to manage, resources [x]    INFERTILITY & SEXUALITY:  causes, fertility preservation options, sexuality changes, ways to manage, importance of birth control [x]    NERVOUS SYSTEM CHANGES:  causes, s/s, neuropathies, cognitive changes, ways to manage [x]    PAIN:  causes, ways to  manage [x] ????   SKIN & NAIL CHANGES:  cause, s/s, ways to manage [x]    ORGAN TOXICITIES:  cause, s/s, need for diagnostic tests, labs, when to notify MD [x]    SURVIVORSHIP:  distress, distress assessment, secondary malignancies, early/late effects, follow-up, social issues, social support [x]    HOME CARE:  use of spill kits, storing of PO chemo, how to manage bodily fluids [x]    MISCELLANEOUS:  drug interactions, administration, vesicant, et [x]        Assessment and Plan:    Diagnoses and all orders for this visit:    1. Malignant neoplasm of biliary tract, unspecified (HCC) (Primary)        This was a 30 minute face-to-face visit with 30 minutes spent in  counseling and coordination of care as documented above.   The patient and I have reviewed their new cancer diagnosis and scheduled treatment plan. Needs assessment was completed including genetics, psychosocial needs, barriers to care, VAD evaluation, advanced care planning, and palliative care services. Referrals have been ordered as appropriate based upon our evaluation and patient desires.     Chemotherapy teaching was also completed today as documented above. Adequate time was given to answer all questions to his satisfaction. Patient and family are aware of their care team members and contact information if they have questions or problems throughout the treatment course. Needs assessments and education has been completed. The patient is adequately prepared to begin treatment as scheduled.       NOY Cr

## 2023-01-30 ENCOUNTER — ANESTHESIA EVENT (OUTPATIENT)
Dept: PERIOP | Facility: HOSPITAL | Age: 68
End: 2023-01-30
Payer: MEDICARE

## 2023-01-30 ENCOUNTER — PRE-ADMISSION TESTING (OUTPATIENT)
Dept: PREADMISSION TESTING | Facility: HOSPITAL | Age: 68
End: 2023-01-30
Payer: MEDICARE

## 2023-01-30 ENCOUNTER — HOSPITAL ENCOUNTER (EMERGENCY)
Facility: HOSPITAL | Age: 68
Discharge: HOME | End: 2023-01-30
Attending: EMERGENCY MEDICINE | Admitting: EMERGENCY MEDICINE
Payer: MEDICARE

## 2023-01-30 VITALS
OXYGEN SATURATION: 94 % | HEART RATE: 82 BPM | WEIGHT: 173 LBS | SYSTOLIC BLOOD PRESSURE: 144 MMHG | HEIGHT: 67 IN | DIASTOLIC BLOOD PRESSURE: 82 MMHG | BODY MASS INDEX: 27.15 KG/M2 | RESPIRATION RATE: 20 BRPM

## 2023-01-30 VITALS
WEIGHT: 166 LBS | OXYGEN SATURATION: 98 % | SYSTOLIC BLOOD PRESSURE: 133 MMHG | TEMPERATURE: 98 F | BODY MASS INDEX: 26.06 KG/M2 | HEIGHT: 67 IN | DIASTOLIC BLOOD PRESSURE: 85 MMHG | RESPIRATION RATE: 20 BRPM | HEART RATE: 82 BPM

## 2023-01-30 DIAGNOSIS — T85.590A OBSTRUCTION OF PERCUTANEOUS TRANSHEPATIC BILIARY DRAINAGE CATHETER: ICD-10-CM

## 2023-01-30 DIAGNOSIS — C22.1 CHOLANGIOCARCINOMA: Primary | ICD-10-CM

## 2023-01-30 LAB
ALBUMIN SERPL-MCNC: 3.4 G/DL (ref 3.5–5.2)
ALBUMIN/GLOB SERPL: 0.9 G/DL
ALP SERPL-CCNC: 357 U/L (ref 39–117)
ALT SERPL W P-5'-P-CCNC: 70 U/L (ref 1–41)
ANION GAP SERPL CALCULATED.3IONS-SCNC: 8 MMOL/L (ref 5–15)
AST SERPL-CCNC: 67 U/L (ref 1–40)
BACTERIA UR QL AUTO: ABNORMAL /HPF
BASOPHILS # BLD AUTO: 0.04 10*3/MM3 (ref 0–0.2)
BASOPHILS NFR BLD AUTO: 0.6 % (ref 0–1.5)
BILIRUB SERPL-MCNC: 0.6 MG/DL (ref 0–1.2)
BILIRUB UR QL STRIP: NEGATIVE
BUN SERPL-MCNC: 12 MG/DL (ref 8–23)
BUN/CREAT SERPL: 12.4 (ref 7–25)
CALCIUM SPEC-SCNC: 9.1 MG/DL (ref 8.6–10.5)
CHLORIDE SERPL-SCNC: 98 MMOL/L (ref 98–107)
CLARITY UR: CLEAR
CO2 SERPL-SCNC: 29 MMOL/L (ref 22–29)
COLOR UR: YELLOW
CREAT SERPL-MCNC: 0.97 MG/DL (ref 0.76–1.27)
DEPRECATED RDW RBC AUTO: 42.6 FL (ref 37–54)
EGFRCR SERPLBLD CKD-EPI 2021: 86.6 ML/MIN/1.73
EOSINOPHIL # BLD AUTO: 0.16 10*3/MM3 (ref 0–0.4)
EOSINOPHIL NFR BLD AUTO: 2.3 % (ref 0.3–6.2)
ERYTHROCYTE [DISTWIDTH] IN BLOOD BY AUTOMATED COUNT: 13.2 % (ref 12.3–15.4)
GLOBULIN UR ELPH-MCNC: 3.7 GM/DL
GLUCOSE SERPL-MCNC: 96 MG/DL (ref 65–99)
GLUCOSE UR STRIP-MCNC: NEGATIVE MG/DL
HCT VFR BLD AUTO: 34.5 % (ref 37.5–51)
HGB BLD-MCNC: 12 G/DL (ref 13–17.7)
HGB UR QL STRIP.AUTO: NEGATIVE
HOLD SPECIMEN: NORMAL
HYALINE CASTS UR QL AUTO: ABNORMAL /LPF
IMM GRANULOCYTES # BLD AUTO: 0.02 10*3/MM3 (ref 0–0.05)
IMM GRANULOCYTES NFR BLD AUTO: 0.3 % (ref 0–0.5)
KETONES UR QL STRIP: NEGATIVE
LEUKOCYTE ESTERASE UR QL STRIP.AUTO: ABNORMAL
LIPASE SERPL-CCNC: 9 U/L (ref 13–60)
LYMPHOCYTES # BLD AUTO: 0.92 10*3/MM3 (ref 0.7–3.1)
LYMPHOCYTES NFR BLD AUTO: 13.4 % (ref 19.6–45.3)
MCH RBC QN AUTO: 30.8 PG (ref 26.6–33)
MCHC RBC AUTO-ENTMCNC: 34.8 G/DL (ref 31.5–35.7)
MCV RBC AUTO: 88.5 FL (ref 79–97)
MONOCYTES # BLD AUTO: 0.8 10*3/MM3 (ref 0.1–0.9)
MONOCYTES NFR BLD AUTO: 11.6 % (ref 5–12)
NEUTROPHILS NFR BLD AUTO: 4.93 10*3/MM3 (ref 1.7–7)
NEUTROPHILS NFR BLD AUTO: 71.8 % (ref 42.7–76)
NITRITE UR QL STRIP: NEGATIVE
NRBC BLD AUTO-RTO: 0 /100 WBC (ref 0–0.2)
PH UR STRIP.AUTO: 5.5 [PH] (ref 5–9)
PLATELET # BLD AUTO: 221 10*3/MM3 (ref 140–450)
PMV BLD AUTO: 10 FL (ref 6–12)
POTASSIUM SERPL-SCNC: 4.2 MMOL/L (ref 3.5–5.2)
PROT SERPL-MCNC: 7.1 G/DL (ref 6–8.5)
PROT UR QL STRIP: NEGATIVE
QT INTERVAL: 380 MS
QTC INTERVAL: 443 MS
RBC # BLD AUTO: 3.9 10*6/MM3 (ref 4.14–5.8)
RBC # UR STRIP: ABNORMAL /HPF
REF LAB TEST METHOD: ABNORMAL
SODIUM SERPL-SCNC: 135 MMOL/L (ref 136–145)
SP GR UR STRIP: 1.01 (ref 1–1.03)
SQUAMOUS #/AREA URNS HPF: ABNORMAL /HPF
UROBILINOGEN UR QL STRIP: ABNORMAL
WBC # UR STRIP: ABNORMAL /HPF
WBC NRBC COR # BLD: 6.87 10*3/MM3 (ref 3.4–10.8)
WHOLE BLOOD HOLD COAG: NORMAL

## 2023-01-30 PROCEDURE — 93005 ELECTROCARDIOGRAM TRACING: CPT

## 2023-01-30 PROCEDURE — 99283 EMERGENCY DEPT VISIT LOW MDM: CPT

## 2023-01-30 PROCEDURE — 80053 COMPREHEN METABOLIC PANEL: CPT | Performed by: EMERGENCY MEDICINE

## 2023-01-30 PROCEDURE — 85025 COMPLETE CBC W/AUTO DIFF WBC: CPT | Performed by: EMERGENCY MEDICINE

## 2023-01-30 PROCEDURE — 93010 ELECTROCARDIOGRAM REPORT: CPT | Performed by: INTERNAL MEDICINE

## 2023-01-30 PROCEDURE — 81001 URINALYSIS AUTO W/SCOPE: CPT | Performed by: EMERGENCY MEDICINE

## 2023-01-30 PROCEDURE — 83690 ASSAY OF LIPASE: CPT | Performed by: EMERGENCY MEDICINE

## 2023-01-30 RX ORDER — LISINOPRIL 10 MG/1
20 TABLET ORAL DAILY
COMMUNITY

## 2023-01-30 RX ORDER — SODIUM CHLORIDE, SODIUM GLUCONATE, SODIUM ACETATE, POTASSIUM CHLORIDE AND MAGNESIUM CHLORIDE 526; 502; 368; 37; 30 MG/100ML; MG/100ML; MG/100ML; MG/100ML; MG/100ML
1000 INJECTION, SOLUTION INTRAVENOUS CONTINUOUS PRN
Status: CANCELLED | OUTPATIENT
Start: 2023-02-02

## 2023-02-02 ENCOUNTER — APPOINTMENT (OUTPATIENT)
Dept: GENERAL RADIOLOGY | Facility: HOSPITAL | Age: 68
End: 2023-02-02
Payer: MEDICARE

## 2023-02-02 ENCOUNTER — ANESTHESIA (OUTPATIENT)
Dept: PERIOP | Facility: HOSPITAL | Age: 68
End: 2023-02-02
Payer: MEDICARE

## 2023-02-02 ENCOUNTER — HOSPITAL ENCOUNTER (OUTPATIENT)
Facility: HOSPITAL | Age: 68
Setting detail: HOSPITAL OUTPATIENT SURGERY
Discharge: HOME | End: 2023-02-02
Attending: SURGERY | Admitting: ANESTHESIOLOGY
Payer: MEDICARE

## 2023-02-02 VITALS
RESPIRATION RATE: 20 BRPM | SYSTOLIC BLOOD PRESSURE: 152 MMHG | HEIGHT: 67 IN | BODY MASS INDEX: 26.75 KG/M2 | OXYGEN SATURATION: 98 % | DIASTOLIC BLOOD PRESSURE: 86 MMHG | WEIGHT: 170.42 LBS | HEART RATE: 59 BPM | TEMPERATURE: 96.8 F

## 2023-02-02 DIAGNOSIS — C24.9 MALIGNANT NEOPLASM OF BILIARY TRACT, UNSPECIFIED: ICD-10-CM

## 2023-02-02 PROCEDURE — 25010000002 HEPARIN LOCK FLUSH PER 10 UNITS: Performed by: SURGERY

## 2023-02-02 PROCEDURE — 25010000002 CEFAZOLIN PER 500 MG: Performed by: SURGERY

## 2023-02-02 PROCEDURE — C1788 PORT, INDWELLING, IMP: HCPCS | Performed by: SURGERY

## 2023-02-02 PROCEDURE — 25010000002 MIDAZOLAM PER 1 MG: Performed by: NURSE ANESTHETIST, CERTIFIED REGISTERED

## 2023-02-02 PROCEDURE — 76000 FLUOROSCOPY <1 HR PHYS/QHP: CPT

## 2023-02-02 PROCEDURE — 36561 INSERT TUNNELED CV CATH: CPT | Performed by: SURGERY

## 2023-02-02 PROCEDURE — 94640 AIRWAY INHALATION TREATMENT: CPT

## 2023-02-02 PROCEDURE — 77001 FLUOROGUIDE FOR VEIN DEVICE: CPT | Performed by: SURGERY

## 2023-02-02 PROCEDURE — 25010000002 FENTANYL CITRATE (PF) 50 MCG/ML SOLUTION: Performed by: NURSE ANESTHETIST, CERTIFIED REGISTERED

## 2023-02-02 PROCEDURE — 25010000002 PROPOFOL 200 MG/20ML EMULSION: Performed by: NURSE ANESTHETIST, CERTIFIED REGISTERED

## 2023-02-02 DEVICE — PRT INTRO VASC/INTERV VORTEX FILL/HL DETACH/POLYURET/CATH 8F: Type: IMPLANTABLE DEVICE | Site: INTERNAL JUGULAR | Status: FUNCTIONAL

## 2023-02-02 RX ORDER — BUPIVACAINE HCL/0.9 % NACL/PF 0.1 %
2 PLASTIC BAG, INJECTION (ML) EPIDURAL ONCE
Status: COMPLETED | OUTPATIENT
Start: 2023-02-02 | End: 2023-02-02

## 2023-02-02 RX ORDER — FENTANYL CITRATE 50 UG/ML
INJECTION, SOLUTION INTRAMUSCULAR; INTRAVENOUS AS NEEDED
Status: DISCONTINUED | OUTPATIENT
Start: 2023-02-02 | End: 2023-02-02 | Stop reason: SURG

## 2023-02-02 RX ORDER — PROPOFOL 10 MG/ML
INJECTION, EMULSION INTRAVENOUS AS NEEDED
Status: DISCONTINUED | OUTPATIENT
Start: 2023-02-02 | End: 2023-02-02 | Stop reason: SURG

## 2023-02-02 RX ORDER — ATORVASTATIN CALCIUM 20 MG/1
TABLET, FILM COATED ORAL
Qty: 90 TABLET | Refills: 3 | OUTPATIENT
Start: 2023-02-02

## 2023-02-02 RX ORDER — MIDAZOLAM HYDROCHLORIDE 1 MG/ML
INJECTION INTRAMUSCULAR; INTRAVENOUS AS NEEDED
Status: DISCONTINUED | OUTPATIENT
Start: 2023-02-02 | End: 2023-02-02 | Stop reason: SURG

## 2023-02-02 RX ORDER — LIDOCAINE HYDROCHLORIDE 10 MG/ML
INJECTION, SOLUTION EPIDURAL; INFILTRATION; INTRACAUDAL; PERINEURAL AS NEEDED
Status: DISCONTINUED | OUTPATIENT
Start: 2023-02-02 | End: 2023-02-02 | Stop reason: HOSPADM

## 2023-02-02 RX ORDER — SODIUM CHLORIDE, SODIUM GLUCONATE, SODIUM ACETATE, POTASSIUM CHLORIDE AND MAGNESIUM CHLORIDE 526; 502; 368; 37; 30 MG/100ML; MG/100ML; MG/100ML; MG/100ML; MG/100ML
1000 INJECTION, SOLUTION INTRAVENOUS CONTINUOUS PRN
Status: DISCONTINUED | OUTPATIENT
Start: 2023-02-02 | End: 2023-02-02 | Stop reason: HOSPADM

## 2023-02-02 RX ORDER — HEPARIN SODIUM (PORCINE) LOCK FLUSH IV SOLN 100 UNIT/ML 100 UNIT/ML
SOLUTION INTRAVENOUS AS NEEDED
Status: DISCONTINUED | OUTPATIENT
Start: 2023-02-02 | End: 2023-02-02 | Stop reason: HOSPADM

## 2023-02-02 RX ORDER — ALBUTEROL SULFATE 2.5 MG/3ML
2.5 SOLUTION RESPIRATORY (INHALATION) ONCE
Status: COMPLETED | OUTPATIENT
Start: 2023-02-02 | End: 2023-02-02

## 2023-02-02 RX ADMIN — FENTANYL CITRATE 25 MCG: 50 INJECTION, SOLUTION INTRAMUSCULAR; INTRAVENOUS at 11:30

## 2023-02-02 RX ADMIN — FENTANYL CITRATE 25 MCG: 50 INJECTION, SOLUTION INTRAMUSCULAR; INTRAVENOUS at 11:14

## 2023-02-02 RX ADMIN — PROPOFOL 30 MG: 10 INJECTION, EMULSION INTRAVENOUS at 11:25

## 2023-02-02 RX ADMIN — Medication 2 G: at 11:10

## 2023-02-02 RX ADMIN — PROPOFOL 30 MG: 10 INJECTION, EMULSION INTRAVENOUS at 11:30

## 2023-02-02 RX ADMIN — MIDAZOLAM 2 MG: 1 INJECTION, SOLUTION INTRAMUSCULAR; INTRAVENOUS at 11:03

## 2023-02-02 RX ADMIN — ALBUTEROL SULFATE 2.5 MG: 2.5 SOLUTION RESPIRATORY (INHALATION) at 08:06

## 2023-02-02 RX ADMIN — PROPOFOL 30 MG: 10 INJECTION, EMULSION INTRAVENOUS at 11:11

## 2023-02-02 RX ADMIN — SODIUM CHLORIDE, SODIUM GLUCONATE, SODIUM ACETATE, POTASSIUM CHLORIDE AND MAGNESIUM CHLORIDE 1000 ML: 526; 502; 368; 37; 30 INJECTION, SOLUTION INTRAVENOUS at 08:07

## 2023-02-02 NOTE — OP NOTE
INSERTION OF PORTACATH  Procedure Report    Patient Name:  Jian Baker  YOB: 1955    Date of Surgery:  2/2/2023     Indications: 67-year-old male recently diagnosed with cholangiocarcinoma who will be undergoing chemotherapy    Pre-op Diagnosis:   Malignant neoplasm of biliary tract, unspecified (HCC) [C24.9]       Post-Op Diagnosis Codes:     * Malignant neoplasm of biliary tract, unspecified (HCC) [C24.9]    Procedure/CPT® Codes:      Procedure(s):  PORT PLACEMENT         (C-ARM#2), right internal jugular    Staff:  Surgeon(s):  aVn Austin MD    Assistant: Dorys Jaime CSA    Anesthesia: Monitored Anesthesia Care    Estimated Blood Loss: 5 mL      Specimen:          None        Findings: Adequate right internal jugular vein for port placement      Description of Procedure: After informed consent was obtained, the patient was brought to the operating room and induced under IV sedation.  Patency of the right IJ vein was confirmed with ultrasound.  The patient's chest and neck were prepped and draped in usual sterile fashion.  A timeout was performed.  An 18-gauge needle was inserted into the right IJ under direct visualization of the ultrasound.  A guidewire was inserted through the needle and placement was confirmed with fluoroscopy.  A pocket was created approximately 2 fingerbreadths below the clavicle with a combination of electrocautery and blunt dissection.  A skin incision was made at the wire in the neck.  The catheter was tunneled from the pocket to neck incision.  The dilator and sheath were advanced over the wire under fluoroscopy.  The catheter was inserted into the sheath and then withdrawn under fluoroscopy until it was at the cavoatrial junction.  The port was attached to the catheter and secured in place in the pocket with two 3-0 Prolene sutures.  The port was noted to withdraw and flush well.  The pocket was closed with interrupted 3-0 Vicryl, running 4-0 Monocryl, and  skin glue.  The neck incision was closed with a 4-0 Monocryl and skin glue.  The patient tolerated this procedure well without any immediate complication.    Complications: None    Assistant: Dorys Jaime CSA  was responsible for performing the following activities: Retraction and Closing and their skilled assistance was necessary for the success of this case.    Van Austin MD     Date: 2/2/2023  Time: 11:49 CST

## 2023-02-02 NOTE — ANESTHESIA POSTPROCEDURE EVALUATION
Patient: Jian Baker    Procedure Summary     Date: 02/02/23 Room / Location: [facility] OR 03 / [facility] OR    Anesthesia Start: 1100 Anesthesia Stop: 1205    Procedure: PORT PLACEMENT         (C-ARM#2), right internal jugular (Right) Diagnosis:       Malignant neoplasm of biliary tract, unspecified (HCC)      (Malignant neoplasm of biliary tract, unspecified (HCC) [C24.9])    Surgeons: Van Austin MD Provider: Willy Galo CRNA    Anesthesia Type: general, MAC ASA Status: 4          Anesthesia Type: general, MAC    Vitals  No vitals data found for the desired time range.          Post Anesthesia Care and Evaluation    Patient location during evaluation: PHASE II  Patient participation: complete - patient participated  Level of consciousness: awake and alert  Pain score: 0  Pain management: adequate    Airway patency: patent  Anesthetic complications: No anesthetic complications  PONV Status: none  Cardiovascular status: acceptable and hemodynamically stable  Respiratory status: acceptable and spontaneous ventilation  Hydration status: acceptable    Comments: ---------------------------               02/02/23                      0750         ---------------------------   BP:          166/85         Pulse:         67           Resp:          20           Temp:   97.2 °F (36.2 °C)   SpO2:          99%         ---------------------------

## 2023-02-03 ENCOUNTER — DOCUMENTATION (OUTPATIENT)
Dept: NUTRITION | Facility: HOSPITAL | Age: 68
End: 2023-02-03
Payer: MEDICARE

## 2023-02-03 ENCOUNTER — OFFICE VISIT (OUTPATIENT)
Dept: ONCOLOGY | Facility: CLINIC | Age: 68
End: 2023-02-03
Payer: MEDICARE

## 2023-02-03 ENCOUNTER — INFUSION (OUTPATIENT)
Dept: ONCOLOGY | Facility: HOSPITAL | Age: 68
End: 2023-02-03
Payer: MEDICARE

## 2023-02-03 VITALS
OXYGEN SATURATION: 100 % | HEART RATE: 71 BPM | RESPIRATION RATE: 18 BRPM | TEMPERATURE: 96.4 F | DIASTOLIC BLOOD PRESSURE: 97 MMHG | SYSTOLIC BLOOD PRESSURE: 165 MMHG

## 2023-02-03 DIAGNOSIS — C24.9 MALIGNANT NEOPLASM OF BILIARY TRACT, UNSPECIFIED: Primary | ICD-10-CM

## 2023-02-03 DIAGNOSIS — Z45.2 ENCOUNTER FOR VENOUS ACCESS DEVICE CARE: ICD-10-CM

## 2023-02-03 DIAGNOSIS — G89.3 CANCER ASSOCIATED PAIN: ICD-10-CM

## 2023-02-03 LAB
ALBUMIN SERPL-MCNC: 3.2 G/DL (ref 3.5–5.2)
ALBUMIN/GLOB SERPL: 0.8 G/DL
ALP SERPL-CCNC: 279 U/L (ref 39–117)
ALT SERPL W P-5'-P-CCNC: 30 U/L (ref 1–41)
ANION GAP SERPL CALCULATED.3IONS-SCNC: 10 MMOL/L (ref 5–15)
AST SERPL-CCNC: 29 U/L (ref 1–40)
BASOPHILS # BLD AUTO: 0.06 10*3/MM3 (ref 0–0.2)
BASOPHILS NFR BLD AUTO: 0.8 % (ref 0–1.5)
BILIRUB SERPL-MCNC: 0.4 MG/DL (ref 0–1.2)
BUN SERPL-MCNC: 11 MG/DL (ref 8–23)
BUN/CREAT SERPL: 13.9 (ref 7–25)
CALCIUM SPEC-SCNC: 8.9 MG/DL (ref 8.6–10.5)
CANCER AG19-9 SERPL-ACNC: 1987 U/ML
CHLORIDE SERPL-SCNC: 101 MMOL/L (ref 98–107)
CO2 SERPL-SCNC: 26 MMOL/L (ref 22–29)
CREAT SERPL-MCNC: 0.79 MG/DL (ref 0.76–1.27)
DEPRECATED RDW RBC AUTO: 43.8 FL (ref 37–54)
EGFRCR SERPLBLD CKD-EPI 2021: 98.6 ML/MIN/1.73
EOSINOPHIL # BLD AUTO: 0.26 10*3/MM3 (ref 0–0.4)
EOSINOPHIL NFR BLD AUTO: 3.6 % (ref 0.3–6.2)
ERYTHROCYTE [DISTWIDTH] IN BLOOD BY AUTOMATED COUNT: 13.3 % (ref 12.3–15.4)
GLOBULIN UR ELPH-MCNC: 4 GM/DL
GLUCOSE SERPL-MCNC: 107 MG/DL (ref 65–99)
HCT VFR BLD AUTO: 37.2 % (ref 37.5–51)
HGB BLD-MCNC: 12.4 G/DL (ref 13–17.7)
IMM GRANULOCYTES # BLD AUTO: 0.02 10*3/MM3 (ref 0–0.05)
IMM GRANULOCYTES NFR BLD AUTO: 0.3 % (ref 0–0.5)
LYMPHOCYTES # BLD AUTO: 1.3 10*3/MM3 (ref 0.7–3.1)
LYMPHOCYTES NFR BLD AUTO: 18.1 % (ref 19.6–45.3)
MCH RBC QN AUTO: 29.7 PG (ref 26.6–33)
MCHC RBC AUTO-ENTMCNC: 33.3 G/DL (ref 31.5–35.7)
MCV RBC AUTO: 89.2 FL (ref 79–97)
MONOCYTES # BLD AUTO: 0.57 10*3/MM3 (ref 0.1–0.9)
MONOCYTES NFR BLD AUTO: 7.9 % (ref 5–12)
NEUTROPHILS NFR BLD AUTO: 4.98 10*3/MM3 (ref 1.7–7)
NEUTROPHILS NFR BLD AUTO: 69.3 % (ref 42.7–76)
NRBC BLD AUTO-RTO: 0 /100 WBC (ref 0–0.2)
PLATELET # BLD AUTO: 237 10*3/MM3 (ref 140–450)
PMV BLD AUTO: 10 FL (ref 6–12)
POTASSIUM SERPL-SCNC: 4.1 MMOL/L (ref 3.5–5.2)
PROT SERPL-MCNC: 7.2 G/DL (ref 6–8.5)
RBC # BLD AUTO: 4.17 10*6/MM3 (ref 4.14–5.8)
SODIUM SERPL-SCNC: 137 MMOL/L (ref 136–145)
WBC NRBC COR # BLD: 7.19 10*3/MM3 (ref 3.4–10.8)

## 2023-02-03 PROCEDURE — 96417 CHEMO IV INFUS EACH ADDL SEQ: CPT | Performed by: INTERNAL MEDICINE

## 2023-02-03 PROCEDURE — 80053 COMPREHEN METABOLIC PANEL: CPT

## 2023-02-03 PROCEDURE — 63710000001 OLANZAPINE 5 MG TABLET: Performed by: INTERNAL MEDICINE

## 2023-02-03 PROCEDURE — 99214 OFFICE O/P EST MOD 30 MIN: CPT | Performed by: INTERNAL MEDICINE

## 2023-02-03 PROCEDURE — 96413 CHEMO IV INFUSION 1 HR: CPT | Performed by: INTERNAL MEDICINE

## 2023-02-03 PROCEDURE — 25010000002 DEXAMETHASONE SODIUM PHOSPHATE 100 MG/10ML SOLUTION: Performed by: INTERNAL MEDICINE

## 2023-02-03 PROCEDURE — 96375 TX/PRO/DX INJ NEW DRUG ADDON: CPT | Performed by: INTERNAL MEDICINE

## 2023-02-03 PROCEDURE — 25010000002 PALONOSETRON PER 25 MCG: Performed by: INTERNAL MEDICINE

## 2023-02-03 PROCEDURE — 96367 TX/PROPH/DG ADDL SEQ IV INF: CPT | Performed by: INTERNAL MEDICINE

## 2023-02-03 PROCEDURE — 25010000002 FOSAPREPITANT PER 1 MG: Performed by: INTERNAL MEDICINE

## 2023-02-03 PROCEDURE — 96361 HYDRATE IV INFUSION ADD-ON: CPT | Performed by: INTERNAL MEDICINE

## 2023-02-03 PROCEDURE — 86301 IMMUNOASSAY TUMOR CA 19-9: CPT

## 2023-02-03 PROCEDURE — 25010000002 DURVALUMAB 500 MG/10ML SOLUTION 10 ML VIAL: Performed by: INTERNAL MEDICINE

## 2023-02-03 PROCEDURE — 25010000002 HEPARIN LOCK FLUSH PER 10 UNITS: Performed by: INTERNAL MEDICINE

## 2023-02-03 PROCEDURE — 25010000002 GEMCITABINE 1 GM/26.3ML SOLUTION 26.3 ML VIAL: Performed by: INTERNAL MEDICINE

## 2023-02-03 PROCEDURE — 25010000002 CISPLATIN PER 50 MG: Performed by: INTERNAL MEDICINE

## 2023-02-03 PROCEDURE — 85025 COMPLETE CBC W/AUTO DIFF WBC: CPT

## 2023-02-03 PROCEDURE — 25010000002 MAGNESIUM SULFATE PER 500 MG OF MAGNESIUM: Performed by: INTERNAL MEDICINE

## 2023-02-03 PROCEDURE — 25010000002 POTASSIUM CHLORIDE PER 2 MEQ OF POTASSIUM: Performed by: INTERNAL MEDICINE

## 2023-02-03 PROCEDURE — A9270 NON-COVERED ITEM OR SERVICE: HCPCS | Performed by: INTERNAL MEDICINE

## 2023-02-03 RX ORDER — PALONOSETRON 0.05 MG/ML
0.25 INJECTION, SOLUTION INTRAVENOUS ONCE
Status: COMPLETED | OUTPATIENT
Start: 2023-02-03 | End: 2023-02-03

## 2023-02-03 RX ORDER — OLANZAPINE 5 MG/1
5 TABLET ORAL ONCE
Status: CANCELLED | OUTPATIENT
Start: 2023-02-03 | End: 2023-02-03

## 2023-02-03 RX ORDER — SODIUM CHLORIDE 0.9 % (FLUSH) 0.9 %
10 SYRINGE (ML) INJECTION AS NEEDED
Status: CANCELLED | OUTPATIENT
Start: 2023-02-03

## 2023-02-03 RX ORDER — HEPARIN SODIUM (PORCINE) LOCK FLUSH IV SOLN 100 UNIT/ML 100 UNIT/ML
500 SOLUTION INTRAVENOUS AS NEEDED
Status: CANCELLED | OUTPATIENT
Start: 2023-02-03

## 2023-02-03 RX ORDER — SODIUM CHLORIDE 9 MG/ML
250 INJECTION, SOLUTION INTRAVENOUS ONCE
Status: COMPLETED | OUTPATIENT
Start: 2023-02-03 | End: 2023-02-03

## 2023-02-03 RX ORDER — HEPARIN SODIUM (PORCINE) LOCK FLUSH IV SOLN 100 UNIT/ML 100 UNIT/ML
500 SOLUTION INTRAVENOUS AS NEEDED
Status: DISCONTINUED | OUTPATIENT
Start: 2023-02-03 | End: 2023-02-06 | Stop reason: HOSPADM

## 2023-02-03 RX ORDER — SODIUM CHLORIDE 9 MG/ML
250 INJECTION, SOLUTION INTRAVENOUS ONCE
Status: CANCELLED | OUTPATIENT
Start: 2023-02-10

## 2023-02-03 RX ORDER — OLANZAPINE 5 MG/1
5 TABLET ORAL ONCE
Status: COMPLETED | OUTPATIENT
Start: 2023-02-03 | End: 2023-02-03

## 2023-02-03 RX ORDER — PALONOSETRON 0.05 MG/ML
0.25 INJECTION, SOLUTION INTRAVENOUS ONCE
Status: CANCELLED | OUTPATIENT
Start: 2023-02-03

## 2023-02-03 RX ORDER — SODIUM CHLORIDE 0.9 % (FLUSH) 0.9 %
10 SYRINGE (ML) INJECTION AS NEEDED
Status: DISCONTINUED | OUTPATIENT
Start: 2023-02-03 | End: 2023-02-06 | Stop reason: HOSPADM

## 2023-02-03 RX ORDER — PALONOSETRON 0.05 MG/ML
0.25 INJECTION, SOLUTION INTRAVENOUS ONCE
Status: CANCELLED | OUTPATIENT
Start: 2023-02-10

## 2023-02-03 RX ORDER — SODIUM CHLORIDE 9 MG/ML
250 INJECTION, SOLUTION INTRAVENOUS ONCE
Status: CANCELLED | OUTPATIENT
Start: 2023-02-03

## 2023-02-03 RX ORDER — OLANZAPINE 5 MG/1
5 TABLET ORAL ONCE
Status: CANCELLED | OUTPATIENT
Start: 2023-02-10 | End: 2023-02-10

## 2023-02-03 RX ADMIN — FOSAPREPITANT 100 ML: 150 INJECTION, POWDER, LYOPHILIZED, FOR SOLUTION INTRAVENOUS at 09:13

## 2023-02-03 RX ADMIN — POTASSIUM CHLORIDE: 2 INJECTION, SOLUTION, CONCENTRATE INTRAVENOUS at 11:21

## 2023-02-03 RX ADMIN — DEXAMETHASONE SODIUM PHOSPHATE 12 MG: 10 INJECTION, SOLUTION INTRAMUSCULAR; INTRAVENOUS at 12:14

## 2023-02-03 RX ADMIN — SODIUM CHLORIDE 250 ML: 9 INJECTION, SOLUTION INTRAVENOUS at 09:09

## 2023-02-03 RX ADMIN — POTASSIUM CHLORIDE: 2 INJECTION, SOLUTION, CONCENTRATE INTRAVENOUS at 13:32

## 2023-02-03 RX ADMIN — GEMCITABINE 1900 MG: 38 INJECTION, SOLUTION INTRAVENOUS at 12:47

## 2023-02-03 RX ADMIN — Medication 500 UNITS: at 14:55

## 2023-02-03 RX ADMIN — Medication 10 ML: at 14:55

## 2023-02-03 RX ADMIN — PALONOSETRON 0.25 MG: 0.05 INJECTION, SOLUTION INTRAVENOUS at 09:09

## 2023-02-03 RX ADMIN — CISPLATIN 48 MG: 1 INJECTION, SOLUTION INTRAVENOUS at 13:32

## 2023-02-03 RX ADMIN — OLANZAPINE 5 MG: 5 TABLET, FILM COATED ORAL at 09:09

## 2023-02-03 RX ADMIN — SODIUM CHLORIDE 1500 MG: 9 INJECTION, SOLUTION INTRAVENOUS at 09:56

## 2023-02-03 NOTE — PROGRESS NOTES
Subjective     Jian Baker was seen in follow-up for cholangiocarcinoma.  He is overall feeling well.  Denies any new health issues.  No nausea or emesis.  We discussed the chemotherapy regimen and side effects at length.  He was counseled about management of chemotherapy-induced nausea and emesis.    Past Medical History, Past Surgical History, Social History, Family History have been reviewed and are without significant changes except as mentioned.      Medications:  The current medication list was reviewed in the EMR    ALLERGIES:  No Known Allergies    Objective      Vitals:    02/03/23 0801   BP: 165/97   Pulse: 71   Resp: 18   Temp: 96.4 °F (35.8 °C)   SpO2: 100%         Physical Exam  Vitals and nursing note reviewed.   Constitutional:       Appearance: Normal appearance.   Abdominal:      General: There is no distension.      Palpations: Abdomen is soft. There is no mass.      Tenderness: There is no abdominal tenderness.   Neurological:      General: No focal deficit present.      Mental Status: He is alert and oriented to person, place, and time. Mental status is at baseline.   Psychiatric:         Mood and Affect: Mood normal.         Behavior: Behavior normal.         Thought Content: Thought content normal.               RECENT LABS:Independently reviewed and summarized  Hematology WBC   Date Value Ref Range Status   01/30/2023 6.87 3.40 - 10.80 10*3/mm3 Final     RBC   Date Value Ref Range Status   01/30/2023 3.90 (L) 4.14 - 5.80 10*6/mm3 Final     Hemoglobin   Date Value Ref Range Status   01/30/2023 12.0 (L) 13.0 - 17.7 g/dL Final     Hematocrit   Date Value Ref Range Status   01/30/2023 34.5 (L) 37.5 - 51.0 % Final     Platelets   Date Value Ref Range Status   01/30/2023 221 140 - 450 10*3/mm3 Final     WBC   Date Value Ref Range Status   02/03/2023 7.19 3.40 - 10.80 10*3/mm3 Final     RBC   Date Value Ref Range Status   02/03/2023 4.17 4.14 - 5.80 10*6/mm3 Final     Hemoglobin   Date Value  Ref Range Status   02/03/2023 12.4 (L) 13.0 - 17.7 g/dL Final     Hematocrit   Date Value Ref Range Status   02/03/2023 37.2 (L) 37.5 - 51.0 % Final     MCV   Date Value Ref Range Status   02/03/2023 89.2 79.0 - 97.0 fL Final     MCH   Date Value Ref Range Status   02/03/2023 29.7 26.6 - 33.0 pg Final     MCHC   Date Value Ref Range Status   02/03/2023 33.3 31.5 - 35.7 g/dL Final     RDW   Date Value Ref Range Status   02/03/2023 13.3 12.3 - 15.4 % Final     RDW-SD   Date Value Ref Range Status   02/03/2023 43.8 37.0 - 54.0 fl Final     MPV   Date Value Ref Range Status   02/03/2023 10.0 6.0 - 12.0 fL Final     Platelets   Date Value Ref Range Status   02/03/2023 237 140 - 450 10*3/mm3 Final     Neutrophil %   Date Value Ref Range Status   02/03/2023 69.3 42.7 - 76.0 % Final     Lymphocyte %   Date Value Ref Range Status   02/03/2023 18.1 (L) 19.6 - 45.3 % Final     Monocyte %   Date Value Ref Range Status   02/03/2023 7.9 5.0 - 12.0 % Final     Eosinophil %   Date Value Ref Range Status   02/03/2023 3.6 0.3 - 6.2 % Final     Basophil %   Date Value Ref Range Status   02/03/2023 0.8 0.0 - 1.5 % Final     Immature Grans %   Date Value Ref Range Status   02/03/2023 0.3 0.0 - 0.5 % Final     Neutrophils, Absolute   Date Value Ref Range Status   02/03/2023 4.98 1.70 - 7.00 10*3/mm3 Final     Lymphocytes, Absolute   Date Value Ref Range Status   02/03/2023 1.30 0.70 - 3.10 10*3/mm3 Final     Monocytes, Absolute   Date Value Ref Range Status   02/03/2023 0.57 0.10 - 0.90 10*3/mm3 Final     Eosinophils, Absolute   Date Value Ref Range Status   02/03/2023 0.26 0.00 - 0.40 10*3/mm3 Final     Basophils, Absolute   Date Value Ref Range Status   02/03/2023 0.06 0.00 - 0.20 10*3/mm3 Final     Immature Grans, Absolute   Date Value Ref Range Status   02/03/2023 0.02 0.00 - 0.05 10*3/mm3 Final     nRBC   Date Value Ref Range Status   02/03/2023 0.0 0.0 - 0.2 /100 WBC Final     Lab Results   Component Value Date    GLUCOSE 96  01/30/2023    BUN 12 01/30/2023    CREATININE 0.97 01/30/2023    EGFRIFNONA 81 01/02/2020    BCR 12.4 01/30/2023    K 4.2 01/30/2023    CO2 29.0 01/30/2023    CALCIUM 9.1 01/30/2023    ALBUMIN 3.4 (L) 01/30/2023    AST 67 (H) 01/30/2023    ALT 70 (H) 01/30/2023                Jian Baker reports a pain score of 0.  Given his pain assessment as noted, treatment options were discussed and the following options were decided upon as a follow-up plan to address the patient's pain: continuation of current treatment plan for pain.    Patient screened negative for depression based on a PHQ-9 score of 0 on 2/3/2023.     Advance Care Planning   ACP discussion was declined by the patient. Patient does not have an advance directive, declines further assistance.         Diagnosis:   (1) Cholangiocarcinoma, locally advance, unresectable   At least Stage IIIB (cT4, cN1, cM0)     Current therapy:   Gemcitabine/Cisplatin/Durvalumab     D1C1: 2/3/22      (2) Biliary obstruction   (3) Candidiasis   (4) Cancer associated pain      Assessment & Plan     (1) Cholangiocarcinoma, locally advance, unresectable     Chronic, stable.    Is scheduled to start palliative systemic chemotherapy with gemcitabine, cisplatin and durvalumab today.  We discussed side effects associated with chemotherapy at length.  Management of chemotherapy-induced nausea and emesis discussed at length as well.    His labs look stable.  Day 1 gemcitabine, cisplatin and durvalumab was signed on today's visit.  I will see him back in 1 week with CBC, CMP prior to day 8 cycle 1    (2) Biliary obstruction    Chronic, stable.  He is s/p PTBD x 2.   Clear not jaundiced.  Continue biliary drainage.  Closely monitor for obstruction of biliary duct in which case he might need exchange    (3) Candidiasis     Chronic, stable.  Patient appears to have fungal growth with percutaneous biliary drain was placed in bile duct.  This might be contamination however given he is  receiving myelosuppressive chemotherapy I have started him on fluconazole 200 mg daily.  We will closely monitor.    (4) Cancer associated pain      Chronic , Stable.  Pain has been well controlled on oxycodone as needed.  He will continue this.      2/3/2023      CC:

## 2023-02-03 NOTE — PROGRESS NOTES
Adult Outpatient Nutrition  Assessment    Patient Name:  Jian Baker  YOB: 1955  MRN: [id number]    Assessment Date:  2/3/2023    Comments: Brief visit to reinforce nutrition/hydration. Wt 170.6 lb (up). Alb 3.2.  Due to financial limitations, case of amor Boost Plus provided.                     Electronically signed by:  Isis Alvarez RD  02/03/23 16:29 CST

## 2023-02-06 ENCOUNTER — TELEPHONE (OUTPATIENT)
Dept: ONCOLOGY | Facility: HOSPITAL | Age: 68
End: 2023-02-06
Payer: MEDICARE

## 2023-02-06 NOTE — TELEPHONE ENCOUNTER
Attempted to call patient to inform him of isolation precautions needed for next treatment visit for bed bugs with no answer.

## 2023-02-07 ENCOUNTER — TELEPHONE (OUTPATIENT)
Dept: ONCOLOGY | Facility: HOSPITAL | Age: 68
End: 2023-02-07
Payer: MEDICARE

## 2023-02-07 NOTE — TELEPHONE ENCOUNTER
Notified patient that bed bug was found at last visit.  Instructed patient on what to do before next visit, also instructed that no visitors allowed and patient needs to leave belongings in the vehicle or at home.  Informed patient that he needs to get his home treated by pest control.  Verbalized understanding and all questions answered.  Patient encouraged to call us back if he has any further questions.

## 2023-02-10 ENCOUNTER — INFUSION (OUTPATIENT)
Dept: ONCOLOGY | Facility: HOSPITAL | Age: 68
End: 2023-02-10
Payer: MEDICARE

## 2023-02-10 VITALS
SYSTOLIC BLOOD PRESSURE: 126 MMHG | TEMPERATURE: 97.4 F | HEART RATE: 88 BPM | OXYGEN SATURATION: 99 % | DIASTOLIC BLOOD PRESSURE: 90 MMHG | RESPIRATION RATE: 18 BRPM

## 2023-02-10 DIAGNOSIS — Z45.2 ENCOUNTER FOR VENOUS ACCESS DEVICE CARE: ICD-10-CM

## 2023-02-10 DIAGNOSIS — C24.9 MALIGNANT NEOPLASM OF BILIARY TRACT, UNSPECIFIED: Primary | ICD-10-CM

## 2023-02-10 LAB
ALBUMIN SERPL-MCNC: 3.8 G/DL (ref 3.5–5.2)
ALBUMIN/GLOB SERPL: 1 G/DL
ALP SERPL-CCNC: 239 U/L (ref 39–117)
ALT SERPL W P-5'-P-CCNC: 54 U/L (ref 1–41)
ANION GAP SERPL CALCULATED.3IONS-SCNC: 10 MMOL/L (ref 5–15)
AST SERPL-CCNC: 39 U/L (ref 1–40)
BASOPHILS # BLD AUTO: 0.04 10*3/MM3 (ref 0–0.2)
BASOPHILS NFR BLD AUTO: 0.7 % (ref 0–1.5)
BILIRUB SERPL-MCNC: 0.4 MG/DL (ref 0–1.2)
BUN SERPL-MCNC: 13 MG/DL (ref 8–23)
BUN/CREAT SERPL: 18.6 (ref 7–25)
CALCIUM SPEC-SCNC: 8.9 MG/DL (ref 8.6–10.5)
CHLORIDE SERPL-SCNC: 100 MMOL/L (ref 98–107)
CO2 SERPL-SCNC: 23 MMOL/L (ref 22–29)
CREAT SERPL-MCNC: 0.7 MG/DL (ref 0.76–1.27)
DEPRECATED RDW RBC AUTO: 43 FL (ref 37–54)
EGFRCR SERPLBLD CKD-EPI 2021: 102.3 ML/MIN/1.73
EOSINOPHIL # BLD AUTO: 0.14 10*3/MM3 (ref 0–0.4)
EOSINOPHIL NFR BLD AUTO: 2.6 % (ref 0.3–6.2)
ERYTHROCYTE [DISTWIDTH] IN BLOOD BY AUTOMATED COUNT: 13.4 % (ref 12.3–15.4)
GLOBULIN UR ELPH-MCNC: 3.7 GM/DL
GLUCOSE SERPL-MCNC: 97 MG/DL (ref 65–99)
HCT VFR BLD AUTO: 37.6 % (ref 37.5–51)
HGB BLD-MCNC: 12.4 G/DL (ref 13–17.7)
HOLD SPECIMEN: NORMAL
IMM GRANULOCYTES # BLD AUTO: 0.01 10*3/MM3 (ref 0–0.05)
IMM GRANULOCYTES NFR BLD AUTO: 0.2 % (ref 0–0.5)
LYMPHOCYTES # BLD AUTO: 1.03 10*3/MM3 (ref 0.7–3.1)
LYMPHOCYTES NFR BLD AUTO: 19.3 % (ref 19.6–45.3)
MAGNESIUM SERPL-MCNC: 1.9 MG/DL (ref 1.6–2.4)
MCH RBC QN AUTO: 29 PG (ref 26.6–33)
MCHC RBC AUTO-ENTMCNC: 33 G/DL (ref 31.5–35.7)
MCV RBC AUTO: 87.9 FL (ref 79–97)
MONOCYTES # BLD AUTO: 0.35 10*3/MM3 (ref 0.1–0.9)
MONOCYTES NFR BLD AUTO: 6.6 % (ref 5–12)
NEUTROPHILS NFR BLD AUTO: 3.77 10*3/MM3 (ref 1.7–7)
NEUTROPHILS NFR BLD AUTO: 70.6 % (ref 42.7–76)
NRBC BLD AUTO-RTO: 0 /100 WBC (ref 0–0.2)
PLATELET # BLD AUTO: 170 10*3/MM3 (ref 140–450)
PMV BLD AUTO: 9.3 FL (ref 6–12)
POTASSIUM SERPL-SCNC: 3.9 MMOL/L (ref 3.5–5.2)
PROT SERPL-MCNC: 7.5 G/DL (ref 6–8.5)
RBC # BLD AUTO: 4.28 10*6/MM3 (ref 4.14–5.8)
SODIUM SERPL-SCNC: 133 MMOL/L (ref 136–145)
WBC NRBC COR # BLD: 5.34 10*3/MM3 (ref 3.4–10.8)

## 2023-02-10 PROCEDURE — 25010000002 GEMCITABINE 1 GM/26.3ML SOLUTION 26.3 ML VIAL: Performed by: INTERNAL MEDICINE

## 2023-02-10 PROCEDURE — 96361 HYDRATE IV INFUSION ADD-ON: CPT | Performed by: INTERNAL MEDICINE

## 2023-02-10 PROCEDURE — 96413 CHEMO IV INFUSION 1 HR: CPT | Performed by: INTERNAL MEDICINE

## 2023-02-10 PROCEDURE — 85025 COMPLETE CBC W/AUTO DIFF WBC: CPT

## 2023-02-10 PROCEDURE — 96375 TX/PRO/DX INJ NEW DRUG ADDON: CPT | Performed by: INTERNAL MEDICINE

## 2023-02-10 PROCEDURE — 63710000001 OLANZAPINE 5 MG TABLET: Performed by: INTERNAL MEDICINE

## 2023-02-10 PROCEDURE — 96367 TX/PROPH/DG ADDL SEQ IV INF: CPT | Performed by: INTERNAL MEDICINE

## 2023-02-10 PROCEDURE — A9270 NON-COVERED ITEM OR SERVICE: HCPCS | Performed by: INTERNAL MEDICINE

## 2023-02-10 PROCEDURE — 36415 COLL VENOUS BLD VENIPUNCTURE: CPT

## 2023-02-10 PROCEDURE — 25010000002 CISPLATIN PER 50 MG: Performed by: INTERNAL MEDICINE

## 2023-02-10 PROCEDURE — 25010000002 POTASSIUM CHLORIDE PER 2 MEQ OF POTASSIUM: Performed by: INTERNAL MEDICINE

## 2023-02-10 PROCEDURE — 25010000002 MAGNESIUM SULFATE PER 500 MG OF MAGNESIUM: Performed by: INTERNAL MEDICINE

## 2023-02-10 PROCEDURE — 25010000002 HEPARIN LOCK FLUSH PER 10 UNITS: Performed by: INTERNAL MEDICINE

## 2023-02-10 PROCEDURE — 25010000002 FOSAPREPITANT PER 1 MG: Performed by: INTERNAL MEDICINE

## 2023-02-10 PROCEDURE — 25010000002 PALONOSETRON PER 25 MCG: Performed by: INTERNAL MEDICINE

## 2023-02-10 PROCEDURE — 80053 COMPREHEN METABOLIC PANEL: CPT

## 2023-02-10 PROCEDURE — 96417 CHEMO IV INFUS EACH ADDL SEQ: CPT | Performed by: INTERNAL MEDICINE

## 2023-02-10 PROCEDURE — 83735 ASSAY OF MAGNESIUM: CPT

## 2023-02-10 PROCEDURE — 25010000002 DEXAMETHASONE SODIUM PHOSPHATE 100 MG/10ML SOLUTION: Performed by: INTERNAL MEDICINE

## 2023-02-10 RX ORDER — SODIUM CHLORIDE 0.9 % (FLUSH) 0.9 %
10 SYRINGE (ML) INJECTION AS NEEDED
Status: DISCONTINUED | OUTPATIENT
Start: 2023-02-10 | End: 2023-02-10 | Stop reason: HOSPADM

## 2023-02-10 RX ORDER — PALONOSETRON 0.05 MG/ML
0.25 INJECTION, SOLUTION INTRAVENOUS ONCE
Status: COMPLETED | OUTPATIENT
Start: 2023-02-10 | End: 2023-02-10

## 2023-02-10 RX ORDER — HEPARIN SODIUM (PORCINE) LOCK FLUSH IV SOLN 100 UNIT/ML 100 UNIT/ML
500 SOLUTION INTRAVENOUS AS NEEDED
Status: DISCONTINUED | OUTPATIENT
Start: 2023-02-10 | End: 2023-02-10 | Stop reason: HOSPADM

## 2023-02-10 RX ORDER — HEPARIN SODIUM (PORCINE) LOCK FLUSH IV SOLN 100 UNIT/ML 100 UNIT/ML
500 SOLUTION INTRAVENOUS AS NEEDED
Status: CANCELLED | OUTPATIENT
Start: 2023-02-10

## 2023-02-10 RX ORDER — SODIUM CHLORIDE 9 MG/ML
250 INJECTION, SOLUTION INTRAVENOUS ONCE
Status: COMPLETED | OUTPATIENT
Start: 2023-02-10 | End: 2023-02-10

## 2023-02-10 RX ORDER — OLANZAPINE 5 MG/1
5 TABLET ORAL ONCE
Status: COMPLETED | OUTPATIENT
Start: 2023-02-10 | End: 2023-02-10

## 2023-02-10 RX ORDER — SODIUM CHLORIDE 0.9 % (FLUSH) 0.9 %
10 SYRINGE (ML) INJECTION AS NEEDED
Status: CANCELLED | OUTPATIENT
Start: 2023-02-10

## 2023-02-10 RX ADMIN — PALONOSETRON 0.25 MG: 0.05 INJECTION, SOLUTION INTRAVENOUS at 08:35

## 2023-02-10 RX ADMIN — POTASSIUM CHLORIDE: 2 INJECTION, SOLUTION, CONCENTRATE INTRAVENOUS at 09:29

## 2023-02-10 RX ADMIN — SODIUM CHLORIDE 250 ML: 9 INJECTION, SOLUTION INTRAVENOUS at 08:35

## 2023-02-10 RX ADMIN — OLANZAPINE 5 MG: 5 TABLET, FILM COATED ORAL at 08:35

## 2023-02-10 RX ADMIN — CISPLATIN 48 MG: 1 INJECTION, SOLUTION INTRAVENOUS at 12:01

## 2023-02-10 RX ADMIN — Medication 10 ML: at 13:19

## 2023-02-10 RX ADMIN — Medication 500 UNITS: at 13:19

## 2023-02-10 RX ADMIN — POTASSIUM CHLORIDE: 2 INJECTION, SOLUTION, CONCENTRATE INTRAVENOUS at 11:52

## 2023-02-10 RX ADMIN — FOSAPREPITANT 100 ML: 150 INJECTION, POWDER, LYOPHILIZED, FOR SOLUTION INTRAVENOUS at 08:40

## 2023-02-10 RX ADMIN — GEMCITABINE 1900 MG: 38 INJECTION, SOLUTION INTRAVENOUS at 11:21

## 2023-02-10 RX ADMIN — DEXAMETHASONE SODIUM PHOSPHATE 12 MG: 10 INJECTION, SOLUTION INTRAMUSCULAR; INTRAVENOUS at 10:40

## 2023-02-13 ENCOUNTER — HOSPITAL ENCOUNTER (EMERGENCY)
Facility: HOSPITAL | Age: 68
Discharge: HOME | End: 2023-02-13
Attending: STUDENT IN AN ORGANIZED HEALTH CARE EDUCATION/TRAINING PROGRAM | Admitting: STUDENT IN AN ORGANIZED HEALTH CARE EDUCATION/TRAINING PROGRAM
Payer: MEDICARE

## 2023-02-13 VITALS
TEMPERATURE: 98.9 F | BODY MASS INDEX: 26.06 KG/M2 | HEART RATE: 85 BPM | DIASTOLIC BLOOD PRESSURE: 66 MMHG | RESPIRATION RATE: 20 BRPM | OXYGEN SATURATION: 98 % | HEIGHT: 67 IN | SYSTOLIC BLOOD PRESSURE: 140 MMHG | WEIGHT: 166 LBS

## 2023-02-13 DIAGNOSIS — T85.520A: Primary | ICD-10-CM

## 2023-02-13 DIAGNOSIS — C22.1 CHOLANGIOCARCINOMA: ICD-10-CM

## 2023-02-13 LAB
ALBUMIN SERPL-MCNC: 3.7 G/DL (ref 3.5–5.2)
ALBUMIN/GLOB SERPL: 1.1 G/DL
ALP SERPL-CCNC: 186 U/L (ref 39–117)
ALT SERPL W P-5'-P-CCNC: 99 U/L (ref 1–41)
ANION GAP SERPL CALCULATED.3IONS-SCNC: 8 MMOL/L (ref 5–15)
AST SERPL-CCNC: 77 U/L (ref 1–40)
BASOPHILS # BLD AUTO: 0.02 10*3/MM3 (ref 0–0.2)
BASOPHILS NFR BLD AUTO: 0.3 % (ref 0–1.5)
BILIRUB SERPL-MCNC: 0.4 MG/DL (ref 0–1.2)
BUN SERPL-MCNC: 14 MG/DL (ref 8–23)
BUN/CREAT SERPL: 24.1 (ref 7–25)
CALCIUM SPEC-SCNC: 9.2 MG/DL (ref 8.6–10.5)
CHLORIDE SERPL-SCNC: 102 MMOL/L (ref 98–107)
CO2 SERPL-SCNC: 26 MMOL/L (ref 22–29)
CREAT SERPL-MCNC: 0.58 MG/DL (ref 0.76–1.27)
DEPRECATED RDW RBC AUTO: 43.8 FL (ref 37–54)
EGFRCR SERPLBLD CKD-EPI 2021: 108.2 ML/MIN/1.73
EOSINOPHIL # BLD AUTO: 0.11 10*3/MM3 (ref 0–0.4)
EOSINOPHIL NFR BLD AUTO: 1.9 % (ref 0.3–6.2)
ERYTHROCYTE [DISTWIDTH] IN BLOOD BY AUTOMATED COUNT: 13.7 % (ref 12.3–15.4)
GLOBULIN UR ELPH-MCNC: 3.4 GM/DL
GLUCOSE SERPL-MCNC: 86 MG/DL (ref 65–99)
HCT VFR BLD AUTO: 35.6 % (ref 37.5–51)
HGB BLD-MCNC: 12.1 G/DL (ref 13–17.7)
HOLD SPECIMEN: NORMAL
IMM GRANULOCYTES # BLD AUTO: 0.01 10*3/MM3 (ref 0–0.05)
IMM GRANULOCYTES NFR BLD AUTO: 0.2 % (ref 0–0.5)
LYMPHOCYTES # BLD AUTO: 1.24 10*3/MM3 (ref 0.7–3.1)
LYMPHOCYTES NFR BLD AUTO: 21 % (ref 19.6–45.3)
MCH RBC QN AUTO: 29.5 PG (ref 26.6–33)
MCHC RBC AUTO-ENTMCNC: 34 G/DL (ref 31.5–35.7)
MCV RBC AUTO: 86.8 FL (ref 79–97)
MONOCYTES # BLD AUTO: 0.12 10*3/MM3 (ref 0.1–0.9)
MONOCYTES NFR BLD AUTO: 2 % (ref 5–12)
NEUTROPHILS NFR BLD AUTO: 4.41 10*3/MM3 (ref 1.7–7)
NEUTROPHILS NFR BLD AUTO: 74.6 % (ref 42.7–76)
NRBC BLD AUTO-RTO: 0 /100 WBC (ref 0–0.2)
PLATELET # BLD AUTO: 95 10*3/MM3 (ref 140–450)
PMV BLD AUTO: 9.8 FL (ref 6–12)
POTASSIUM SERPL-SCNC: 4.3 MMOL/L (ref 3.5–5.2)
PROT SERPL-MCNC: 7.1 G/DL (ref 6–8.5)
RBC # BLD AUTO: 4.1 10*6/MM3 (ref 4.14–5.8)
RBC MORPH BLD: NORMAL
SMALL PLATELETS BLD QL SMEAR: NORMAL
SODIUM SERPL-SCNC: 136 MMOL/L (ref 136–145)
WBC MORPH BLD: NORMAL
WBC NRBC COR # BLD: 5.91 10*3/MM3 (ref 3.4–10.8)
WHOLE BLOOD HOLD COAG: NORMAL

## 2023-02-13 PROCEDURE — 80053 COMPREHEN METABOLIC PANEL: CPT

## 2023-02-13 PROCEDURE — 85025 COMPLETE CBC W/AUTO DIFF WBC: CPT

## 2023-02-13 PROCEDURE — 99283 EMERGENCY DEPT VISIT LOW MDM: CPT

## 2023-02-13 PROCEDURE — 85007 BL SMEAR W/DIFF WBC COUNT: CPT

## 2023-02-13 NOTE — ED NOTES
Pt states the drain to his bile duct started leaking Saturday. Dressing is saturated with green colored fluid. Pt also has a drain in his liver which is not draining at this time. Pt denies pain at this time. Pt has drains put in in [place].

## 2023-02-13 NOTE — ED PROVIDER NOTES
Subjective   History of Present Illness  67-year-old male here for leaking of his biliary drains.  He has a history of cholangiocarcinoma.  He was seen at the [facility] for further evaluation and treatment.  There he received a percutaneous transhepatic biliary drain in January 2023.  Since this past Saturday the left drain has been leaking bile.  Per patient in the past it has leaked but after dressing changes which usually stops.  This time around it has been consistently draining.  The last dressing change was done this morning by the patient's wife. He denies any fever nausea vomiting or diarrhea    Per chart review was seen last month for lack of drainage from his drains.  He was advised to follow-up with his surgeon in [place] but was unable to due to transportation issues.  Transportation phone numbers listed in his chart were [phone] & [phone].     At time of exam there is substantial leakage from the left drain. It leaked through the new dressing that was placed by his wife earlier today.      Review of Systems   Constitutional: Negative.    HENT: Negative.  Negative for congestion, dental problem, drooling, ear discharge and ear pain.    Eyes: Negative.  Negative for pain, redness and itching.   Respiratory: Negative.  Negative for apnea, cough, choking, chest tightness and shortness of breath.    Cardiovascular: Negative.  Negative for chest pain, palpitations and leg swelling.   Gastrointestinal: Negative.  Negative for abdominal distention, abdominal pain, anal bleeding and blood in stool.   Endocrine: Negative.  Negative for cold intolerance and heat intolerance.   Genitourinary: Negative.  Negative for difficulty urinating, dysuria and enuresis.   Musculoskeletal: Negative.  Negative for arthralgias, back pain and gait problem.   Skin: Negative.  Negative for color change.   Allergic/Immunologic: Negative.    Neurological: Negative.  Negative for dizziness, facial asymmetry  and headaches.   Hematological: Negative.    Psychiatric/Behavioral: Negative.  Negative for agitation and behavioral problems.       Past Medical History:   Diagnosis Date   • Arthritis    • Asthma     Previously diagnosed and previously prescribed inhaler medications.     • Cancer (HCC)     cholangiocarcinoma   • Elevated cholesterol    • Essential hypertension    • Gastroesophageal reflux disease        No Known Allergies    Past Surgical History:   Procedure Laterality Date   • CERVICAL SPINE SURGERY      x3   • DENTAL PROCEDURE      3 wisdom teeth and 2+ other teeth removed   • ERCP N/A 11/22/2022    Procedure: ENDOSCOPIC RETROGRADE CHOLANGIOPANCREATOGRAPHY;  Surgeon: Nabila Worthy MD;  Location: [facility] ENDOSCOPY;  Service: Gastroenterology;  Laterality: N/A;   • WRIST FRACTURE SURGERY Bilateral        Family History   Problem Relation Age of Onset   • COPD Mother    • Hypertension Mother    • Diabetes type II Mother    • Cancer Father    • Heart failure Father    • Cancer Brother    • Early death Son    • Lung cancer Maternal Uncle    • Early death Sister        Social History     Socioeconomic History   • Marital status:    Tobacco Use   • Smoking status: Former     Packs/day: 1.00     Years: 45.00     Pack years: 45.00     Types: Pipe, Cigarettes     Start date: 10/10/1972     Quit date: 10/31/2019     Years since quitting: 3.2     Passive exposure: Past   • Smokeless tobacco: Current     Types: Chew   Vaping Use   • Vaping Use: Never used   Substance and Sexual Activity   • Alcohol use: Yes     Alcohol/week: 7.0 standard drinks     Types: 7 Cans of beer per week     Comment: Prior heavy drinker   • Drug use: Never   • Sexual activity: Defer     Partners: Female     Objective      Vitals:    02/13/23 1545 02/13/23 1742 02/13/23 1855 02/13/23 1909   BP: 119/60 122/68 134/60 140/66   BP Location:       Patient Position:       Pulse: 77 80  85   Resp: 20 20 20 20   Temp: 98.9 °F (37.2 °C)       TempSrc: Oral      SpO2: 98% 97%  98%   Weight:       Height:           Physical Exam  Constitutional:       Appearance: Normal appearance.   HENT:      Nose: Nose normal.   Eyes:      Extraocular Movements: Extraocular movements intact.      Conjunctiva/sclera: Conjunctivae normal.      Pupils: Pupils are equal, round, and reactive to light.   Cardiovascular:      Rate and Rhythm: Normal rate and regular rhythm.      Pulses: Normal pulses.      Heart sounds: Normal heart sounds.   Pulmonary:      Effort: Pulmonary effort is normal.      Breath sounds: Normal breath sounds.   Abdominal:      General: Bowel sounds are normal.      Comments: Bilateral percutaneous transhepatic biliary drains placed.  Bilateral drainage, more on the left.  Left drain dressing showed substantial leakage, bile clearly leaking through the dressing.   Musculoskeletal:         General: Normal range of motion.      Cervical back: Normal range of motion.   Skin:     General: Skin is warm.   Neurological:      General: No focal deficit present.      Mental Status: He is alert.   Psychiatric:         Mood and Affect: Mood normal.         Behavior: Behavior normal.       Procedures    No results found.       ED Course      Results for orders placed or performed during the hospital encounter of 02/13/23   Comprehensive Metabolic Panel    Specimen: Blood   Result Value Ref Range    Glucose 86 65 - 99 mg/dL    BUN 14 8 - 23 mg/dL    Creatinine 0.58 (L) 0.76 - 1.27 mg/dL    Sodium 136 136 - 145 mmol/L    Potassium 4.3 3.5 - 5.2 mmol/L    Chloride 102 98 - 107 mmol/L    CO2 26.0 22.0 - 29.0 mmol/L    Calcium 9.2 8.6 - 10.5 mg/dL    Total Protein 7.1 6.0 - 8.5 g/dL    Albumin 3.7 3.5 - 5.2 g/dL    ALT (SGPT) 99 (H) 1 - 41 U/L    AST (SGOT) 77 (H) 1 - 40 U/L    Alkaline Phosphatase 186 (H) 39 - 117 U/L    Total Bilirubin 0.4 0.0 - 1.2 mg/dL    Globulin 3.4 gm/dL    A/G Ratio 1.1 g/dL    BUN/Creatinine Ratio 24.1 7.0 - 25.0    Anion Gap 8.0 5.0 - 15.0  mmol/L    eGFR 108.2 >60.0 mL/min/1.73   CBC Auto Differential    Specimen: Blood   Result Value Ref Range    WBC 5.91 3.40 - 10.80 10*3/mm3    RBC 4.10 (L) 4.14 - 5.80 10*6/mm3    Hemoglobin 12.1 (L) 13.0 - 17.7 g/dL    Hematocrit 35.6 (L) 37.5 - 51.0 %    MCV 86.8 79.0 - 97.0 fL    MCH 29.5 26.6 - 33.0 pg    MCHC 34.0 31.5 - 35.7 g/dL    RDW 13.7 12.3 - 15.4 %    RDW-SD 43.8 37.0 - 54.0 fl    MPV 9.8 6.0 - 12.0 fL    Platelets 95 (L) 140 - 450 10*3/mm3    Neutrophil % 74.6 42.7 - 76.0 %    Lymphocyte % 21.0 19.6 - 45.3 %    Monocyte % 2.0 (L) 5.0 - 12.0 %    Eosinophil % 1.9 0.3 - 6.2 %    Basophil % 0.3 0.0 - 1.5 %    Immature Grans % 0.2 0.0 - 0.5 %    Neutrophils, Absolute 4.41 1.70 - 7.00 10*3/mm3    Lymphocytes, Absolute 1.24 0.70 - 3.10 10*3/mm3    Monocytes, Absolute 0.12 0.10 - 0.90 10*3/mm3    Eosinophils, Absolute 0.11 0.00 - 0.40 10*3/mm3    Basophils, Absolute 0.02 0.00 - 0.20 10*3/mm3    Immature Grans, Absolute 0.01 0.00 - 0.05 10*3/mm3    nRBC 0.0 0.0 - 0.2 /100 WBC   Scan Slide    Specimen: Blood   Result Value Ref Range    RBC Morphology Normal Normal    WBC Morphology Normal Normal    Platelet Estimate Decreased Normal   Gold Top - SST   Result Value Ref Range    Extra Tube Hold for add-ons.    Light Blue Top   Result Value Ref Range    Extra Tube Hold for add-ons.                                  Medical Decision Making  67-year-old male here for drain leakage.  He has a history of cholangiocarcinoma and percutaneous transhepatic biliary drains placed. His surgeon in [place] was contacted who said there is a possibility that the drainage may need to be replaced.  The surgeon also recommended placing changing the dressing and having the patient follow-up with him tomorrow.  After informing the patient what the surgeon said he expressed how difficult it is for him to get to [place].  Patient is hesitant to drive at night and does not know anyone that will be willing to take him to  [place].  The most he can drive is within the same city.  Spoke to case management who informed me that insurance will not cover his trip to [place].  Insurance only cover up to 50 miles of travel.  PACS will not cover his trip as he has a car under his name.  Discussed the importance of going to [place] to see his surgeon and he understands.  At time of discharge his dressings were changed and reinforced. His drain bag was drained and cleaned.  He was afebrile, vital signs are stable at the time of discharge.     Cholangiocarcinoma (HCC): acute illness or injury  Migration of percutaneous transhepatic biliary drain catheter: acute illness or injury  Amount and/or Complexity of Data Reviewed  Labs: ordered.        Final diagnoses:   Migration of percutaneous transhepatic biliary drain catheter   Cholangiocarcinoma (HCC)       ED Disposition  ED Disposition     ED Disposition   Discharge    Condition   Stable    Comment   --             Van Urbina MD  [address] [place] [facility] [address]  [phone]    Schedule an appointment as soon as possible for a visit   If symptoms worsen    Heath Varner MD  [address] [place], [place] [id number]  [phone]    Call   If symptoms worsen, As needed, For wound re-check         Medication List      No changes were made to your prescriptions during this visit.         This document has been electronically signed by Dae Love MD on February 13, 2023 19:24 CST     Dae Love MD  Resident  02/13/23 1302

## 2023-02-14 NOTE — DISCHARGE INSTRUCTIONS
Change dressings as needed. Please inquire about [place]. Please call Dr. Barboza when you have a ride available. Please follow up with your PCP with any additional questions or concerns.

## 2023-02-14 NOTE — ED NOTES
Pts dressing changed and belly cleaned by Janene Interiano RN. Pts drainage bags emptied and reported to provider.

## 2023-02-14 NOTE — ED NOTES
Dr RERE Fried called patient surgeon who stated was happy to work the patient in any time [place] to be seen.  Patient is stating he drives and owns a car but is uncomfortable driving to [place].  He stated [employer] insurance will cover a trip within 50 miles , PACS will not take him because he wons a vehicle.  His wife stated she don't drive at all.  I told them both it is important for him to get back to [place] and they were going to have to call all friends , family, and Christian members to ask for help as he needs to go and it is his responsibility to get there.  Verbalized understanding.

## 2023-02-15 ENCOUNTER — HOSPITAL ENCOUNTER (EMERGENCY)
Facility: HOSPITAL | Age: 68
Discharge: ANOTHER HOSPITAL | End: 2023-02-16
Attending: FAMILY MEDICINE | Admitting: FAMILY MEDICINE
Payer: MEDICARE

## 2023-02-15 ENCOUNTER — DOCUMENTATION (OUTPATIENT)
Dept: FAMILY MEDICINE CLINIC | Facility: CLINIC | Age: 68
End: 2023-02-15
Payer: MEDICARE

## 2023-02-15 DIAGNOSIS — C22.1 CHOLANGIOCARCINOMA: Primary | ICD-10-CM

## 2023-02-15 LAB
ALBUMIN SERPL-MCNC: 4.1 G/DL (ref 3.5–5.2)
ALBUMIN/GLOB SERPL: 1.1 G/DL
ALP SERPL-CCNC: 285 U/L (ref 39–117)
ALT SERPL W P-5'-P-CCNC: 71 U/L (ref 1–41)
ANION GAP SERPL CALCULATED.3IONS-SCNC: 6 MMOL/L (ref 5–15)
AST SERPL-CCNC: 50 U/L (ref 1–40)
BASOPHILS # BLD AUTO: 0.02 10*3/MM3 (ref 0–0.2)
BASOPHILS NFR BLD AUTO: 0.5 % (ref 0–1.5)
BILIRUB SERPL-MCNC: 0.6 MG/DL (ref 0–1.2)
BUN SERPL-MCNC: 14 MG/DL (ref 8–23)
BUN/CREAT SERPL: 15.2 (ref 7–25)
CALCIUM SPEC-SCNC: 9.3 MG/DL (ref 8.6–10.5)
CHLORIDE SERPL-SCNC: 99 MMOL/L (ref 98–107)
CO2 SERPL-SCNC: 29 MMOL/L (ref 22–29)
CREAT SERPL-MCNC: 0.92 MG/DL (ref 0.76–1.27)
DEPRECATED RDW RBC AUTO: 41.8 FL (ref 37–54)
EGFRCR SERPLBLD CKD-EPI 2021: 92.3 ML/MIN/1.73
EOSINOPHIL # BLD AUTO: 0.04 10*3/MM3 (ref 0–0.4)
EOSINOPHIL NFR BLD AUTO: 0.9 % (ref 0.3–6.2)
ERYTHROCYTE [DISTWIDTH] IN BLOOD BY AUTOMATED COUNT: 13.3 % (ref 12.3–15.4)
GLOBULIN UR ELPH-MCNC: 3.6 GM/DL
GLUCOSE SERPL-MCNC: 84 MG/DL (ref 65–99)
HCT VFR BLD AUTO: 37.1 % (ref 37.5–51)
HGB BLD-MCNC: 12.7 G/DL (ref 13–17.7)
HOLD SPECIMEN: NORMAL
HOLD SPECIMEN: NORMAL
IMM GRANULOCYTES # BLD AUTO: 0.02 10*3/MM3 (ref 0–0.05)
IMM GRANULOCYTES NFR BLD AUTO: 0.5 % (ref 0–0.5)
LIPASE SERPL-CCNC: 13 U/L (ref 13–60)
LYMPHOCYTES # BLD AUTO: 1.1 10*3/MM3 (ref 0.7–3.1)
LYMPHOCYTES NFR BLD AUTO: 24.8 % (ref 19.6–45.3)
MCH RBC QN AUTO: 29.3 PG (ref 26.6–33)
MCHC RBC AUTO-ENTMCNC: 34.2 G/DL (ref 31.5–35.7)
MCV RBC AUTO: 85.7 FL (ref 79–97)
MONOCYTES # BLD AUTO: 0.07 10*3/MM3 (ref 0.1–0.9)
MONOCYTES NFR BLD AUTO: 1.6 % (ref 5–12)
NEUTROPHILS NFR BLD AUTO: 3.19 10*3/MM3 (ref 1.7–7)
NEUTROPHILS NFR BLD AUTO: 71.7 % (ref 42.7–76)
NRBC BLD AUTO-RTO: 0 /100 WBC (ref 0–0.2)
PLATELET # BLD AUTO: 95 10*3/MM3 (ref 140–450)
PMV BLD AUTO: 9.6 FL (ref 6–12)
POTASSIUM SERPL-SCNC: 4.2 MMOL/L (ref 3.5–5.2)
PROT SERPL-MCNC: 7.7 G/DL (ref 6–8.5)
RBC # BLD AUTO: 4.33 10*6/MM3 (ref 4.14–5.8)
RBC MORPH BLD: NORMAL
SMALL PLATELETS BLD QL SMEAR: NORMAL
SODIUM SERPL-SCNC: 134 MMOL/L (ref 136–145)
WBC MORPH BLD: NORMAL
WBC NRBC COR # BLD: 4.44 10*3/MM3 (ref 3.4–10.8)
WHOLE BLOOD HOLD COAG: NORMAL
WHOLE BLOOD HOLD SPECIMEN: NORMAL

## 2023-02-15 PROCEDURE — 99284 EMERGENCY DEPT VISIT MOD MDM: CPT

## 2023-02-15 PROCEDURE — 80053 COMPREHEN METABOLIC PANEL: CPT

## 2023-02-15 PROCEDURE — 83690 ASSAY OF LIPASE: CPT | Performed by: FAMILY MEDICINE

## 2023-02-15 PROCEDURE — 85007 BL SMEAR W/DIFF WBC COUNT: CPT

## 2023-02-15 PROCEDURE — 36415 COLL VENOUS BLD VENIPUNCTURE: CPT

## 2023-02-15 PROCEDURE — 85025 COMPLETE CBC W/AUTO DIFF WBC: CPT

## 2023-02-15 RX ORDER — IBUPROFEN 800 MG/1
800 TABLET ORAL ONCE
Status: COMPLETED | OUTPATIENT
Start: 2023-02-15 | End: 2023-02-15

## 2023-02-15 RX ADMIN — IBUPROFEN 800 MG: 800 TABLET, FILM COATED ORAL at 21:33

## 2023-02-15 NOTE — ED NOTES
I spoke with patient as he signed in complaint was he needs transportation to [place].  I asked if his condition had changed from Monday when I spoke with him.  He stated he had pulled the tube out his ABD last night and didn't know what to do so he tried to reinsert it.  He spoke with his doctor today who told him of the risk of infection with this and urged him to come to the ED.  His surgeon is in [place]

## 2023-02-15 NOTE — PROGRESS NOTES
Contacted by home health worker who let me know that Mr. Baker had lost one of his biliary stents and had reinserted himself.  I immediately called the patient and let him know that this puts him at increased risk for life-threatening complications including perforation and infection.  The patient needs to be assessed for this emergently and he does need to be transferred to the care of his surgeon Dr. Barboza at [facility].  I called the patient and told him to immediately present to our emergency department, and he agreed.  I have spoken with Dr. Toure, the director of our ED, and informed him of the situation.      This document has been electronically signed by Van Urbina MD on February 15, 2023 15:55 CST

## 2023-02-15 NOTE — PROGRESS NOTES
Patient has called to let us know that he is having difficulty arranging transportation to [place]. I have let our staff know to direct him to the [facility]  to explore options for him.       This document has been electronically signed by Van Urbina MD on February 15, 2023 11:44 CST

## 2023-02-16 VITALS
BODY MASS INDEX: 26.49 KG/M2 | RESPIRATION RATE: 16 BRPM | HEART RATE: 80 BPM | OXYGEN SATURATION: 99 % | TEMPERATURE: 98.2 F | WEIGHT: 168.8 LBS | HEIGHT: 67 IN | DIASTOLIC BLOOD PRESSURE: 92 MMHG | SYSTOLIC BLOOD PRESSURE: 162 MMHG

## 2023-02-16 RX ORDER — LISINOPRIL 20 MG/1
20 TABLET ORAL ONCE
Status: DISCONTINUED | OUTPATIENT
Start: 2023-02-16 | End: 2023-02-16 | Stop reason: HOSPADM

## 2023-02-16 NOTE — ED NOTES
Ambulance service contacted to find out when transport would be here to transport patient to [facility]. EMS stated to resend the PCS form. Form re-faxed and fax number verified.

## 2023-02-16 NOTE — ED PROVIDER NOTES
Subjective   History of Present Illness  67-year-old male presents to the ED today since his percutaneous transhepatic ductal tubes fell out.  There were no originally placed in [place].  He originally was here earlier this week due to leakage from the left tube.  That same tube has came out.  Initially he called his PCP and told him that he had placed the tube back in.  His PCP informed him that he needs to remove it and leave it out. There is concern for introduction of bacteria through the original insertion site. Since then he has denied fever, chills, nausea, vomiting or diarrhea.     At his last visit I called his surgical oncologist who recommended he present to [place] for potential replacement of the tubes.  He had an appointment scheduled for this coming Friday.         Review of Systems   Constitutional: Negative.    Eyes: Negative.    Respiratory: Negative.    Cardiovascular: Negative.    Gastrointestinal: Negative.    Endocrine: Negative.    Genitourinary: Negative.    Musculoskeletal: Negative.    Skin: Negative.    Allergic/Immunologic: Negative.    Neurological: Positive for headaches.   Hematological: Negative.    Psychiatric/Behavioral: Negative.        Past Medical History:   Diagnosis Date   • Arthritis    • Asthma     Previously diagnosed and previously prescribed inhaler medications.     • Cancer (HCC)     cholangiocarcinoma   • Elevated cholesterol    • Essential hypertension    • Gastroesophageal reflux disease        No Known Allergies    Past Surgical History:   Procedure Laterality Date   • CERVICAL SPINE SURGERY      x3   • DENTAL PROCEDURE      3 wisdom teeth and 2+ other teeth removed   • ERCP N/A 11/22/2022    Procedure: ENDOSCOPIC RETROGRADE CHOLANGIOPANCREATOGRAPHY;  Surgeon: Nabila Worthy MD;  Location: [facility] ENDOSCOPY;  Service: Gastroenterology;  Laterality: N/A;   • PORTACATH PLACEMENT Right 2/2/2023    Procedure: PORT PLACEMENT         (C-ARM#2), right internal  "jugular;  Surgeon: Van Austin MD;  Location: [facility];  Service: General;  Laterality: Right;   • WRIST FRACTURE SURGERY Bilateral        Family History   Problem Relation Age of Onset   • COPD Mother    • Hypertension Mother    • Diabetes type II Mother    • Cancer Father    • Heart failure Father    • Cancer Brother    • Early death Son    • Lung cancer Maternal Uncle    • Early death Sister        Social History     Socioeconomic History   • Marital status:    Tobacco Use   • Smoking status: Former     Packs/day: 1.00     Years: 45.00     Pack years: 45.00     Types: Pipe, Cigarettes     Start date: 10/10/1972     Quit date: 10/31/2019     Years since quitting: 3.2     Passive exposure: Past   • Smokeless tobacco: Current     Types: Chew   Vaping Use   • Vaping Use: Never used   Substance and Sexual Activity   • Alcohol use: Yes     Alcohol/week: 7.0 standard drinks     Types: 7 Cans of beer per week     Comment: Prior heavy drinker   • Drug use: Never   • Sexual activity: Defer     Partners: Female           Objective     Vitals:    02/15/23 1653 02/15/23 2046   BP: 135/88 138/89   BP Location: Right arm Left arm   Patient Position: Sitting Lying   Pulse: 111 88   Resp: 20 20   Temp: 99.1 °F (37.3 °C)    TempSrc: Oral    SpO2: 97% 100%   Weight: 76.6 kg (168 lb 12.8 oz)    Height: 170.2 cm (67\")        Physical Exam  HENT:      Right Ear: Tympanic membrane normal.      Left Ear: Tympanic membrane normal.      Nose: Nose normal.      Mouth/Throat:      Mouth: Mucous membranes are moist.   Eyes:      Extraocular Movements: Extraocular movements intact.      Conjunctiva/sclera: Conjunctivae normal.      Pupils: Pupils are equal, round, and reactive to light.   Cardiovascular:      Rate and Rhythm: Normal rate and regular rhythm.      Pulses: Normal pulses.      Heart sounds: Normal heart sounds.   Pulmonary:      Effort: Pulmonary effort is normal.      Breath sounds: Normal breath sounds.   Abdominal: "      Comments: Left tube taped to his abdomen but displaced from the insertion site.  Insertion site does not show any erythema or discoloration     Musculoskeletal:         General: Normal range of motion.      Cervical back: Normal range of motion.   Skin:     General: Skin is warm.   Neurological:      General: No focal deficit present.      Mental Status: He is alert.   Psychiatric:         Mood and Affect: Mood normal.         Behavior: Behavior normal.         Procedures           ED Course      Results for orders placed or performed during the hospital encounter of 02/15/23   Comprehensive Metabolic Panel    Specimen: Blood   Result Value Ref Range    Glucose 84 65 - 99 mg/dL    BUN 14 8 - 23 mg/dL    Creatinine 0.92 0.76 - 1.27 mg/dL    Sodium 134 (L) 136 - 145 mmol/L    Potassium 4.2 3.5 - 5.2 mmol/L    Chloride 99 98 - 107 mmol/L    CO2 29.0 22.0 - 29.0 mmol/L    Calcium 9.3 8.6 - 10.5 mg/dL    Total Protein 7.7 6.0 - 8.5 g/dL    Albumin 4.1 3.5 - 5.2 g/dL    ALT (SGPT) 71 (H) 1 - 41 U/L    AST (SGOT) 50 (H) 1 - 40 U/L    Alkaline Phosphatase 285 (H) 39 - 117 U/L    Total Bilirubin 0.6 0.0 - 1.2 mg/dL    Globulin 3.6 gm/dL    A/G Ratio 1.1 g/dL    BUN/Creatinine Ratio 15.2 7.0 - 25.0    Anion Gap 6.0 5.0 - 15.0 mmol/L    eGFR 92.3 >60.0 mL/min/1.73   CBC Auto Differential    Specimen: Blood   Result Value Ref Range    WBC 4.44 3.40 - 10.80 10*3/mm3    RBC 4.33 4.14 - 5.80 10*6/mm3    Hemoglobin 12.7 (L) 13.0 - 17.7 g/dL    Hematocrit 37.1 (L) 37.5 - 51.0 %    MCV 85.7 79.0 - 97.0 fL    MCH 29.3 26.6 - 33.0 pg    MCHC 34.2 31.5 - 35.7 g/dL    RDW 13.3 12.3 - 15.4 %    RDW-SD 41.8 37.0 - 54.0 fl    MPV 9.6 6.0 - 12.0 fL    Platelets 95 (L) 140 - 450 10*3/mm3    Neutrophil % 71.7 42.7 - 76.0 %    Lymphocyte % 24.8 19.6 - 45.3 %    Monocyte % 1.6 (L) 5.0 - 12.0 %    Eosinophil % 0.9 0.3 - 6.2 %    Basophil % 0.5 0.0 - 1.5 %    Immature Grans % 0.5 0.0 - 0.5 %    Neutrophils, Absolute 3.19 1.70 - 7.00 10*3/mm3     Lymphocytes, Absolute 1.10 0.70 - 3.10 10*3/mm3    Monocytes, Absolute 0.07 (L) 0.10 - 0.90 10*3/mm3    Eosinophils, Absolute 0.04 0.00 - 0.40 10*3/mm3    Basophils, Absolute 0.02 0.00 - 0.20 10*3/mm3    Immature Grans, Absolute 0.02 0.00 - 0.05 10*3/mm3    nRBC 0.0 0.0 - 0.2 /100 WBC   Lipase    Specimen: Blood   Result Value Ref Range    Lipase 13 13 - 60 U/L   Scan Slide    Specimen: Blood   Result Value Ref Range    RBC Morphology Normal Normal    WBC Morphology Normal Normal    Platelet Estimate Decreased Normal                                   Medical Decision Making  67-year-old male here for dislodged tube.  He will need to be transferred to [place] to see his surgical oncologist, a service that is not available at this facility.  Dr. Barboza at [facility] was contacted who has accepted the patient to the [facility] floor.  He had labs done which showed an alk phos 285 elevated from last set of labs done a few days ago. Currently awaiting on a bed to be available and availability of transport.     Cholangiocarcinoma (HCC): acute illness or injury  Amount and/or Complexity of Data Reviewed  Labs: ordered.      Risk  Prescription drug management.          Final diagnoses:   Cholangiocarcinoma (HCC)       ED Disposition  ED Disposition     ED Disposition   Transfer to Another Facility     Condition   --    Comment   --             No follow-up provider specified.       Medication List      No changes were made to your prescriptions during this visit.          Dae Love MD  Resident  02/17/23 8884

## 2023-02-16 NOTE — ED NOTES
Ambulance service called to see when transport would be here to transport patient to [facility]. EMS stated that a new PCS form needs to be filled out and sent back to EMS as the date for transfer started yesterday and patient is being transported today. Updated PCS form filled out and sent to [facility].

## 2023-02-16 NOTE — ED NOTES
Updated on plan of care. Sitting in chair at bedside watching tv on his ipad. No complaints at this time.

## 2023-02-23 NOTE — PROGRESS NOTES
Subjective     Jian Baker was seen in follow-up for cholangiocarcinoma.  Since his last visit  He went to [place] to change the biliary drain.  This is improved.  Denies any new aches or pains.  Chronic pain has been stable.  Mild nausea which responded to antinausea medication.    Past Medical History, Past Surgical History, Social History, Family History have been reviewed and are without significant changes except as mentioned.        Medications:  The current medication list was reviewed in the EMR    ALLERGIES:  No Known Allergies    Objective        Current Status 2/10/2023   ECOG score 0       Physical Exam  Vitals and nursing note reviewed.   Constitutional:       Appearance: Normal appearance.   Neurological:      General: No focal deficit present.      Mental Status: He is alert and oriented to person, place, and time. Mental status is at baseline.   Psychiatric:         Mood and Affect: Mood normal.         Behavior: Behavior normal.         Thought Content: Thought content normal.               RECENT LABS: Independently reviewed and summarized  Hematology WBC   Date Value Ref Range Status   02/15/2023 4.44 3.40 - 10.80 10*3/mm3 Final     RBC   Date Value Ref Range Status   02/15/2023 4.33 4.14 - 5.80 10*6/mm3 Final     Hemoglobin   Date Value Ref Range Status   02/15/2023 12.7 (L) 13.0 - 17.7 g/dL Final     Hematocrit   Date Value Ref Range Status   02/15/2023 37.1 (L) 37.5 - 51.0 % Final     Platelets   Date Value Ref Range Status   02/15/2023 95 (L) 140 - 450 10*3/mm3 Final       WBC   Date Value Ref Range Status   02/24/2023 4.66 3.40 - 10.80 10*3/mm3 Final     RBC   Date Value Ref Range Status   02/24/2023 4.21 4.14 - 5.80 10*6/mm3 Final     Hemoglobin   Date Value Ref Range Status   02/24/2023 12.2 (L) 13.0 - 17.7 g/dL Final     Hematocrit   Date Value Ref Range Status   02/24/2023 36.0 (L) 37.5 - 51.0 % Final     MCV   Date Value Ref Range Status   02/24/2023 85.5 79.0 - 97.0 fL Final      MCH   Date Value Ref Range Status   02/24/2023 29.0 26.6 - 33.0 pg Final     MCHC   Date Value Ref Range Status   02/24/2023 33.9 31.5 - 35.7 g/dL Final     RDW   Date Value Ref Range Status   02/24/2023 14.3 12.3 - 15.4 % Final     RDW-SD   Date Value Ref Range Status   02/24/2023 42.5 37.0 - 54.0 fl Final     MPV   Date Value Ref Range Status   02/24/2023 9.4 6.0 - 12.0 fL Final     Platelets   Date Value Ref Range Status   02/24/2023 474 (H) 140 - 450 10*3/mm3 Final     Neutrophil %   Date Value Ref Range Status   02/24/2023 61.5 42.7 - 76.0 % Final     Lymphocyte %   Date Value Ref Range Status   02/24/2023 23.8 19.6 - 45.3 % Final     Monocyte %   Date Value Ref Range Status   02/24/2023 12.2 (H) 5.0 - 12.0 % Final     Eosinophil %   Date Value Ref Range Status   02/24/2023 1.5 0.3 - 6.2 % Final     Basophil %   Date Value Ref Range Status   02/24/2023 0.6 0.0 - 1.5 % Final     Immature Grans %   Date Value Ref Range Status   02/24/2023 0.4 0.0 - 0.5 % Final     Neutrophils, Absolute   Date Value Ref Range Status   02/24/2023 2.86 1.70 - 7.00 10*3/mm3 Final     Lymphocytes, Absolute   Date Value Ref Range Status   02/24/2023 1.11 0.70 - 3.10 10*3/mm3 Final     Monocytes, Absolute   Date Value Ref Range Status   02/24/2023 0.57 0.10 - 0.90 10*3/mm3 Final     Eosinophils, Absolute   Date Value Ref Range Status   02/24/2023 0.07 0.00 - 0.40 10*3/mm3 Final     Basophils, Absolute   Date Value Ref Range Status   02/24/2023 0.03 0.00 - 0.20 10*3/mm3 Final     Immature Grans, Absolute   Date Value Ref Range Status   02/24/2023 0.02 0.00 - 0.05 10*3/mm3 Final     nRBC   Date Value Ref Range Status   02/24/2023 0.0 0.0 - 0.2 /100 WBC Final       Lab Results   Component Value Date    GLUCOSE 99 02/24/2023    BUN 8 02/24/2023    CREATININE 0.75 (L) 02/24/2023    EGFR 100.1 02/24/2023    BCR 10.7 02/24/2023    K 4.2 02/24/2023    CO2 25.0 02/24/2023    CALCIUM 9.7 02/24/2023    ALBUMIN 3.9 02/24/2023    AST 21  02/24/2023    ALT 21 02/24/2023          Jian Baker reports a pain score of 3.  Given his pain assessment as noted, treatment options were discussed and the following options were decided upon as a follow-up plan to address the patient's pain: prescription for opiod analgesics.    Patient screened negative for depression based on a PHQ-9 score of 0 on 2/3/2023.    Advance Care Planning   ACP discussion was declined by the patient. Patient does not have an advance directive, declines further assistance.          Diagnosis:   (1) Cholangiocarcinoma, locally advance, unresectable   At least Stage IIIB (cT4, cN1, cM0)      Current therapy:   Gemcitabine/Cisplatin/Durvalumab      D1C1: 2/3/22   D8C1: 2/10/23  D1C2: 2/24/23     (2) Biliary obstruction   (3) Candidiasis   (4) Cancer associated pain    (5) Chemo therapy induced nausea/emesis.    Assessment & Plan     (1) Cholangiocarcinoma, locally advance, unresectable     Chronic, stable.  S/p cycle 1 gemcitabine, cisplatin and durvalumab.  Tolerating treatment well without any major side effect.  He did required change in the biliary drain.  Liver function has improved.  Overall feels well.  Labs look stable.  Day 1 cycle 2 gemcitabine, cisplatin and durvalumab was signed on today's visit.    (2) Biliary obstruction     Chronic, stable.  He is s/p PTBD x2.  Not jaundiced.  Liver enzymes have improved.  Continue biliary drainage.  Right biliary drain continues to drain copious amount of bile.  Left biliary drain almost no drainage.  He is being managed by Dr. Barboza in [place].    (3) Candidiasis     Patient had Candida in biliary tree.  He was treated with 1 month of fluconazole.  Does not have any fever or chills.  Continue to monitor.    (4) Cancer associated pain      Chronic, stable.  He is using oxycodone as needed.  He is requesting new prescription which was sent to the pharmacy.    (5) Chemo therapy induced nausea/emesis    Chronic, stable.  Continue  Zofran as needed.  New prescription sent to the pharmacy.        Patient has significant transportation issues.  Even though he is currently is unable to drive to [place].  We do not have any other option available in order to get him to [place].    2/23/2023      CC:

## 2023-02-24 ENCOUNTER — INFUSION (OUTPATIENT)
Dept: ONCOLOGY | Facility: HOSPITAL | Age: 68
End: 2023-02-24
Payer: MEDICARE

## 2023-02-24 ENCOUNTER — OFFICE VISIT (OUTPATIENT)
Dept: ONCOLOGY | Facility: CLINIC | Age: 68
End: 2023-02-24
Payer: MEDICARE

## 2023-02-24 VITALS
DIASTOLIC BLOOD PRESSURE: 87 MMHG | TEMPERATURE: 97.6 F | RESPIRATION RATE: 18 BRPM | SYSTOLIC BLOOD PRESSURE: 154 MMHG | HEART RATE: 88 BPM

## 2023-02-24 DIAGNOSIS — K83.1 BILIARY OBSTRUCTION: ICD-10-CM

## 2023-02-24 DIAGNOSIS — Z45.2 ENCOUNTER FOR VENOUS ACCESS DEVICE CARE: ICD-10-CM

## 2023-02-24 DIAGNOSIS — C24.9 MALIGNANT NEOPLASM OF BILIARY TRACT, UNSPECIFIED: Primary | ICD-10-CM

## 2023-02-24 DIAGNOSIS — G89.3 CANCER ASSOCIATED PAIN: ICD-10-CM

## 2023-02-24 LAB
ALBUMIN SERPL-MCNC: 3.9 G/DL (ref 3.5–5.2)
ALBUMIN/GLOB SERPL: 1 G/DL
ALP SERPL-CCNC: 196 U/L (ref 39–117)
ALT SERPL W P-5'-P-CCNC: 21 U/L (ref 1–41)
ANION GAP SERPL CALCULATED.3IONS-SCNC: 7 MMOL/L (ref 5–15)
AST SERPL-CCNC: 21 U/L (ref 1–40)
BASOPHILS # BLD AUTO: 0.03 10*3/MM3 (ref 0–0.2)
BASOPHILS NFR BLD AUTO: 0.6 % (ref 0–1.5)
BILIRUB SERPL-MCNC: 0.3 MG/DL (ref 0–1.2)
BUN SERPL-MCNC: 8 MG/DL (ref 8–23)
BUN/CREAT SERPL: 10.7 (ref 7–25)
CALCIUM SPEC-SCNC: 9.7 MG/DL (ref 8.6–10.5)
CHLORIDE SERPL-SCNC: 102 MMOL/L (ref 98–107)
CO2 SERPL-SCNC: 25 MMOL/L (ref 22–29)
CREAT SERPL-MCNC: 0.75 MG/DL (ref 0.76–1.27)
DEPRECATED RDW RBC AUTO: 42.5 FL (ref 37–54)
EGFRCR SERPLBLD CKD-EPI 2021: 100.1 ML/MIN/1.73
EOSINOPHIL # BLD AUTO: 0.07 10*3/MM3 (ref 0–0.4)
EOSINOPHIL NFR BLD AUTO: 1.5 % (ref 0.3–6.2)
ERYTHROCYTE [DISTWIDTH] IN BLOOD BY AUTOMATED COUNT: 14.3 % (ref 12.3–15.4)
GLOBULIN UR ELPH-MCNC: 3.8 GM/DL
GLUCOSE SERPL-MCNC: 99 MG/DL (ref 65–99)
HCT VFR BLD AUTO: 36 % (ref 37.5–51)
HGB BLD-MCNC: 12.2 G/DL (ref 13–17.7)
HOLD SPECIMEN: NORMAL
IMM GRANULOCYTES # BLD AUTO: 0.02 10*3/MM3 (ref 0–0.05)
IMM GRANULOCYTES NFR BLD AUTO: 0.4 % (ref 0–0.5)
LYMPHOCYTES # BLD AUTO: 1.11 10*3/MM3 (ref 0.7–3.1)
LYMPHOCYTES NFR BLD AUTO: 23.8 % (ref 19.6–45.3)
MAGNESIUM SERPL-MCNC: 2 MG/DL (ref 1.6–2.4)
MCH RBC QN AUTO: 29 PG (ref 26.6–33)
MCHC RBC AUTO-ENTMCNC: 33.9 G/DL (ref 31.5–35.7)
MCV RBC AUTO: 85.5 FL (ref 79–97)
MONOCYTES # BLD AUTO: 0.57 10*3/MM3 (ref 0.1–0.9)
MONOCYTES NFR BLD AUTO: 12.2 % (ref 5–12)
NEUTROPHILS NFR BLD AUTO: 2.86 10*3/MM3 (ref 1.7–7)
NEUTROPHILS NFR BLD AUTO: 61.5 % (ref 42.7–76)
NRBC BLD AUTO-RTO: 0 /100 WBC (ref 0–0.2)
PLATELET # BLD AUTO: 474 10*3/MM3 (ref 140–450)
PMV BLD AUTO: 9.4 FL (ref 6–12)
POTASSIUM SERPL-SCNC: 4.2 MMOL/L (ref 3.5–5.2)
PROT SERPL-MCNC: 7.7 G/DL (ref 6–8.5)
RBC # BLD AUTO: 4.21 10*6/MM3 (ref 4.14–5.8)
SODIUM SERPL-SCNC: 134 MMOL/L (ref 136–145)
T4 FREE SERPL-MCNC: 1.26 NG/DL (ref 0.93–1.7)
TSH SERPL DL<=0.05 MIU/L-ACNC: 3.82 UIU/ML (ref 0.27–4.2)
WBC NRBC COR # BLD: 4.66 10*3/MM3 (ref 3.4–10.8)

## 2023-02-24 PROCEDURE — 80053 COMPREHEN METABOLIC PANEL: CPT

## 2023-02-24 PROCEDURE — 84443 ASSAY THYROID STIM HORMONE: CPT

## 2023-02-24 PROCEDURE — 25010000002 PALONOSETRON PER 25 MCG: Performed by: INTERNAL MEDICINE

## 2023-02-24 PROCEDURE — 84439 ASSAY OF FREE THYROXINE: CPT

## 2023-02-24 PROCEDURE — 96367 TX/PROPH/DG ADDL SEQ IV INF: CPT | Performed by: INTERNAL MEDICINE

## 2023-02-24 PROCEDURE — 85025 COMPLETE CBC W/AUTO DIFF WBC: CPT

## 2023-02-24 PROCEDURE — 25010000002 DEXAMETHASONE SODIUM PHOSPHATE 100 MG/10ML SOLUTION: Performed by: INTERNAL MEDICINE

## 2023-02-24 PROCEDURE — 36415 COLL VENOUS BLD VENIPUNCTURE: CPT

## 2023-02-24 PROCEDURE — 25010000002 GEMCITABINE 1 GM/26.3ML SOLUTION 26.3 ML VIAL: Performed by: INTERNAL MEDICINE

## 2023-02-24 PROCEDURE — 99214 OFFICE O/P EST MOD 30 MIN: CPT | Performed by: INTERNAL MEDICINE

## 2023-02-24 PROCEDURE — 25010000002 FOSAPREPITANT PER 1 MG: Performed by: INTERNAL MEDICINE

## 2023-02-24 PROCEDURE — 96361 HYDRATE IV INFUSION ADD-ON: CPT | Performed by: INTERNAL MEDICINE

## 2023-02-24 PROCEDURE — 25010000002 HEPARIN LOCK FLUSH PER 10 UNITS: Performed by: INTERNAL MEDICINE

## 2023-02-24 PROCEDURE — 96413 CHEMO IV INFUSION 1 HR: CPT | Performed by: INTERNAL MEDICINE

## 2023-02-24 PROCEDURE — 96375 TX/PRO/DX INJ NEW DRUG ADDON: CPT | Performed by: INTERNAL MEDICINE

## 2023-02-24 PROCEDURE — 83735 ASSAY OF MAGNESIUM: CPT

## 2023-02-24 PROCEDURE — 25010000002 CISPLATIN PER 50 MG: Performed by: INTERNAL MEDICINE

## 2023-02-24 PROCEDURE — 63710000001 OLANZAPINE 5 MG TABLET: Performed by: INTERNAL MEDICINE

## 2023-02-24 PROCEDURE — 25010000002 POTASSIUM CHLORIDE PER 2 MEQ OF POTASSIUM: Performed by: INTERNAL MEDICINE

## 2023-02-24 PROCEDURE — 96417 CHEMO IV INFUS EACH ADDL SEQ: CPT

## 2023-02-24 PROCEDURE — A9270 NON-COVERED ITEM OR SERVICE: HCPCS | Performed by: INTERNAL MEDICINE

## 2023-02-24 PROCEDURE — 25010000002 MAGNESIUM SULFATE PER 500 MG OF MAGNESIUM: Performed by: INTERNAL MEDICINE

## 2023-02-24 PROCEDURE — 25010000002 DURVALUMAB 500 MG/10ML SOLUTION 10 ML VIAL: Performed by: INTERNAL MEDICINE

## 2023-02-24 RX ORDER — HEPARIN SODIUM (PORCINE) LOCK FLUSH IV SOLN 100 UNIT/ML 100 UNIT/ML
500 SOLUTION INTRAVENOUS AS NEEDED
Status: DISCONTINUED | OUTPATIENT
Start: 2023-02-24 | End: 2023-02-24 | Stop reason: HOSPADM

## 2023-02-24 RX ORDER — SODIUM CHLORIDE 9 MG/ML
250 INJECTION, SOLUTION INTRAVENOUS ONCE
Status: CANCELLED | OUTPATIENT
Start: 2023-02-24

## 2023-02-24 RX ORDER — SODIUM CHLORIDE 9 MG/ML
250 INJECTION, SOLUTION INTRAVENOUS ONCE
Status: COMPLETED | OUTPATIENT
Start: 2023-02-24 | End: 2023-02-24

## 2023-02-24 RX ORDER — SODIUM CHLORIDE 0.9 % (FLUSH) 0.9 %
10 SYRINGE (ML) INJECTION AS NEEDED
Status: CANCELLED | OUTPATIENT
Start: 2023-02-24

## 2023-02-24 RX ORDER — ONDANSETRON HYDROCHLORIDE 8 MG/1
8 TABLET, FILM COATED ORAL 3 TIMES DAILY PRN
Qty: 30 TABLET | Refills: 5 | Status: SHIPPED | OUTPATIENT
Start: 2023-02-24 | End: 2023-03-29 | Stop reason: SDUPTHER

## 2023-02-24 RX ORDER — OLANZAPINE 5 MG/1
5 TABLET ORAL ONCE
Status: CANCELLED | OUTPATIENT
Start: 2023-02-24 | End: 2023-02-24

## 2023-02-24 RX ORDER — OXYCODONE HYDROCHLORIDE 5 MG/1
5 CAPSULE ORAL EVERY 4 HOURS PRN
Qty: 60 CAPSULE | Refills: 0 | Status: SHIPPED | OUTPATIENT
Start: 2023-02-24 | End: 2023-03-29 | Stop reason: SDUPTHER

## 2023-02-24 RX ORDER — PALONOSETRON 0.05 MG/ML
0.25 INJECTION, SOLUTION INTRAVENOUS ONCE
Status: CANCELLED | OUTPATIENT
Start: 2023-02-24

## 2023-02-24 RX ORDER — OLANZAPINE 5 MG/1
5 TABLET ORAL ONCE
Status: COMPLETED | OUTPATIENT
Start: 2023-02-24 | End: 2023-02-24

## 2023-02-24 RX ORDER — PALONOSETRON 0.05 MG/ML
0.25 INJECTION, SOLUTION INTRAVENOUS ONCE
Status: COMPLETED | OUTPATIENT
Start: 2023-02-24 | End: 2023-02-24

## 2023-02-24 RX ORDER — SODIUM CHLORIDE 0.9 % (FLUSH) 0.9 %
10 SYRINGE (ML) INJECTION AS NEEDED
Status: DISCONTINUED | OUTPATIENT
Start: 2023-02-24 | End: 2023-02-24 | Stop reason: HOSPADM

## 2023-02-24 RX ORDER — LOPERAMIDE HYDROCHLORIDE 2 MG/1
2 TABLET ORAL 4 TIMES DAILY PRN
Qty: 60 TABLET | Refills: 3 | Status: SHIPPED | OUTPATIENT
Start: 2023-02-24

## 2023-02-24 RX ORDER — HEPARIN SODIUM (PORCINE) LOCK FLUSH IV SOLN 100 UNIT/ML 100 UNIT/ML
500 SOLUTION INTRAVENOUS AS NEEDED
Status: CANCELLED | OUTPATIENT
Start: 2023-02-24

## 2023-02-24 RX ADMIN — POTASSIUM CHLORIDE: 2 INJECTION, SOLUTION, CONCENTRATE INTRAVENOUS at 12:40

## 2023-02-24 RX ADMIN — GEMCITABINE 1900 MG: 38 INJECTION, SOLUTION INTRAVENOUS at 12:41

## 2023-02-24 RX ADMIN — POTASSIUM CHLORIDE: 2 INJECTION, SOLUTION, CONCENTRATE INTRAVENOUS at 11:14

## 2023-02-24 RX ADMIN — PALONOSETRON 0.25 MG: 0.05 INJECTION, SOLUTION INTRAVENOUS at 09:10

## 2023-02-24 RX ADMIN — SODIUM CHLORIDE 250 ML: 9 INJECTION, SOLUTION INTRAVENOUS at 09:10

## 2023-02-24 RX ADMIN — Medication 10 ML: at 14:33

## 2023-02-24 RX ADMIN — FOSAPREPITANT 100 ML: 150 INJECTION, POWDER, LYOPHILIZED, FOR SOLUTION INTRAVENOUS at 09:17

## 2023-02-24 RX ADMIN — OLANZAPINE 5 MG: 5 TABLET, FILM COATED ORAL at 09:10

## 2023-02-24 RX ADMIN — DEXAMETHASONE SODIUM PHOSPHATE 12 MG: 10 INJECTION, SOLUTION INTRAMUSCULAR; INTRAVENOUS at 12:09

## 2023-02-24 RX ADMIN — SODIUM CHLORIDE 1500 MG: 9 INJECTION, SOLUTION INTRAVENOUS at 10:00

## 2023-02-24 RX ADMIN — CISPLATIN 48 MG: 1 INJECTION, SOLUTION INTRAVENOUS at 13:22

## 2023-02-24 RX ADMIN — Medication 500 UNITS: at 14:33

## 2023-02-26 ENCOUNTER — HOSPITAL ENCOUNTER (EMERGENCY)
Facility: HOSPITAL | Age: 68
Discharge: HOME | End: 2023-02-26
Attending: INTERNAL MEDICINE | Admitting: EMERGENCY MEDICINE
Payer: MEDICARE

## 2023-02-26 VITALS
HEART RATE: 92 BPM | SYSTOLIC BLOOD PRESSURE: 156 MMHG | OXYGEN SATURATION: 99 % | BODY MASS INDEX: 26.37 KG/M2 | HEIGHT: 67 IN | RESPIRATION RATE: 18 BRPM | TEMPERATURE: 98.2 F | DIASTOLIC BLOOD PRESSURE: 90 MMHG | WEIGHT: 168 LBS

## 2023-02-26 DIAGNOSIS — C22.1 CHOLANGIOCARCINOMA: Primary | ICD-10-CM

## 2023-02-26 PROCEDURE — 99282 EMERGENCY DEPT VISIT SF MDM: CPT

## 2023-03-02 ENCOUNTER — DOCUMENTATION (OUTPATIENT)
Dept: FAMILY MEDICINE CLINIC | Facility: CLINIC | Age: 68
End: 2023-03-02
Payer: MEDICARE

## 2023-03-02 DIAGNOSIS — C24.9 MALIGNANT NEOPLASM OF BILIARY TRACT, UNSPECIFIED: ICD-10-CM

## 2023-03-02 DIAGNOSIS — K21.9 GASTROESOPHAGEAL REFLUX DISEASE WITHOUT ESOPHAGITIS: Primary | ICD-10-CM

## 2023-03-02 RX ORDER — OLANZAPINE 5 MG/1
TABLET ORAL
Qty: 6 TABLET | Refills: 7 | Status: SHIPPED | OUTPATIENT
Start: 2023-03-02

## 2023-03-02 RX ORDER — PANTOPRAZOLE SODIUM 40 MG/1
40 TABLET, DELAYED RELEASE ORAL DAILY
Qty: 30 TABLET | Refills: 2 | Status: SHIPPED | OUTPATIENT
Start: 2023-03-02

## 2023-03-02 NOTE — PROGRESS NOTES
1745 p.m. received call from Dee with [facility] [facility].  Patient was experiencing significant symptoms of GERD.  Found to be on Protonix 20.  Advised her we could increase the dose to 40 mg daily.  Also advised her to let the patient know he can take some Tums with that as well.  She is going to make sure that the patient is not going recumbent within an hour after eating as well.  She would like to stay with the patient for another 4 weeks to follow him.  I agree with this plan.        This document has been electronically signed by Adarsh Barrios MD on March 2, 2023 17:55 CST

## 2023-03-02 NOTE — TELEPHONE ENCOUNTER
Rx Refill Note  Requested Prescriptions     Pending Prescriptions Disp Refills   • OLANZapine (zyPREXA) 5 MG tablet [Pharmacy Med Name: OLANZAPINE 5MG TABLET] 6 tablet 7     Sig: TAKE ON DAY 2, 3, AND 4 AND DAYS 9, 10, 11 AFTER CHEMOTHERAPY.      Last office visit with prescribing clinician: 2/24/2023   Last telemedicine visit with prescribing clinician: 3/17/2023   Next office visit with prescribing clinician: 3/17/2023                         Would you like a call back once the refill request has been completed: [] Yes [] No    If the office needs to give you a call back, can they leave a voicemail: [] Yes [] No    Clementine Lainez, Arabella Rep  03/02/23, 10:47 CST

## 2023-03-03 ENCOUNTER — INFUSION (OUTPATIENT)
Dept: ONCOLOGY | Facility: HOSPITAL | Age: 68
End: 2023-03-03
Payer: MEDICARE

## 2023-03-03 VITALS
RESPIRATION RATE: 18 BRPM | TEMPERATURE: 97.4 F | DIASTOLIC BLOOD PRESSURE: 73 MMHG | HEART RATE: 117 BPM | SYSTOLIC BLOOD PRESSURE: 133 MMHG

## 2023-03-03 DIAGNOSIS — C24.9 MALIGNANT NEOPLASM OF BILIARY TRACT, UNSPECIFIED: Primary | ICD-10-CM

## 2023-03-03 DIAGNOSIS — Z45.2 ENCOUNTER FOR VENOUS ACCESS DEVICE CARE: ICD-10-CM

## 2023-03-03 LAB
ALBUMIN SERPL-MCNC: 3.8 G/DL (ref 3.5–5.2)
ALBUMIN/GLOB SERPL: 1.1 G/DL
ALP SERPL-CCNC: 209 U/L (ref 39–117)
ALT SERPL W P-5'-P-CCNC: 38 U/L (ref 1–41)
ANION GAP SERPL CALCULATED.3IONS-SCNC: 11 MMOL/L (ref 5–15)
AST SERPL-CCNC: 36 U/L (ref 1–40)
BASOPHILS # BLD AUTO: 0.06 10*3/MM3 (ref 0–0.2)
BASOPHILS NFR BLD AUTO: 1 % (ref 0–1.5)
BILIRUB SERPL-MCNC: 0.3 MG/DL (ref 0–1.2)
BUN SERPL-MCNC: 10 MG/DL (ref 8–23)
BUN/CREAT SERPL: 13 (ref 7–25)
CALCIUM SPEC-SCNC: 9.2 MG/DL (ref 8.6–10.5)
CHLORIDE SERPL-SCNC: 101 MMOL/L (ref 98–107)
CO2 SERPL-SCNC: 26 MMOL/L (ref 22–29)
CREAT SERPL-MCNC: 0.77 MG/DL (ref 0.76–1.27)
DEPRECATED RDW RBC AUTO: 44.7 FL (ref 37–54)
EGFRCR SERPLBLD CKD-EPI 2021: 99.4 ML/MIN/1.73
EOSINOPHIL # BLD AUTO: 0.01 10*3/MM3 (ref 0–0.4)
EOSINOPHIL NFR BLD AUTO: 0.2 % (ref 0.3–6.2)
ERYTHROCYTE [DISTWIDTH] IN BLOOD BY AUTOMATED COUNT: 14.8 % (ref 12.3–15.4)
GLOBULIN UR ELPH-MCNC: 3.5 GM/DL
GLUCOSE SERPL-MCNC: 135 MG/DL (ref 65–99)
HCT VFR BLD AUTO: 34.1 % (ref 37.5–51)
HGB BLD-MCNC: 11.5 G/DL (ref 13–17.7)
HOLD SPECIMEN: NORMAL
IMM GRANULOCYTES # BLD AUTO: 0.24 10*3/MM3 (ref 0–0.05)
IMM GRANULOCYTES NFR BLD AUTO: 4 % (ref 0–0.5)
LYMPHOCYTES # BLD AUTO: 1.31 10*3/MM3 (ref 0.7–3.1)
LYMPHOCYTES NFR BLD AUTO: 21.8 % (ref 19.6–45.3)
MAGNESIUM SERPL-MCNC: 1.8 MG/DL (ref 1.6–2.4)
MCH RBC QN AUTO: 28.6 PG (ref 26.6–33)
MCHC RBC AUTO-ENTMCNC: 33.7 G/DL (ref 31.5–35.7)
MCV RBC AUTO: 84.8 FL (ref 79–97)
MONOCYTES # BLD AUTO: 0.74 10*3/MM3 (ref 0.1–0.9)
MONOCYTES NFR BLD AUTO: 12.3 % (ref 5–12)
NEUTROPHILS NFR BLD AUTO: 3.66 10*3/MM3 (ref 1.7–7)
NEUTROPHILS NFR BLD AUTO: 60.7 % (ref 42.7–76)
NRBC BLD AUTO-RTO: 0 /100 WBC (ref 0–0.2)
PLATELET # BLD AUTO: 387 10*3/MM3 (ref 140–450)
PMV BLD AUTO: 9.1 FL (ref 6–12)
POTASSIUM SERPL-SCNC: 3.8 MMOL/L (ref 3.5–5.2)
PROT SERPL-MCNC: 7.3 G/DL (ref 6–8.5)
RBC # BLD AUTO: 4.02 10*6/MM3 (ref 4.14–5.8)
SODIUM SERPL-SCNC: 138 MMOL/L (ref 136–145)
WBC NRBC COR # BLD: 6.02 10*3/MM3 (ref 3.4–10.8)

## 2023-03-03 PROCEDURE — 36415 COLL VENOUS BLD VENIPUNCTURE: CPT

## 2023-03-03 PROCEDURE — 96417 CHEMO IV INFUS EACH ADDL SEQ: CPT | Performed by: INTERNAL MEDICINE

## 2023-03-03 PROCEDURE — 96367 TX/PROPH/DG ADDL SEQ IV INF: CPT | Performed by: INTERNAL MEDICINE

## 2023-03-03 PROCEDURE — 25010000002 HEPARIN LOCK FLUSH PER 10 UNITS: Performed by: INTERNAL MEDICINE

## 2023-03-03 PROCEDURE — 96375 TX/PRO/DX INJ NEW DRUG ADDON: CPT | Performed by: INTERNAL MEDICINE

## 2023-03-03 PROCEDURE — 25010000002 DEXAMETHASONE SODIUM PHOSPHATE 100 MG/10ML SOLUTION: Performed by: INTERNAL MEDICINE

## 2023-03-03 PROCEDURE — 96361 HYDRATE IV INFUSION ADD-ON: CPT | Performed by: INTERNAL MEDICINE

## 2023-03-03 PROCEDURE — 85025 COMPLETE CBC W/AUTO DIFF WBC: CPT

## 2023-03-03 PROCEDURE — 25010000002 FOSAPREPITANT PER 1 MG: Performed by: INTERNAL MEDICINE

## 2023-03-03 PROCEDURE — 83735 ASSAY OF MAGNESIUM: CPT

## 2023-03-03 PROCEDURE — 63710000001 OLANZAPINE 5 MG TABLET: Performed by: INTERNAL MEDICINE

## 2023-03-03 PROCEDURE — A9270 NON-COVERED ITEM OR SERVICE: HCPCS | Performed by: INTERNAL MEDICINE

## 2023-03-03 PROCEDURE — 80053 COMPREHEN METABOLIC PANEL: CPT

## 2023-03-03 PROCEDURE — 25010000002 MAGNESIUM SULFATE PER 500 MG OF MAGNESIUM: Performed by: INTERNAL MEDICINE

## 2023-03-03 PROCEDURE — 25010000002 CISPLATIN PER 50 MG: Performed by: INTERNAL MEDICINE

## 2023-03-03 PROCEDURE — 25010000002 PALONOSETRON PER 25 MCG: Performed by: INTERNAL MEDICINE

## 2023-03-03 PROCEDURE — 25010000002 GEMCITABINE 1 GM/26.3ML SOLUTION 26.3 ML VIAL: Performed by: INTERNAL MEDICINE

## 2023-03-03 PROCEDURE — 25010000002 POTASSIUM CHLORIDE PER 2 MEQ OF POTASSIUM: Performed by: INTERNAL MEDICINE

## 2023-03-03 PROCEDURE — 96413 CHEMO IV INFUSION 1 HR: CPT | Performed by: INTERNAL MEDICINE

## 2023-03-03 RX ORDER — SODIUM CHLORIDE 9 MG/ML
250 INJECTION, SOLUTION INTRAVENOUS ONCE
Status: CANCELLED | OUTPATIENT
Start: 2023-03-03

## 2023-03-03 RX ORDER — SODIUM CHLORIDE 9 MG/ML
250 INJECTION, SOLUTION INTRAVENOUS ONCE
Status: COMPLETED | OUTPATIENT
Start: 2023-03-03 | End: 2023-03-03

## 2023-03-03 RX ORDER — OLANZAPINE 5 MG/1
5 TABLET ORAL ONCE
Status: CANCELLED | OUTPATIENT
Start: 2023-03-03 | End: 2023-03-03

## 2023-03-03 RX ORDER — SODIUM CHLORIDE 0.9 % (FLUSH) 0.9 %
10 SYRINGE (ML) INJECTION AS NEEDED
Status: CANCELLED | OUTPATIENT
Start: 2023-03-03

## 2023-03-03 RX ORDER — SODIUM CHLORIDE 0.9 % (FLUSH) 0.9 %
10 SYRINGE (ML) INJECTION AS NEEDED
Status: DISCONTINUED | OUTPATIENT
Start: 2023-03-03 | End: 2023-03-03 | Stop reason: HOSPADM

## 2023-03-03 RX ORDER — HEPARIN SODIUM (PORCINE) LOCK FLUSH IV SOLN 100 UNIT/ML 100 UNIT/ML
500 SOLUTION INTRAVENOUS AS NEEDED
Status: CANCELLED | OUTPATIENT
Start: 2023-03-03

## 2023-03-03 RX ORDER — PALONOSETRON 0.05 MG/ML
0.25 INJECTION, SOLUTION INTRAVENOUS ONCE
Status: COMPLETED | OUTPATIENT
Start: 2023-03-03 | End: 2023-03-03

## 2023-03-03 RX ORDER — OLANZAPINE 5 MG/1
5 TABLET ORAL ONCE
Status: COMPLETED | OUTPATIENT
Start: 2023-03-03 | End: 2023-03-03

## 2023-03-03 RX ORDER — HEPARIN SODIUM (PORCINE) LOCK FLUSH IV SOLN 100 UNIT/ML 100 UNIT/ML
500 SOLUTION INTRAVENOUS AS NEEDED
Status: DISCONTINUED | OUTPATIENT
Start: 2023-03-03 | End: 2023-03-03 | Stop reason: HOSPADM

## 2023-03-03 RX ORDER — PALONOSETRON 0.05 MG/ML
0.25 INJECTION, SOLUTION INTRAVENOUS ONCE
Status: CANCELLED | OUTPATIENT
Start: 2023-03-03

## 2023-03-03 RX ADMIN — FOSAPREPITANT 100 ML: 150 INJECTION, POWDER, LYOPHILIZED, FOR SOLUTION INTRAVENOUS at 08:30

## 2023-03-03 RX ADMIN — GEMCITABINE 1900 MG: 38 INJECTION, SOLUTION INTRAVENOUS at 10:50

## 2023-03-03 RX ADMIN — POTASSIUM CHLORIDE: 2 INJECTION, SOLUTION, CONCENTRATE INTRAVENOUS at 11:44

## 2023-03-03 RX ADMIN — CISPLATIN 48 MG: 1 INJECTION, SOLUTION INTRAVENOUS at 11:44

## 2023-03-03 RX ADMIN — POTASSIUM CHLORIDE: 2 INJECTION, SOLUTION, CONCENTRATE INTRAVENOUS at 09:23

## 2023-03-03 RX ADMIN — Medication 500 UNITS: at 13:05

## 2023-03-03 RX ADMIN — Medication 10 ML: at 13:05

## 2023-03-03 RX ADMIN — PALONOSETRON 0.25 MG: 0.05 INJECTION, SOLUTION INTRAVENOUS at 08:20

## 2023-03-03 RX ADMIN — SODIUM CHLORIDE 250 ML: 9 INJECTION, SOLUTION INTRAVENOUS at 08:20

## 2023-03-03 RX ADMIN — OLANZAPINE 5 MG: 5 TABLET, FILM COATED ORAL at 08:37

## 2023-03-03 RX ADMIN — DEXAMETHASONE SODIUM PHOSPHATE 12 MG: 10 INJECTION, SOLUTION INTRAMUSCULAR; INTRAVENOUS at 10:14

## 2023-03-08 NOTE — PROGRESS NOTES
Family Medicine Residency  Elizabeth Fried MD    Subjective:     Jian Baker is a 65 y.o. male who presents for left shoulder pain.  He was seen by Dr. Loco in February.  Diagnosis as likely rotator cuff tendinitis and was given a steroid injection.  Symptoms were improving until he had a fall last week.  He tripped while working on his bike and landed shoulder first.  He did not notice any swelling or bruising.  Endorses symptoms at nighttime.  Endorses weakness by difficulty with picking up a cup of coffee.  Requesting a steroid injection.    The following portions of the patient's history were reviewed and updated as appropriate: allergies, current medications, past family history, past medical history, past social history, past surgical history and problem list.    Past Medical Hx:  Past Medical History:   Diagnosis Date   • Asthma     Previously diagnosed and previously prescribed inhaler medications.     • Essential hypertension    • Gastroesophageal reflux disease        Past Surgical Hx:  Past Surgical History:   Procedure Laterality Date   • BONE GRAFT     • WRIST FRACTURE SURGERY Bilateral        Current Meds:    Current Outpatient Medications:   •  atorvastatin (LIPITOR) 20 MG tablet, TAKE ONE TABLET BY MOUTH ONCE DAILY , Disp: 90 tablet, Rfl: 1  •  fluticasone (FLONASE) 50 MCG/ACT nasal spray, 2 sprays into the nostril(s) as directed by provider Daily., Disp: 1 bottle, Rfl: 10  •  lidocaine (LIDODERM) 5 %, Place 1 patch on the skin as directed by provider Daily. Remove & Discard patch within 12 hours or as directed by MD, Disp: 30 each, Rfl: 3  •  lisinopril (PRINIVIL,ZESTRIL) 10 MG tablet, Take 2 tablets by mouth Daily., Disp: 60 tablet, Rfl: 5  •  loratadine (Claritin) 10 MG tablet, Take 1 tablet by mouth Daily., Disp: 30 tablet, Rfl: 11  •  pantoprazole (PROTONIX) 20 MG EC tablet, Take 1 tablet by mouth Daily., Disp: 30 tablet, Rfl: 11  •  pregabalin (LYRICA) 50 MG capsule, Take 1 capsule by  [ Incomplete Pressure Recording ] mouth 3 (Three) Times a Day., Disp: 90 capsule, Rfl: 2  •  vitamin B-12 (CYANOCOBALAMIN) 1000 MCG tablet, Take 1 tablet by mouth Daily., Disp: 30 tablet, Rfl: 2  •  Diclofenac Sodium (VOLTAREN) 1 % gel gel, Apply 4 g topically to the appropriate area as directed 4 (Four) Times a Day As Needed (pain)., Disp: 150 g, Rfl: 0  •  naproxen (Naprosyn) 500 MG tablet, Take 1 tablet by mouth 2 (Two) Times a Day With Meals., Disp: 60 tablet, Rfl: 0    Current Facility-Administered Medications:   •  cyanocobalamin injection 1,000 mcg, 1,000 mcg, Intramuscular, Q28 Days, Stephania Head MD, 1,000 mcg at 20 0841  •  cyanocobalamin injection 1,000 mcg, 1,000 mcg, Intramuscular, Q28 Days, Pelon Stewart Jr., MD, 1,000 mcg at 20 1409  •  cyanocobalamin injection 1,000 mcg, 1,000 mcg, Intramuscular, Q28 Days, Chiqui Coker MD, 1,000 mcg at 20 1358    Allergies:  No Known Allergies    Family Hx:  Family History   Problem Relation Age of Onset   • COPD Mother    • Hypertension Mother    • Diabetes type II Mother    • Cancer Father    • Heart failure Father         Social History:  Social History     Socioeconomic History   • Marital status:      Spouse name: Not on file   • Number of children: Not on file   • Years of education: Not on file   • Highest education level: Not on file   Tobacco Use   • Smoking status: Former Smoker     Packs/day: 1.00     Years: 25.00     Pack years: 25.00     Types: Pipe     Start date: 10/10/1972     Quit date: 10/31/2019     Years since quittin.4   • Smokeless tobacco: Current User     Types: Chew   Substance and Sexual Activity   • Alcohol use: Yes     Alcohol/week: 2.0 standard drinks     Types: 2 Cans of beer per week     Comment: Prior heavy drinker   • Drug use: No   • Sexual activity: Yes     Partners: Female       Review of Systems  Review of Systems   Constitutional: Negative for activity change, appetite change, chills, fatigue and fever.   Respiratory:  "Negative for cough, shortness of breath and wheezing.    Cardiovascular: Negative for chest pain, palpitations and leg swelling.   Gastrointestinal: Negative for abdominal pain, constipation, diarrhea, nausea and vomiting.   Genitourinary: Negative for dysuria and flank pain.   Musculoskeletal: Positive for arthralgias. Negative for joint swelling and myalgias.   Skin: Negative for color change, pallor and rash.   Neurological: Negative for dizziness, numbness and headaches.       Objective:     /82   Pulse 81   Ht 170.2 cm (67\")   Wt 88 kg (194 lb)   SpO2 96%   BMI 30.38 kg/m²   Physical Exam  Constitutional:       General: He is not in acute distress.     Appearance: Normal appearance. He is well-developed. He is not diaphoretic.   HENT:      Head: Normocephalic and atraumatic.      Right Ear: Hearing normal.      Left Ear: Hearing normal.   Eyes:      General: Lids are normal.      Conjunctiva/sclera: Conjunctivae normal.   Cardiovascular:      Rate and Rhythm: Normal rate and regular rhythm.      Heart sounds: Normal heart sounds, S1 normal and S2 normal. No murmur heard.     Pulmonary:      Effort: Pulmonary effort is normal. No respiratory distress.      Breath sounds: Normal breath sounds. No wheezing or rales.   Abdominal:      General: Bowel sounds are normal. There is no distension.      Palpations: Abdomen is soft.      Tenderness: There is no abdominal tenderness. There is no guarding.   Musculoskeletal:         General: No deformity.      Left shoulder: Tenderness present. No swelling. Decreased range of motion. Decreased strength.      Comments: L Shoulder: Tenderness along the lateral shoulder and AC joint. (+) Scarf, (+) Acevedo, (+) Tin Can.    Skin:     General: Skin is warm and dry.      Coloration: Skin is not pale.      Findings: No erythema or rash.   Neurological:      Mental Status: He is alert and oriented to person, place, and time. He is not disoriented.   Psychiatric:         " Speech: Speech normal.         Behavior: Behavior normal.          Assessment/Plan:     Diagnoses and all orders for this visit:    1. Chronic left shoulder pain (Primary)  -     naproxen (Naprosyn) 500 MG tablet; Take 1 tablet by mouth 2 (Two) Times a Day With Meals.  Dispense: 60 tablet; Refill: 0  -     Diclofenac Sodium (VOLTAREN) 1 % gel gel; Apply 4 g topically to the appropriate area as directed 4 (Four) Times a Day As Needed (pain).  Dispense: 150 g; Refill: 0    2. Tendinitis of left rotator cuff  -     naproxen (Naprosyn) 500 MG tablet; Take 1 tablet by mouth 2 (Two) Times a Day With Meals.  Dispense: 60 tablet; Refill: 0  -     Diclofenac Sodium (VOLTAREN) 1 % gel gel; Apply 4 g topically to the appropriate area as directed 4 (Four) Times a Day As Needed (pain).  Dispense: 150 g; Refill: 0    Exam and history concerning for worsening rotator cuff injury.  Previously diagnosed with rotator cuff tendinitis and now has new trauma on top of it.  Weakness noted on exam.  Will evaluate further with MRI.  Prescribed naproxen and potential in the meantime.  He would like to defer physical therapy for now.  He had a steroid injection in his shoulder 2 months ago so we will hold off on that for now.    · Rx changes: add naproxen, voltaren gel  · Patient Education: see a/p  · Compliance at present is estimated to be good.     Depression screening: Up to date; last screen 4/9/2021     Follow-up:     Return in about 4 weeks (around 5/7/2021) for Recheck.    Preventative:  Health Maintenance   Topic Date Due   • LUNG CANCER SCREENING  Never done   • ZOSTER VACCINE (2 of 2) 12/05/2017   • ANNUAL WELLNESS VISIT  07/02/2021   • INFLUENZA VACCINE  08/01/2021   • COLONOSCOPY  10/10/2024   • TDAP/TD VACCINES (3 - Td) 10/10/2027   • HEPATITIS C SCREENING  Completed   • COVID-19 Vaccine  Completed   • Pneumococcal Vaccine 0-64  Aged Out     Male Preventative: Colon cancer screening is up to date  Recommended:  Varicella  Vaccine Counseling: N/A    Weight  -Class: Obese Class I: 30-34.9kg/m2  -Patient's Body mass index is 30.38 kg/m². BMI is above normal parameters. Recommendations include: exercise counseling and nutrition counseling.   eat more fruits and vegetables, decrease soda or juice intake, increase water intake, increase physical activity, reduce screen time and reduce portion size    Alcohol use:  reports current alcohol use of about 2.0 standard drinks of alcohol per week.  Nicotine status  reports that he quit smoking about 17 months ago. His smoking use included pipe. He started smoking about 48 years ago. He has a 25.00 pack-year smoking history. His smokeless tobacco use includes chew.    Goals     •  Better Compliance with HTN Medications (pt-stated)       Barriers: None      •  Medication management       Patient has been having trouble with medication compliance.  Goal now is to take medications everyday at the same time.    Barriers: patient has not been on medications consistently for sometime.  Has trouble with remembering to take medications.              RISK SCORE: 3      Elizabeth Fried M.D. PGY2  [facility]  [address] [place] [address]  Office: [phone]  This document has been electronically signed by Elizabeth Fried MD on April 14, 2021 13:05 CDT

## 2023-03-13 ENCOUNTER — TELEPHONE (OUTPATIENT)
Dept: ONCOLOGY | Facility: CLINIC | Age: 68
End: 2023-03-13
Payer: MEDICARE

## 2023-03-13 ENCOUNTER — OFFICE VISIT (OUTPATIENT)
Dept: FAMILY MEDICINE CLINIC | Facility: CLINIC | Age: 68
End: 2023-03-13
Payer: MEDICARE

## 2023-03-13 VITALS
HEIGHT: 67 IN | TEMPERATURE: 97.7 F | HEART RATE: 79 BPM | BODY MASS INDEX: 27.44 KG/M2 | SYSTOLIC BLOOD PRESSURE: 130 MMHG | WEIGHT: 174.8 LBS | DIASTOLIC BLOOD PRESSURE: 90 MMHG | OXYGEN SATURATION: 99 %

## 2023-03-13 DIAGNOSIS — R12 HEARTBURN: Primary | ICD-10-CM

## 2023-03-13 DIAGNOSIS — J30.1 NON-SEASONAL ALLERGIC RHINITIS DUE TO POLLEN: ICD-10-CM

## 2023-03-13 RX ORDER — FLUTICASONE PROPIONATE 50 MCG
2 SPRAY, SUSPENSION (ML) NASAL DAILY
Qty: 9.9 ML | Refills: 0 | Status: SHIPPED | OUTPATIENT
Start: 2023-03-13 | End: 2023-04-04

## 2023-03-13 RX ORDER — FAMOTIDINE 20 MG/1
20 TABLET, FILM COATED ORAL 2 TIMES DAILY
Qty: 60 TABLET | Refills: 0 | Status: SHIPPED | OUTPATIENT
Start: 2023-03-13 | End: 2023-04-04

## 2023-03-13 NOTE — PROGRESS NOTES
Family Medicine Residency  Van Urbina MD    Subjective:     Jian Baker is a 67 y.o. male who presents for heartburn.     Patient has been on protonix. I counseled him to take it with meals (he was not doing this). He requests a second medication. I will start pepcid and see him back in one month.     Patient has history of cholangiocarcinoma. Has been doing chemotherapy and tolerating it well. Has another chemo session coming up. Was recently hospitalized in [place] for drain replacement. Patient says that he is working with surgeon to move closer to [place].     He has had some headache and congestion he attributes to allergies. Will send in some flonase.     The following portions of the patient's history were reviewed and updated as appropriate: allergies, current medications, past family history, past medical history, past social history, past surgical history and problem list.    Past Medical Hx:  Past Medical History:   Diagnosis Date   • Arthritis    • Asthma     Previously diagnosed and previously prescribed inhaler medications.     • Cancer (HCC)     cholangiocarcinoma   • Elevated cholesterol    • Essential hypertension    • Gastroesophageal reflux disease        Past Surgical Hx:  Past Surgical History:   Procedure Laterality Date   • CERVICAL SPINE SURGERY      x3   • DENTAL PROCEDURE      3 wisdom teeth and 2+ other teeth removed   • ERCP N/A 11/22/2022    Procedure: ENDOSCOPIC RETROGRADE CHOLANGIOPANCREATOGRAPHY;  Surgeon: Nabila Worthy MD;  Location: [facility] ENDOSCOPY;  Service: Gastroenterology;  Laterality: N/A;   • PORTACATH PLACEMENT Right 2/2/2023    Procedure: PORT PLACEMENT         (C-ARM#2), right internal jugular;  Surgeon: Van Austin MD;  Location: [facility] OR;  Service: General;  Laterality: Right;   • WRIST FRACTURE SURGERY Bilateral        Current Meds:    Current Outpatient Medications:   •  albuterol sulfate  (90 Base) MCG/ACT inhaler, Inhale 2 puffs  Every 4 (Four) Hours As Needed for Wheezing or Shortness of Air., Disp: 18 g, Rfl: 0  •  atorvastatin (LIPITOR) 20 MG tablet, Take 1 tablet by mouth Daily., Disp: , Rfl:   •  cyanocobalamin (VITAMIN B-12) 1000 MCG tablet, Take 1 tablet by mouth Daily., Disp: , Rfl:   •  cyclobenzaprine (FLEXERIL) 10 MG tablet, Take 1 tablet by mouth 3 (Three) Times a Day As Needed for Muscle Spasms., Disp: 60 tablet, Rfl: 11  •  fluconazole (DIFLUCAN) 200 MG tablet, Take 1 tablet by mouth Daily., Disp: 30 tablet, Rfl: 0  •  lisinopril (PRINIVIL,ZESTRIL) 10 MG tablet, Take 2 tablets by mouth Daily., Disp: , Rfl:   •  loperamide (IMODIUM A-D) 2 MG tablet, Take 1 tablet by mouth 4 (Four) Times a Day As Needed for Diarrhea., Disp: 60 tablet, Rfl: 3  •  loratadine (CLARITIN) 10 MG tablet, Take 1 tablet by mouth Daily As Needed., Disp: , Rfl:   •  OLANZapine (zyPREXA) 5 MG tablet, TAKE ON DAY 2, 3, AND 4 AND DAYS 9, 10, 11 AFTER CHEMOTHERAPY., Disp: 6 tablet, Rfl: 7  •  ondansetron (ZOFRAN) 8 MG tablet, Take 1 tablet by mouth 3 (Three) Times a Day As Needed for Nausea or Vomiting., Disp: 30 tablet, Rfl: 5  •  oxyCODONE (OXY-IR) 5 MG capsule, Take 1 capsule by mouth Every 4 (Four) Hours As Needed for Moderate Pain., Disp: 60 capsule, Rfl: 0  •  pantoprazole (Protonix) 40 MG EC tablet, Take 1 tablet by mouth Daily., Disp: 30 tablet, Rfl: 2  •  polyethylene glycol (MIRALAX) 17 g packet, Take 17 g by mouth Daily As Needed (constipation)., Disp: 20 each, Rfl: 0  •  famotidine (Pepcid) 20 MG tablet, Take 1 tablet by mouth 2 (Two) Times a Day., Disp: 60 tablet, Rfl: 0  •  fluticasone (FLONASE) 50 MCG/ACT nasal spray, 2 sprays into the nostril(s) as directed by provider Daily., Disp: 9.9 mL, Rfl: 0    Allergies:  No Known Allergies    Family Hx:  Family History   Problem Relation Age of Onset   • COPD Mother    • Hypertension Mother    • Diabetes type II Mother    • Cancer Father    • Heart failure Father    • Cancer Brother    • Early death Son   "  • Lung cancer Maternal Uncle    • Early death Sister         Social History:  Social History     Socioeconomic History   • Marital status:    Tobacco Use   • Smoking status: Former     Packs/day: 1.00     Years: 45.00     Pack years: 45.00     Types: Pipe, Cigarettes     Start date: 10/10/1972     Quit date: 10/31/2019     Years since quitting: 3.3     Passive exposure: Past   • Smokeless tobacco: Current     Types: Chew   Vaping Use   • Vaping Use: Never used   Substance and Sexual Activity   • Alcohol use: Not Currently     Alcohol/week: 7.0 standard drinks     Types: 7 Cans of beer per week     Comment: Prior heavy drinker   • Drug use: Never   • Sexual activity: Defer     Partners: Female       Review of Systems  Review of Systems   Constitutional: Negative for chills and fever.   HENT: Positive for congestion.    Gastrointestinal: Negative for abdominal pain, nausea and vomiting.   Neurological: Positive for headaches.       Objective:     /90   Pulse 79   Temp 97.7 °F (36.5 °C)   Ht 170.2 cm (67\")   Wt 79.3 kg (174 lb 12.8 oz)   SpO2 99%   BMI 27.38 kg/m²   Physical Exam  Constitutional:       General: He is not in acute distress.     Appearance: Normal appearance. He is not ill-appearing, toxic-appearing or diaphoretic.   HENT:      Head: Normocephalic and atraumatic.   Eyes:      Extraocular Movements: Extraocular movements intact.   Pulmonary:      Effort: Pulmonary effort is normal. No respiratory distress.   Neurological:      Mental Status: He is alert.   Psychiatric:         Mood and Affect: Mood normal.         Behavior: Behavior normal.          Assessment/Plan:     Diagnoses and all orders for this visit:    1. Heartburn (Primary)  Patient has been on protonix. I counseled him to take it with meals (he was not doing this). He requests a second medication. I will start pepcid and see him back in one month.     -     famotidine (Pepcid) 20 MG tablet; Take 1 tablet by mouth 2 (Two) " Times a Day.  Dispense: 60 tablet; Refill: 0    2. Non-seasonal allergic rhinitis due to pollen      He has had some headache and congestion he attributes to allergies. Will send in some flonase.     -     fluticasone (FLONASE) 50 MCG/ACT nasal spray; 2 sprays into the nostril(s) as directed by provider Daily.  Dispense: 9.9 mL; Refill: 0            · Rx changes: pepcid for heartburn. Flonase for allergies.      Follow-up:     Return in about 1 month (around 4/13/2023) for [place] .    Preventative:  Health Maintenance   Topic Date Due   • LUNG CANCER SCREENING  Never done   • ZOSTER VACCINE (2 of 2) 12/05/2017   • COVID-19 Vaccine (3 - Booster for Pfizer series) 06/08/2021   • Pneumococcal Vaccine 65+ (1 - PCV) Never done   • INFLUENZA VACCINE  08/01/2022   • LIPID PANEL  01/30/2023   • ANNUAL WELLNESS VISIT  05/19/2023   • COLORECTAL CANCER SCREENING  10/10/2024   • TDAP/TD VACCINES (3 - Td or Tdap) 10/10/2027   • HEPATITIS C SCREENING  Completed   • AAA SCREEN (ONE-TIME)  Completed       Alcohol use:  reports that he does not currently use alcohol after a past usage of about 7.0 standard drinks per week.  Nicotine status  reports that he quit smoking about 3 years ago. His smoking use included pipe and cigarettes. He started smoking about 50 years ago. He has a 45.00 pack-year smoking history. He has been exposed to tobacco smoke. His smokeless tobacco use includes chew.     Goals     •  Better Compliance with HTN Medications (pt-stated)       Barriers: None      •  Medication management       Patient has been having trouble with medication compliance.  Goal now is to take medications everyday at the same time.    Barriers: patient has not been on medications consistently for sometime.  Has trouble with remembering to take medications.              RISK SCORE: 4      This document has been electronically signed by Van Urbina MD on March 13, 2023 12:29 CDT

## 2023-03-13 NOTE — TELEPHONE ENCOUNTER
Initially, SW reached out to patient to verify transportation for 3-17-23 infusion treatment   Transport for that visit has been arranged. Pt has called back to advise that his surgeon in [place] has referred him to a physician closer to manage his biliary drains as pt cannot facilitate transportation to [place]. SW followed up with call to verify date time and physician in effort to assist with transportation arrangements.  Pt is scheduled for March 30, 2023 at 10:45 (be there by 10:30) with [facility]  [facility], [address] [place] [address] Dr. [place], [place]    [phone]

## 2023-03-14 ENCOUNTER — TELEPHONE (OUTPATIENT)
Dept: ONCOLOGY | Facility: CLINIC | Age: 68
End: 2023-03-14
Payer: MEDICARE

## 2023-03-14 NOTE — TELEPHONE ENCOUNTER
Oncology SW contacted pt by phone subsequent to travel arrangement inquiry for pt consult with [facility] in [place].  Pt advised that his benefit will not cover travel to this apt. He has a 50 mile round radius limit and the distance is 71 miles  one way. He does have a reimbursement  Benefit . This was explained to pt as he was encouraged to begin seeking alternative transportation options for 3-30-23 apt.

## 2023-03-16 DIAGNOSIS — C24.9 MALIGNANT NEOPLASM OF BILIARY TRACT, UNSPECIFIED: Primary | ICD-10-CM

## 2023-03-17 ENCOUNTER — INFUSION (OUTPATIENT)
Dept: ONCOLOGY | Facility: HOSPITAL | Age: 68
End: 2023-03-17
Payer: MEDICARE

## 2023-03-17 ENCOUNTER — TELEPHONE (OUTPATIENT)
Dept: ONCOLOGY | Facility: HOSPITAL | Age: 68
End: 2023-03-17
Payer: MEDICARE

## 2023-03-17 ENCOUNTER — OFFICE VISIT (OUTPATIENT)
Dept: ONCOLOGY | Facility: CLINIC | Age: 68
End: 2023-03-17
Payer: MEDICARE

## 2023-03-17 VITALS
WEIGHT: 176 LBS | TEMPERATURE: 96.4 F | SYSTOLIC BLOOD PRESSURE: 166 MMHG | RESPIRATION RATE: 18 BRPM | BODY MASS INDEX: 27.57 KG/M2 | DIASTOLIC BLOOD PRESSURE: 93 MMHG

## 2023-03-17 DIAGNOSIS — C24.9 MALIGNANT NEOPLASM OF BILIARY TRACT, UNSPECIFIED: Primary | ICD-10-CM

## 2023-03-17 DIAGNOSIS — Z45.2 ENCOUNTER FOR VENOUS ACCESS DEVICE CARE: ICD-10-CM

## 2023-03-17 DIAGNOSIS — G89.3 CANCER ASSOCIATED PAIN: ICD-10-CM

## 2023-03-17 DIAGNOSIS — Z79.899 OTHER LONG TERM (CURRENT) DRUG THERAPY: ICD-10-CM

## 2023-03-17 DIAGNOSIS — C24.9 MALIGNANT NEOPLASM OF BILIARY TRACT, UNSPECIFIED: ICD-10-CM

## 2023-03-17 LAB
ALBUMIN SERPL-MCNC: 3.7 G/DL (ref 3.5–5.2)
ALBUMIN/GLOB SERPL: 1.2 G/DL
ALP SERPL-CCNC: 201 U/L (ref 39–117)
ALT SERPL W P-5'-P-CCNC: 35 U/L (ref 1–41)
ANION GAP SERPL CALCULATED.3IONS-SCNC: 10 MMOL/L (ref 5–15)
AST SERPL-CCNC: 32 U/L (ref 1–40)
BASOPHILS # BLD AUTO: 0.04 10*3/MM3 (ref 0–0.2)
BASOPHILS NFR BLD AUTO: 0.5 % (ref 0–1.5)
BILIRUB SERPL-MCNC: 0.3 MG/DL (ref 0–1.2)
BUN SERPL-MCNC: 7 MG/DL (ref 8–23)
BUN/CREAT SERPL: 12.5 (ref 7–25)
CALCIUM SPEC-SCNC: 9.3 MG/DL (ref 8.6–10.5)
CHLORIDE SERPL-SCNC: 96 MMOL/L (ref 98–107)
CO2 SERPL-SCNC: 25 MMOL/L (ref 22–29)
CREAT SERPL-MCNC: 0.56 MG/DL (ref 0.76–1.27)
DEPRECATED RDW RBC AUTO: 53.1 FL (ref 37–54)
EGFRCR SERPLBLD CKD-EPI 2021: 109.4 ML/MIN/1.73
EOSINOPHIL # BLD AUTO: 0.09 10*3/MM3 (ref 0–0.4)
EOSINOPHIL NFR BLD AUTO: 1.1 % (ref 0.3–6.2)
ERYTHROCYTE [DISTWIDTH] IN BLOOD BY AUTOMATED COUNT: 17.3 % (ref 12.3–15.4)
GLOBULIN UR ELPH-MCNC: 3.2 GM/DL
GLUCOSE SERPL-MCNC: 99 MG/DL (ref 65–99)
HCT VFR BLD AUTO: 30.4 % (ref 37.5–51)
HGB BLD-MCNC: 10 G/DL (ref 13–17.7)
HOLD SPECIMEN: NORMAL
IMM GRANULOCYTES # BLD AUTO: 0.03 10*3/MM3 (ref 0–0.05)
IMM GRANULOCYTES NFR BLD AUTO: 0.4 % (ref 0–0.5)
LYMPHOCYTES # BLD AUTO: 0.93 10*3/MM3 (ref 0.7–3.1)
LYMPHOCYTES NFR BLD AUTO: 11.7 % (ref 19.6–45.3)
MAGNESIUM SERPL-MCNC: 1.8 MG/DL (ref 1.6–2.4)
MCH RBC QN AUTO: 28.5 PG (ref 26.6–33)
MCHC RBC AUTO-ENTMCNC: 32.9 G/DL (ref 31.5–35.7)
MCV RBC AUTO: 86.6 FL (ref 79–97)
MONOCYTES # BLD AUTO: 0.96 10*3/MM3 (ref 0.1–0.9)
MONOCYTES NFR BLD AUTO: 12.1 % (ref 5–12)
NEUTROPHILS NFR BLD AUTO: 5.88 10*3/MM3 (ref 1.7–7)
NEUTROPHILS NFR BLD AUTO: 74.2 % (ref 42.7–76)
NRBC BLD AUTO-RTO: 0 /100 WBC (ref 0–0.2)
PLATELET # BLD AUTO: 152 10*3/MM3 (ref 140–450)
PMV BLD AUTO: 10.1 FL (ref 6–12)
POTASSIUM SERPL-SCNC: 4.1 MMOL/L (ref 3.5–5.2)
PROT SERPL-MCNC: 6.9 G/DL (ref 6–8.5)
RBC # BLD AUTO: 3.51 10*6/MM3 (ref 4.14–5.8)
SODIUM SERPL-SCNC: 131 MMOL/L (ref 136–145)
T4 FREE SERPL-MCNC: 1.18 NG/DL (ref 0.93–1.7)
TSH SERPL DL<=0.05 MIU/L-ACNC: 2.71 UIU/ML (ref 0.27–4.2)
WBC NRBC COR # BLD: 7.93 10*3/MM3 (ref 3.4–10.8)

## 2023-03-17 PROCEDURE — 25010000002 MAGNESIUM SULFATE PER 500 MG OF MAGNESIUM: Performed by: INTERNAL MEDICINE

## 2023-03-17 PROCEDURE — 1126F AMNT PAIN NOTED NONE PRSNT: CPT | Performed by: INTERNAL MEDICINE

## 2023-03-17 PROCEDURE — 25010000002 PALONOSETRON PER 25 MCG: Performed by: INTERNAL MEDICINE

## 2023-03-17 PROCEDURE — 96375 TX/PRO/DX INJ NEW DRUG ADDON: CPT | Performed by: INTERNAL MEDICINE

## 2023-03-17 PROCEDURE — 25010000002 CISPLATIN PER 50 MG: Performed by: INTERNAL MEDICINE

## 2023-03-17 PROCEDURE — 25010000002 POTASSIUM CHLORIDE PER 2 MEQ OF POTASSIUM: Performed by: INTERNAL MEDICINE

## 2023-03-17 PROCEDURE — 25010000002 GEMCITABINE 1 GM/26.3ML SOLUTION 26.3 ML VIAL: Performed by: INTERNAL MEDICINE

## 2023-03-17 PROCEDURE — 3080F DIAST BP >= 90 MM HG: CPT | Performed by: INTERNAL MEDICINE

## 2023-03-17 PROCEDURE — 85025 COMPLETE CBC W/AUTO DIFF WBC: CPT

## 2023-03-17 PROCEDURE — 25010000002 FOSAPREPITANT PER 1 MG: Performed by: INTERNAL MEDICINE

## 2023-03-17 PROCEDURE — 96413 CHEMO IV INFUSION 1 HR: CPT | Performed by: INTERNAL MEDICINE

## 2023-03-17 PROCEDURE — 96361 HYDRATE IV INFUSION ADD-ON: CPT | Performed by: INTERNAL MEDICINE

## 2023-03-17 PROCEDURE — 63710000001 OLANZAPINE 5 MG TABLET: Performed by: INTERNAL MEDICINE

## 2023-03-17 PROCEDURE — 1160F RVW MEDS BY RX/DR IN RCRD: CPT | Performed by: INTERNAL MEDICINE

## 2023-03-17 PROCEDURE — 83735 ASSAY OF MAGNESIUM: CPT

## 2023-03-17 PROCEDURE — 36415 COLL VENOUS BLD VENIPUNCTURE: CPT

## 2023-03-17 PROCEDURE — 84439 ASSAY OF FREE THYROXINE: CPT

## 2023-03-17 PROCEDURE — 96417 CHEMO IV INFUS EACH ADDL SEQ: CPT | Performed by: INTERNAL MEDICINE

## 2023-03-17 PROCEDURE — 3077F SYST BP >= 140 MM HG: CPT | Performed by: INTERNAL MEDICINE

## 2023-03-17 PROCEDURE — 99214 OFFICE O/P EST MOD 30 MIN: CPT | Performed by: INTERNAL MEDICINE

## 2023-03-17 PROCEDURE — 25010000002 HEPARIN LOCK FLUSH PER 10 UNITS: Performed by: INTERNAL MEDICINE

## 2023-03-17 PROCEDURE — 25010000002 DURVALUMAB 500 MG/10ML SOLUTION 10 ML VIAL: Performed by: INTERNAL MEDICINE

## 2023-03-17 PROCEDURE — A9270 NON-COVERED ITEM OR SERVICE: HCPCS | Performed by: INTERNAL MEDICINE

## 2023-03-17 PROCEDURE — 80053 COMPREHEN METABOLIC PANEL: CPT

## 2023-03-17 PROCEDURE — 25010000002 DEXAMETHASONE SODIUM PHOSPHATE 100 MG/10ML SOLUTION: Performed by: INTERNAL MEDICINE

## 2023-03-17 PROCEDURE — 84443 ASSAY THYROID STIM HORMONE: CPT

## 2023-03-17 PROCEDURE — 96367 TX/PROPH/DG ADDL SEQ IV INF: CPT | Performed by: INTERNAL MEDICINE

## 2023-03-17 PROCEDURE — 1159F MED LIST DOCD IN RCRD: CPT | Performed by: INTERNAL MEDICINE

## 2023-03-17 RX ORDER — OLANZAPINE 5 MG/1
5 TABLET ORAL ONCE
Status: CANCELLED | OUTPATIENT
Start: 2023-03-24 | End: 2023-03-24

## 2023-03-17 RX ORDER — PALONOSETRON 0.05 MG/ML
0.25 INJECTION, SOLUTION INTRAVENOUS ONCE
Status: COMPLETED | OUTPATIENT
Start: 2023-03-17 | End: 2023-03-17

## 2023-03-17 RX ORDER — SODIUM CHLORIDE 9 MG/ML
250 INJECTION, SOLUTION INTRAVENOUS ONCE
Status: CANCELLED | OUTPATIENT
Start: 2023-03-24

## 2023-03-17 RX ORDER — HEPARIN SODIUM (PORCINE) LOCK FLUSH IV SOLN 100 UNIT/ML 100 UNIT/ML
500 SOLUTION INTRAVENOUS AS NEEDED
Status: DISCONTINUED | OUTPATIENT
Start: 2023-03-17 | End: 2023-03-17 | Stop reason: HOSPADM

## 2023-03-17 RX ORDER — SODIUM CHLORIDE 9 MG/ML
250 INJECTION, SOLUTION INTRAVENOUS ONCE
Status: COMPLETED | OUTPATIENT
Start: 2023-03-17 | End: 2023-03-17

## 2023-03-17 RX ORDER — HEPARIN SODIUM (PORCINE) LOCK FLUSH IV SOLN 100 UNIT/ML 100 UNIT/ML
500 SOLUTION INTRAVENOUS AS NEEDED
Status: CANCELLED | OUTPATIENT
Start: 2023-03-17

## 2023-03-17 RX ORDER — PALONOSETRON 0.05 MG/ML
0.25 INJECTION, SOLUTION INTRAVENOUS ONCE
Status: CANCELLED | OUTPATIENT
Start: 2023-03-17

## 2023-03-17 RX ORDER — OLANZAPINE 5 MG/1
5 TABLET ORAL ONCE
Status: CANCELLED | OUTPATIENT
Start: 2023-03-17 | End: 2023-03-17

## 2023-03-17 RX ORDER — PALONOSETRON 0.05 MG/ML
0.25 INJECTION, SOLUTION INTRAVENOUS ONCE
Status: CANCELLED | OUTPATIENT
Start: 2023-03-24

## 2023-03-17 RX ORDER — SODIUM CHLORIDE 9 MG/ML
250 INJECTION, SOLUTION INTRAVENOUS ONCE
Status: CANCELLED | OUTPATIENT
Start: 2023-03-17

## 2023-03-17 RX ORDER — SODIUM CHLORIDE 0.9 % (FLUSH) 0.9 %
10 SYRINGE (ML) INJECTION AS NEEDED
Status: CANCELLED | OUTPATIENT
Start: 2023-03-17

## 2023-03-17 RX ORDER — SODIUM CHLORIDE 0.9 % (FLUSH) 0.9 %
10 SYRINGE (ML) INJECTION AS NEEDED
Status: DISCONTINUED | OUTPATIENT
Start: 2023-03-17 | End: 2023-03-17 | Stop reason: HOSPADM

## 2023-03-17 RX ORDER — OLANZAPINE 5 MG/1
5 TABLET ORAL ONCE
Status: COMPLETED | OUTPATIENT
Start: 2023-03-17 | End: 2023-03-17

## 2023-03-17 RX ADMIN — FOSAPREPITANT 100 ML: 150 INJECTION, POWDER, LYOPHILIZED, FOR SOLUTION INTRAVENOUS at 09:09

## 2023-03-17 RX ADMIN — PALONOSETRON 0.25 MG: 0.05 INJECTION, SOLUTION INTRAVENOUS at 08:56

## 2023-03-17 RX ADMIN — SODIUM CHLORIDE 1500 MG: 9 INJECTION, SOLUTION INTRAVENOUS at 09:54

## 2023-03-17 RX ADMIN — DEXAMETHASONE SODIUM PHOSPHATE 12 MG: 10 INJECTION, SOLUTION INTRAMUSCULAR; INTRAVENOUS at 12:11

## 2023-03-17 RX ADMIN — CISPLATIN 48 MG: 1 INJECTION INTRAVENOUS at 13:30

## 2023-03-17 RX ADMIN — GEMCITABINE 1900 MG: 38 INJECTION, SOLUTION INTRAVENOUS at 12:45

## 2023-03-17 RX ADMIN — Medication 10 ML: at 14:38

## 2023-03-17 RX ADMIN — POTASSIUM CHLORIDE: 2 INJECTION, SOLUTION, CONCENTRATE INTRAVENOUS at 12:45

## 2023-03-17 RX ADMIN — POTASSIUM CHLORIDE: 2 INJECTION, SOLUTION, CONCENTRATE INTRAVENOUS at 11:11

## 2023-03-17 RX ADMIN — SODIUM CHLORIDE 250 ML: 9 INJECTION, SOLUTION INTRAVENOUS at 08:56

## 2023-03-17 RX ADMIN — OLANZAPINE 5 MG: 5 TABLET, FILM COATED ORAL at 08:56

## 2023-03-17 RX ADMIN — HEPARIN 500 UNITS: 100 SYRINGE at 14:38

## 2023-03-17 NOTE — PROGRESS NOTES
Subjective     Jian Baker was seen in follow-up for cholangiocarcinoma.  He is overall stable since last visit.  Feels well.  Has gained some weight.  Reports redness and pain around percutaneous drain on the right side of abdomen.  No fever or chills.  Eating well.  No nausea or emesis.    Past Medical History, Past Surgical History, Social History, Family History have been reviewed and are without significant changes except as mentioned.        Medications:  The current medication list was reviewed in the EMR    ALLERGIES:  No Known Allergies    Objective      Vitals:    03/17/23 0801   BP: 166/93   Resp: 18   Temp: 96.4 °F (35.8 °C)         Current Status 3/3/2023   ECOG score 0       Physical Exam  Vitals and nursing note reviewed.   Constitutional:       Appearance: Normal appearance.   Abdominal:      General: There is no distension.      Palpations: Abdomen is soft. There is no mass.      Tenderness: There is no abdominal tenderness.      Comments: Slight redness around PBD on right side of abdomen +    Neurological:      General: No focal deficit present.      Mental Status: He is alert and oriented to person, place, and time. Mental status is at baseline.   Psychiatric:         Mood and Affect: Mood normal.         Behavior: Behavior normal.         Thought Content: Thought content normal.               RECENT LABS:Independently reviewed and summarized  Hematology WBC   Date Value Ref Range Status   03/03/2023 6.02 3.40 - 10.80 10*3/mm3 Final     RBC   Date Value Ref Range Status   03/03/2023 4.02 (L) 4.14 - 5.80 10*6/mm3 Final     Hemoglobin   Date Value Ref Range Status   03/03/2023 11.5 (L) 13.0 - 17.7 g/dL Final     Hematocrit   Date Value Ref Range Status   03/03/2023 34.1 (L) 37.5 - 51.0 % Final     Platelets   Date Value Ref Range Status   03/03/2023 387 140 - 450 10*3/mm3 Final       WBC   Date Value Ref Range Status   03/17/2023 7.93 3.40 - 10.80 10*3/mm3 Final     RBC   Date Value Ref  Range Status   03/17/2023 3.51 (L) 4.14 - 5.80 10*6/mm3 Final     Hemoglobin   Date Value Ref Range Status   03/17/2023 10.0 (L) 13.0 - 17.7 g/dL Final     Hematocrit   Date Value Ref Range Status   03/17/2023 30.4 (L) 37.5 - 51.0 % Final     MCV   Date Value Ref Range Status   03/17/2023 86.6 79.0 - 97.0 fL Final     MCH   Date Value Ref Range Status   03/17/2023 28.5 26.6 - 33.0 pg Final     MCHC   Date Value Ref Range Status   03/17/2023 32.9 31.5 - 35.7 g/dL Final     RDW   Date Value Ref Range Status   03/17/2023 17.3 (H) 12.3 - 15.4 % Final     RDW-SD   Date Value Ref Range Status   03/17/2023 53.1 37.0 - 54.0 fl Final     MPV   Date Value Ref Range Status   03/17/2023 10.1 6.0 - 12.0 fL Final     Platelets   Date Value Ref Range Status   03/17/2023 152 140 - 450 10*3/mm3 Final     Neutrophil %   Date Value Ref Range Status   03/17/2023 74.2 42.7 - 76.0 % Final     Lymphocyte %   Date Value Ref Range Status   03/17/2023 11.7 (L) 19.6 - 45.3 % Final     Monocyte %   Date Value Ref Range Status   03/17/2023 12.1 (H) 5.0 - 12.0 % Final     Eosinophil %   Date Value Ref Range Status   03/17/2023 1.1 0.3 - 6.2 % Final     Basophil %   Date Value Ref Range Status   03/17/2023 0.5 0.0 - 1.5 % Final     Immature Grans %   Date Value Ref Range Status   03/17/2023 0.4 0.0 - 0.5 % Final     Neutrophils, Absolute   Date Value Ref Range Status   03/17/2023 5.88 1.70 - 7.00 10*3/mm3 Final     Lymphocytes, Absolute   Date Value Ref Range Status   03/17/2023 0.93 0.70 - 3.10 10*3/mm3 Final     Monocytes, Absolute   Date Value Ref Range Status   03/17/2023 0.96 (H) 0.10 - 0.90 10*3/mm3 Final     Eosinophils, Absolute   Date Value Ref Range Status   03/17/2023 0.09 0.00 - 0.40 10*3/mm3 Final     Basophils, Absolute   Date Value Ref Range Status   03/17/2023 0.04 0.00 - 0.20 10*3/mm3 Final     Immature Grans, Absolute   Date Value Ref Range Status   03/17/2023 0.03 0.00 - 0.05 10*3/mm3 Final     nRBC   Date Value Ref Range  Status   03/17/2023 0.0 0.0 - 0.2 /100 WBC Final     Lab Results   Component Value Date    GLUCOSE 99 03/17/2023    BUN 7 (L) 03/17/2023    CREATININE 0.56 (L) 03/17/2023    EGFR 109.4 03/17/2023    BCR 12.5 03/17/2023    K 4.1 03/17/2023    CO2 25.0 03/17/2023    CALCIUM 9.3 03/17/2023    ALBUMIN 3.7 03/17/2023    BILITOT 0.3 03/17/2023    AST 32 03/17/2023    ALT 35 03/17/2023              Jian Baker reports a pain score of 0.  Given his pain assessment as noted, treatment options were discussed and the following options were decided upon as a follow-up plan to address the patient's pain: continuation of current treatment plan for pain.    Patient screened negative for depression based on a PHQ-9 score of 0 on 3/17/2023.    Advance Care Planning   ACP discussion was declined by the patient. Patient does not have an advance directive, declines further assistance.         Diagnosis:   (1) Cholangiocarcinoma, locally advance, unresectable   At least Stage IIIB (cT4, cN1, cM0)      Current therapy:   Gemcitabine/Cisplatin/Durvalumab      D1C1: 2/3/22   D8C1: 2/10/23  D1C2: 2/24/23  D8C2: 3/3/23   D1C3: 3/1723      (2) Biliary obstruction   (3) Candidiasis   (4) Cancer associated pain    (5) Chemo therapy induced nausea/emesis.    Assessment & Plan     (1) Cholangiocarcinoma, locally advance, unresectable     Chronic, stable.  He is s/p 2 cycles of gemcitabine, cisplatin and durvalumab.  Tolerating treatment well without any major side effect.  Overall feels well.  Labs look stable except mild anemia.  Day 1 cycle 3 gemcitabine, cisplatin and durvalumab was signed on today's visit.  CBC, CMP, magnesium level next week prior to day 8 cycle 3.  I will order CT scan of abdomen pelvis with liver protocol to evaluate response to treatment in 2 weeks.  I will see him back in 3 weeks with CBC, CMP, TSH, magnesium level prior to day 1 cycle 4.    (2) Biliary obstruction     Chronic, stable.  S/p PTBD x2.  No jaundice.   Liver enzymes have improved.  Continue biliary drainage.  Right biliary drain continues to drain copious amount of bile.  Left biliary drain almost no drainage.  He is being managed by Dr. Barboza in [place].    (3) Candidiasis     Patient had Candida in biliary tree.  He was treated with 1 month of fluconazole.  Does not have any fever or chills.  Continue to monitor.    (4) Cancer associated pain      Chronic, stable.  He is using oxycodone as needed.    (5) Chemo therapy induced nausea/emesis.    Chronic, stable.  Continue Zofran as needed.    3/17/2023      CC:

## 2023-03-17 NOTE — TELEPHONE ENCOUNTER
Called pt to inform that bedbug was found in his infusion bed. Pt states he does not know where they came from. I asked pt if he has sprayed home recently for bedbugs pt states yes but has not seen any at home. Pt states has been having trouble paying for treatment for bedbugs. I informed pt that I would talk to  Katie Hartman about any resources she may have.  Pt voiced consent and understanding. Pt informed to take shower day of next treatment and to put clothes in dryer. Pt encouraged to call with any questions or concerns.

## 2023-03-20 ENCOUNTER — TELEPHONE (OUTPATIENT)
Dept: ONCOLOGY | Facility: CLINIC | Age: 68
End: 2023-03-20
Payer: MEDICARE

## 2023-03-20 NOTE — TELEPHONE ENCOUNTER
"Pt. Called to ISA this am for assistance with transportation arrangements for upcoming apt.  ISA has been assisting with call to [facility] prior to apts to arrange transport via his current coverage.  ISA advised on Friday of recurrent situation of bedbug found. ISA discussed with pt recommendations as to treatment. Pt. Assures he has washed clothing, bedding, bagged up and isolated personal items. States he showered and laundered his clothing and put on straight out of the dryer before the apt. He attests bedbug issue in past but states they have not personal found at home.  ISA inquired as to efforts for extermination Pt states he has used \"over the counter\" chemicals but that extermination is around $1200.00. There are no known resources to  Assist with this amount. Pt. [place] in public housing. Arrangements made for 3-24-23 apt. Can only schedule 3 days prior and no more than two weeks out.      "

## 2023-03-24 ENCOUNTER — INFUSION (OUTPATIENT)
Dept: ONCOLOGY | Facility: HOSPITAL | Age: 68
End: 2023-03-24
Payer: MEDICARE

## 2023-03-24 VITALS
HEART RATE: 97 BPM | SYSTOLIC BLOOD PRESSURE: 147 MMHG | RESPIRATION RATE: 18 BRPM | DIASTOLIC BLOOD PRESSURE: 71 MMHG | TEMPERATURE: 97.7 F

## 2023-03-24 DIAGNOSIS — C24.9 MALIGNANT NEOPLASM OF BILIARY TRACT, UNSPECIFIED: Primary | ICD-10-CM

## 2023-03-24 DIAGNOSIS — Z45.2 ENCOUNTER FOR VENOUS ACCESS DEVICE CARE: ICD-10-CM

## 2023-03-24 DIAGNOSIS — Z45.2 ENCOUNTER FOR VENOUS ACCESS DEVICE CARE: Primary | ICD-10-CM

## 2023-03-24 LAB
ALBUMIN SERPL-MCNC: 3.7 G/DL (ref 3.5–5.2)
ALBUMIN/GLOB SERPL: 1.1 G/DL
ALP SERPL-CCNC: 288 U/L (ref 39–117)
ALT SERPL W P-5'-P-CCNC: 80 U/L (ref 1–41)
ANION GAP SERPL CALCULATED.3IONS-SCNC: 10 MMOL/L (ref 5–15)
AST SERPL-CCNC: 105 U/L (ref 1–40)
BASOPHILS # BLD AUTO: 0.07 10*3/MM3 (ref 0–0.2)
BASOPHILS NFR BLD AUTO: 1.4 % (ref 0–1.5)
BILIRUB SERPL-MCNC: 0.4 MG/DL (ref 0–1.2)
BUN SERPL-MCNC: 8 MG/DL (ref 8–23)
BUN/CREAT SERPL: 11.3 (ref 7–25)
CALCIUM SPEC-SCNC: 8.9 MG/DL (ref 8.6–10.5)
CHLORIDE SERPL-SCNC: 98 MMOL/L (ref 98–107)
CO2 SERPL-SCNC: 23 MMOL/L (ref 22–29)
CREAT SERPL-MCNC: 0.71 MG/DL (ref 0.76–1.27)
DEPRECATED RDW RBC AUTO: 54.5 FL (ref 37–54)
EGFRCR SERPLBLD CKD-EPI 2021: 101.8 ML/MIN/1.73
EOSINOPHIL # BLD AUTO: 0.06 10*3/MM3 (ref 0–0.4)
EOSINOPHIL NFR BLD AUTO: 1.2 % (ref 0.3–6.2)
ERYTHROCYTE [DISTWIDTH] IN BLOOD BY AUTOMATED COUNT: 17.2 % (ref 12.3–15.4)
GLOBULIN UR ELPH-MCNC: 3.3 GM/DL
GLUCOSE SERPL-MCNC: 93 MG/DL (ref 65–99)
HCT VFR BLD AUTO: 29.2 % (ref 37.5–51)
HGB BLD-MCNC: 9.7 G/DL (ref 13–17.7)
HOLD SPECIMEN: NORMAL
IMM GRANULOCYTES # BLD AUTO: 0.1 10*3/MM3 (ref 0–0.05)
IMM GRANULOCYTES NFR BLD AUTO: 2.1 % (ref 0–0.5)
LYMPHOCYTES # BLD AUTO: 1.02 10*3/MM3 (ref 0.7–3.1)
LYMPHOCYTES NFR BLD AUTO: 21.1 % (ref 19.6–45.3)
MAGNESIUM SERPL-MCNC: 1.9 MG/DL (ref 1.6–2.4)
MCH RBC QN AUTO: 29 PG (ref 26.6–33)
MCHC RBC AUTO-ENTMCNC: 33.2 G/DL (ref 31.5–35.7)
MCV RBC AUTO: 87.4 FL (ref 79–97)
MONOCYTES # BLD AUTO: 0.96 10*3/MM3 (ref 0.1–0.9)
MONOCYTES NFR BLD AUTO: 19.9 % (ref 5–12)
NEUTROPHILS NFR BLD AUTO: 2.62 10*3/MM3 (ref 1.7–7)
NEUTROPHILS NFR BLD AUTO: 54.3 % (ref 42.7–76)
NRBC BLD AUTO-RTO: 0 /100 WBC (ref 0–0.2)
PLATELET # BLD AUTO: 231 10*3/MM3 (ref 140–450)
PMV BLD AUTO: 9.3 FL (ref 6–12)
POTASSIUM SERPL-SCNC: 4.2 MMOL/L (ref 3.5–5.2)
PROT SERPL-MCNC: 7 G/DL (ref 6–8.5)
RBC # BLD AUTO: 3.34 10*6/MM3 (ref 4.14–5.8)
SODIUM SERPL-SCNC: 131 MMOL/L (ref 136–145)
WBC NRBC COR # BLD: 4.83 10*3/MM3 (ref 3.4–10.8)

## 2023-03-24 PROCEDURE — 25010000002 CISPLATIN PER 50 MG: Performed by: INTERNAL MEDICINE

## 2023-03-24 PROCEDURE — 85025 COMPLETE CBC W/AUTO DIFF WBC: CPT

## 2023-03-24 PROCEDURE — 25010000002 MAGNESIUM SULFATE PER 500 MG OF MAGNESIUM: Performed by: INTERNAL MEDICINE

## 2023-03-24 PROCEDURE — 25010000002 HEPARIN LOCK FLUSH PER 10 UNITS: Performed by: INTERNAL MEDICINE

## 2023-03-24 PROCEDURE — 96361 HYDRATE IV INFUSION ADD-ON: CPT | Performed by: INTERNAL MEDICINE

## 2023-03-24 PROCEDURE — 25010000002 FOSAPREPITANT PER 1 MG: Performed by: INTERNAL MEDICINE

## 2023-03-24 PROCEDURE — 96413 CHEMO IV INFUSION 1 HR: CPT | Performed by: INTERNAL MEDICINE

## 2023-03-24 PROCEDURE — 25010000002 GEMCITABINE 1 GM/26.3ML SOLUTION 26.3 ML VIAL: Performed by: INTERNAL MEDICINE

## 2023-03-24 PROCEDURE — 80053 COMPREHEN METABOLIC PANEL: CPT

## 2023-03-24 PROCEDURE — 96375 TX/PRO/DX INJ NEW DRUG ADDON: CPT | Performed by: INTERNAL MEDICINE

## 2023-03-24 PROCEDURE — A9270 NON-COVERED ITEM OR SERVICE: HCPCS | Performed by: INTERNAL MEDICINE

## 2023-03-24 PROCEDURE — 83735 ASSAY OF MAGNESIUM: CPT

## 2023-03-24 PROCEDURE — 96417 CHEMO IV INFUS EACH ADDL SEQ: CPT | Performed by: INTERNAL MEDICINE

## 2023-03-24 PROCEDURE — 96367 TX/PROPH/DG ADDL SEQ IV INF: CPT | Performed by: INTERNAL MEDICINE

## 2023-03-24 PROCEDURE — 25010000002 DEXAMETHASONE SODIUM PHOSPHATE 100 MG/10ML SOLUTION: Performed by: INTERNAL MEDICINE

## 2023-03-24 PROCEDURE — 25010000002 PALONOSETRON PER 25 MCG: Performed by: INTERNAL MEDICINE

## 2023-03-24 PROCEDURE — 25010000002 POTASSIUM CHLORIDE PER 2 MEQ OF POTASSIUM: Performed by: INTERNAL MEDICINE

## 2023-03-24 PROCEDURE — 63710000001 OLANZAPINE 5 MG TABLET: Performed by: INTERNAL MEDICINE

## 2023-03-24 PROCEDURE — 36415 COLL VENOUS BLD VENIPUNCTURE: CPT

## 2023-03-24 RX ORDER — SODIUM CHLORIDE 0.9 % (FLUSH) 0.9 %
10 SYRINGE (ML) INJECTION AS NEEDED
Status: DISCONTINUED | OUTPATIENT
Start: 2023-03-24 | End: 2023-03-24 | Stop reason: HOSPADM

## 2023-03-24 RX ORDER — PALONOSETRON 0.05 MG/ML
0.25 INJECTION, SOLUTION INTRAVENOUS ONCE
Status: COMPLETED | OUTPATIENT
Start: 2023-03-24 | End: 2023-03-24

## 2023-03-24 RX ORDER — HEPARIN SODIUM (PORCINE) LOCK FLUSH IV SOLN 100 UNIT/ML 100 UNIT/ML
500 SOLUTION INTRAVENOUS AS NEEDED
Status: CANCELLED | OUTPATIENT
Start: 2023-03-24

## 2023-03-24 RX ORDER — SODIUM CHLORIDE 9 MG/ML
250 INJECTION, SOLUTION INTRAVENOUS ONCE
Status: COMPLETED | OUTPATIENT
Start: 2023-03-24 | End: 2023-03-24

## 2023-03-24 RX ORDER — HEPARIN SODIUM (PORCINE) LOCK FLUSH IV SOLN 100 UNIT/ML 100 UNIT/ML
500 SOLUTION INTRAVENOUS AS NEEDED
Status: DISCONTINUED | OUTPATIENT
Start: 2023-03-24 | End: 2023-03-24 | Stop reason: HOSPADM

## 2023-03-24 RX ORDER — SODIUM CHLORIDE 0.9 % (FLUSH) 0.9 %
10 SYRINGE (ML) INJECTION AS NEEDED
Status: CANCELLED | OUTPATIENT
Start: 2023-03-24

## 2023-03-24 RX ORDER — OLANZAPINE 5 MG/1
5 TABLET ORAL ONCE
Status: COMPLETED | OUTPATIENT
Start: 2023-03-24 | End: 2023-03-24

## 2023-03-24 RX ADMIN — POTASSIUM CHLORIDE: 2 INJECTION, SOLUTION, CONCENTRATE INTRAVENOUS at 09:33

## 2023-03-24 RX ADMIN — POTASSIUM CHLORIDE: 2 INJECTION, SOLUTION, CONCENTRATE INTRAVENOUS at 13:00

## 2023-03-24 RX ADMIN — OLANZAPINE 5 MG: 5 TABLET, FILM COATED ORAL at 08:51

## 2023-03-24 RX ADMIN — CISPLATIN 48 MG: 1 INJECTION, SOLUTION INTRAVENOUS at 11:59

## 2023-03-24 RX ADMIN — Medication 10 ML: at 14:04

## 2023-03-24 RX ADMIN — HEPARIN 500 UNITS: 100 SYRINGE at 14:04

## 2023-03-24 RX ADMIN — GEMCITABINE 1900 MG: 38 INJECTION, SOLUTION INTRAVENOUS at 11:11

## 2023-03-24 RX ADMIN — FOSAPREPITANT 100 ML: 150 INJECTION, POWDER, LYOPHILIZED, FOR SOLUTION INTRAVENOUS at 08:51

## 2023-03-24 RX ADMIN — DEXAMETHASONE SODIUM PHOSPHATE 12 MG: 10 INJECTION, SOLUTION INTRAMUSCULAR; INTRAVENOUS at 10:33

## 2023-03-24 RX ADMIN — SODIUM CHLORIDE 250 ML: 9 INJECTION, SOLUTION INTRAVENOUS at 08:51

## 2023-03-24 RX ADMIN — PALONOSETRON 0.25 MG: 0.05 INJECTION, SOLUTION INTRAVENOUS at 08:51

## 2023-03-29 ENCOUNTER — TELEPHONE (OUTPATIENT)
Dept: FAMILY MEDICINE CLINIC | Facility: CLINIC | Age: 68
End: 2023-03-29
Payer: MEDICARE

## 2023-03-29 DIAGNOSIS — C24.9 MALIGNANT NEOPLASM OF BILIARY TRACT, UNSPECIFIED: ICD-10-CM

## 2023-03-29 DIAGNOSIS — G89.3 CANCER ASSOCIATED PAIN: ICD-10-CM

## 2023-03-29 RX ORDER — ALBUTEROL SULFATE 90 UG/1
2 AEROSOL, METERED RESPIRATORY (INHALATION) EVERY 4 HOURS PRN
Qty: 18 G | Refills: 0 | Status: SHIPPED | OUTPATIENT
Start: 2023-03-29

## 2023-03-29 RX ORDER — ONDANSETRON HYDROCHLORIDE 8 MG/1
8 TABLET, FILM COATED ORAL 3 TIMES DAILY PRN
Qty: 30 TABLET | Refills: 5 | Status: SHIPPED | OUTPATIENT
Start: 2023-03-29

## 2023-03-29 RX ORDER — OXYCODONE HYDROCHLORIDE 5 MG/1
5 CAPSULE ORAL EVERY 4 HOURS PRN
Qty: 60 CAPSULE | Refills: 0 | Status: SHIPPED | OUTPATIENT
Start: 2023-03-29

## 2023-03-29 NOTE — TELEPHONE ENCOUNTER
Rx Refill Note  Requested Prescriptions     Pending Prescriptions Disp Refills   • oxyCODONE (OXY-IR) 5 MG capsule 60 capsule 0     Sig: Take 1 capsule by mouth Every 4 (Four) Hours As Needed for Moderate Pain.   • ondansetron (ZOFRAN) 8 MG tablet 30 tablet 5     Sig: Take 1 tablet by mouth 3 (Three) Times a Day As Needed for Nausea or Vomiting.      Last office visit with prescribing clinician: 3/17/2023   Last telemedicine visit with prescribing clinician: 4/7/2023   Next office visit with prescribing clinician: 4/7/2023                         Would you like a call back once the refill request has been completed: [] Yes [] No    If the office needs to give you a call back, can they leave a voicemail: [] Yes [] No    Mary Nunes  03/29/23, 09:01 CDT

## 2023-03-29 NOTE — TELEPHONE ENCOUNTER
Incoming Refill Request      Medication requested (name and dose):   albuterol sulfate  (90 Base) MCG/ACT inhaler    Pharmacy where request should be sent: [facility]    Additional details provided by patient: PATIENT STATED HE HAS LOST HIS INHALER    Best call back number: [phone]    Does the patient have less than a 3 day supply:  [x] Yes  [] No    Gretta Manley  03/29/23, 09:47 CDT

## 2023-03-29 NOTE — TELEPHONE ENCOUNTER
Incoming Refill Request      Medication requested (name and dose): Oxycodone and Zofran     Pharmacy where request should be sent: [facility]     Additional details provided by patient:     Best call back number: [phone]    Does the patient have less than a 3 day supply:  [] Yes  [x] No    Wanda Mercado  03/29/23, 08:52 CDT

## 2023-04-03 ENCOUNTER — HOSPITAL ENCOUNTER (OUTPATIENT)
Dept: CT IMAGING | Facility: HOSPITAL | Age: 68
Discharge: HOME | End: 2023-04-03
Admitting: INTERNAL MEDICINE
Payer: MEDICARE

## 2023-04-03 DIAGNOSIS — C24.9 MALIGNANT NEOPLASM OF BILIARY TRACT, UNSPECIFIED: ICD-10-CM

## 2023-04-03 PROCEDURE — 25010000002 HEPARIN LOCK FLUSH PER 10 UNITS

## 2023-04-03 PROCEDURE — 74178 CT ABD&PLV WO CNTR FLWD CNTR: CPT

## 2023-04-03 PROCEDURE — 25510000001 IOPAMIDOL 61 % SOLUTION: Performed by: INTERNAL MEDICINE

## 2023-04-03 RX ORDER — HEPARIN SODIUM (PORCINE) LOCK FLUSH IV SOLN 100 UNIT/ML 100 UNIT/ML
500 SOLUTION INTRAVENOUS ONCE
Status: COMPLETED | OUTPATIENT
Start: 2023-04-03 | End: 2023-04-03

## 2023-04-03 RX ORDER — HEPARIN SODIUM (PORCINE) LOCK FLUSH IV SOLN 100 UNIT/ML 100 UNIT/ML
SOLUTION INTRAVENOUS
Status: COMPLETED
Start: 2023-04-03 | End: 2023-04-03

## 2023-04-03 RX ORDER — SODIUM CHLORIDE 9 MG/ML
10 INJECTION INTRAVENOUS ONCE
Status: COMPLETED | OUTPATIENT
Start: 2023-04-03 | End: 2023-04-03

## 2023-04-03 RX ADMIN — HEPARIN 500 UNITS: 100 SYRINGE at 10:22

## 2023-04-03 RX ADMIN — HEPARIN SODIUM (PORCINE) LOCK FLUSH IV SOLN 100 UNIT/ML 500 UNITS: 100 SOLUTION at 10:22

## 2023-04-03 RX ADMIN — SODIUM CHLORIDE 10 ML: 9 INJECTION, SOLUTION INTRAMUSCULAR; INTRAVENOUS; SUBCUTANEOUS at 10:22

## 2023-04-03 RX ADMIN — IOPAMIDOL 90 ML: 612 INJECTION, SOLUTION INTRAVENOUS at 10:25

## 2023-04-03 NOTE — PROGRESS NOTES
Subjective     Jian Baker was seen in follow-up for cholangiocarcinoma.  He is overall stable since last visit.  Denies any new issue.  No new aches or pains.  Tolerating chemotherapy well.  No nausea or emesis.  No fever or chills.  No worsening numbness and tingling in extremities.    Past Medical History, Past Surgical History, Social History, Family History have been reviewed and are without significant changes except as mentioned.        Medications:  The current medication list was reviewed in the EMR    ALLERGIES:  No Known Allergies    Objective        Current Status 3/24/2023   ECOG score 0       Physical Exam  Vitals and nursing note reviewed.   Constitutional:       Appearance: Normal appearance.   Neurological:      General: No focal deficit present.      Mental Status: He is alert and oriented to person, place, and time. Mental status is at baseline.   Psychiatric:         Mood and Affect: Mood normal.         Behavior: Behavior normal.         Thought Content: Thought content normal.               RECENT LABS:Independently reviewed and summarized  Hematology WBC   Date Value Ref Range Status   03/24/2023 4.83 3.40 - 10.80 10*3/mm3 Final     RBC   Date Value Ref Range Status   03/24/2023 3.34 (L) 4.14 - 5.80 10*6/mm3 Final     Hemoglobin   Date Value Ref Range Status   03/24/2023 9.7 (L) 13.0 - 17.7 g/dL Final     Hematocrit   Date Value Ref Range Status   03/24/2023 29.2 (L) 37.5 - 51.0 % Final     Platelets   Date Value Ref Range Status   03/24/2023 231 140 - 450 10*3/mm3 Final          Imaging (independently reviewed and summarized):     CT Abdomen Pelvis With & Without Contrast    Result Date: 4/3/2023  1.  Primary tumor associated with or involving the proximal gallbladder and central liver is difficult to accurately measure compared to the prior exam. This is probably similar in size and may even be less dense or demonstrate less enhancement when comparing the two portal venous phase  series.  However, this area is also partially obscured by some artifact from the intrahepatic biliary drains/stents. 2.  There is no evidence of metastatic disease in the abdomen or pelvis.  No new liver lesions are identified.      Jian Baker reports a pain score of 3 .  Given his pain assessment as noted, treatment options were discussed and the following options were decided upon as a follow-up plan to address the patient's pain: continuation of current treatment plan for pain.    Patient screened negative for depression based on a PHQ-9 score of 0 on 3/17/2023.    Advance Care Planning   ACP discussion was declined by the patient. Patient has an advance directive in EMR which is still valid.            Diagnosis:   (1) Cholangiocarcinoma, locally advance, unresectable   At least Stage IIIB (cT4, cN1, cM0)      Current therapy:   Gemcitabine/Cisplatin/Durvalumab      D1C1: 2/3/22   D8C1: 2/10/23  D1C2: 2/24/23  D8C2: 3/3/23   D1C3: 3/17/23   D8C3: 3/24/23     CT scan showed overall stable disease with slight less dense primary tumor.  No evidence of progressive disease.    D1C4: 4/5/23     (2) Biliary obstruction   (3) Cancer associated pain    (4) Chemo therapy induced nausea/emesis    Assessment & Plan     (1) Cholangiocarcinoma, locally advance, unresectable     Chronic, stable.  He is currently on cisplatin, gemcitabine and durvalumab.  Holding chemotherapy well.  Result of CT scan reviewed which showed overall stable disease.  Size was same however tumor appeared less dense may be indicating partial response.  Study was limited by artifact from drain and stent.  Discussed with patient that I would like to continue  chemoimmunotherapy since he is tolerating treatment well.  I will reevaluate after total 6 cycles.  If no evidence of progressive disease could potentially consider consolidative radiation therapy.  Labs look stable.  Cycle 4 gemcitabine, cisplatin and durvalumab was signed on today's  visit.  CBC, CMP next week prior to day 8 cycle 4.  I will see him back in 3 weeks with CBC, CMP, magnesium level and TSH prior to cycle 5.    (2) Biliary obstruction     Chronic, stable.  S/p PTBD still.  No jaundice.  Liver enzymes are improved.  Right biliary drain continues to drain copious amount of bile.  Left.  Drain almost no drainage.  He is being managed by Dr. Barboza in [place].    (3) Cancer associated pain      Chronic, stable.  He is using oxycodone as needed.  Continue this.    (4) Chemotherapy induced nausea/emesis    Chronic, stable.  Continue Zofran as needed for nausea/emesis     4/2/2023      CC:

## 2023-04-04 DIAGNOSIS — R12 HEARTBURN: ICD-10-CM

## 2023-04-04 DIAGNOSIS — J30.1 NON-SEASONAL ALLERGIC RHINITIS DUE TO POLLEN: ICD-10-CM

## 2023-04-04 RX ORDER — FAMOTIDINE 20 MG/1
TABLET, FILM COATED ORAL
Qty: 60 TABLET | Refills: 2 | Status: SHIPPED | OUTPATIENT
Start: 2023-04-04

## 2023-04-04 RX ORDER — FLUTICASONE PROPIONATE 50 MCG
SPRAY, SUSPENSION (ML) NASAL
Qty: 16 G | Refills: 2 | Status: SHIPPED | OUTPATIENT
Start: 2023-04-04

## 2023-04-07 ENCOUNTER — INFUSION (OUTPATIENT)
Dept: ONCOLOGY | Facility: HOSPITAL | Age: 68
End: 2023-04-07
Payer: MEDICARE

## 2023-04-07 ENCOUNTER — TELEPHONE (OUTPATIENT)
Dept: ONCOLOGY | Facility: CLINIC | Age: 68
End: 2023-04-07

## 2023-04-07 ENCOUNTER — TELEPHONE (OUTPATIENT)
Dept: ONCOLOGY | Facility: HOSPITAL | Age: 68
End: 2023-04-07
Payer: MEDICARE

## 2023-04-07 ENCOUNTER — OFFICE VISIT (OUTPATIENT)
Dept: ONCOLOGY | Facility: CLINIC | Age: 68
End: 2023-04-07
Payer: MEDICARE

## 2023-04-07 VITALS
TEMPERATURE: 97.1 F | DIASTOLIC BLOOD PRESSURE: 82 MMHG | RESPIRATION RATE: 18 BRPM | HEART RATE: 91 BPM | SYSTOLIC BLOOD PRESSURE: 133 MMHG

## 2023-04-07 DIAGNOSIS — C24.9 MALIGNANT NEOPLASM OF BILIARY TRACT, UNSPECIFIED: ICD-10-CM

## 2023-04-07 DIAGNOSIS — G89.3 CANCER ASSOCIATED PAIN: ICD-10-CM

## 2023-04-07 DIAGNOSIS — Z79.899 OTHER LONG TERM (CURRENT) DRUG THERAPY: ICD-10-CM

## 2023-04-07 DIAGNOSIS — R51.9 ACUTE NONINTRACTABLE HEADACHE, UNSPECIFIED HEADACHE TYPE: ICD-10-CM

## 2023-04-07 DIAGNOSIS — C24.9 MALIGNANT NEOPLASM OF BILIARY TRACT, UNSPECIFIED: Primary | ICD-10-CM

## 2023-04-07 DIAGNOSIS — Z45.2 ENCOUNTER FOR VENOUS ACCESS DEVICE CARE: ICD-10-CM

## 2023-04-07 LAB
ALBUMIN SERPL-MCNC: 3.7 G/DL (ref 3.5–5.2)
ALBUMIN/GLOB SERPL: 1.2 G/DL
ALP SERPL-CCNC: 227 U/L (ref 39–117)
ALT SERPL W P-5'-P-CCNC: 17 U/L (ref 1–41)
ANION GAP SERPL CALCULATED.3IONS-SCNC: 12 MMOL/L (ref 5–15)
AST SERPL-CCNC: 26 U/L (ref 1–40)
BILIRUB SERPL-MCNC: 0.3 MG/DL (ref 0–1.2)
BUN SERPL-MCNC: 14 MG/DL (ref 8–23)
BUN/CREAT SERPL: 19.7 (ref 7–25)
CALCIUM SPEC-SCNC: 8.9 MG/DL (ref 8.6–10.5)
CHLORIDE SERPL-SCNC: 102 MMOL/L (ref 98–107)
CO2 SERPL-SCNC: 22 MMOL/L (ref 22–29)
CREAT SERPL-MCNC: 0.71 MG/DL (ref 0.76–1.27)
DEPRECATED RDW RBC AUTO: 60.8 FL (ref 37–54)
EGFRCR SERPLBLD CKD-EPI 2021: 101.8 ML/MIN/1.73
ERYTHROCYTE [DISTWIDTH] IN BLOOD BY AUTOMATED COUNT: 19.2 % (ref 12.3–15.4)
GLOBULIN UR ELPH-MCNC: 3.1 GM/DL
GLUCOSE SERPL-MCNC: 116 MG/DL (ref 65–99)
HCT VFR BLD AUTO: 28.2 % (ref 37.5–51)
HGB BLD-MCNC: 9.4 G/DL (ref 13–17.7)
MAGNESIUM SERPL-MCNC: 1.6 MG/DL (ref 1.6–2.4)
MCH RBC QN AUTO: 30 PG (ref 26.6–33)
MCHC RBC AUTO-ENTMCNC: 33.3 G/DL (ref 31.5–35.7)
MCV RBC AUTO: 90.1 FL (ref 79–97)
PLATELET # BLD AUTO: 273 10*3/MM3 (ref 140–450)
PMV BLD AUTO: 11.1 FL (ref 6–12)
POTASSIUM SERPL-SCNC: 3.5 MMOL/L (ref 3.5–5.2)
PROT SERPL-MCNC: 6.8 G/DL (ref 6–8.5)
RBC # BLD AUTO: 3.13 10*6/MM3 (ref 4.14–5.8)
SODIUM SERPL-SCNC: 136 MMOL/L (ref 136–145)
TSH SERPL DL<=0.05 MIU/L-ACNC: 0.05 UIU/ML (ref 0.27–4.2)
WBC NRBC COR # BLD: 6.34 10*3/MM3 (ref 3.4–10.8)

## 2023-04-07 PROCEDURE — A9270 NON-COVERED ITEM OR SERVICE: HCPCS | Performed by: INTERNAL MEDICINE

## 2023-04-07 PROCEDURE — 25010000002 PALONOSETRON PER 25 MCG: Performed by: INTERNAL MEDICINE

## 2023-04-07 PROCEDURE — 25010000002 FOSAPREPITANT PER 1 MG: Performed by: INTERNAL MEDICINE

## 2023-04-07 PROCEDURE — 63710000001 OLANZAPINE 5 MG TABLET: Performed by: INTERNAL MEDICINE

## 2023-04-07 PROCEDURE — 25010000002 DURVALUMAB 500 MG/10ML SOLUTION 10 ML VIAL: Performed by: INTERNAL MEDICINE

## 2023-04-07 PROCEDURE — 25010000002 CISPLATIN PER 50 MG: Performed by: INTERNAL MEDICINE

## 2023-04-07 PROCEDURE — 25010000002 HEPARIN LOCK FLUSH PER 10 UNITS: Performed by: INTERNAL MEDICINE

## 2023-04-07 PROCEDURE — 25010000002 GEMCITABINE 1 GM/26.3ML SOLUTION 26.3 ML VIAL: Performed by: INTERNAL MEDICINE

## 2023-04-07 PROCEDURE — 83735 ASSAY OF MAGNESIUM: CPT

## 2023-04-07 PROCEDURE — 84443 ASSAY THYROID STIM HORMONE: CPT

## 2023-04-07 PROCEDURE — 85025 COMPLETE CBC W/AUTO DIFF WBC: CPT

## 2023-04-07 PROCEDURE — 96367 TX/PROPH/DG ADDL SEQ IV INF: CPT | Performed by: INTERNAL MEDICINE

## 2023-04-07 PROCEDURE — 96413 CHEMO IV INFUSION 1 HR: CPT | Performed by: INTERNAL MEDICINE

## 2023-04-07 PROCEDURE — 96417 CHEMO IV INFUS EACH ADDL SEQ: CPT | Performed by: INTERNAL MEDICINE

## 2023-04-07 PROCEDURE — 63710000001 ACETAMINOPHEN 325 MG TABLET: Performed by: INTERNAL MEDICINE

## 2023-04-07 PROCEDURE — 25010000002 DEXAMETHASONE SODIUM PHOSPHATE 100 MG/10ML SOLUTION: Performed by: INTERNAL MEDICINE

## 2023-04-07 PROCEDURE — 96375 TX/PRO/DX INJ NEW DRUG ADDON: CPT | Performed by: INTERNAL MEDICINE

## 2023-04-07 PROCEDURE — 25010000002 POTASSIUM CHLORIDE PER 2 MEQ OF POTASSIUM: Performed by: INTERNAL MEDICINE

## 2023-04-07 PROCEDURE — 96361 HYDRATE IV INFUSION ADD-ON: CPT | Performed by: INTERNAL MEDICINE

## 2023-04-07 PROCEDURE — 85007 BL SMEAR W/DIFF WBC COUNT: CPT

## 2023-04-07 PROCEDURE — 25010000002 MAGNESIUM SULFATE PER 500 MG OF MAGNESIUM: Performed by: INTERNAL MEDICINE

## 2023-04-07 PROCEDURE — 80053 COMPREHEN METABOLIC PANEL: CPT

## 2023-04-07 RX ORDER — HEPARIN SODIUM (PORCINE) LOCK FLUSH IV SOLN 100 UNIT/ML 100 UNIT/ML
500 SOLUTION INTRAVENOUS AS NEEDED
Status: DISCONTINUED | OUTPATIENT
Start: 2023-04-07 | End: 2023-04-07 | Stop reason: HOSPADM

## 2023-04-07 RX ORDER — PALONOSETRON 0.05 MG/ML
0.25 INJECTION, SOLUTION INTRAVENOUS ONCE
Status: CANCELLED | OUTPATIENT
Start: 2023-04-14

## 2023-04-07 RX ORDER — SODIUM CHLORIDE 9 MG/ML
250 INJECTION, SOLUTION INTRAVENOUS ONCE
Status: COMPLETED | OUTPATIENT
Start: 2023-04-07 | End: 2023-04-07

## 2023-04-07 RX ORDER — SODIUM CHLORIDE 0.9 % (FLUSH) 0.9 %
10 SYRINGE (ML) INJECTION AS NEEDED
Status: DISCONTINUED | OUTPATIENT
Start: 2023-04-07 | End: 2023-04-07 | Stop reason: HOSPADM

## 2023-04-07 RX ORDER — SODIUM CHLORIDE 0.9 % (FLUSH) 0.9 %
10 SYRINGE (ML) INJECTION AS NEEDED
Status: CANCELLED | OUTPATIENT
Start: 2023-04-07

## 2023-04-07 RX ORDER — SODIUM CHLORIDE 9 MG/ML
250 INJECTION, SOLUTION INTRAVENOUS ONCE
Status: CANCELLED | OUTPATIENT
Start: 2023-04-14

## 2023-04-07 RX ORDER — ACETAMINOPHEN 325 MG/1
650 TABLET ORAL ONCE
Status: CANCELLED
Start: 2023-04-07 | End: 2023-04-07

## 2023-04-07 RX ORDER — HEPARIN SODIUM (PORCINE) LOCK FLUSH IV SOLN 100 UNIT/ML 100 UNIT/ML
500 SOLUTION INTRAVENOUS AS NEEDED
Status: CANCELLED | OUTPATIENT
Start: 2023-04-07

## 2023-04-07 RX ORDER — PALONOSETRON 0.05 MG/ML
0.25 INJECTION, SOLUTION INTRAVENOUS ONCE
Status: CANCELLED | OUTPATIENT
Start: 2023-04-07

## 2023-04-07 RX ORDER — OLANZAPINE 5 MG/1
5 TABLET ORAL ONCE
Status: CANCELLED | OUTPATIENT
Start: 2023-04-07 | End: 2023-04-07

## 2023-04-07 RX ORDER — OLANZAPINE 5 MG/1
5 TABLET ORAL ONCE
Status: COMPLETED | OUTPATIENT
Start: 2023-04-07 | End: 2023-04-07

## 2023-04-07 RX ORDER — SODIUM CHLORIDE 9 MG/ML
250 INJECTION, SOLUTION INTRAVENOUS ONCE
Status: CANCELLED | OUTPATIENT
Start: 2023-04-07

## 2023-04-07 RX ORDER — PALONOSETRON 0.05 MG/ML
0.25 INJECTION, SOLUTION INTRAVENOUS ONCE
Status: COMPLETED | OUTPATIENT
Start: 2023-04-07 | End: 2023-04-07

## 2023-04-07 RX ORDER — ACETAMINOPHEN 325 MG/1
650 TABLET ORAL ONCE
Status: COMPLETED | OUTPATIENT
Start: 2023-04-07 | End: 2023-04-07

## 2023-04-07 RX ORDER — OLANZAPINE 5 MG/1
5 TABLET ORAL ONCE
Status: CANCELLED | OUTPATIENT
Start: 2023-04-14 | End: 2023-04-14

## 2023-04-07 RX ADMIN — POTASSIUM CHLORIDE: 2 INJECTION, SOLUTION, CONCENTRATE INTRAVENOUS at 10:21

## 2023-04-07 RX ADMIN — DEXAMETHASONE SODIUM PHOSPHATE 12 MG: 10 INJECTION, SOLUTION INTRAMUSCULAR; INTRAVENOUS at 11:17

## 2023-04-07 RX ADMIN — GEMCITABINE 1900 MG: 38 INJECTION, SOLUTION INTRAVENOUS at 11:55

## 2023-04-07 RX ADMIN — PALONOSETRON 0.25 MG: 0.05 INJECTION, SOLUTION INTRAVENOUS at 08:22

## 2023-04-07 RX ADMIN — CISPLATIN 48 MG: 50 INJECTION, SOLUTION INTRAVENOUS at 13:00

## 2023-04-07 RX ADMIN — SODIUM CHLORIDE 250 ML: 9 INJECTION, SOLUTION INTRAVENOUS at 08:22

## 2023-04-07 RX ADMIN — POTASSIUM CHLORIDE: 2 INJECTION, SOLUTION, CONCENTRATE INTRAVENOUS at 11:31

## 2023-04-07 RX ADMIN — SODIUM CHLORIDE 1500 MG: 9 INJECTION, SOLUTION INTRAVENOUS at 09:09

## 2023-04-07 RX ADMIN — HEPARIN 500 UNITS: 100 SYRINGE at 14:19

## 2023-04-07 RX ADMIN — Medication 10 ML: at 14:19

## 2023-04-07 RX ADMIN — Medication 100 ML: at 08:27

## 2023-04-07 RX ADMIN — ACETAMINOPHEN 650 MG: 325 TABLET ORAL at 11:55

## 2023-04-07 RX ADMIN — OLANZAPINE 5 MG: 5 TABLET, FILM COATED ORAL at 08:22

## 2023-04-07 NOTE — TELEPHONE ENCOUNTER
----- Message from Ivonne Davies MD sent at 4/7/2023  7:58 AM CDT -----  TSH low. Will add T4. Recheck TSH, T4 next week

## 2023-04-10 LAB
LYMPHOCYTES # BLD MANUAL: 0.7 10*3/MM3 (ref 0.7–3.1)
LYMPHOCYTES NFR BLD MANUAL: 15 % (ref 5–12)
MONOCYTES # BLD: 0.95 10*3/MM3 (ref 0.1–0.9)
NEUTROPHILS # BLD AUTO: 4.69 10*3/MM3 (ref 1.7–7)
NEUTROPHILS NFR BLD MANUAL: 74 % (ref 42.7–76)
RBC MORPH BLD: NORMAL
SMALL PLATELETS BLD QL SMEAR: ADEQUATE
VARIANT LYMPHS NFR BLD MANUAL: 2 % (ref 0–5)
VARIANT LYMPHS NFR BLD MANUAL: 9 % (ref 19.6–45.3)
WBC MORPH BLD: NORMAL

## 2023-04-13 ENCOUNTER — INFUSION (OUTPATIENT)
Dept: ONCOLOGY | Facility: HOSPITAL | Age: 68
End: 2023-04-13
Payer: MEDICARE

## 2023-04-13 DIAGNOSIS — R51.9 ACUTE NONINTRACTABLE HEADACHE, UNSPECIFIED HEADACHE TYPE: ICD-10-CM

## 2023-04-13 DIAGNOSIS — Z79.899 OTHER LONG TERM (CURRENT) DRUG THERAPY: Primary | ICD-10-CM

## 2023-04-13 DIAGNOSIS — Z45.2 ENCOUNTER FOR VENOUS ACCESS DEVICE CARE: ICD-10-CM

## 2023-04-13 LAB
T4 FREE SERPL-MCNC: 1.87 NG/DL (ref 0.93–1.7)
TSH SERPL DL<=0.05 MIU/L-ACNC: 0.07 UIU/ML (ref 0.27–4.2)

## 2023-04-13 PROCEDURE — 84439 ASSAY OF FREE THYROXINE: CPT

## 2023-04-13 PROCEDURE — 84443 ASSAY THYROID STIM HORMONE: CPT

## 2023-04-13 PROCEDURE — 25010000002 HEPARIN LOCK FLUSH PER 10 UNITS: Performed by: INTERNAL MEDICINE

## 2023-04-13 RX ORDER — HEPARIN SODIUM (PORCINE) LOCK FLUSH IV SOLN 100 UNIT/ML 100 UNIT/ML
500 SOLUTION INTRAVENOUS AS NEEDED
Status: DISCONTINUED | OUTPATIENT
Start: 2023-04-13 | End: 2023-04-14 | Stop reason: HOSPADM

## 2023-04-13 RX ORDER — SODIUM CHLORIDE 0.9 % (FLUSH) 0.9 %
10 SYRINGE (ML) INJECTION AS NEEDED
Status: CANCELLED | OUTPATIENT
Start: 2023-04-13

## 2023-04-13 RX ORDER — HEPARIN SODIUM (PORCINE) LOCK FLUSH IV SOLN 100 UNIT/ML 100 UNIT/ML
500 SOLUTION INTRAVENOUS AS NEEDED
Status: CANCELLED | OUTPATIENT
Start: 2023-04-13

## 2023-04-13 RX ADMIN — HEPARIN 500 UNITS: 100 SYRINGE at 14:08

## 2023-04-13 NOTE — PROGRESS NOTES
He is on immunotherapy. Mildly hyperthyroid. Asymptomatic. Continue to monitor. Should be good for chemo tomorrow.

## 2023-04-14 ENCOUNTER — INFUSION (OUTPATIENT)
Dept: ONCOLOGY | Facility: HOSPITAL | Age: 68
End: 2023-04-14
Payer: MEDICARE

## 2023-04-14 VITALS
RESPIRATION RATE: 18 BRPM | DIASTOLIC BLOOD PRESSURE: 64 MMHG | TEMPERATURE: 98.9 F | SYSTOLIC BLOOD PRESSURE: 110 MMHG | OXYGEN SATURATION: 99 % | HEART RATE: 101 BPM

## 2023-04-14 DIAGNOSIS — R51.9 ACUTE NONINTRACTABLE HEADACHE, UNSPECIFIED HEADACHE TYPE: ICD-10-CM

## 2023-04-14 DIAGNOSIS — C24.9 MALIGNANT NEOPLASM OF BILIARY TRACT, UNSPECIFIED: Primary | ICD-10-CM

## 2023-04-14 DIAGNOSIS — Z45.2 ENCOUNTER FOR VENOUS ACCESS DEVICE CARE: ICD-10-CM

## 2023-04-14 LAB
ALBUMIN SERPL-MCNC: 3.5 G/DL (ref 3.5–5.2)
ALBUMIN/GLOB SERPL: 1.1 G/DL
ALP SERPL-CCNC: 313 U/L (ref 39–117)
ALT SERPL W P-5'-P-CCNC: 35 U/L (ref 1–41)
ANION GAP SERPL CALCULATED.3IONS-SCNC: 12 MMOL/L (ref 5–15)
AST SERPL-CCNC: 35 U/L (ref 1–40)
BASOPHILS # BLD AUTO: 0.03 10*3/MM3 (ref 0–0.2)
BASOPHILS NFR BLD AUTO: 0.6 % (ref 0–1.5)
BILIRUB SERPL-MCNC: 0.4 MG/DL (ref 0–1.2)
BUN SERPL-MCNC: 14 MG/DL (ref 8–23)
BUN/CREAT SERPL: 13.7 (ref 7–25)
CALCIUM SPEC-SCNC: 9 MG/DL (ref 8.6–10.5)
CHLORIDE SERPL-SCNC: 101 MMOL/L (ref 98–107)
CO2 SERPL-SCNC: 22 MMOL/L (ref 22–29)
CREAT SERPL-MCNC: 1.02 MG/DL (ref 0.76–1.27)
DEPRECATED RDW RBC AUTO: 58 FL (ref 37–54)
EGFRCR SERPLBLD CKD-EPI 2021: 81.6 ML/MIN/1.73
EOSINOPHIL # BLD AUTO: 0.02 10*3/MM3 (ref 0–0.4)
EOSINOPHIL NFR BLD AUTO: 0.4 % (ref 0.3–6.2)
ERYTHROCYTE [DISTWIDTH] IN BLOOD BY AUTOMATED COUNT: 18.3 % (ref 12.3–15.4)
GLOBULIN UR ELPH-MCNC: 3.3 GM/DL
GLUCOSE SERPL-MCNC: 138 MG/DL (ref 65–99)
HCT VFR BLD AUTO: 26.2 % (ref 37.5–51)
HGB BLD-MCNC: 8.8 G/DL (ref 13–17.7)
IMM GRANULOCYTES # BLD AUTO: 0.05 10*3/MM3 (ref 0–0.05)
IMM GRANULOCYTES NFR BLD AUTO: 0.9 % (ref 0–0.5)
LYMPHOCYTES # BLD AUTO: 0.62 10*3/MM3 (ref 0.7–3.1)
LYMPHOCYTES NFR BLD AUTO: 11.6 % (ref 19.6–45.3)
MAGNESIUM SERPL-MCNC: 1.5 MG/DL (ref 1.6–2.4)
MCH RBC QN AUTO: 29.3 PG (ref 26.6–33)
MCHC RBC AUTO-ENTMCNC: 33.6 G/DL (ref 31.5–35.7)
MCV RBC AUTO: 87.3 FL (ref 79–97)
MONOCYTES # BLD AUTO: 0.69 10*3/MM3 (ref 0.1–0.9)
MONOCYTES NFR BLD AUTO: 12.9 % (ref 5–12)
NEUTROPHILS NFR BLD AUTO: 3.94 10*3/MM3 (ref 1.7–7)
NEUTROPHILS NFR BLD AUTO: 73.6 % (ref 42.7–76)
NRBC BLD AUTO-RTO: 0 /100 WBC (ref 0–0.2)
PLATELET # BLD AUTO: 250 10*3/MM3 (ref 140–450)
PMV BLD AUTO: 9.3 FL (ref 6–12)
POTASSIUM SERPL-SCNC: 3.5 MMOL/L (ref 3.5–5.2)
PROT SERPL-MCNC: 6.8 G/DL (ref 6–8.5)
RBC # BLD AUTO: 3 10*6/MM3 (ref 4.14–5.8)
SODIUM SERPL-SCNC: 135 MMOL/L (ref 136–145)
WBC NRBC COR # BLD: 5.35 10*3/MM3 (ref 3.4–10.8)

## 2023-04-14 PROCEDURE — 25010000002 POTASSIUM CHLORIDE PER 2 MEQ OF POTASSIUM: Performed by: INTERNAL MEDICINE

## 2023-04-14 PROCEDURE — 25010000002 GEMCITABINE 1 GM/26.3ML SOLUTION 26.3 ML VIAL: Performed by: INTERNAL MEDICINE

## 2023-04-14 PROCEDURE — 25010000002 MAGNESIUM SULFATE PER 500 MG OF MAGNESIUM: Performed by: INTERNAL MEDICINE

## 2023-04-14 PROCEDURE — 25010000002 HEPARIN LOCK FLUSH PER 10 UNITS: Performed by: INTERNAL MEDICINE

## 2023-04-14 PROCEDURE — 63710000001 OLANZAPINE 5 MG TABLET: Performed by: INTERNAL MEDICINE

## 2023-04-14 PROCEDURE — 25010000002 PALONOSETRON PER 25 MCG: Performed by: INTERNAL MEDICINE

## 2023-04-14 PROCEDURE — A9270 NON-COVERED ITEM OR SERVICE: HCPCS | Performed by: INTERNAL MEDICINE

## 2023-04-14 PROCEDURE — 25010000002 CISPLATIN PER 50 MG: Performed by: INTERNAL MEDICINE

## 2023-04-14 PROCEDURE — 25010000002 DEXAMETHASONE SODIUM PHOSPHATE 100 MG/10ML SOLUTION: Performed by: INTERNAL MEDICINE

## 2023-04-14 PROCEDURE — 25010000002 FOSAPREPITANT PER 1 MG: Performed by: INTERNAL MEDICINE

## 2023-04-14 RX ORDER — PALONOSETRON 0.05 MG/ML
0.25 INJECTION, SOLUTION INTRAVENOUS ONCE
Status: COMPLETED | OUTPATIENT
Start: 2023-04-14 | End: 2023-04-14

## 2023-04-14 RX ORDER — HEPARIN SODIUM (PORCINE) LOCK FLUSH IV SOLN 100 UNIT/ML 100 UNIT/ML
500 SOLUTION INTRAVENOUS AS NEEDED
Status: DISCONTINUED | OUTPATIENT
Start: 2023-04-14 | End: 2023-04-14 | Stop reason: HOSPADM

## 2023-04-14 RX ORDER — SODIUM CHLORIDE 0.9 % (FLUSH) 0.9 %
10 SYRINGE (ML) INJECTION AS NEEDED
Status: DISCONTINUED | OUTPATIENT
Start: 2023-04-14 | End: 2023-04-14 | Stop reason: HOSPADM

## 2023-04-14 RX ORDER — SODIUM CHLORIDE 9 MG/ML
250 INJECTION, SOLUTION INTRAVENOUS ONCE
Status: COMPLETED | OUTPATIENT
Start: 2023-04-14 | End: 2023-04-14

## 2023-04-14 RX ORDER — HEPARIN SODIUM (PORCINE) LOCK FLUSH IV SOLN 100 UNIT/ML 100 UNIT/ML
500 SOLUTION INTRAVENOUS AS NEEDED
OUTPATIENT
Start: 2023-04-14

## 2023-04-14 RX ORDER — SODIUM CHLORIDE 0.9 % (FLUSH) 0.9 %
10 SYRINGE (ML) INJECTION AS NEEDED
OUTPATIENT
Start: 2023-04-14

## 2023-04-14 RX ORDER — OLANZAPINE 5 MG/1
5 TABLET ORAL ONCE
Status: COMPLETED | OUTPATIENT
Start: 2023-04-14 | End: 2023-04-14

## 2023-04-14 RX ADMIN — FOSAPREPITANT 100 ML: 150 INJECTION, POWDER, LYOPHILIZED, FOR SOLUTION INTRAVENOUS at 08:48

## 2023-04-14 RX ADMIN — OLANZAPINE 5 MG: 5 TABLET, FILM COATED ORAL at 08:43

## 2023-04-14 RX ADMIN — GEMCITABINE 1900 MG: 38 INJECTION, SOLUTION INTRAVENOUS at 11:14

## 2023-04-14 RX ADMIN — SODIUM CHLORIDE 250 ML: 9 INJECTION, SOLUTION INTRAVENOUS at 08:41

## 2023-04-14 RX ADMIN — Medication 10 ML: at 13:22

## 2023-04-14 RX ADMIN — POTASSIUM CHLORIDE: 2 INJECTION, SOLUTION, CONCENTRATE INTRAVENOUS at 09:34

## 2023-04-14 RX ADMIN — POTASSIUM CHLORIDE: 2 INJECTION, SOLUTION, CONCENTRATE INTRAVENOUS at 11:14

## 2023-04-14 RX ADMIN — CISPLATIN 48 MG: 50 INJECTION, SOLUTION INTRAVENOUS at 12:08

## 2023-04-14 RX ADMIN — HEPARIN 500 UNITS: 100 SYRINGE at 13:22

## 2023-04-14 RX ADMIN — DEXAMETHASONE SODIUM PHOSPHATE 12 MG: 10 INJECTION, SOLUTION INTRAMUSCULAR; INTRAVENOUS at 10:40

## 2023-04-14 RX ADMIN — PALONOSETRON 0.25 MG: 0.05 INJECTION, SOLUTION INTRAVENOUS at 08:43

## 2023-04-16 NOTE — PROGRESS NOTES
Family Medicine Residency  Van Urbina MD    Subjective:     Jian Baker is a 67 y.o. male with a concurrent medical history of cholangiocarcinoma who presents for follow up for symptoms of heartburn.     Mr. Baker complained of symptoms of heartburn at his last visit. He had not been taking prescribed protonix with meals and I counseled him to try this. I also started him on Pepcid and scheduled him for one month follow up.     Today, Mr. Baker tells me that his heartburn symptoms have improved with medication.     Receives chemotherapy for cholangiocarcinoma. Is doing well. Reports ONC says he has 6 chemo treatments left. Will follow up again in June for coordination of care.     The following portions of the patient's history were reviewed and updated as appropriate: allergies, current medications, past family history, past medical history, past social history, past surgical history and problem list.    Past Medical Hx:  Past Medical History:   Diagnosis Date   • Arthritis    • Asthma     Previously diagnosed and previously prescribed inhaler medications.     • Cancer     cholangiocarcinoma   • Elevated cholesterol    • Essential hypertension    • Gastroesophageal reflux disease        Past Surgical Hx:  Past Surgical History:   Procedure Laterality Date   • CERVICAL SPINE SURGERY      x3   • DENTAL PROCEDURE      3 wisdom teeth and 2+ other teeth removed   • ERCP N/A 11/22/2022    Procedure: ENDOSCOPIC RETROGRADE CHOLANGIOPANCREATOGRAPHY;  Surgeon: Nabila Worthy MD;  Location: [facility] ENDOSCOPY;  Service: Gastroenterology;  Laterality: N/A;   • PORTACATH PLACEMENT Right 2/2/2023    Procedure: PORT PLACEMENT         (C-ARM#2), right internal jugular;  Surgeon: Van Austin MD;  Location: [facility] OR;  Service: General;  Laterality: Right;   • WRIST FRACTURE SURGERY Bilateral        Current Meds:    Current Outpatient Medications:   •  albuterol sulfate  (90 Base) MCG/ACT inhaler, Inhale 2  puffs Every 4 (Four) Hours As Needed for Wheezing or Shortness of Air., Disp: 18 g, Rfl: 0  •  atorvastatin (LIPITOR) 20 MG tablet, Take 1 tablet by mouth Daily., Disp: , Rfl:   •  cyanocobalamin (VITAMIN B-12) 1000 MCG tablet, Take 1 tablet by mouth Daily., Disp: 90 tablet, Rfl: 0  •  cyclobenzaprine (FLEXERIL) 10 MG tablet, Take 1 tablet by mouth 3 (Three) Times a Day As Needed for Muscle Spasms., Disp: 60 tablet, Rfl: 11  •  famotidine (PEPCID) 20 MG tablet, TAKE ONE (1) TABLET BY MOUTH TWICE A DAY, Disp: 60 tablet, Rfl: 2  •  fluconazole (DIFLUCAN) 200 MG tablet, Take 1 tablet by mouth Daily., Disp: 30 tablet, Rfl: 0  •  fluticasone (FLONASE) 50 MCG/ACT nasal spray, INSTILL 2 SPRAYS INTO THE NOSTRIL(S) AS DIRECTED BY PROVIDER DAILY., Disp: 16 g, Rfl: 2  •  lisinopril (PRINIVIL,ZESTRIL) 10 MG tablet, Take 2 tablets by mouth Daily., Disp: , Rfl:   •  loperamide (IMODIUM A-D) 2 MG tablet, Take 1 tablet by mouth 4 (Four) Times a Day As Needed for Diarrhea., Disp: 60 tablet, Rfl: 3  •  loratadine (CLARITIN) 10 MG tablet, Take 1 tablet by mouth Daily As Needed., Disp: , Rfl:   •  OLANZapine (zyPREXA) 5 MG tablet, TAKE ON DAY 2, 3, AND 4 AND DAYS 9, 10, 11 AFTER CHEMOTHERAPY., Disp: 6 tablet, Rfl: 7  •  ondansetron (ZOFRAN) 8 MG tablet, Take 1 tablet by mouth 3 (Three) Times a Day As Needed for Nausea or Vomiting., Disp: 30 tablet, Rfl: 5  •  oxyCODONE (OXY-IR) 5 MG capsule, Take 1 capsule by mouth Every 4 (Four) Hours As Needed for Moderate Pain., Disp: 60 capsule, Rfl: 0  •  pantoprazole (Protonix) 40 MG EC tablet, Take 1 tablet by mouth Daily., Disp: 30 tablet, Rfl: 2  •  polyethylene glycol (MIRALAX) 17 g packet, Take 17 g by mouth Daily As Needed (constipation)., Disp: 20 each, Rfl: 0    Allergies:  No Known Allergies    Family Hx:  Family History   Problem Relation Age of Onset   • COPD Mother    • Hypertension Mother    • Diabetes type II Mother    • Cancer Father    • Heart failure Father    • Cancer Brother   "  • Early death Son    • Lung cancer Maternal Uncle    • Early death Sister         Social History:  Social History     Socioeconomic History   • Marital status:    Tobacco Use   • Smoking status: Former     Packs/day: 1.00     Years: 45.00     Pack years: 45.00     Types: Pipe, Cigarettes     Start date: 10/10/1972     Quit date: 10/31/2019     Years since quitting: 3.4     Passive exposure: Past   • Smokeless tobacco: Current     Types: Chew   Vaping Use   • Vaping Use: Never used   Substance and Sexual Activity   • Alcohol use: Not Currently     Alcohol/week: 7.0 standard drinks     Types: 7 Cans of beer per week     Comment: Prior heavy drinker   • Drug use: Never   • Sexual activity: Defer     Partners: Female       Review of Systems  Review of Systems   Cardiovascular: Negative for palpitations.   Skin: Negative for rash.   Neurological: Negative for light-headedness.       Objective:     /80   Pulse 79   Ht 170.2 cm (67\")   Wt 76.6 kg (168 lb 12.8 oz)   SpO2 95%   BMI 26.44 kg/m²   Physical Exam  Constitutional:       General: He is not in acute distress.     Appearance: Normal appearance. He is not ill-appearing, toxic-appearing or diaphoretic.   HENT:      Head: Normocephalic and atraumatic.   Eyes:      Extraocular Movements: Extraocular movements intact.   Pulmonary:      Effort: Pulmonary effort is normal. No respiratory distress.   Abdominal:      Comments: Biliary tubes draining    Neurological:      Mental Status: He is alert.   Psychiatric:         Mood and Affect: Mood normal.         Behavior: Behavior normal.          Assessment/Plan:     Diagnoses and all orders for this visit:    1. Heartburn (Primary)    Jian Baker is a 67 y.o. male with a concurrent medical history of cholangiocarcinoma who presents for follow up for symptoms of heartburn.     Mr. Baker complained of symptoms of heartburn at his last visit. He had not been taking prescribed protonix with meals and I " counseled him to try this. I also started him on Pepcid and scheduled him for one month follow up.     Today, Mr. Baker tells me that his heartburn symptoms have improved with medication.         2. Need for shingles vaccine    Patient immunocompromised. Over 65. Would benefit from Zoster vaccination and we gave it today.    -     Varicella-Zoster Vaccine Subcutaneous    3. Coordination of care.    Receives chemotherapy for cholangiocarcinoma. Is doing well. Reports ONC says he has 6 chemo treatments left. Will follow up again in June for coordination of care. Some care gaps, such as lipid panel, can probably be deferred given his prognosis. Have requested he ask his oncologist if he still needs CT screening for lung cancer.        Follow-up:     Return in about 8 weeks (around 6/14/2023) for Coordination of Care .    Preventative:  Health Maintenance   Topic Date Due   • LUNG CANCER SCREENING  Never done   • ZOSTER VACCINE (2 of 2) 12/05/2017   • COVID-19 Vaccine (3 - Booster for Pfizer series) 06/08/2021   • LIPID PANEL  01/30/2023   • ANNUAL WELLNESS VISIT  05/19/2023   • INFLUENZA VACCINE  08/01/2023   • COLORECTAL CANCER SCREENING  10/10/2024   • TDAP/TD VACCINES (3 - Td or Tdap) 10/10/2027   • HEPATITIS C SCREENING  Completed   • Pneumococcal Vaccine 65+  Completed   • AAA SCREEN (ONE-TIME)  Completed       Alcohol use:  reports that he does not currently use alcohol after a past usage of about 7.0 standard drinks per week.  Nicotine status  reports that he quit smoking about 3 years ago. His smoking use included pipe and cigarettes. He started smoking about 50 years ago. He has a 45.00 pack-year smoking history. He has been exposed to tobacco smoke. His smokeless tobacco use includes chew.     Goals     •  Better Compliance with HTN Medications (pt-stated)       Barriers: None      •  Medication management       Patient has been having trouble with medication compliance.  Goal now is to take medications  everyday at the same time.    Barriers: patient has not been on medications consistently for sometime.  Has trouble with remembering to take medications.              RISK SCORE: 5 - Patient has cholangiocarcinoma on chemotherapy and has required multiple hospital admissions.       This document has been electronically signed by Van Urbina MD on April 17, 2023 11:06 CDT

## 2023-04-17 ENCOUNTER — OFFICE VISIT (OUTPATIENT)
Dept: FAMILY MEDICINE CLINIC | Facility: CLINIC | Age: 68
End: 2023-04-17
Payer: MEDICARE

## 2023-04-17 VITALS
OXYGEN SATURATION: 95 % | DIASTOLIC BLOOD PRESSURE: 80 MMHG | SYSTOLIC BLOOD PRESSURE: 120 MMHG | WEIGHT: 168.8 LBS | HEART RATE: 79 BPM | BODY MASS INDEX: 26.49 KG/M2 | HEIGHT: 67 IN

## 2023-04-17 DIAGNOSIS — R12 HEARTBURN: Primary | ICD-10-CM

## 2023-04-17 DIAGNOSIS — Z23 NEED FOR SHINGLES VACCINE: ICD-10-CM

## 2023-04-20 RX ORDER — ALBUTEROL SULFATE 90 UG/1
AEROSOL, METERED RESPIRATORY (INHALATION)
Qty: 18 G | Refills: 0 | Status: SHIPPED | OUTPATIENT
Start: 2023-04-20

## 2023-04-28 ENCOUNTER — INFUSION (OUTPATIENT)
Dept: ONCOLOGY | Facility: HOSPITAL | Age: 68
End: 2023-04-28
Payer: MEDICARE

## 2023-04-28 ENCOUNTER — OFFICE VISIT (OUTPATIENT)
Dept: ONCOLOGY | Facility: CLINIC | Age: 68
End: 2023-04-28
Payer: MEDICARE

## 2023-04-28 VITALS
SYSTOLIC BLOOD PRESSURE: 149 MMHG | OXYGEN SATURATION: 95 % | HEART RATE: 72 BPM | BODY MASS INDEX: 26.31 KG/M2 | DIASTOLIC BLOOD PRESSURE: 84 MMHG | WEIGHT: 168 LBS | RESPIRATION RATE: 18 BRPM | TEMPERATURE: 96.3 F

## 2023-04-28 DIAGNOSIS — R51.9 ACUTE NONINTRACTABLE HEADACHE, UNSPECIFIED HEADACHE TYPE: ICD-10-CM

## 2023-04-28 DIAGNOSIS — C24.9 MALIGNANT NEOPLASM OF BILIARY TRACT, UNSPECIFIED: Primary | ICD-10-CM

## 2023-04-28 DIAGNOSIS — Z79.899 OTHER LONG TERM (CURRENT) DRUG THERAPY: ICD-10-CM

## 2023-04-28 DIAGNOSIS — Z45.2 ENCOUNTER FOR VENOUS ACCESS DEVICE CARE: ICD-10-CM

## 2023-04-28 LAB
ALBUMIN SERPL-MCNC: 3.5 G/DL (ref 3.5–5.2)
ALBUMIN/GLOB SERPL: 1.1 G/DL
ALP SERPL-CCNC: 294 U/L (ref 39–117)
ALT SERPL W P-5'-P-CCNC: 26 U/L (ref 1–41)
ANION GAP SERPL CALCULATED.3IONS-SCNC: 12 MMOL/L (ref 5–15)
AST SERPL-CCNC: 62 U/L (ref 1–40)
BASOPHILS # BLD AUTO: 0.03 10*3/MM3 (ref 0–0.2)
BASOPHILS NFR BLD AUTO: 0.4 % (ref 0–1.5)
BILIRUB SERPL-MCNC: 0.3 MG/DL (ref 0–1.2)
BUN SERPL-MCNC: 9 MG/DL (ref 8–23)
BUN/CREAT SERPL: 11.3 (ref 7–25)
CALCIUM SPEC-SCNC: 8.6 MG/DL (ref 8.6–10.5)
CHLORIDE SERPL-SCNC: 101 MMOL/L (ref 98–107)
CO2 SERPL-SCNC: 22 MMOL/L (ref 22–29)
CREAT SERPL-MCNC: 0.8 MG/DL (ref 0.76–1.27)
DEPRECATED RDW RBC AUTO: 65.9 FL (ref 37–54)
EGFRCR SERPLBLD CKD-EPI 2021: 98.2 ML/MIN/1.73
EOSINOPHIL # BLD AUTO: 0.06 10*3/MM3 (ref 0–0.4)
EOSINOPHIL NFR BLD AUTO: 0.8 % (ref 0.3–6.2)
ERYTHROCYTE [DISTWIDTH] IN BLOOD BY AUTOMATED COUNT: 19.8 % (ref 12.3–15.4)
GLOBULIN UR ELPH-MCNC: 3.1 GM/DL
GLUCOSE SERPL-MCNC: 115 MG/DL (ref 65–99)
HCT VFR BLD AUTO: 26.5 % (ref 37.5–51)
HGB BLD-MCNC: 8.8 G/DL (ref 13–17.7)
HOLD SPECIMEN: NORMAL
IMM GRANULOCYTES # BLD AUTO: 0.04 10*3/MM3 (ref 0–0.05)
IMM GRANULOCYTES NFR BLD AUTO: 0.6 % (ref 0–0.5)
LYMPHOCYTES # BLD AUTO: 0.72 10*3/MM3 (ref 0.7–3.1)
LYMPHOCYTES NFR BLD AUTO: 10 % (ref 19.6–45.3)
MAGNESIUM SERPL-MCNC: 1.4 MG/DL (ref 1.6–2.4)
MCH RBC QN AUTO: 30.2 PG (ref 26.6–33)
MCHC RBC AUTO-ENTMCNC: 33.2 G/DL (ref 31.5–35.7)
MCV RBC AUTO: 91.1 FL (ref 79–97)
MONOCYTES # BLD AUTO: 0.96 10*3/MM3 (ref 0.1–0.9)
MONOCYTES NFR BLD AUTO: 13.3 % (ref 5–12)
NEUTROPHILS NFR BLD AUTO: 5.4 10*3/MM3 (ref 1.7–7)
NEUTROPHILS NFR BLD AUTO: 74.9 % (ref 42.7–76)
NRBC BLD AUTO-RTO: 0 /100 WBC (ref 0–0.2)
PLATELET # BLD AUTO: 250 10*3/MM3 (ref 140–450)
PMV BLD AUTO: 9.6 FL (ref 6–12)
POTASSIUM SERPL-SCNC: 3.7 MMOL/L (ref 3.5–5.2)
PROT SERPL-MCNC: 6.6 G/DL (ref 6–8.5)
RBC # BLD AUTO: 2.91 10*6/MM3 (ref 4.14–5.8)
SODIUM SERPL-SCNC: 135 MMOL/L (ref 136–145)
TSH SERPL DL<=0.05 MIU/L-ACNC: 1.9 UIU/ML (ref 0.27–4.2)
WBC NRBC COR # BLD: 7.21 10*3/MM3 (ref 3.4–10.8)

## 2023-04-28 PROCEDURE — 25010000002 DEXAMETHASONE SODIUM PHOSPHATE 100 MG/10ML SOLUTION: Performed by: INTERNAL MEDICINE

## 2023-04-28 PROCEDURE — 25010000002 MAGNESIUM SULFATE PER 500 MG OF MAGNESIUM: Performed by: INTERNAL MEDICINE

## 2023-04-28 PROCEDURE — 80053 COMPREHEN METABOLIC PANEL: CPT

## 2023-04-28 PROCEDURE — 25010000002 POTASSIUM CHLORIDE PER 2 MEQ OF POTASSIUM: Performed by: INTERNAL MEDICINE

## 2023-04-28 PROCEDURE — 63710000001 OLANZAPINE 5 MG TABLET: Performed by: INTERNAL MEDICINE

## 2023-04-28 PROCEDURE — 25010000002 DURVALUMAB 500 MG/10ML SOLUTION 10 ML VIAL: Performed by: INTERNAL MEDICINE

## 2023-04-28 PROCEDURE — 25010000002 PALONOSETRON PER 25 MCG: Performed by: INTERNAL MEDICINE

## 2023-04-28 PROCEDURE — 36415 COLL VENOUS BLD VENIPUNCTURE: CPT

## 2023-04-28 PROCEDURE — 25010000002 FOSAPREPITANT PER 1 MG: Performed by: INTERNAL MEDICINE

## 2023-04-28 PROCEDURE — 25010000002 CISPLATIN PER 50 MG: Performed by: INTERNAL MEDICINE

## 2023-04-28 PROCEDURE — A9270 NON-COVERED ITEM OR SERVICE: HCPCS | Performed by: INTERNAL MEDICINE

## 2023-04-28 PROCEDURE — 25010000002 GEMCITABINE 1 GM/26.3ML SOLUTION 26.3 ML VIAL: Performed by: INTERNAL MEDICINE

## 2023-04-28 PROCEDURE — 84443 ASSAY THYROID STIM HORMONE: CPT

## 2023-04-28 PROCEDURE — 83735 ASSAY OF MAGNESIUM: CPT

## 2023-04-28 PROCEDURE — 85025 COMPLETE CBC W/AUTO DIFF WBC: CPT

## 2023-04-28 RX ORDER — HEPARIN SODIUM (PORCINE) LOCK FLUSH IV SOLN 100 UNIT/ML 100 UNIT/ML
500 SOLUTION INTRAVENOUS AS NEEDED
Status: DISCONTINUED | OUTPATIENT
Start: 2023-04-28 | End: 2023-04-28 | Stop reason: HOSPADM

## 2023-04-28 RX ORDER — SODIUM CHLORIDE 9 MG/ML
250 INJECTION, SOLUTION INTRAVENOUS ONCE
OUTPATIENT
Start: 2023-05-05

## 2023-04-28 RX ORDER — PALONOSETRON 0.05 MG/ML
0.25 INJECTION, SOLUTION INTRAVENOUS ONCE
OUTPATIENT
Start: 2023-05-05

## 2023-04-28 RX ORDER — SODIUM CHLORIDE 0.9 % (FLUSH) 0.9 %
10 SYRINGE (ML) INJECTION AS NEEDED
Status: DISCONTINUED | OUTPATIENT
Start: 2023-04-28 | End: 2023-04-28 | Stop reason: HOSPADM

## 2023-04-28 RX ORDER — SODIUM CHLORIDE 0.9 % (FLUSH) 0.9 %
10 SYRINGE (ML) INJECTION AS NEEDED
OUTPATIENT
Start: 2023-04-28

## 2023-04-28 RX ORDER — SODIUM CHLORIDE 9 MG/ML
250 INJECTION, SOLUTION INTRAVENOUS ONCE
Status: COMPLETED | OUTPATIENT
Start: 2023-04-28 | End: 2023-04-28

## 2023-04-28 RX ORDER — PALONOSETRON 0.05 MG/ML
0.25 INJECTION, SOLUTION INTRAVENOUS ONCE
Status: CANCELLED | OUTPATIENT
Start: 2023-04-28

## 2023-04-28 RX ORDER — OLANZAPINE 5 MG/1
5 TABLET ORAL ONCE
Status: COMPLETED | OUTPATIENT
Start: 2023-04-28 | End: 2023-04-28

## 2023-04-28 RX ORDER — SODIUM CHLORIDE 9 MG/ML
250 INJECTION, SOLUTION INTRAVENOUS ONCE
Status: CANCELLED | OUTPATIENT
Start: 2023-04-28

## 2023-04-28 RX ORDER — OLANZAPINE 5 MG/1
5 TABLET ORAL ONCE
Status: CANCELLED | OUTPATIENT
Start: 2023-04-28 | End: 2023-04-28

## 2023-04-28 RX ORDER — SODIUM CHLORIDE 0.9 % (FLUSH) 0.9 %
10 SYRINGE (ML) INJECTION AS NEEDED
Status: CANCELLED | OUTPATIENT
Start: 2023-04-28

## 2023-04-28 RX ORDER — OLANZAPINE 5 MG/1
5 TABLET ORAL ONCE
OUTPATIENT
Start: 2023-05-05 | End: 2023-05-05

## 2023-04-28 RX ORDER — PALONOSETRON 0.05 MG/ML
0.25 INJECTION, SOLUTION INTRAVENOUS ONCE
Status: COMPLETED | OUTPATIENT
Start: 2023-04-28 | End: 2023-04-28

## 2023-04-28 RX ORDER — HEPARIN SODIUM (PORCINE) LOCK FLUSH IV SOLN 100 UNIT/ML 100 UNIT/ML
500 SOLUTION INTRAVENOUS AS NEEDED
OUTPATIENT
Start: 2023-04-28

## 2023-04-28 RX ORDER — HEPARIN SODIUM (PORCINE) LOCK FLUSH IV SOLN 100 UNIT/ML 100 UNIT/ML
500 SOLUTION INTRAVENOUS AS NEEDED
Status: CANCELLED | OUTPATIENT
Start: 2023-04-28

## 2023-04-28 RX ADMIN — GEMCITABINE 1900 MG: 38 INJECTION, SOLUTION INTRAVENOUS at 11:39

## 2023-04-28 RX ADMIN — SODIUM CHLORIDE 1500 MG: 9 INJECTION, SOLUTION INTRAVENOUS at 10:01

## 2023-04-28 RX ADMIN — Medication 10 ML: at 14:05

## 2023-04-28 RX ADMIN — POTASSIUM CHLORIDE: 2 INJECTION, SOLUTION, CONCENTRATE INTRAVENOUS at 12:45

## 2023-04-28 RX ADMIN — FOSAPREPITANT 100 ML: 150 INJECTION, POWDER, LYOPHILIZED, FOR SOLUTION INTRAVENOUS at 09:21

## 2023-04-28 RX ADMIN — HEPARIN SODIUM (PORCINE) LOCK FLUSH IV SOLN 100 UNIT/ML 500 UNITS: 100 SOLUTION at 14:05

## 2023-04-28 RX ADMIN — CISPLATIN 48 MG: 1 INJECTION INTRAVENOUS at 12:45

## 2023-04-28 RX ADMIN — SODIUM CHLORIDE 250 ML: 9 INJECTION, SOLUTION INTRAVENOUS at 09:04

## 2023-04-28 RX ADMIN — POTASSIUM CHLORIDE: 2 INJECTION, SOLUTION, CONCENTRATE INTRAVENOUS at 11:10

## 2023-04-28 RX ADMIN — OLANZAPINE 5 MG: 5 TABLET, FILM COATED ORAL at 09:09

## 2023-04-28 RX ADMIN — DEXAMETHASONE SODIUM PHOSPHATE 12 MG: 10 INJECTION, SOLUTION INTRAMUSCULAR; INTRAVENOUS at 11:09

## 2023-04-28 RX ADMIN — PALONOSETRON 0.25 MG: 0.05 INJECTION, SOLUTION INTRAVENOUS at 09:09

## 2023-04-28 NOTE — PROGRESS NOTES
Subjective     Jian Baker was seen in follow-up for cholangiocarcinoma.  He is overall stable.  Reports fatigue with no other major issues.  No fever or chills.  No mouth sores.  Eating well.  Mild numbness and tingling in upper extremity which is intermittent and does not interfere with day-to-day activities.  Denies any abdominal pain.  No nausea or emesis.  No shortness of breath or chest pain.    Past Medical History, Past Surgical History, Social History, Family History have been reviewed and are without significant changes except as mentioned.        Medications:  The current medication list was reviewed in the EMR    ALLERGIES:  No Known Allergies    Objective      Vitals:    04/28/23 0800   BP: 149/84   Pulse: 72   Resp: 18   Temp: 96.3 °F (35.7 °C)   SpO2: 95%             4/14/2023     8:06 AM   Current Status   ECOG score 0       Physical Exam  Vitals and nursing note reviewed.   Constitutional:       Appearance: Normal appearance.   Neurological:      General: No focal deficit present.      Mental Status: He is alert and oriented to person, place, and time. Mental status is at baseline.   Psychiatric:         Mood and Affect: Mood normal.         Behavior: Behavior normal.         Thought Content: Thought content normal.               RECENT LABS:Independently reviewed and summarized  Hematology WBC   Date Value Ref Range Status   04/14/2023 5.35 3.40 - 10.80 10*3/mm3 Final   01/11/2023 8.31 4.5 - 11.0 10*3/uL Final     RBC   Date Value Ref Range Status   04/14/2023 3.00 (L) 4.14 - 5.80 10*6/mm3 Final   01/11/2023 4.17 (L) 4.5 - 5.9 10*6/uL Final     Hemoglobin   Date Value Ref Range Status   04/14/2023 8.8 (L) 13.0 - 17.7 g/dL Final   01/11/2023 13.0 (L) 13.5 - 17.5 g/dL Final     Hematocrit   Date Value Ref Range Status   04/14/2023 26.2 (L) 37.5 - 51.0 % Final   01/11/2023 39.2 (L) 41.0 - 53.0 % Final     Platelets   Date Value Ref Range Status   04/14/2023 250 140 - 450 10*3/mm3 Final    01/11/2023 290 140 - 440 10*3/uL Final     WBC   Date Value Ref Range Status   04/28/2023 7.21 3.40 - 10.80 10*3/mm3 Final   01/11/2023 8.31 4.5 - 11.0 10*3/uL Final     RBC   Date Value Ref Range Status   04/28/2023 2.91 (L) 4.14 - 5.80 10*6/mm3 Final   01/11/2023 4.17 (L) 4.5 - 5.9 10*6/uL Final     Hemoglobin   Date Value Ref Range Status   04/28/2023 8.8 (L) 13.0 - 17.7 g/dL Final   01/11/2023 13.0 (L) 13.5 - 17.5 g/dL Final     Hematocrit   Date Value Ref Range Status   04/28/2023 26.5 (L) 37.5 - 51.0 % Final   01/11/2023 39.2 (L) 41.0 - 53.0 % Final     MCV   Date Value Ref Range Status   04/28/2023 91.1 79.0 - 97.0 fL Final   01/11/2023 94.0 80.0 - 100.0 fL Final     MCH   Date Value Ref Range Status   04/28/2023 30.2 26.6 - 33.0 pg Final   01/11/2023 31.2 26.0 - 34.0 pg Final     MCHC   Date Value Ref Range Status   04/28/2023 33.2 31.5 - 35.7 g/dL Final   01/11/2023 33.2 31.0 - 37.0 g/dL Final     RDW   Date Value Ref Range Status   04/28/2023 19.8 (H) 12.3 - 15.4 % Final   01/11/2023 13.2 12.0 - 16.8 % Final     RDW-SD   Date Value Ref Range Status   04/28/2023 65.9 (H) 37.0 - 54.0 fl Final     MPV   Date Value Ref Range Status   04/28/2023 9.6 6.0 - 12.0 fL Final   01/11/2023 9.2 8.4 - 12.4 fL Final     Platelets   Date Value Ref Range Status   04/28/2023 250 140 - 450 10*3/mm3 Final   01/11/2023 290 140 - 440 10*3/uL Final     Neutrophil Rel %   Date Value Ref Range Status   01/11/2023 79.0 45 - 80 % Final     Neutrophil %   Date Value Ref Range Status   04/28/2023 74.9 42.7 - 76.0 % Final     Lymphocyte Rel %   Date Value Ref Range Status   01/11/2023 10.2 (L) 15 - 50 % Final     Lymphocyte %   Date Value Ref Range Status   04/28/2023 10.0 (L) 19.6 - 45.3 % Final     Monocyte Rel %   Date Value Ref Range Status   01/11/2023 7.9 0 - 15 % Final     Monocyte %   Date Value Ref Range Status   04/28/2023 13.3 (H) 5.0 - 12.0 % Final     Eosinophil %   Date Value Ref Range Status   04/28/2023 0.8 0.3 - 6.2 %  Final   01/11/2023 2.2 0 - 7 % Final     Basophil Rel %   Date Value Ref Range Status   01/11/2023 0.5 0 - 2 % Final     Basophil %   Date Value Ref Range Status   04/28/2023 0.4 0.0 - 1.5 % Final     Immature Grans %   Date Value Ref Range Status   04/28/2023 0.6 (H) 0.0 - 0.5 % Final   01/11/2023 0.2 0.0 - 1.0 % Final     Neutrophils Absolute   Date Value Ref Range Status   01/11/2023 6.56 2.0 - 8.8 10*3/uL Final     Neutrophils, Absolute   Date Value Ref Range Status   04/28/2023 5.40 1.70 - 7.00 10*3/mm3 Final     Lymphocytes Absolute   Date Value Ref Range Status   01/11/2023 0.85 0.7 - 5.5 10*3/uL Final     Lymphocytes, Absolute   Date Value Ref Range Status   04/28/2023 0.72 0.70 - 3.10 10*3/mm3 Final     Monocytes Absolute   Date Value Ref Range Status   01/11/2023 0.66 0.0 - 1.7 10*3/uL Final     Monocytes, Absolute   Date Value Ref Range Status   04/28/2023 0.96 (H) 0.10 - 0.90 10*3/mm3 Final     Eosinophils Absolute   Date Value Ref Range Status   01/11/2023 0.18 0.0 - 0.8 10*3/uL Final     Eosinophils, Absolute   Date Value Ref Range Status   04/28/2023 0.06 0.00 - 0.40 10*3/mm3 Final     Basophils Absolute   Date Value Ref Range Status   01/11/2023 0.04 0.0 - 0.2 10*3/uL Final     Basophils, Absolute   Date Value Ref Range Status   04/28/2023 0.03 0.00 - 0.20 10*3/mm3 Final     Immature Grans, Absolute   Date Value Ref Range Status   04/28/2023 0.04 0.00 - 0.05 10*3/mm3 Final   01/11/2023 0.02 0.00 - 0.10 10*3/uL Final     nRBC   Date Value Ref Range Status   04/28/2023 0.0 0.0 - 0.2 /100 WBC Final     Lab Results   Component Value Date    GLUCOSE 115 (H) 04/28/2023    BUN 9 04/28/2023    CREATININE 0.80 04/28/2023    EGFR 98.2 04/28/2023    BCR 11.3 04/28/2023    K 3.7 04/28/2023    CO2 22.0 04/28/2023    CALCIUM 8.6 04/28/2023    ALBUMIN 3.5 04/28/2023    BILITOT 0.3 04/28/2023    AST 62 (H) 04/28/2023    ALT 26 04/28/2023          Jian Baker reports a pain score of 0.  Given his pain assessment  as noted, treatment options were discussed and the following options were decided upon as a follow-up plan to address the patient's pain: continuation of current treatment plan for pain.    Patient screened negative for depression based on a PHQ-9 score of 0 on 4/28/2023.     Advance Care Planning   ACP discussion was declined by the patient. Patient does not have an advance directive, declines further assistance.         Diagnosis:   (1) Cholangiocarcinoma, locally advance, unresectable   At least Stage IIIB (cT4, cN1, cM0)      Current therapy:   Gemcitabine/Cisplatin/Durvalumab      D1C1: 2/3/22, D8C1: 2/10/23  D1C2: 2/24/23, D8C2: 3/3/23   D1C3: 3/17/23,D8C3: 3/24/23     CT scan showed overall stable disease with slight less dense primary tumor.  No evidence of progressive disease.     D1C4: 4/5/23, D8C4: 4/14/23   D1C5: 4/28/23      (2) Biliary obstruction   (3) Cancer associated pain    (4) Chemo therapy induced nausea/emesis    Assessment & Plan     (1) Cholangiocarcinoma, locally advance, unresectable     Chronic, stable.\Currently on cisplatin, gemcitabine and durvalumab.  Tolerating treatment well without any major side effect.  Fatigue is present but manageable.  No GI side effects.  No immune related adverse events.  Feels well.  Labs look stable.  Day 1 cycle 5 gemcitabine, cisplatin and durvalumab was signed on today's visit.  CBC, CMP next week prior to day 8 cycle 5.  I will see him back in 3 weeks with CBC, CMP, magnesium level and TSH prior to day 1 cycle 6.    (2) Biliary obstruction     Chronic, stable.  S/p PTBD still.  No jaundice.  Liver enzymes are improved.  Right biliary drain continues to drain copious amount of bile.  Left.  Drain almost no drainage.  He is being managed by Dr. Barboza in [place].    (3) Cancer associated pain      Chronic, stable.  He is using oxycodone as needed.  Continue this.  New prescription sent.    (4) Chemotherapy induced nausea/emesis    Chronic, stable.   Continue Zofran as needed.    4/28/2023      CC:

## 2023-05-05 ENCOUNTER — INFUSION (OUTPATIENT)
Dept: ONCOLOGY | Facility: HOSPITAL | Age: 68
End: 2023-05-05
Payer: MEDICARE

## 2023-05-05 ENCOUNTER — DOCUMENTATION (OUTPATIENT)
Dept: ONCOLOGY | Facility: CLINIC | Age: 68
End: 2023-05-05
Payer: MEDICARE

## 2023-05-05 VITALS
HEART RATE: 70 BPM | DIASTOLIC BLOOD PRESSURE: 77 MMHG | RESPIRATION RATE: 18 BRPM | TEMPERATURE: 98 F | SYSTOLIC BLOOD PRESSURE: 154 MMHG

## 2023-05-05 DIAGNOSIS — Z45.2 ENCOUNTER FOR VENOUS ACCESS DEVICE CARE: ICD-10-CM

## 2023-05-05 DIAGNOSIS — C24.9 MALIGNANT NEOPLASM OF BILIARY TRACT, UNSPECIFIED: ICD-10-CM

## 2023-05-05 DIAGNOSIS — R51.9 ACUTE NONINTRACTABLE HEADACHE, UNSPECIFIED HEADACHE TYPE: Primary | ICD-10-CM

## 2023-05-05 LAB
ALBUMIN SERPL-MCNC: 3.5 G/DL (ref 3.5–5.2)
ALBUMIN/GLOB SERPL: 1.1 G/DL
ALP SERPL-CCNC: 276 U/L (ref 39–117)
ALT SERPL W P-5'-P-CCNC: 28 U/L (ref 1–41)
ANION GAP SERPL CALCULATED.3IONS-SCNC: 12 MMOL/L (ref 5–15)
AST SERPL-CCNC: 33 U/L (ref 1–40)
BASOPHILS # BLD AUTO: 0.06 10*3/MM3 (ref 0–0.2)
BASOPHILS NFR BLD AUTO: 1.3 % (ref 0–1.5)
BILIRUB SERPL-MCNC: 0.4 MG/DL (ref 0–1.2)
BUN SERPL-MCNC: 8 MG/DL (ref 8–23)
BUN/CREAT SERPL: 10.5 (ref 7–25)
CALCIUM SPEC-SCNC: 8.3 MG/DL (ref 8.6–10.5)
CHLORIDE SERPL-SCNC: 102 MMOL/L (ref 98–107)
CO2 SERPL-SCNC: 20 MMOL/L (ref 22–29)
CREAT SERPL-MCNC: 0.76 MG/DL (ref 0.76–1.27)
DEPRECATED RDW RBC AUTO: 58.9 FL (ref 37–54)
EGFRCR SERPLBLD CKD-EPI 2021: 99.7 ML/MIN/1.73
EOSINOPHIL # BLD AUTO: 0.04 10*3/MM3 (ref 0–0.4)
EOSINOPHIL NFR BLD AUTO: 0.8 % (ref 0.3–6.2)
ERYTHROCYTE [DISTWIDTH] IN BLOOD BY AUTOMATED COUNT: 17.5 % (ref 12.3–15.4)
GLOBULIN UR ELPH-MCNC: 3.2 GM/DL
GLUCOSE SERPL-MCNC: 87 MG/DL (ref 65–99)
HCT VFR BLD AUTO: 24.7 % (ref 37.5–51)
HGB BLD-MCNC: 8.3 G/DL (ref 13–17.7)
HOLD SPECIMEN: NORMAL
IMM GRANULOCYTES # BLD AUTO: 0.02 10*3/MM3 (ref 0–0.05)
IMM GRANULOCYTES NFR BLD AUTO: 0.4 % (ref 0–0.5)
LYMPHOCYTES # BLD AUTO: 0.76 10*3/MM3 (ref 0.7–3.1)
LYMPHOCYTES NFR BLD AUTO: 16 % (ref 19.6–45.3)
MAGNESIUM SERPL-MCNC: 1.2 MG/DL (ref 1.6–2.4)
MCH RBC QN AUTO: 30.7 PG (ref 26.6–33)
MCHC RBC AUTO-ENTMCNC: 33.6 G/DL (ref 31.5–35.7)
MCV RBC AUTO: 91.5 FL (ref 79–97)
MONOCYTES # BLD AUTO: 0.59 10*3/MM3 (ref 0.1–0.9)
MONOCYTES NFR BLD AUTO: 12.4 % (ref 5–12)
NEUTROPHILS NFR BLD AUTO: 3.28 10*3/MM3 (ref 1.7–7)
NEUTROPHILS NFR BLD AUTO: 69.1 % (ref 42.7–76)
NRBC BLD AUTO-RTO: 0 /100 WBC (ref 0–0.2)
PLATELET # BLD AUTO: 184 10*3/MM3 (ref 140–450)
PMV BLD AUTO: 9.8 FL (ref 6–12)
POTASSIUM SERPL-SCNC: 3.8 MMOL/L (ref 3.5–5.2)
PROT SERPL-MCNC: 6.7 G/DL (ref 6–8.5)
RBC # BLD AUTO: 2.7 10*6/MM3 (ref 4.14–5.8)
SODIUM SERPL-SCNC: 134 MMOL/L (ref 136–145)
WBC NRBC COR # BLD: 4.75 10*3/MM3 (ref 3.4–10.8)

## 2023-05-05 PROCEDURE — 25010000002 POTASSIUM CHLORIDE PER 2 MEQ OF POTASSIUM: Performed by: INTERNAL MEDICINE

## 2023-05-05 PROCEDURE — 25010000002 HEPARIN LOCK FLUSH PER 10 UNITS: Performed by: INTERNAL MEDICINE

## 2023-05-05 PROCEDURE — 83735 ASSAY OF MAGNESIUM: CPT

## 2023-05-05 PROCEDURE — 63710000001 OLANZAPINE 5 MG TABLET: Performed by: INTERNAL MEDICINE

## 2023-05-05 PROCEDURE — 25010000002 GEMCITABINE 1 GM/26.3ML SOLUTION 26.3 ML VIAL: Performed by: INTERNAL MEDICINE

## 2023-05-05 PROCEDURE — A9270 NON-COVERED ITEM OR SERVICE: HCPCS | Performed by: INTERNAL MEDICINE

## 2023-05-05 PROCEDURE — 80053 COMPREHEN METABOLIC PANEL: CPT

## 2023-05-05 PROCEDURE — 25010000002 MAGNESIUM SULFATE PER 500 MG OF MAGNESIUM: Performed by: INTERNAL MEDICINE

## 2023-05-05 PROCEDURE — 85025 COMPLETE CBC W/AUTO DIFF WBC: CPT

## 2023-05-05 PROCEDURE — 25010000002 CISPLATIN PER 50 MG: Performed by: INTERNAL MEDICINE

## 2023-05-05 PROCEDURE — 25010000002 DEXAMETHASONE SODIUM PHOSPHATE 100 MG/10ML SOLUTION: Performed by: INTERNAL MEDICINE

## 2023-05-05 PROCEDURE — 25010000002 FOSAPREPITANT PER 1 MG: Performed by: INTERNAL MEDICINE

## 2023-05-05 PROCEDURE — 25010000002 PALONOSETRON PER 25 MCG: Performed by: INTERNAL MEDICINE

## 2023-05-05 RX ORDER — SODIUM CHLORIDE 0.9 % (FLUSH) 0.9 %
10 SYRINGE (ML) INJECTION AS NEEDED
OUTPATIENT
Start: 2023-05-05

## 2023-05-05 RX ORDER — OLANZAPINE 5 MG/1
5 TABLET ORAL ONCE
Status: COMPLETED | OUTPATIENT
Start: 2023-05-05 | End: 2023-05-05

## 2023-05-05 RX ORDER — PALONOSETRON 0.05 MG/ML
0.25 INJECTION, SOLUTION INTRAVENOUS ONCE
Status: COMPLETED | OUTPATIENT
Start: 2023-05-05 | End: 2023-05-05

## 2023-05-05 RX ORDER — HEPARIN SODIUM (PORCINE) LOCK FLUSH IV SOLN 100 UNIT/ML 100 UNIT/ML
500 SOLUTION INTRAVENOUS AS NEEDED
Status: DISCONTINUED | OUTPATIENT
Start: 2023-05-05 | End: 2023-05-05 | Stop reason: HOSPADM

## 2023-05-05 RX ORDER — HEPARIN SODIUM (PORCINE) LOCK FLUSH IV SOLN 100 UNIT/ML 100 UNIT/ML
500 SOLUTION INTRAVENOUS AS NEEDED
Status: CANCELLED | OUTPATIENT
Start: 2023-05-05

## 2023-05-05 RX ORDER — SODIUM CHLORIDE 0.9 % (FLUSH) 0.9 %
10 SYRINGE (ML) INJECTION AS NEEDED
Status: DISCONTINUED | OUTPATIENT
Start: 2023-05-05 | End: 2023-05-05 | Stop reason: HOSPADM

## 2023-05-05 RX ORDER — SODIUM CHLORIDE 9 MG/ML
250 INJECTION, SOLUTION INTRAVENOUS ONCE
Status: COMPLETED | OUTPATIENT
Start: 2023-05-05 | End: 2023-05-05

## 2023-05-05 RX ORDER — HEPARIN SODIUM (PORCINE) LOCK FLUSH IV SOLN 100 UNIT/ML 100 UNIT/ML
500 SOLUTION INTRAVENOUS AS NEEDED
OUTPATIENT
Start: 2023-05-05

## 2023-05-05 RX ORDER — SODIUM CHLORIDE 0.9 % (FLUSH) 0.9 %
10 SYRINGE (ML) INJECTION AS NEEDED
Status: CANCELLED | OUTPATIENT
Start: 2023-05-05

## 2023-05-05 RX ADMIN — GEMCITABINE 1900 MG: 38 INJECTION, SOLUTION INTRAVENOUS at 12:07

## 2023-05-05 RX ADMIN — HEPARIN 500 UNITS: 100 SYRINGE at 14:08

## 2023-05-05 RX ADMIN — DEXAMETHASONE SODIUM PHOSPHATE 12 MG: 10 INJECTION, SOLUTION INTRAMUSCULAR; INTRAVENOUS at 11:34

## 2023-05-05 RX ADMIN — FOSAPREPITANT 100 ML: 150 INJECTION, POWDER, LYOPHILIZED, FOR SOLUTION INTRAVENOUS at 09:52

## 2023-05-05 RX ADMIN — OLANZAPINE 5 MG: 5 TABLET, FILM COATED ORAL at 09:19

## 2023-05-05 RX ADMIN — POTASSIUM CHLORIDE: 2 INJECTION, SOLUTION, CONCENTRATE INTRAVENOUS at 12:41

## 2023-05-05 RX ADMIN — SODIUM CHLORIDE 250 ML: 9 INJECTION, SOLUTION INTRAVENOUS at 09:18

## 2023-05-05 RX ADMIN — PALONOSETRON 0.25 MG: 0.05 INJECTION, SOLUTION INTRAVENOUS at 09:18

## 2023-05-05 RX ADMIN — POTASSIUM CHLORIDE: 2 INJECTION, SOLUTION, CONCENTRATE INTRAVENOUS at 10:43

## 2023-05-05 RX ADMIN — CISPLATIN 48 MG: 1 INJECTION, SOLUTION INTRAVENOUS at 12:43

## 2023-05-05 RX ADMIN — Medication 10 ML: at 14:08

## 2023-05-05 NOTE — PROGRESS NOTES
Oncology SW  Met with pt while in the infusion area, per pt request.  SW maintains frequent communication to assist with transportation through pt insurance- [employer].  Pt. States request for direction with help for medical bills. SW offered feedback r/t financial assistance and will follow up with referral and request to mail pt application.  SW offered to assist or direct in the event pt has difficulty with completion.   Pt shared there are three adults in home, pt, wife and his brother. They share expenses with rent, utilities, etc exceeding family income.   Pt. Referred to Food stamp office to reapply and discussed area food bank services.  Pt. States understanding and appreciation an will advise as needs arise, to include continued support with arrangements for transportation.

## 2023-05-08 ENCOUNTER — DOCUMENTATION (OUTPATIENT)
Dept: NUTRITION | Facility: HOSPITAL | Age: 68
End: 2023-05-08
Payer: MEDICARE

## 2023-05-08 NOTE — PROGRESS NOTES
Adult Outpatient Nutrition  Assessment    Patient Name:  Jian Baker  YOB: 1955  MRN: [id number]    Assessment Date:  Entry from 5/5/2023    Comments: Follow-up to check nutrition. Wt 168 lb. Lost 20% body/yr. Alb 3.5. Weight loss recently slowed. Receiving chemo due to gall bladder cancer. Pt states appetite/intake better. No nausea/vomiting. Significant fatigue. Due to financial limitations, provided 1/2 case Boost Plus.                    Electronically signed by:  Isis Alvarez RD  05/08/23 09:50 CDT

## 2023-05-10 DIAGNOSIS — K21.9 GASTROESOPHAGEAL REFLUX DISEASE WITHOUT ESOPHAGITIS: ICD-10-CM

## 2023-05-10 DIAGNOSIS — C24.9 MALIGNANT NEOPLASM OF BILIARY TRACT, UNSPECIFIED: ICD-10-CM

## 2023-05-10 DIAGNOSIS — G89.3 CANCER ASSOCIATED PAIN: ICD-10-CM

## 2023-05-10 RX ORDER — OLANZAPINE 5 MG/1
10 TABLET ORAL NIGHTLY
Qty: 6 TABLET | Refills: 7 | Status: SHIPPED | OUTPATIENT
Start: 2023-05-10

## 2023-05-10 RX ORDER — ONDANSETRON HYDROCHLORIDE 8 MG/1
8 TABLET, FILM COATED ORAL 3 TIMES DAILY PRN
Qty: 30 TABLET | Refills: 5 | Status: SHIPPED | OUTPATIENT
Start: 2023-05-10

## 2023-05-10 RX ORDER — OLANZAPINE 5 MG/1
TABLET ORAL
Qty: 6 TABLET | Refills: 7 | Status: CANCELLED | OUTPATIENT
Start: 2023-05-10

## 2023-05-10 RX ORDER — PANTOPRAZOLE SODIUM 40 MG/1
40 TABLET, DELAYED RELEASE ORAL DAILY
Qty: 30 TABLET | Refills: 2 | Status: CANCELLED | OUTPATIENT
Start: 2023-05-10

## 2023-05-10 RX ORDER — OXYCODONE HYDROCHLORIDE 5 MG/1
5 CAPSULE ORAL EVERY 4 HOURS PRN
Qty: 60 CAPSULE | Refills: 0 | Status: SHIPPED | OUTPATIENT
Start: 2023-05-10

## 2023-05-10 NOTE — TELEPHONE ENCOUNTER
Rx Refill Note  Requested Prescriptions     Pending Prescriptions Disp Refills   • ondansetron (ZOFRAN) 8 MG tablet 30 tablet 5     Sig: Take 1 tablet by mouth 3 (Three) Times a Day As Needed for Nausea or Vomiting.   • oxyCODONE (OXY-IR) 5 MG capsule 60 capsule 0     Sig: Take 1 capsule by mouth Every 4 (Four) Hours As Needed for Moderate Pain.      Last office visit with prescribing clinician: 4/28/2023   Last telemedicine visit with prescribing clinician: 5/5/2023   Next office visit with prescribing clinician: 5/19/2023                         Would you like a call back once the refill request has been completed: [] Yes [] No    If the office needs to give you a call back, can they leave a voicemail: [] Yes [] No    Clementine Lainez, Arabella Rep  05/10/23, 14:59 CDT

## 2023-05-10 NOTE — TELEPHONE ENCOUNTER
Incoming Refill Request      Medication requested (name and dose): Oxycodone    Pharmacy where request should be sent: [facility]    Additional details provided by patient: na    Best call back number: [phone]    Does the patient have less than a 3 day supply:  [] Yes  [x] No    Arabella Sierra Rep  05/10/23, 14:25 CDT

## 2023-05-10 NOTE — TELEPHONE ENCOUNTER
Incoming Refill Request      Medication requested Zofran  3 doses left    Pharmacy where request should be sent: [facility]    Additional details provided by patient: na    Best call back number: [phone]    Does the patient have less than a 3 day supply:  [x] Yes  [] No    Arabella Sierra Rep  05/10/23, 14:03 CDT

## 2023-05-10 NOTE — TELEPHONE ENCOUNTER
Rx Refill Note  Requested Prescriptions     Pending Prescriptions Disp Refills   • ondansetron (ZOFRAN) 8 MG tablet 30 tablet 5     Sig: Take 1 tablet by mouth 3 (Three) Times a Day As Needed for Nausea or Vomiting.   • oxyCODONE (OXY-IR) 5 MG capsule 60 capsule 0     Sig: Take 1 capsule by mouth Every 4 (Four) Hours As Needed for Moderate Pain.   • OLANZapine (zyPREXA) 5 MG tablet 6 tablet 7      Last office visit with prescribing clinician: 4/28/2023   Last telemedicine visit with prescribing clinician: 5/5/2023   Next office visit with prescribing clinician: 5/19/2023                         Would you like a call back once the refill request has been completed: [] Yes [] No    If the office needs to give you a call back, can they leave a voicemail: [] Yes [] No    Arabella Castillo Rep  05/10/23, 15:01 CDT

## 2023-05-19 ENCOUNTER — DOCUMENTATION (OUTPATIENT)
Dept: NUTRITION | Facility: HOSPITAL | Age: 68
End: 2023-05-19
Payer: MEDICARE

## 2023-05-19 ENCOUNTER — INFUSION (OUTPATIENT)
Dept: ONCOLOGY | Facility: HOSPITAL | Age: 68
End: 2023-05-19
Payer: MEDICARE

## 2023-05-19 ENCOUNTER — OFFICE VISIT (OUTPATIENT)
Dept: ONCOLOGY | Facility: CLINIC | Age: 68
End: 2023-05-19
Payer: MEDICARE

## 2023-05-19 VITALS
HEART RATE: 65 BPM | RESPIRATION RATE: 18 BRPM | TEMPERATURE: 96.2 F | DIASTOLIC BLOOD PRESSURE: 85 MMHG | WEIGHT: 168 LBS | OXYGEN SATURATION: 100 % | BODY MASS INDEX: 26.31 KG/M2 | SYSTOLIC BLOOD PRESSURE: 153 MMHG

## 2023-05-19 DIAGNOSIS — E03.2 HYPOTHYROIDISM DUE TO MEDICATION: ICD-10-CM

## 2023-05-19 DIAGNOSIS — R51.9 ACUTE NONINTRACTABLE HEADACHE, UNSPECIFIED HEADACHE TYPE: ICD-10-CM

## 2023-05-19 DIAGNOSIS — Z79.899 OTHER LONG TERM (CURRENT) DRUG THERAPY: ICD-10-CM

## 2023-05-19 DIAGNOSIS — C24.9 MALIGNANT NEOPLASM OF BILIARY TRACT, UNSPECIFIED: Primary | ICD-10-CM

## 2023-05-19 DIAGNOSIS — C24.9 MALIGNANT NEOPLASM OF BILIARY TRACT, UNSPECIFIED: ICD-10-CM

## 2023-05-19 DIAGNOSIS — Z45.2 ENCOUNTER FOR VENOUS ACCESS DEVICE CARE: ICD-10-CM

## 2023-05-19 LAB
ALBUMIN SERPL-MCNC: 3.6 G/DL (ref 3.5–5.2)
ALBUMIN/GLOB SERPL: 1.2 G/DL
ALP SERPL-CCNC: 334 U/L (ref 39–117)
ALT SERPL W P-5'-P-CCNC: 21 U/L (ref 1–41)
ANION GAP SERPL CALCULATED.3IONS-SCNC: 12 MMOL/L (ref 5–15)
AST SERPL-CCNC: 26 U/L (ref 1–40)
BASOPHILS # BLD AUTO: 0.04 10*3/MM3 (ref 0–0.2)
BASOPHILS NFR BLD AUTO: 0.7 % (ref 0–1.5)
BILIRUB SERPL-MCNC: 0.2 MG/DL (ref 0–1.2)
BUN SERPL-MCNC: 12 MG/DL (ref 8–23)
BUN/CREAT SERPL: 12.9 (ref 7–25)
CALCIUM SPEC-SCNC: 8.7 MG/DL (ref 8.6–10.5)
CHLORIDE SERPL-SCNC: 100 MMOL/L (ref 98–107)
CO2 SERPL-SCNC: 22 MMOL/L (ref 22–29)
CREAT SERPL-MCNC: 0.93 MG/DL (ref 0.76–1.27)
DEPRECATED RDW RBC AUTO: 57.2 FL (ref 37–54)
EGFRCR SERPLBLD CKD-EPI 2021: 91.1 ML/MIN/1.73
EOSINOPHIL # BLD AUTO: 0.07 10*3/MM3 (ref 0–0.4)
EOSINOPHIL NFR BLD AUTO: 1.1 % (ref 0.3–6.2)
ERYTHROCYTE [DISTWIDTH] IN BLOOD BY AUTOMATED COUNT: 17.2 % (ref 12.3–15.4)
GLOBULIN UR ELPH-MCNC: 3 GM/DL
GLUCOSE SERPL-MCNC: 85 MG/DL (ref 65–99)
HCT VFR BLD AUTO: 24.2 % (ref 37.5–51)
HGB BLD-MCNC: 8.1 G/DL (ref 13–17.7)
HOLD SPECIMEN: NORMAL
IMM GRANULOCYTES # BLD AUTO: 0.05 10*3/MM3 (ref 0–0.05)
IMM GRANULOCYTES NFR BLD AUTO: 0.8 % (ref 0–0.5)
LYMPHOCYTES # BLD AUTO: 1.06 10*3/MM3 (ref 0.7–3.1)
LYMPHOCYTES NFR BLD AUTO: 17.3 % (ref 19.6–45.3)
MAGNESIUM SERPL-MCNC: 1.4 MG/DL (ref 1.6–2.4)
MCH RBC QN AUTO: 31.3 PG (ref 26.6–33)
MCHC RBC AUTO-ENTMCNC: 33.5 G/DL (ref 31.5–35.7)
MCV RBC AUTO: 93.4 FL (ref 79–97)
MONOCYTES # BLD AUTO: 0.8 10*3/MM3 (ref 0.1–0.9)
MONOCYTES NFR BLD AUTO: 13.1 % (ref 5–12)
NEUTROPHILS NFR BLD AUTO: 4.11 10*3/MM3 (ref 1.7–7)
NEUTROPHILS NFR BLD AUTO: 67 % (ref 42.7–76)
NRBC BLD AUTO-RTO: 0 /100 WBC (ref 0–0.2)
PLATELET # BLD AUTO: 186 10*3/MM3 (ref 140–450)
PMV BLD AUTO: 10.4 FL (ref 6–12)
POTASSIUM SERPL-SCNC: 3.3 MMOL/L (ref 3.5–5.2)
PROT SERPL-MCNC: 6.6 G/DL (ref 6–8.5)
RBC # BLD AUTO: 2.59 10*6/MM3 (ref 4.14–5.8)
SODIUM SERPL-SCNC: 134 MMOL/L (ref 136–145)
T4 FREE SERPL-MCNC: 0.63 NG/DL (ref 0.93–1.7)
TSH SERPL DL<=0.05 MIU/L-ACNC: 29.69 UIU/ML (ref 0.27–4.2)
WBC NRBC COR # BLD: 6.13 10*3/MM3 (ref 3.4–10.8)

## 2023-05-19 PROCEDURE — 25010000002 CISPLATIN PER 50 MG: Performed by: INTERNAL MEDICINE

## 2023-05-19 PROCEDURE — 25010000002 POTASSIUM CHLORIDE PER 2 MEQ OF POTASSIUM: Performed by: INTERNAL MEDICINE

## 2023-05-19 PROCEDURE — 25010000002 GEMCITABINE 1 GM/26.3ML SOLUTION 26.3 ML VIAL: Performed by: INTERNAL MEDICINE

## 2023-05-19 PROCEDURE — 25010000002 MAGNESIUM SULFATE PER 500 MG OF MAGNESIUM: Performed by: INTERNAL MEDICINE

## 2023-05-19 PROCEDURE — 63710000001 OLANZAPINE 5 MG TABLET: Performed by: INTERNAL MEDICINE

## 2023-05-19 PROCEDURE — 25010000002 PALONOSETRON PER 25 MCG: Performed by: INTERNAL MEDICINE

## 2023-05-19 PROCEDURE — 85025 COMPLETE CBC W/AUTO DIFF WBC: CPT

## 2023-05-19 PROCEDURE — A9270 NON-COVERED ITEM OR SERVICE: HCPCS | Performed by: INTERNAL MEDICINE

## 2023-05-19 PROCEDURE — 25010000002 HEPARIN LOCK FLUSH PER 10 UNITS: Performed by: INTERNAL MEDICINE

## 2023-05-19 PROCEDURE — 25010000002 DEXAMETHASONE SODIUM PHOSPHATE 100 MG/10ML SOLUTION: Performed by: INTERNAL MEDICINE

## 2023-05-19 PROCEDURE — 84439 ASSAY OF FREE THYROXINE: CPT

## 2023-05-19 PROCEDURE — 80053 COMPREHEN METABOLIC PANEL: CPT

## 2023-05-19 PROCEDURE — 83735 ASSAY OF MAGNESIUM: CPT

## 2023-05-19 PROCEDURE — 84443 ASSAY THYROID STIM HORMONE: CPT

## 2023-05-19 PROCEDURE — 25010000002 DURVALUMAB 50 MG/ML SOLUTION 10 ML VIAL: Performed by: INTERNAL MEDICINE

## 2023-05-19 PROCEDURE — 25010000002 FOSAPREPITANT PER 1 MG: Performed by: INTERNAL MEDICINE

## 2023-05-19 RX ORDER — OLANZAPINE 5 MG/1
5 TABLET ORAL ONCE
Status: COMPLETED | OUTPATIENT
Start: 2023-05-19 | End: 2023-05-19

## 2023-05-19 RX ORDER — OLANZAPINE 5 MG/1
5 TABLET ORAL ONCE
Status: CANCELLED | OUTPATIENT
Start: 2023-05-19 | End: 2023-05-19

## 2023-05-19 RX ORDER — SODIUM CHLORIDE 9 MG/ML
250 INJECTION, SOLUTION INTRAVENOUS ONCE
Status: COMPLETED | OUTPATIENT
Start: 2023-05-19 | End: 2023-05-19

## 2023-05-19 RX ORDER — OLANZAPINE 5 MG/1
5 TABLET ORAL ONCE
OUTPATIENT
Start: 2023-05-26 | End: 2023-05-26

## 2023-05-19 RX ORDER — SODIUM CHLORIDE 0.9 % (FLUSH) 0.9 %
10 SYRINGE (ML) INJECTION AS NEEDED
OUTPATIENT
Start: 2023-05-19

## 2023-05-19 RX ORDER — PALONOSETRON 0.05 MG/ML
0.25 INJECTION, SOLUTION INTRAVENOUS ONCE
Status: CANCELLED | OUTPATIENT
Start: 2023-05-19

## 2023-05-19 RX ORDER — PALONOSETRON 0.05 MG/ML
0.25 INJECTION, SOLUTION INTRAVENOUS ONCE
Status: COMPLETED | OUTPATIENT
Start: 2023-05-19 | End: 2023-05-19

## 2023-05-19 RX ORDER — HEPARIN SODIUM (PORCINE) LOCK FLUSH IV SOLN 100 UNIT/ML 100 UNIT/ML
500 SOLUTION INTRAVENOUS AS NEEDED
OUTPATIENT
Start: 2023-05-19

## 2023-05-19 RX ORDER — HEPARIN SODIUM (PORCINE) LOCK FLUSH IV SOLN 100 UNIT/ML 100 UNIT/ML
500 SOLUTION INTRAVENOUS AS NEEDED
Status: DISCONTINUED | OUTPATIENT
Start: 2023-05-19 | End: 2023-05-19 | Stop reason: HOSPADM

## 2023-05-19 RX ORDER — SODIUM CHLORIDE 9 MG/ML
250 INJECTION, SOLUTION INTRAVENOUS ONCE
Status: CANCELLED | OUTPATIENT
Start: 2023-05-19

## 2023-05-19 RX ORDER — SODIUM CHLORIDE 0.9 % (FLUSH) 0.9 %
10 SYRINGE (ML) INJECTION AS NEEDED
Status: DISCONTINUED | OUTPATIENT
Start: 2023-05-19 | End: 2023-05-19 | Stop reason: HOSPADM

## 2023-05-19 RX ORDER — LEVOTHYROXINE SODIUM 0.05 MG/1
50 TABLET ORAL
Qty: 90 TABLET | Refills: 0 | Status: SHIPPED | OUTPATIENT
Start: 2023-05-19

## 2023-05-19 RX ORDER — PALONOSETRON 0.05 MG/ML
0.25 INJECTION, SOLUTION INTRAVENOUS ONCE
OUTPATIENT
Start: 2023-05-26

## 2023-05-19 RX ORDER — SODIUM CHLORIDE 9 MG/ML
250 INJECTION, SOLUTION INTRAVENOUS ONCE
OUTPATIENT
Start: 2023-05-26

## 2023-05-19 RX ADMIN — OLANZAPINE 5 MG: 5 TABLET, FILM COATED ORAL at 08:28

## 2023-05-19 RX ADMIN — POTASSIUM CHLORIDE: 2 INJECTION, SOLUTION, CONCENTRATE INTRAVENOUS at 10:34

## 2023-05-19 RX ADMIN — HEPARIN 500 UNITS: 100 SYRINGE at 13:39

## 2023-05-19 RX ADMIN — GEMCITABINE 1900 MG: 38 INJECTION, SOLUTION INTRAVENOUS at 11:45

## 2023-05-19 RX ADMIN — SODIUM CHLORIDE 250 ML: 9 INJECTION, SOLUTION INTRAVENOUS at 08:28

## 2023-05-19 RX ADMIN — CISPLATIN 48 MG: 1 INJECTION, SOLUTION INTRAVENOUS at 12:27

## 2023-05-19 RX ADMIN — DEXAMETHASONE SODIUM PHOSPHATE 12 MG: 10 INJECTION, SOLUTION INTRAMUSCULAR; INTRAVENOUS at 11:12

## 2023-05-19 RX ADMIN — POTASSIUM CHLORIDE: 2 INJECTION, SOLUTION, CONCENTRATE INTRAVENOUS at 11:46

## 2023-05-19 RX ADMIN — Medication 10 ML: at 13:39

## 2023-05-19 RX ADMIN — SODIUM CHLORIDE 1500 MG: 9 INJECTION, SOLUTION INTRAVENOUS at 09:16

## 2023-05-19 RX ADMIN — FOSAPREPITANT 100 ML: 150 INJECTION, POWDER, LYOPHILIZED, FOR SOLUTION INTRAVENOUS at 08:35

## 2023-05-19 RX ADMIN — PALONOSETRON 0.25 MG: 0.05 INJECTION, SOLUTION INTRAVENOUS at 08:28

## 2023-05-19 NOTE — PROGRESS NOTES
Subjective     Jian Baker was seen in follow-up for cholangiocarcinoma.  He is overall stable.  Denies any new health issues.  No new aches or pains.  No fever or chills.  No nausea or emesis.      Past Medical History, Past Surgical History, Social History, Family History have been reviewed and are without significant changes except as mentioned.        Medications:  The current medication list was reviewed in the EMR    ALLERGIES:  No Known Allergies    Objective      Vitals:    05/19/23 0756   BP: 153/85   Pulse: 65   Resp: 18   Temp: 96.2 °F (35.7 °C)   SpO2: 100%             4/14/2023     8:06 AM   Current Status   ECOG score 0       Physical Exam  Vitals and nursing note reviewed.   Constitutional:       Appearance: Normal appearance.   Neurological:      General: No focal deficit present.      Mental Status: He is alert and oriented to person, place, and time. Mental status is at baseline.   Psychiatric:         Mood and Affect: Mood normal.         Behavior: Behavior normal.         Thought Content: Thought content normal.               RECENT LABS:Independently reviewed and summarized  Hematology WBC   Date Value Ref Range Status   05/05/2023 4.75 3.40 - 10.80 10*3/mm3 Final   01/11/2023 8.31 4.5 - 11.0 10*3/uL Final     RBC   Date Value Ref Range Status   05/05/2023 2.70 (L) 4.14 - 5.80 10*6/mm3 Final   01/11/2023 4.17 (L) 4.5 - 5.9 10*6/uL Final     Hemoglobin   Date Value Ref Range Status   05/05/2023 8.3 (L) 13.0 - 17.7 g/dL Final   01/11/2023 13.0 (L) 13.5 - 17.5 g/dL Final     Hematocrit   Date Value Ref Range Status   05/05/2023 24.7 (L) 37.5 - 51.0 % Final   01/11/2023 39.2 (L) 41.0 - 53.0 % Final     Platelets   Date Value Ref Range Status   05/05/2023 184 140 - 450 10*3/mm3 Final   01/11/2023 290 140 - 440 10*3/uL Final     WBC   Date Value Ref Range Status   05/19/2023 6.13 3.40 - 10.80 10*3/mm3 Final   01/11/2023 8.31 4.5 - 11.0 10*3/uL Final     RBC   Date Value Ref Range Status    05/19/2023 2.59 (L) 4.14 - 5.80 10*6/mm3 Final   01/11/2023 4.17 (L) 4.5 - 5.9 10*6/uL Final     Hemoglobin   Date Value Ref Range Status   05/19/2023 8.1 (L) 13.0 - 17.7 g/dL Final   01/11/2023 13.0 (L) 13.5 - 17.5 g/dL Final     Hematocrit   Date Value Ref Range Status   05/19/2023 24.2 (L) 37.5 - 51.0 % Final   01/11/2023 39.2 (L) 41.0 - 53.0 % Final     MCV   Date Value Ref Range Status   05/19/2023 93.4 79.0 - 97.0 fL Final   01/11/2023 94.0 80.0 - 100.0 fL Final     MCH   Date Value Ref Range Status   05/19/2023 31.3 26.6 - 33.0 pg Final   01/11/2023 31.2 26.0 - 34.0 pg Final     MCHC   Date Value Ref Range Status   05/19/2023 33.5 31.5 - 35.7 g/dL Final   01/11/2023 33.2 31.0 - 37.0 g/dL Final     RDW   Date Value Ref Range Status   05/19/2023 17.2 (H) 12.3 - 15.4 % Final   01/11/2023 13.2 12.0 - 16.8 % Final     RDW-SD   Date Value Ref Range Status   05/19/2023 57.2 (H) 37.0 - 54.0 fl Final     MPV   Date Value Ref Range Status   05/19/2023 10.4 6.0 - 12.0 fL Final   01/11/2023 9.2 8.4 - 12.4 fL Final     Platelets   Date Value Ref Range Status   05/19/2023 186 140 - 450 10*3/mm3 Final   01/11/2023 290 140 - 440 10*3/uL Final     Neutrophil Rel %   Date Value Ref Range Status   01/11/2023 79.0 45 - 80 % Final     Neutrophil %   Date Value Ref Range Status   05/19/2023 67.0 42.7 - 76.0 % Final     Lymphocyte Rel %   Date Value Ref Range Status   01/11/2023 10.2 (L) 15 - 50 % Final     Lymphocyte %   Date Value Ref Range Status   05/19/2023 17.3 (L) 19.6 - 45.3 % Final     Monocyte Rel %   Date Value Ref Range Status   01/11/2023 7.9 0 - 15 % Final     Monocyte %   Date Value Ref Range Status   05/19/2023 13.1 (H) 5.0 - 12.0 % Final     Eosinophil %   Date Value Ref Range Status   05/19/2023 1.1 0.3 - 6.2 % Final   01/11/2023 2.2 0 - 7 % Final     Basophil Rel %   Date Value Ref Range Status   01/11/2023 0.5 0 - 2 % Final     Basophil %   Date Value Ref Range Status   05/19/2023 0.7 0.0 - 1.5 % Final      Immature Grans %   Date Value Ref Range Status   05/19/2023 0.8 (H) 0.0 - 0.5 % Final   01/11/2023 0.2 0.0 - 1.0 % Final     Neutrophils Absolute   Date Value Ref Range Status   01/11/2023 6.56 2.0 - 8.8 10*3/uL Final     Neutrophils, Absolute   Date Value Ref Range Status   05/19/2023 4.11 1.70 - 7.00 10*3/mm3 Final     Lymphocytes Absolute   Date Value Ref Range Status   01/11/2023 0.85 0.7 - 5.5 10*3/uL Final     Lymphocytes, Absolute   Date Value Ref Range Status   05/19/2023 1.06 0.70 - 3.10 10*3/mm3 Final     Monocytes Absolute   Date Value Ref Range Status   01/11/2023 0.66 0.0 - 1.7 10*3/uL Final     Monocytes, Absolute   Date Value Ref Range Status   05/19/2023 0.80 0.10 - 0.90 10*3/mm3 Final     Eosinophils Absolute   Date Value Ref Range Status   01/11/2023 0.18 0.0 - 0.8 10*3/uL Final     Eosinophils, Absolute   Date Value Ref Range Status   05/19/2023 0.07 0.00 - 0.40 10*3/mm3 Final     Basophils Absolute   Date Value Ref Range Status   01/11/2023 0.04 0.0 - 0.2 10*3/uL Final     Basophils, Absolute   Date Value Ref Range Status   05/19/2023 0.04 0.00 - 0.20 10*3/mm3 Final     Immature Grans, Absolute   Date Value Ref Range Status   05/19/2023 0.05 0.00 - 0.05 10*3/mm3 Final   01/11/2023 0.02 0.00 - 0.10 10*3/uL Final     nRBC   Date Value Ref Range Status   05/19/2023 0.0 0.0 - 0.2 /100 WBC Final     Lab Results   Component Value Date    GLUCOSE 85 05/19/2023    BUN 12 05/19/2023    CREATININE 0.93 05/19/2023    EGFR 91.1 05/19/2023    BCR 12.9 05/19/2023    K 3.3 (L) 05/19/2023    CO2 22.0 05/19/2023    CALCIUM 8.7 05/19/2023    ALBUMIN 3.6 05/19/2023    BILITOT 0.2 05/19/2023    AST 26 05/19/2023    ALT 21 05/19/2023              Jian Baker reports a pain score of 0.  Given his pain assessment as noted, treatment options were discussed and the following options were decided upon as a follow-up plan to address the patient's pain: continuation of current treatment plan for pain.    Patient  screened negative for depression based on a PHQ-9 score of 0 on 5/19/2023.    Advance Care Planning   ACP discussion was declined by the patient. Patient has an advance directive in EMR which is still valid.          Diagnosis:   (1) Cholangiocarcinoma, locally advance, unresectable   At least Stage IIIB (cT4, cN1, cM0)      Current therapy:   Gemcitabine/Cisplatin/Durvalumab      D1C1: 2/3/22, D8C1: 2/10/23  D1C2: 2/24/23, D8C2: 3/3/23   D1C3: 3/17/23,D8C3: 3/24/23     CT scan showed overall stable disease with slight less dense primary tumor.  No evidence of progressive disease.     D1C4: 4/5/23, D8C4: 4/14/23   D1C5: 4/28/23, D8C5: 5/5/23     (2) Biliary obstruction   (3) Cancer associated pain    (4) Chemo therapy induced nausea/emesis  (5) Hypothyroidism      Assessment & Plan       (1) Cholangiocarcinoma, locally advance, unresectable     Chronic, stable  Currently on cisplatin, gemcitabine and durvalumab  Current treatment well without any major side effect.  Fatigue is present but manageable.  No GI side effect.  He does have elevated TSH indicating  Hypothyroidism.  See management below.  Labs look stable except for anemia.  Day 1 cycle 6 gemcitabine, cisplatin and durvalumab was signed on today's visit.  CBC, CMP next week prior to day 8 cycle 6.  We will order CT abdomen pelvis in 2 weeks to evaluate response to treatment.  I will see him back in 3 weeks.    (2) Biliary obstruction     Chronic, stable.  S/p PTBD still.  No jaundice.  Liver enzymes are improved.  Right biliary drain continues to drain copious amount of bile.  Left.  Drain almost no drainage.  He is being managed by Dr. Barboza in [place].    (3) Cancer associated pain      Chronic, stable.  He is using oxycodone as needed.  He will continue this.    (4) Chemo therapy induced nausea/emesis    Chronic, stable.  Continue Zofran as needed    (5) Hypothyroidism      New issue.  Likely secondary to immunotherapy.  Management discussed at  length.  I will start him on Synthroid 50 mcg daily.  He was instructed to take this first thing in the morning empty stomach.  He understood and was agreeable.  I will recheck TSH and T4 in 3 weeks.    5/19/2023      CC:

## 2023-05-19 NOTE — PROGRESS NOTES
Adult Outpatient Nutrition  Assessment    Patient Name:  Jian Baker  YOB: 1955  MRN: [id number]    Assessment Date:  5/19/2023    Comments: Due to financial limitations, provided amor Boost Plus today. Wt 168 today. Alb 3.6.                    Electronically signed by:  Isis Alvarez RD  05/19/23 10:30 CDT

## 2023-05-23 ENCOUNTER — OFFICE VISIT (OUTPATIENT)
Dept: FAMILY MEDICINE CLINIC | Facility: CLINIC | Age: 68
End: 2023-05-23
Payer: MEDICARE

## 2023-05-23 VITALS
DIASTOLIC BLOOD PRESSURE: 78 MMHG | WEIGHT: 174 LBS | BODY MASS INDEX: 27.31 KG/M2 | SYSTOLIC BLOOD PRESSURE: 140 MMHG | HEART RATE: 82 BPM | HEIGHT: 67 IN | OXYGEN SATURATION: 97 %

## 2023-05-23 DIAGNOSIS — R12 HEARTBURN: ICD-10-CM

## 2023-05-23 DIAGNOSIS — E53.8 VITAMIN B 12 DEFICIENCY: ICD-10-CM

## 2023-05-23 DIAGNOSIS — I10 PRIMARY HYPERTENSION: ICD-10-CM

## 2023-05-23 DIAGNOSIS — J30.1 SEASONAL ALLERGIC RHINITIS DUE TO POLLEN: Primary | ICD-10-CM

## 2023-05-23 DIAGNOSIS — M75.122 COMPLETE TEAR OF LEFT ROTATOR CUFF, UNSPECIFIED WHETHER TRAUMATIC: ICD-10-CM

## 2023-05-23 DIAGNOSIS — E78.2 MIXED HYPERLIPIDEMIA: ICD-10-CM

## 2023-05-23 DIAGNOSIS — K21.9 GASTROESOPHAGEAL REFLUX DISEASE WITHOUT ESOPHAGITIS: ICD-10-CM

## 2023-05-23 RX ORDER — CYCLOBENZAPRINE HCL 10 MG
10 TABLET ORAL 3 TIMES DAILY PRN
Qty: 60 TABLET | Refills: 11 | Status: SHIPPED | OUTPATIENT
Start: 2023-05-23

## 2023-05-23 RX ORDER — PANTOPRAZOLE SODIUM 40 MG/1
40 TABLET, DELAYED RELEASE ORAL DAILY
Qty: 30 TABLET | Refills: 2 | Status: SHIPPED | OUTPATIENT
Start: 2023-05-23

## 2023-05-23 RX ORDER — ATORVASTATIN CALCIUM 20 MG/1
20 TABLET, FILM COATED ORAL DAILY
Qty: 90 TABLET | Refills: 0 | Status: SHIPPED | OUTPATIENT
Start: 2023-05-23

## 2023-05-23 RX ORDER — FAMOTIDINE 20 MG/1
20 TABLET, FILM COATED ORAL DAILY
Qty: 60 TABLET | Refills: 2 | Status: SHIPPED | OUTPATIENT
Start: 2023-05-23

## 2023-05-23 RX ORDER — LISINOPRIL 20 MG/1
20 TABLET ORAL DAILY
Qty: 30 TABLET | Refills: 1 | Status: SHIPPED | OUTPATIENT
Start: 2023-05-23

## 2023-05-23 RX ORDER — LORATADINE 10 MG/1
10 TABLET ORAL DAILY
Qty: 30 TABLET | Refills: 1 | Status: SHIPPED | OUTPATIENT
Start: 2023-05-23

## 2023-05-23 RX ORDER — LISINOPRIL 10 MG/1
20 TABLET ORAL DAILY
Status: CANCELLED | OUTPATIENT
Start: 2023-05-23

## 2023-05-23 NOTE — PROGRESS NOTES
The ABCs of the Annual Wellness Visit  Subsequent Medicare Wellness Visit    Subjective      Jian Baker is a 67 y.o. male who presents for a Subsequent Medicare Wellness Visit.    The following portions of the patient's history were reviewed and   updated as appropriate: allergies, current medications, past family history, past medical history, past social history, past surgical history and problem list.    Compared to one year ago, the patient feels his physical   health is worse.    Compared to one year ago, the patient feels his mental   health is the same.    Recent Hospitalizations:  He was admitted within the past 365 days at multiple hospitals. Being treated for cancer.       Current Medical Providers:  Patient Care Team:  Van Urbina MD as PCP - General (Family Medicine)  Ivonne Davies MD as Consulting Physician (Hematology and Oncology)  Katie Hartman LCSW as  (Oncology)    Outpatient Medications Prior to Visit   Medication Sig Dispense Refill   • albuterol sulfate  (90 Base) MCG/ACT inhaler INHALE 2 PUFFS EVERY 4 HOURS AS NEEDED FOR WHEEZING OR SHORTNESS OF AIR. 18 g 0   • atorvastatin (LIPITOR) 20 MG tablet Take 1 tablet by mouth Daily.     • cyanocobalamin (VITAMIN B-12) 1000 MCG tablet Take 1 tablet by mouth Daily. 90 tablet 0   • cyclobenzaprine (FLEXERIL) 10 MG tablet Take 1 tablet by mouth 3 (Three) Times a Day As Needed for Muscle Spasms. 60 tablet 11   • famotidine (PEPCID) 20 MG tablet TAKE ONE (1) TABLET BY MOUTH TWICE A DAY 60 tablet 2   • fluconazole (DIFLUCAN) 200 MG tablet Take 1 tablet by mouth Daily. 30 tablet 0   • fluticasone (FLONASE) 50 MCG/ACT nasal spray INSTILL 2 SPRAYS INTO THE NOSTRIL(S) AS DIRECTED BY PROVIDER DAILY. 16 g 2   • levothyroxine (SYNTHROID, LEVOTHROID) 50 MCG tablet Take 1 tablet by mouth Every Morning. 90 tablet 0   • lisinopril (PRINIVIL,ZESTRIL) 10 MG tablet Take 2 tablets by mouth Daily.     • loperamide (IMODIUM A-D) 2 MG  tablet Take 1 tablet by mouth 4 (Four) Times a Day As Needed for Diarrhea. 60 tablet 3   • loratadine (CLARITIN) 10 MG tablet Take 1 tablet by mouth Daily As Needed.     • OLANZapine (zyPREXA) 5 MG tablet Take 2 tablets by mouth Every Night. 6 tablet 7   • ondansetron (ZOFRAN) 8 MG tablet Take 1 tablet by mouth 3 (Three) Times a Day As Needed for Nausea or Vomiting. 30 tablet 5   • oxyCODONE (OXY-IR) 5 MG capsule Take 1 capsule by mouth Every 4 (Four) Hours As Needed for Moderate Pain. 60 capsule 0   • pantoprazole (Protonix) 40 MG EC tablet Take 1 tablet by mouth Daily. 30 tablet 2   • polyethylene glycol (MIRALAX) 17 g packet Take 17 g by mouth Daily As Needed (constipation). 20 each 0     No facility-administered medications prior to visit.       Opioid medication/s are on active medication list.  and I have evaluated his active treatment plan and pain score trends (see table).  Vitals:    05/23/23 0955   PainSc: 0-No pain     I have reviewed the chart for potential of high risk medication and harmful drug interactions in the elderly.            Aspirin is not on active medication list.  Patient has never had MI or CVA.    Patient Active Problem List   Diagnosis   • Essential hypertension   • Bilateral sciatica   • Primary osteoarthritis of both knees   • Neuropathy   • Muscle cramps at night   • Vitamin B 12 deficiency   • Chronic pain of left knee   • Acute pain of left shoulder   • Biliary obstruction   • Transaminitis   • Essential hypertension   • History of fall   • Essential hypertension   • Other dysphagia   • Gallbladder mass   • Cancer associated pain   • Malignant neoplasm of biliary tract, unspecified   • Encounter for venous access device care   • Headache   • Other long term (current) drug therapy   • Hypothyroidism due to medication     Advance Care Planning   Advance Care Planning     Advance Directive is not on file.  ACP discussion was held with the patient during this visit. Patient does not  "have an advance directive, information provided.     Objective    Vitals:    23 0955   BP: 140/78   Pulse: 82   SpO2: 97%   Weight: 78.9 kg (174 lb)   Height: 170.2 cm (67\")   PainSc: 0-No pain     Estimated body mass index is 27.25 kg/m² as calculated from the following:    Height as of this encounter: 170.2 cm (67\").    Weight as of this encounter: 78.9 kg (174 lb).    BMI is >= 25 and <30. (Overweight) The following options were offered after discussion;: none (medical contraindication) Patient on chemotherapy.      Does the patient have evidence of cognitive impairment?   No            HEALTH RISK ASSESSMENT    Smoking Status:  Social History     Tobacco Use   Smoking Status Former   • Packs/day: 1.00   • Years: 45.00   • Pack years: 45.00   • Types: Pipe, Cigarettes   • Start date: 10/10/1972   • Quit date: 10/31/2019   • Years since quitting: 3.5   • Passive exposure: Past   Smokeless Tobacco Current   • Types: Chew     Alcohol Consumption:  Social History     Substance and Sexual Activity   Alcohol Use Not Currently   • Alcohol/week: 7.0 standard drinks   • Types: 7 Cans of beer per week    Comment: Prior heavy drinker     Fall Risk Screen:    JAVIER Fall Risk Assessment has not been completed.    Depression Screenin/19/2023     7:57 AM   PHQ-2/PHQ-9 Depression Screening   Little Interest or Pleasure in Doing Things 0-->not at all   Feeling Down, Depressed or Hopeless 0-->not at all   PHQ-9: Brief Depression Severity Measure Score 0       Health Habits and Functional and Cognitive Screenin/19/2022     3:00 PM   Functional & Cognitive Status   Do you have difficulty preparing food and eating? No   Do you have difficulty bathing yourself, getting dressed or grooming yourself? No   Do you have difficulty using the toilet? No   Do you have difficulty moving around from place to place? No   Do you have trouble with steps or getting out of a bed or a chair? No   Do you need help using the " phone?  No   Are you deaf or do you have serious difficulty hearing?  No   Do you need help with transportation? No   Do you need help shopping? No   Do you need help preparing meals?  No   Do you need help with housework?  No   Do you need help with laundry? No   Do you need help taking your medications? No   Do you need help managing money? No   Do you ever drive or ride in a car without wearing a seat belt? No   Have you felt unusual stress, anger or loneliness in the last month? No   Who do you live with? Spouse   If you need help, do you have trouble finding someone available to you? No   Have you been bothered in the last four weeks by sexual problems? No   Do you have difficulty concentrating, remembering or making decisions? No       Age-appropriate Screening Schedule:  Refer to the list below for future screening recommendations based on patient's age, sex and/or medical conditions. Orders for these recommended tests are listed in the plan section. The patient has been provided with a written plan.    Health Maintenance   Topic Date Due   • LUNG CANCER SCREENING  Never done   • COVID-19 Vaccine (3 - Booster for Pfizer series) 06/08/2021   • ANNUAL WELLNESS VISIT  07/02/2021   • LIPID PANEL  01/30/2023   • ZOSTER VACCINE (2 of 2) 06/12/2023   • INFLUENZA VACCINE  08/01/2023   • COLORECTAL CANCER SCREENING  10/10/2024   • TDAP/TD VACCINES (3 - Td or Tdap) 10/10/2027   • HEPATITIS C SCREENING  Completed   • Pneumococcal Vaccine 65+  Completed   • AAA SCREEN (ONE-TIME)  Completed                  CMS Preventative Services Quick Reference  Risk Factors Identified During Encounter:    Hearing Problem: Patient has seen audiologist and was told he needs hearing aids.    The above risks/problems have been discussed with the patient.  Pertinent information has been shared with the patient in the After Visit Summary.    There are no diagnoses linked to this encounter.    Follow Up:   Next Medicare Wellness visit to be  scheduled in 1 year.      An After Visit Summary and PPPS were made available to the patient.        This document has been electronically signed by Van Urbina MD on May 23, 2023 10:19 CDT

## 2023-05-25 ENCOUNTER — HOSPITAL ENCOUNTER (OUTPATIENT)
Dept: CT IMAGING | Facility: HOSPITAL | Age: 68
Discharge: HOME | End: 2023-05-25
Payer: MEDICARE

## 2023-05-25 DIAGNOSIS — C24.9 MALIGNANT NEOPLASM OF BILIARY TRACT, UNSPECIFIED: ICD-10-CM

## 2023-05-25 PROCEDURE — 74177 CT ABD & PELVIS W/CONTRAST: CPT

## 2023-05-25 PROCEDURE — 25510000001 IOPAMIDOL 61 % SOLUTION: Performed by: INTERNAL MEDICINE

## 2023-05-25 PROCEDURE — 71260 CT THORAX DX C+: CPT

## 2023-05-25 RX ADMIN — IOPAMIDOL 90 ML: 612 INJECTION, SOLUTION INTRAVENOUS at 16:32

## 2023-05-26 ENCOUNTER — DOCUMENTATION (OUTPATIENT)
Dept: ONCOLOGY | Facility: HOSPITAL | Age: 68
End: 2023-05-26
Payer: MEDICARE

## 2023-05-26 ENCOUNTER — INFUSION (OUTPATIENT)
Dept: ONCOLOGY | Facility: HOSPITAL | Age: 68
End: 2023-05-26
Payer: MEDICARE

## 2023-05-26 ENCOUNTER — DOCUMENTATION (OUTPATIENT)
Dept: NUTRITION | Facility: HOSPITAL | Age: 68
End: 2023-05-26

## 2023-05-26 VITALS
HEART RATE: 89 BPM | TEMPERATURE: 97.9 F | RESPIRATION RATE: 18 BRPM | SYSTOLIC BLOOD PRESSURE: 135 MMHG | DIASTOLIC BLOOD PRESSURE: 83 MMHG

## 2023-05-26 DIAGNOSIS — E83.42 HYPOMAGNESEMIA: ICD-10-CM

## 2023-05-26 DIAGNOSIS — R51.9 ACUTE NONINTRACTABLE HEADACHE, UNSPECIFIED HEADACHE TYPE: Primary | ICD-10-CM

## 2023-05-26 DIAGNOSIS — C24.9 MALIGNANT NEOPLASM OF BILIARY TRACT, UNSPECIFIED: Primary | ICD-10-CM

## 2023-05-26 DIAGNOSIS — Z45.2 ENCOUNTER FOR VENOUS ACCESS DEVICE CARE: ICD-10-CM

## 2023-05-26 DIAGNOSIS — C24.9 MALIGNANT NEOPLASM OF BILIARY TRACT, UNSPECIFIED: ICD-10-CM

## 2023-05-26 DIAGNOSIS — R51.9 ACUTE NONINTRACTABLE HEADACHE, UNSPECIFIED HEADACHE TYPE: ICD-10-CM

## 2023-05-26 LAB
ALBUMIN SERPL-MCNC: 3.4 G/DL (ref 3.5–5.2)
ALBUMIN/GLOB SERPL: 1 G/DL
ALP SERPL-CCNC: 444 U/L (ref 39–117)
ALT SERPL W P-5'-P-CCNC: 46 U/L (ref 1–41)
ANION GAP SERPL CALCULATED.3IONS-SCNC: 16 MMOL/L (ref 5–15)
AST SERPL-CCNC: 59 U/L (ref 1–40)
BASOPHILS # BLD AUTO: 0.05 10*3/MM3 (ref 0–0.2)
BASOPHILS NFR BLD AUTO: 0.8 % (ref 0–1.5)
BILIRUB SERPL-MCNC: 0.8 MG/DL (ref 0–1.2)
BUN SERPL-MCNC: 14 MG/DL (ref 8–23)
BUN/CREAT SERPL: 13.1 (ref 7–25)
CALCIUM SPEC-SCNC: 8.1 MG/DL (ref 8.6–10.5)
CHLORIDE SERPL-SCNC: 94 MMOL/L (ref 98–107)
CO2 SERPL-SCNC: 20 MMOL/L (ref 22–29)
CREAT SERPL-MCNC: 1.07 MG/DL (ref 0.76–1.27)
DEPRECATED RDW RBC AUTO: 54.2 FL (ref 37–54)
EGFRCR SERPLBLD CKD-EPI 2021: 77 ML/MIN/1.73
EOSINOPHIL # BLD AUTO: 0.03 10*3/MM3 (ref 0–0.4)
EOSINOPHIL NFR BLD AUTO: 0.5 % (ref 0.3–6.2)
ERYTHROCYTE [DISTWIDTH] IN BLOOD BY AUTOMATED COUNT: 16.1 % (ref 12.3–15.4)
GLOBULIN UR ELPH-MCNC: 3.5 GM/DL
GLUCOSE SERPL-MCNC: 94 MG/DL (ref 65–99)
HCT VFR BLD AUTO: 25.6 % (ref 37.5–51)
HGB BLD-MCNC: 8.8 G/DL (ref 13–17.7)
HOLD SPECIMEN: NORMAL
IMM GRANULOCYTES # BLD AUTO: 0.09 10*3/MM3 (ref 0–0.05)
IMM GRANULOCYTES NFR BLD AUTO: 1.5 % (ref 0–0.5)
LYMPHOCYTES # BLD AUTO: 0.6 10*3/MM3 (ref 0.7–3.1)
LYMPHOCYTES NFR BLD AUTO: 9.7 % (ref 19.6–45.3)
MAGNESIUM SERPL-MCNC: 0.7 MG/DL (ref 1.6–2.4)
MCH RBC QN AUTO: 31.4 PG (ref 26.6–33)
MCHC RBC AUTO-ENTMCNC: 34.4 G/DL (ref 31.5–35.7)
MCV RBC AUTO: 91.4 FL (ref 79–97)
MONOCYTES # BLD AUTO: 0.7 10*3/MM3 (ref 0.1–0.9)
MONOCYTES NFR BLD AUTO: 11.3 % (ref 5–12)
NEUTROPHILS NFR BLD AUTO: 4.7 10*3/MM3 (ref 1.7–7)
NEUTROPHILS NFR BLD AUTO: 76.2 % (ref 42.7–76)
NRBC BLD AUTO-RTO: 0 /100 WBC (ref 0–0.2)
PLATELET # BLD AUTO: 220 10*3/MM3 (ref 140–450)
PMV BLD AUTO: 9.4 FL (ref 6–12)
POTASSIUM SERPL-SCNC: 3.2 MMOL/L (ref 3.5–5.2)
PROT SERPL-MCNC: 6.9 G/DL (ref 6–8.5)
RBC # BLD AUTO: 2.8 10*6/MM3 (ref 4.14–5.8)
SODIUM SERPL-SCNC: 130 MMOL/L (ref 136–145)
WBC NRBC COR # BLD: 6.17 10*3/MM3 (ref 3.4–10.8)

## 2023-05-26 PROCEDURE — 25010000002 PALONOSETRON PER 25 MCG: Performed by: INTERNAL MEDICINE

## 2023-05-26 PROCEDURE — 85025 COMPLETE CBC W/AUTO DIFF WBC: CPT

## 2023-05-26 PROCEDURE — 25010000002 POTASSIUM CHLORIDE PER 2 MEQ OF POTASSIUM: Performed by: INTERNAL MEDICINE

## 2023-05-26 PROCEDURE — 36415 COLL VENOUS BLD VENIPUNCTURE: CPT

## 2023-05-26 PROCEDURE — 25010000002 FOSAPREPITANT PER 1 MG: Performed by: INTERNAL MEDICINE

## 2023-05-26 PROCEDURE — 83735 ASSAY OF MAGNESIUM: CPT

## 2023-05-26 PROCEDURE — 25010000002 DEXAMETHASONE SODIUM PHOSPHATE 100 MG/10ML SOLUTION: Performed by: INTERNAL MEDICINE

## 2023-05-26 PROCEDURE — 80053 COMPREHEN METABOLIC PANEL: CPT

## 2023-05-26 PROCEDURE — 25010000002 HEPARIN LOCK FLUSH PER 10 UNITS: Performed by: INTERNAL MEDICINE

## 2023-05-26 PROCEDURE — 25010000002 CISPLATIN PER 50 MG: Performed by: INTERNAL MEDICINE

## 2023-05-26 PROCEDURE — 25010000002 MAGNESIUM SULFATE PER 500 MG OF MAGNESIUM: Performed by: INTERNAL MEDICINE

## 2023-05-26 PROCEDURE — 63710000001 OLANZAPINE 5 MG TABLET: Performed by: INTERNAL MEDICINE

## 2023-05-26 PROCEDURE — 25010000002 GEMCITABINE 1 GM/26.3ML SOLUTION 26.3 ML VIAL: Performed by: INTERNAL MEDICINE

## 2023-05-26 PROCEDURE — A9270 NON-COVERED ITEM OR SERVICE: HCPCS | Performed by: INTERNAL MEDICINE

## 2023-05-26 PROCEDURE — 25010000002 MAGNESIUM SULFATE IN D5W 1G/100ML (PREMIX) 1-5 GM/100ML-% SOLUTION: Performed by: INTERNAL MEDICINE

## 2023-05-26 RX ORDER — SODIUM CHLORIDE 0.9 % (FLUSH) 0.9 %
10 SYRINGE (ML) INJECTION AS NEEDED
Status: DISCONTINUED | OUTPATIENT
Start: 2023-05-26 | End: 2023-05-26 | Stop reason: HOSPADM

## 2023-05-26 RX ORDER — SODIUM CHLORIDE 0.9 % (FLUSH) 0.9 %
10 SYRINGE (ML) INJECTION AS NEEDED
OUTPATIENT
Start: 2023-05-26

## 2023-05-26 RX ORDER — SODIUM CHLORIDE 9 MG/ML
250 INJECTION, SOLUTION INTRAVENOUS ONCE
Status: COMPLETED | OUTPATIENT
Start: 2023-05-26 | End: 2023-05-26

## 2023-05-26 RX ORDER — HEPARIN SODIUM (PORCINE) LOCK FLUSH IV SOLN 100 UNIT/ML 100 UNIT/ML
500 SOLUTION INTRAVENOUS AS NEEDED
Status: DISCONTINUED | OUTPATIENT
Start: 2023-05-26 | End: 2023-05-26 | Stop reason: HOSPADM

## 2023-05-26 RX ORDER — MAGNESIUM SULFATE 1 G/100ML
1 INJECTION INTRAVENOUS ONCE
Status: COMPLETED | OUTPATIENT
Start: 2023-05-26 | End: 2023-05-26

## 2023-05-26 RX ORDER — SODIUM CHLORIDE 0.9 % (FLUSH) 0.9 %
10 SYRINGE (ML) INJECTION AS NEEDED
Status: CANCELLED | OUTPATIENT
Start: 2023-05-26

## 2023-05-26 RX ORDER — HEPARIN SODIUM (PORCINE) LOCK FLUSH IV SOLN 100 UNIT/ML 100 UNIT/ML
500 SOLUTION INTRAVENOUS AS NEEDED
Status: CANCELLED | OUTPATIENT
Start: 2023-05-26

## 2023-05-26 RX ORDER — HEPARIN SODIUM (PORCINE) LOCK FLUSH IV SOLN 100 UNIT/ML 100 UNIT/ML
500 SOLUTION INTRAVENOUS AS NEEDED
OUTPATIENT
Start: 2023-05-26

## 2023-05-26 RX ORDER — OLANZAPINE 5 MG/1
5 TABLET ORAL ONCE
Status: COMPLETED | OUTPATIENT
Start: 2023-05-26 | End: 2023-05-26

## 2023-05-26 RX ORDER — PALONOSETRON 0.05 MG/ML
0.25 INJECTION, SOLUTION INTRAVENOUS ONCE
Status: COMPLETED | OUTPATIENT
Start: 2023-05-26 | End: 2023-05-26

## 2023-05-26 RX ORDER — MAGNESIUM SULFATE 1 G/100ML
1 INJECTION INTRAVENOUS ONCE
Status: CANCELLED
Start: 2023-05-26 | End: 2023-05-26

## 2023-05-26 RX ADMIN — Medication 10 ML: at 13:18

## 2023-05-26 RX ADMIN — DEXAMETHASONE SODIUM PHOSPHATE 12 MG: 10 INJECTION, SOLUTION INTRAMUSCULAR; INTRAVENOUS at 10:53

## 2023-05-26 RX ADMIN — SODIUM CHLORIDE 250 ML: 9 INJECTION, SOLUTION INTRAVENOUS at 09:03

## 2023-05-26 RX ADMIN — MAGNESIUM SULFATE HEPTAHYDRATE 1 G: 1 INJECTION, SOLUTION INTRAVENOUS at 09:52

## 2023-05-26 RX ADMIN — CISPLATIN 48 MG: 1 INJECTION, SOLUTION INTRAVENOUS at 12:05

## 2023-05-26 RX ADMIN — OLANZAPINE 5 MG: 5 TABLET, FILM COATED ORAL at 09:03

## 2023-05-26 RX ADMIN — GEMCITABINE 1900 MG: 38 INJECTION, SOLUTION INTRAVENOUS at 11:22

## 2023-05-26 RX ADMIN — HEPARIN 500 UNITS: 100 SYRINGE at 13:18

## 2023-05-26 RX ADMIN — POTASSIUM CHLORIDE: 2 INJECTION, SOLUTION, CONCENTRATE INTRAVENOUS at 11:22

## 2023-05-26 RX ADMIN — POTASSIUM CHLORIDE: 2 INJECTION, SOLUTION, CONCENTRATE INTRAVENOUS at 09:52

## 2023-05-26 RX ADMIN — PALONOSETRON 0.25 MG: 0.05 INJECTION, SOLUTION INTRAVENOUS at 09:03

## 2023-05-26 RX ADMIN — FOSAPREPITANT 100 ML: 150 INJECTION, POWDER, LYOPHILIZED, FOR SOLUTION INTRAVENOUS at 09:11

## 2023-05-26 NOTE — PROGRESS NOTES
Adult Outpatient Nutrition  Assessment    Patient Name:  Jian Baker  YOB: 1955  MRN: [id number]    Assessment Date:  5/26/2023    Comments: Follow-up to reinforce importance of nutrition. Wt 174 lb--recently holding. Alb 3.6. Case of commercial supplements provided due to financial limitations.                    Electronically signed by:  Isis Alvarez RD  05/26/23 13:54 CDT

## 2023-06-01 ENCOUNTER — DOCUMENTATION (OUTPATIENT)
Dept: ONCOLOGY | Facility: CLINIC | Age: 68
End: 2023-06-01

## 2023-06-01 ENCOUNTER — CONSULT (OUTPATIENT)
Dept: RADIATION ONCOLOGY | Facility: HOSPITAL | Age: 68
End: 2023-06-01

## 2023-06-01 VITALS
HEART RATE: 95 BPM | BODY MASS INDEX: 26.81 KG/M2 | SYSTOLIC BLOOD PRESSURE: 134 MMHG | WEIGHT: 171.2 LBS | TEMPERATURE: 96.9 F | RESPIRATION RATE: 18 BRPM | DIASTOLIC BLOOD PRESSURE: 78 MMHG | OXYGEN SATURATION: 97 %

## 2023-06-01 DIAGNOSIS — C24.9 MALIGNANT NEOPLASM OF BILIARY TRACT, UNSPECIFIED: Primary | ICD-10-CM

## 2023-06-01 PROCEDURE — G0463 HOSPITAL OUTPT CLINIC VISIT: HCPCS | Performed by: STUDENT IN AN ORGANIZED HEALTH CARE EDUCATION/TRAINING PROGRAM

## 2023-06-01 NOTE — PROGRESS NOTES
Radiation Oncology Consult    Patient: Jian Baker   YOB: 1955   Medical Record Number: [id number]   Date of Visit  June 1, 2023   Primary Diagnosis:  Cancer Staging   Malignant neoplasm of biliary tract, unspecified  Staging form: Distal Bile Duct, AJCC 8th Edition  - Clinical stage from 1/25/2023: Stage IIIB (cT4, cN1, cM0) - Signed by Ivonne Davies MD on 1/25/2023        ASSESSMENT/PLAN:  Mr. Baker is a 67 y.o. male with cholangiocarcinoma, cT4 N1 M0, stage IIIB, s/p chemotherapy. Personally reviewed available physician notes, imaging, and pathology. Reviewed NCCN guidelines and discussed treatment options.    He is doing well today. Case was discussed with Dr. العلي today. Given overall stability on imaging after chemotherapy, without evidence of metastatic disease, agree with radiotherapy, with concurrent capecitabine if he can tolerate it. Radiotherapy would consist of 45 Gy in 25 fractions to the tumor and lymph node regions, with boost to the tumor to 50.4 Gy in 28 fractions total. Potential side effects include fatigue, diarrhea, nausea, abdominal cramping, decreased liver function, secondary malignancy. Plan to start radiotherapy between 4-8 weeks after completion of chemotherapy. Patient is agreeable to radiotherapy at this time. Will schedule CT simulation.    Jian Baker  reports that he quit smoking about 3 years ago. His smoking use included pipe and cigarettes. He started smoking about 50 years ago. He has a 45.00 pack-year smoking history. He has been exposed to tobacco smoke. His smokeless tobacco use includes chew.. I have educated him on the risk of diseases from using tobacco products such as cancer.     I advised him to quit and he is willing to quit. We have discussed the following method/s for tobacco cessation:  [place].  He states he will try to cut down. No quit date set. He will follow up with me in 3 weeks or sooner to check on his progress.    I  spent 3  minutes counseling the patient on tobacco.        Prior Treatment:  - gemcitabine/cisplatin/durvalumab x6 2/3/2023-5/26/2023    Implanted Devices: none    History of Present Illness:  Mr. Baker is a 67 y.o. male with cholangiocarcinoma, cT4 N1 M0. He presented with obstructive jaundice, bilirubin 11.4. MRCP on 11/18/2022 showed a 3.9 cm obstructing mass at the biliary confluence; ERCP was attempted on 11/22/2022 but was unsuccessful due to compression from the tumor. He went to [facility] and underwent IR biliary drainage and brushings on 11/28/2023 which was nondiagnostic; repeat biopsy on 1/11/2023 showed adenocarcinoma. He completed 6 cycles of chemotherapy on 5/26/2023. CT chest/abd/pelvis on 5/25/2023 showed a stable 4 cm mass. He was referred to discuss radiotherapy.    He is doing well today. No significant recent complaints. Denies significant abdominal pain, diarrhea, constipation. Stable dyspnea. Chronic knee pain. No significant nausea or vomiting. Still has biliary drains in place; right sided one drains constantly but the left one does not drain.    The patient is a current tobacco user (chew).    This is a new problem to me (Old medical records were requested, reviewed, and summarized in the HPI)    Review of Systems    All other systems reviewed and are negative.    Past Medical History:   Diagnosis Date   • Arthritis    • Asthma     Previously diagnosed and previously prescribed inhaler medications.     • Cancer     cholangiocarcinoma   • Elevated cholesterol    • Essential hypertension    • Gastroesophageal reflux disease         Past Surgical History:   Procedure Laterality Date   • CERVICAL SPINE SURGERY      x3   • DENTAL PROCEDURE      3 wisdom teeth and 2+ other teeth removed   • ERCP N/A 11/22/2022    Procedure: ENDOSCOPIC RETROGRADE CHOLANGIOPANCREATOGRAPHY;  Surgeon: Nabila Worthy MD;  Location: [facility] ENDOSCOPY;  Service: Gastroenterology;  Laterality: N/A;   • PORTACATH PLACEMENT  Right 2/2/2023    Procedure: PORT PLACEMENT         (C-ARM#2), right internal jugular;  Surgeon: Van Austin MD;  Location: [facility];  Service: General;  Laterality: Right;   • WRIST FRACTURE SURGERY Bilateral       Family History   Problem Relation Age of Onset   • COPD Mother    • Hypertension Mother    • Diabetes type II Mother    • Cancer Father    • Heart failure Father    • Cancer Brother    • Early death Son    • Lung cancer Maternal Uncle    • Early death Sister         Social History     Socioeconomic History   • Marital status:    Tobacco Use   • Smoking status: Former     Packs/day: 1.00     Years: 45.00     Pack years: 45.00     Types: Pipe, Cigarettes     Start date: 10/10/1972     Quit date: 10/31/2019     Years since quitting: 3.5     Passive exposure: Past   • Smokeless tobacco: Current     Types: Chew   Vaping Use   • Vaping Use: Never used   Substance and Sexual Activity   • Alcohol use: Not Currently     Alcohol/week: 7.0 standard drinks     Types: 7 Cans of beer per week     Comment: Prior heavy drinker   • Drug use: Never   • Sexual activity: Defer     Partners: Female        Allergies:  Patient has no known allergies.   Prior to Admission medications    Medication Sig Start Date End Date Taking? Authorizing Provider   albuterol sulfate  (90 Base) MCG/ACT inhaler INHALE 2 PUFFS EVERY 4 HOURS AS NEEDED FOR WHEEZING OR SHORTNESS OF AIR. 4/20/23   Van Urbina MD   atorvastatin (LIPITOR) 20 MG tablet Take 1 tablet by mouth Daily. 5/23/23   Van Urbina MD   cyanocobalamin (VITAMIN B-12) 1000 MCG tablet Take 1 tablet by mouth Daily. 5/23/23   Van Urbina MD   cyclobenzaprine (FLEXERIL) 10 MG tablet Take 1 tablet by mouth 3 (Three) Times a Day As Needed for Muscle Spasms. 5/23/23   Van Urbina MD   famotidine (PEPCID) 20 MG tablet Take 1 tablet by mouth Daily. 5/23/23   aVn Urbina MD   fluconazole (DIFLUCAN) 200 MG tablet Take 1 tablet by mouth Daily.  1/25/23   Ivonne Davies MD   fluticasone (FLONASE) 50 MCG/ACT nasal spray INSTILL 2 SPRAYS INTO THE NOSTRIL(S) AS DIRECTED BY PROVIDER DAILY. 4/4/23   Van Urbina MD   levothyroxine (SYNTHROID, LEVOTHROID) 50 MCG tablet Take 1 tablet by mouth Every Morning. 5/19/23   Ivonne Davies MD   lisinopril (PRINIVIL,ZESTRIL) 20 MG tablet Take 1 tablet by mouth Daily. 5/23/23   Van Urbina MD   loperamide (IMODIUM A-D) 2 MG tablet Take 1 tablet by mouth 4 (Four) Times a Day As Needed for Diarrhea. 2/24/23   Ivonne Davies MD   loratadine (CLARITIN) 10 MG tablet Take 1 tablet by mouth Daily. 5/23/23   Van Urbina MD   OLANZapine (zyPREXA) 5 MG tablet Take 2 tablets by mouth Every Night. 5/10/23   Ivonne Davies MD   ondansetron (ZOFRAN) 8 MG tablet Take 1 tablet by mouth 3 (Three) Times a Day As Needed for Nausea or Vomiting. 5/10/23   Ivonne Davies MD   oxyCODONE (OXY-IR) 5 MG capsule Take 1 capsule by mouth Every 4 (Four) Hours As Needed for Moderate Pain. 5/10/23   Ivonne Davies MD   pantoprazole (Protonix) 40 MG EC tablet Take 1 tablet by mouth Daily. 5/23/23   Van Urbina MD   polyethylene glycol (MIRALAX) 17 g packet Take 17 g by mouth Daily As Needed (constipation). 12/7/22   Ivonne Davies MD      Pain:(on a scale of 0-10)    Pain Score    06/01/23 1058   PainSc:   5   PainLoc: Knee  Comment: bilateral      Jian Baker reports a pain score of 5.  Given his pain assessment as noted, treatment options were discussed and the following options were decided upon as a follow-up plan to address the patient's pain: continuation of current treatment plan for pain.    Quality of Life:   ECOG: (0) Fully active, able to carry on all predisease performance without restriction    Advance Directives (For Healthcare)  Pre-existing AND/MOST/POLST Order: No  Advance Directive Status: Patient does not have advance directive  Have you  reviewed your Advance Directive and is it valid for this stay?: Not applicable  Literature Provided on Advance Directives: No  Patient Requests Assistance on Advance Directives: Patient Declined    PHQ-9 Depression Screening:  Little interest or pleasure in doing things? 0-->not at all   Feeling down, depressed, or hopeless? 0-->not at all   Trouble falling or staying asleep, or sleeping too much?     Feeling tired or having little energy?     Poor appetite or overeating?     Feeling bad about yourself - or that you are a failure or have let yourself or your family down?     Trouble concentrating on things, such as reading the newspaper or watching television?     Moving or speaking so slowly that other people could have noticed? Or the opposite - being so fidgety or restless that you have been moving around a lot more than usual?     Thoughts that you would be better off dead, or of hurting yourself in some way?     PHQ-9 Total Score 0   If you checked off any problems, how difficult have these problems made it for you to do your work, take care of things at home, or get along with other people?      PHQ-9 Total Score: 0       Physical Examination:  Vitals:    06/01/23 1058   BP: 134/78   Pulse: 95   Resp: 18   Temp: 96.9 °F (36.1 °C)   SpO2: 97%     Wt Readings from Last 3 Encounters:   06/01/23 77.7 kg (171 lb 3.2 oz)   05/23/23 78.9 kg (174 lb)   05/19/23 76.2 kg (168 lb)     Body mass index is 26.81 kg/m².    Constitutional: The patient is a well-developed, well-nourished male in no acute distress.  HEENT: Normocephalic, atraumatic. Sclera anicteric. EOMI. Nose and ears without abnormalities upon visual inspection.  Lymphatics: No cervical/supraclavicular lymphadenopathy is palpated bilaterally. No edema.  CV: Regular rate and rhythm. No murmurs/rubs/gallops/clicks.  Respiratory: Breathing comfortably on room air. Lungs clear to auscultation bilaterally.  GI: Abdomen soft, nontender, nondistended, with no  hepatosplenomegaly or masses palpated. Bowel sounds normoactive. Biliary drains in place; right sided one with drainage, left sided one without.  Musculoskeletal: No clubbing or cyanosis.  Skin: No abnormal lesions noted on visible skin  Neurologic: Cranial nerves grossly intact, with no focal neurological deficits noted on exam.  Psychiatric: Alert and oriented. Normal affect, with no anxiety or depression noted.      Imaging:  CT Abdomen Pelvis With & Without Contrast    Result Date: 4/3/2023  1.  Primary tumor associated with or involving the proximal gallbladder and central liver is difficult to accurately measure compared to the prior exam. This is probably similar in size and may even be less dense or demonstrate less enhancement when comparing the two portal venous phase series.  However, this area is also partially obscured by some artifact from the intrahepatic biliary drains/stents. 2.  There is no evidence of metastatic disease in the abdomen or pelvis.  No new liver lesions are identified.    CT Chest With Contrast Diagnostic    Result Date: 5/26/2023  No metastatic disease seen    CT Abdomen Pelvis With Contrast    Result Date: 5/26/2023  A mass from the known cholangiocarcinoma is stable at the most minimally reduced in size.  Other findings are stable.        Pathology:  Patient Name: FELIPE WANG   YOB: 1955   MRN: [id number]   Accession#: [id number]   Collected: 1/11/2023   Received: 1/11/2023     [address]  [address]       DIAGNOSIS:   A.       RIGHT MAIN BILE DUCT STRICTURE, BIOPSY:             POSITIVE FOR RARE MALIGNANT CELLS, CONSISTENT WITH ADENOCARCINOMA.     B.       COMMON BILE DUCT STRICTURE, BIOPSY:             RARE DETACHED ATYPICAL EPITHELIAL CELLS IN A BACKGROUND OF LIVER   TISSUE,                  ACUTE INFLAMMATION, AND FIBROINFLAMMATORY MATERIAL (PUS).             FUNGAL ELEMENTS PRESENT ON ROUTINE AND SPECIAL STAINS  "(H&E, GMS).             SEE COMMENT.         PATHOLOGIST COMMENT:   Clinical correlation with microbial studies is recommended if indicated.     Dr. Varner was given a preliminary diagnosis at 3:25 pm on 1/13/23 and a final   diagnosis at 3:07 pm on 1/18/23.     Reviewed for QA.  (mn/troy/jacinto-suzanne)     **Electronically Signed Out on 1/18/2023 **         RAQUEL IGNACIO MD    gmk/1/12/2023       Interpretation performed at:   [facility] [address] [place] [address]     CLINICAL HISTORY:   CLINICAL HISTORY AND SPECIAL REQUESTS SUSPECTED MALIGNANT BILIARY STRICTURE     SPECIMEN SOURCE:   A: RIGHT MAIN BILE DUCT STRICTURE   B: CBD STRICTURE     GROSS DESCRIPTION:   Received in 2 parts.     A.  Received labeled \"right main bile duct stricture biopsy\". The specimen   consists of multiple, tan-red tissue fragments ranging from 1 mm to 2 mm. The   specimen is stained with eosin and submitted in toto in block A1.     B.  Received in formalin labeled \"common bile duct stricture\". The specimen   consists of multiple, tan-red tissue fragments ranging from < 1 mm to 3 mm,   which are entirely submitted in blocks B1-B2.       drFrancine/1/11/2023   Alexandre Rushing   MICROSCOPIC DESCRIPTION:   Microscopic examination performed.     SUMMARY OF IMMUNOHISTOCHEMICAL AND SPECIAL STAIN FINDINGS:     BLOCK A1:   The following 3 immunohistochemical stains are performed on the right main bile   duct stricture biopsy in this block:  cytokeratin AE1/3, MUC4, and IMP3.   Cytokeratin AE1/3 and IMP3 immunostains are positive on rare atypical   epithelioid cells in stromal fragments in this block.  The MUC4 immunostain also   shows rare weak positivity on atypical cells.  This staining pattern in   conjunction with histomorphology is consistent with adenocarcinoma.     BLOCK B1:   The following 3 immunohistochemical stains are performed on a representative   section of the common bile duct stricture biopsy in this block:  cytokeratin "   AE1/3, MUC4, and IMP3.  The following 1 special stain is also performed on this   block:  GMS.  The cytokeratin AE1/3 immunostain is positive on scattered   detached atypical epithelial cells (repeated twice).  The MUC4 immunostain is   focally positive on atypical epithelial cells.  The IMP3 immunostain shows weak   focal positivity on atypical epithelial cells.  This staining pattern in   conjunction with histomorphology is insufficient for a definitive diagnosis of   malignancy.  The GMS stain is positive on fungal elements.  Additional   H&E-stained levels are also examined.     BLOCK B2:   The following 3 immunohistochemical stains are performed on a representative   section of the common bile duct stricture biopsy in this block:  cytokeratin   AE1/3, MUC4, and IMP3.  The cytokeratin AE1/3 immunostain is positive on   scattered detached atypical epithelial cells and liver tissue fragments.  The   MUC4 immunostain is positive on atypical epithelial cells.  The IMP3 immunostain   shows focal positivity on atypical epithelial cells.  This staining pattern in   conjunction with histomorphology is insufficient for a definitive diagnosis of   malignancy.  Additional H&E-stained levels are also examined.     All controls show appropriate reactivity.         Dada Ingram M.D. - Medical Director  Specimen Collected: 01/11/23 13:24 Last Resulted: 01/18/23 14:11   Received From: [facility]  Result Received: 01/24/23 16:24            Labs:   Lab Results   Component Value Date    GLUCOSE 94 05/26/2023    BUN 14 05/26/2023    CREATININE 1.07 05/26/2023    EGFRIFNONA 81 01/02/2020    BCR 13.1 05/26/2023    K 3.2 (L) 05/26/2023    CO2 20.0 (L) 05/26/2023    CALCIUM 8.1 (L) 05/26/2023    ALBUMIN 3.4 (L) 05/26/2023    AST 59 (H) 05/26/2023    ALT 46 (H) 05/26/2023       WBC   Date Value Ref Range Status   05/26/2023 6.17 3.40 - 10.80 10*3/mm3 Final   01/11/2023 8.31 4.5 - 11.0 10*3/uL Final     Hemoglobin   Date  Value Ref Range Status   05/26/2023 8.8 (L) 13.0 - 17.7 g/dL Final   01/11/2023 13.0 (L) 13.5 - 17.5 g/dL Final     Hematocrit   Date Value Ref Range Status   05/26/2023 25.6 (L) 37.5 - 51.0 % Final   01/11/2023 39.2 (L) 41.0 - 53.0 % Final     Platelets   Date Value Ref Range Status   05/26/2023 220 140 - 450 10*3/mm3 Final   01/11/2023 290 140 - 440 10*3/uL Final     CEA   Date Value Ref Range Status   11/22/2022 2.92 ng/mL Final         Electronically signed by Rhonda Arevalo MD 06/01/23 11:11 CDT

## 2023-06-01 NOTE — PATIENT INSTRUCTIONS
MUST HAVE NOTHING BY MOUTH 3 HOURS PRIOR TO CT SIM    We will schedule your CT SIM (radiation planning) once approved by your insurance.  The approval process can take up to 7 days to complete depending on your insurance company.  If you have not been called to schedule your CT SIM appointment within 7 days, please call the office [phone].    Review patient education folder information:    [facility] Radiation Therapy for Cancer booklet  Radiation Simulation   Radiation Skin Care    On day of radiation mapping (CT Simulation):  Check in at Computer in [facility] Lobby-enter the last 4 numbers of your Social Security number or your last name  Do not wear jewelry  Wear comfortable and easy to remove clothing-you will change to a gown for the mapping (Ct Simulation) procedure  If the radiation oncologist wants dye (contrast) for your mapping, we will start an IV. If you have an implanted port, the nurse will access it for use if you prefer.

## 2023-06-01 NOTE — PROGRESS NOTES
Oncology SW met face to face with pt and his wife in the exam room subsequent to his radiation oncology consult.  Pt is known to this SW as has undergone a very complex course of diagnostics and treatment beginning in  2022.  Pt. Diagnosis of cholangiocarcinoma was obtained and pt underwent chemotherapy .  SW has been active in pt experiences as he has experienced significant barriers related to transportation.  SW assisted by arranging transportation via [facility], pt's insurance provider, for his chemotherapy.  SW advised by company and advised pt today that he no longer has any transportation benefits under insurance.  Pt. States after talking with radiation oncologist, feeling he will be able to drive self to [facility] as he does own a car that can be used locally only.  Pt/ wife do not travel out of the county.  Pt. States probable meed for assistance with gas voucher as he begins radiation. SW encouraged pt to advise prior to starting as it may be 6-8 weeks after chemo before he begins.    Pt. Presents with bright affect and calm, cheerful mood. Pt states appreciation for oncology supports and has maintained optimistic, committed to treatment and compliant with recommendations given the physical, financial, environmental and psychosocial stressors the has experienced.  Ongoing support of this SW and team reinforced.

## 2023-06-09 ENCOUNTER — OFFICE VISIT (OUTPATIENT)
Dept: ONCOLOGY | Facility: CLINIC | Age: 68
End: 2023-06-09
Payer: MEDICARE

## 2023-06-09 ENCOUNTER — DOCUMENTATION (OUTPATIENT)
Dept: ONCOLOGY | Facility: CLINIC | Age: 68
End: 2023-06-09
Payer: MEDICARE

## 2023-06-09 VITALS
OXYGEN SATURATION: 98 % | RESPIRATION RATE: 18 BRPM | SYSTOLIC BLOOD PRESSURE: 143 MMHG | HEART RATE: 86 BPM | BODY MASS INDEX: 27.1 KG/M2 | WEIGHT: 173 LBS | DIASTOLIC BLOOD PRESSURE: 78 MMHG

## 2023-06-09 DIAGNOSIS — C24.9 MALIGNANT NEOPLASM OF BILIARY TRACT, UNSPECIFIED: Primary | ICD-10-CM

## 2023-06-09 RX ORDER — CAPECITABINE 500 MG/1
2000 TABLET, FILM COATED ORAL TAKE AS DIRECTED
Qty: 112 TABLET | Refills: 0 | Status: SHIPPED | OUTPATIENT
Start: 2023-06-18

## 2023-06-09 RX ORDER — ONDANSETRON HYDROCHLORIDE 8 MG/1
8 TABLET, FILM COATED ORAL 3 TIMES DAILY PRN
Qty: 30 TABLET | Refills: 5 | Status: SHIPPED | OUTPATIENT
Start: 2023-06-09

## 2023-06-09 NOTE — PROGRESS NOTES
Subjective     Jian Baker was seen in follow up for cholangiocarcinoma.   He is doing well.   Fatigue + but manageable.   No nausea or  emesis.   No abdominal pain.       Past Medical History, Past Surgical History, Social History, Family History have been reviewed and are without significant changes except as mentioned.        Medications:  The current medication list was reviewed in the EMR    ALLERGIES:  No Known Allergies    Objective      Vitals:    06/09/23 0755   BP: 143/78   Pulse: 86   Resp: 18   SpO2: 98%   Weight: 78.5 kg (173 lb)   PainSc: 0-No pain         5/26/2023     7:58 AM   Current Status   ECOG score 0       Physical Exam  Vitals and nursing note reviewed.   Constitutional:       Appearance: Normal appearance.   Neurological:      General: No focal deficit present.      Mental Status: He is alert and oriented to person, place, and time. Mental status is at baseline.   Psychiatric:         Mood and Affect: Mood normal.         Behavior: Behavior normal.         Thought Content: Thought content normal.         RECENT LABS:Independently reviewed and summarized  Hematology WBC   Date Value Ref Range Status   05/26/2023 6.17 3.40 - 10.80 10*3/mm3 Final   01/11/2023 8.31 4.5 - 11.0 10*3/uL Final     RBC   Date Value Ref Range Status   05/26/2023 2.80 (L) 4.14 - 5.80 10*6/mm3 Final   01/11/2023 4.17 (L) 4.5 - 5.9 10*6/uL Final     Hemoglobin   Date Value Ref Range Status   05/26/2023 8.8 (L) 13.0 - 17.7 g/dL Final   01/11/2023 13.0 (L) 13.5 - 17.5 g/dL Final     Hematocrit   Date Value Ref Range Status   05/26/2023 25.6 (L) 37.5 - 51.0 % Final   01/11/2023 39.2 (L) 41.0 - 53.0 % Final     Platelets   Date Value Ref Range Status   05/26/2023 220 140 - 450 10*3/mm3 Final   01/11/2023 290 140 - 440 10*3/uL Final       Lab Results   Component Value Date    GLUCOSE 94 05/26/2023    BUN 14 05/26/2023    CREATININE 1.6 (H) 06/05/2023    EGFR 77.0 05/26/2023    BCR 13.1 05/26/2023    K 3.2 (L)  05/26/2023    CO2 20.0 (L) 05/26/2023    CALCIUM 8.1 (L) 05/26/2023    ALBUMIN 3.4 (L) 05/26/2023    BILITOT 0.8 05/26/2023    AST 59 (H) 05/26/2023    ALT 46 (H) 05/26/2023          Jian Baker reports a pain score of 0.  Given his pain assessment as noted, treatment options were discussed and the following options were decided upon as a follow-up plan to address the patient's pain: continuation of current treatment plan for pain.    Patient screened negative for depression based on a PHQ-9 score of 0 on 6/9/2023.     Advance Care Planning   ACP discussion was declined by the patient. Patient does not have an advance directive, declines further assistance.         Diagnosis:   (1) Cholangiocarcinoma, locally advance, unresectable   At least Stage IIIB (cT4, cN1, cM0)      Current therapy:   Gemcitabine/Cisplatin/Durvalumab      D1C1: 2/3/22, D8C1: 2/10/23  D1C2: 2/24/23, D8C2: 3/3/23   D1C3: 3/17/23,D8C3: 3/24/23     CT scan showed overall stable disease with slight less dense primary tumor.  No evidence of progressive disease.     D1C4: 4/5/23, D8C4: 4/14/23   D1C5: 4/28/23, D8C5: 5/5/23  D1C6: 5/19/23 , D8C6: 5/26/23     CT showed on 5/25/23 showed stable to minimally reduced cholangiocarcinoma without any evidence of progression.  Case discussed with radiation oncology and concurrent chemoradiation with Xeloda was recommended.     (2) Biliary obstruction   (3) Cancer associated pain    (4) Chemo therapy induced nausea/emesis  (5) Hypothyroidism              Assessment & Plan     (1) Cholangiocarcinoma, locally advance, unresectable      Chronic, stable.   Case discussed with Dr Arevalo.   Recommend concurrent chemoradiation with xeloda (consolidation).   Side effects  associated  with xeloda discussed.   He  understood and was  agreeable.   New prescription of xeloda 2000 mg twice a  day sent.   I  will  see  him when he starts his  radiation  with CBC, CMP.     (2) Biliary obstruction    Chronic,  stable.  S/p PTBD still.  No jaundice.  Liver enzymes are improved.  Right biliary drain continues to drain copious amount of bile.  Left.  Drain almost no drainage.  He is being managed by Dr. Barboza in [place].     (3) Cancer associated pain      Chronic, stable.   He is using oxycodone  as needed.   He will continue this.     (4) Chemotherapy induced nausea/emesis    Chronic, stable.   Continue  zofran  as needed.     (5) Hypothyroidism      Chronic, stable.   Secondary to immunotherapy.   On synthroid 50 mcg daily.   I  will check TSH on next  appointment.       6/9/2023      CC:

## 2023-06-12 ENCOUNTER — SPECIALTY PHARMACY (OUTPATIENT)
Dept: ONCOLOGY | Facility: CLINIC | Age: 68
End: 2023-06-12
Payer: MEDICARE

## 2023-06-12 NOTE — PROGRESS NOTES
Specialty Pharmacy Refill Coordination Note     Jian is a 67 y.o. male contacted today regarding refills of  xeloda  specialty medication(s).    Reviewed and verified with patient:       Specialty medication(s) and dose(s) confirmed: yes  Pt was approved for a ramesh with [employer] in the amount of 4300.00 from 03/23/23-06/11/24. Pt is aware                 Follow-up: 30 day(s)     Erma Hargrove, Pharmacy Technician  Specialty Pharmacy Technician

## 2023-06-13 ENCOUNTER — OFFICE VISIT (OUTPATIENT)
Dept: ONCOLOGY | Facility: CLINIC | Age: 68
End: 2023-06-13
Payer: MEDICARE

## 2023-06-13 VITALS
OXYGEN SATURATION: 100 % | DIASTOLIC BLOOD PRESSURE: 96 MMHG | HEART RATE: 73 BPM | SYSTOLIC BLOOD PRESSURE: 157 MMHG | RESPIRATION RATE: 18 BRPM

## 2023-06-13 DIAGNOSIS — C24.9 MALIGNANT NEOPLASM OF BILIARY TRACT, UNSPECIFIED: Primary | ICD-10-CM

## 2023-06-13 NOTE — PROGRESS NOTES
6/13/2023    CHEMOTHERAPY PREPARATION    Jian Baker  [id number]  1955    Chief Complaint: chemo education     History of present illness:  Jian Baker is a 67 y.o. year old male who is here today for chemotherapy preparation and needs assessment. The patient has had adjuvant chemotherapy and is now going to be undergoing concurrent chemo and radiation therapy. Will be educating on Xeloda.    Oncology History:    Oncology/Hematology History   Malignant neoplasm of biliary tract, unspecified   12/7/2022 Initial Diagnosis    Malignant neoplasm of biliary tract, unspecified (HCC)     1/25/2023 Cancer Staged    Staging form: Distal Bile Duct, AJCC 8th Edition  - Clinical stage from 1/25/2023: Stage IIIB (cT4, cN1, cM0) - Signed by Ivonne Davies MD on 1/25/2023     2/3/2023 - 5/26/2023 Chemotherapy    OP HEPATOBILIARY CISplatin + Gemcitabine Days 1,8 / Durvalumab Q21D       6/1/2023 -  Radiation    RADIATION THERAPY Treatment Details (Noted on 6/1/2023)  Site: Right Abdomen  Technique: IMRT  Goal: No goal specified  Planned Treatment Start Date: No planned start date specified     6/19/2023 -  Chemotherapy    OP GALLBLADDER Capecitabine + XRT           Past Medical History:   Diagnosis Date    Arthritis     Asthma     Previously diagnosed and previously prescribed inhaler medications.      Cancer     cholangiocarcinoma    Elevated cholesterol     Essential hypertension     Gastroesophageal reflux disease        Past Surgical History:   Procedure Laterality Date    CERVICAL SPINE SURGERY      x3    DENTAL PROCEDURE      3 wisdom teeth and 2+ other teeth removed    ERCP N/A 11/22/2022    Procedure: ENDOSCOPIC RETROGRADE CHOLANGIOPANCREATOGRAPHY;  Surgeon: Nabila Worthy MD;  Location: [facility] ENDOSCOPY;  Service: Gastroenterology;  Laterality: N/A;    PORTACATH PLACEMENT Right 2/2/2023    Procedure: PORT PLACEMENT         (C-ARM#2), right internal jugular;  Surgeon: Van Austin MD;   Location: [facility];  Service: General;  Laterality: Right;    WRIST FRACTURE SURGERY Bilateral        MEDICATIONS: The current medication list was reviewed and reconciled.     Allergies:  has No Known Allergies.    Family History   Problem Relation Age of Onset    COPD Mother     Hypertension Mother     Diabetes type II Mother     Cancer Father     Heart failure Father     Cancer Brother     Early death Son     Lung cancer Maternal Uncle     Early death Sister          Review of Systems    Physical Exam  Vital Signs: /96   Pulse 73   Resp 18   SpO2 100%    General Appearance:  alert, cooperative, no apparent distress, appears stated age, and normal weight   Neurologic/Psychiatric: A&O x 3, gait steady, appropriate affect   HEENT:  Normocephalic, without obvious abnormality, mucous membranes moist   Lungs:   Clear to auscultation bilaterally; respirations regular, even, and unlabored bilaterally   Heart:  Regular rate and rhythm, no murmurs appreciated   Extremities: Normal, atraumatic; no clubbing, cyanosis, or edema    Skin: No rashes, lesions, or abnormal coloration noted     ECOG Performance Status: (0) Fully Active - Able to Carry On All Pre-disease Performance Without Restriction          NEEDS ASSESSMENTS    Genetics  The patient's new diagnosis and family history have been reviewed for genetic counseling needs. A genetic referral is not recommended.     Psychosocial  The patient has completed Distress Thermometer (DT) today.   Copies of patient's questionnaires will be scanned into EMR for details and further reference.    Palliative Care  Oncology criteria for palliative care referral is met at this time. The patient is not interested in a palliative care consultation.     Additional Referral needs  none      CHEMOTHERAPY EDUCATION    Booklets Given: Chemo cares education sheet on Xeloda.New consent form signed today.     Chemotherapy/Biotherapy Education Sheets: (list all that apply)  nausea  management, acid reflux management, diarrhea management, Cancer resourse contacts information, and skin and mouth care                                                                                                                                                                 Chemotherapy Regimen:   Treatment Plans       Name Type Plan Dates Plan Provider         Active    OP GALLBLADDER Capecitabine + XRT ONCOLOGY TREATMENT  6/9/2023 - Present Ivonne Davies MD                      TOPICS EDUCATION PROVIDED COMMENTS   ANEMIA:  role of RBC, cause, s/s, ways to manage, role of transfusion [x]    THROMBOCYTOPENIA:  role of platelet, cause, s/s, ways to prevent bleeding, things to avoid, when to seek help [x]    NEUTROPENIA:  role of WBC, cause, infection precautions, s/s of infection, when to call MD [x]    NUTRITION & APPETITE CHANGES:  importance of maintaining healthy diet & weight, ways to manage to improve intake, dietary consult, exercise regimen [x]    DIARRHEA:  causes, s/s of dehydration, ways to manage, dietary changes, when to call MD [x]    CONSTIPATION:  causes, ways to manage, dietary changes, when to call MD [x]    NAUSEA & VOMITING:  cause, use of antiemetics, dietary changes, when to call MD [x]    MOUTH SORES:  causes, oral care, ways to manage [x]    ALOPECIA:  cause, ways to manage, resources [x]    INFERTILITY & SEXUALITY:  causes, fertility preservation options, sexuality changes, ways to manage, importance of birth control [x]    NERVOUS SYSTEM CHANGES:  causes, s/s, neuropathies, cognitive changes, ways to manage [x]    PAIN:  causes, ways to manage [x]    SKIN & NAIL CHANGES:  cause, s/s, ways to manage [x]    ORGAN TOXICITIES:  cause, s/s, need for diagnostic tests, labs, when to notify MD [x]    SURVIVORSHIP:  distress, distress assessment, secondary malignancies, early/late effects, follow-up, social issues, social support [x]    HOME CARE:  use of spill kits, storing of PO  chemo, how to manage bodily fluids [x]    MISCELLANEOUS:  drug interactions, administration, vesicant, et [x]        Assessment and Plan:    Diagnoses and all orders for this visit:    1. Malignant neoplasm of biliary tract, unspecified (Primary)        This was a 25 minute face-to-face visit with 25 minutes spent in  counseling and coordination of care as documented above.   The patient and I have reviewed their new cancer diagnosis and scheduled treatment plan. Needs assessment was completed including genetics, psychosocial needs, barriers to care, VAD evaluation, advanced care planning, and palliative care services. Referrals have been ordered as appropriate based upon our evaluation and patient desires.     Chemotherapy teaching was also completed today as documented above. Adequate time was given to answer all questions to his satisfaction. Patient and family are aware of their care team members and contact information if they have questions or problems throughout the treatment course. Needs assessments and education has been completed. The patient is adequately prepared to begin treatment as scheduled.       Stefanie Wilde, APRN

## 2023-06-15 ENCOUNTER — LAB (OUTPATIENT)
Dept: LAB | Facility: HOSPITAL | Age: 68
End: 2023-06-15
Payer: MEDICARE

## 2023-06-15 ENCOUNTER — TELEPHONE (OUTPATIENT)
Dept: ONCOLOGY | Facility: HOSPITAL | Age: 68
End: 2023-06-15
Payer: MEDICARE

## 2023-06-15 DIAGNOSIS — E03.2 HYPOTHYROIDISM DUE TO MEDICATION: ICD-10-CM

## 2023-06-15 DIAGNOSIS — E87.1 HYPONATREMIA: ICD-10-CM

## 2023-06-15 DIAGNOSIS — C24.9 MALIGNANT NEOPLASM OF BILIARY TRACT, UNSPECIFIED: ICD-10-CM

## 2023-06-15 DIAGNOSIS — Z79.899 OTHER LONG TERM (CURRENT) DRUG THERAPY: ICD-10-CM

## 2023-06-15 LAB
ALBUMIN SERPL-MCNC: 3.6 G/DL (ref 3.5–5.2)
ALBUMIN/GLOB SERPL: 1.1 G/DL
ALP SERPL-CCNC: 237 U/L (ref 39–117)
ALT SERPL W P-5'-P-CCNC: 16 U/L (ref 1–41)
ANION GAP SERPL CALCULATED.3IONS-SCNC: 13 MMOL/L (ref 5–15)
AST SERPL-CCNC: 32 U/L (ref 1–40)
BASOPHILS # BLD AUTO: 0.08 10*3/MM3 (ref 0–0.2)
BASOPHILS NFR BLD AUTO: 1.4 % (ref 0–1.5)
BILIRUB SERPL-MCNC: 0.4 MG/DL (ref 0–1.2)
BUN SERPL-MCNC: 13 MG/DL (ref 8–23)
BUN/CREAT SERPL: 10.5 (ref 7–25)
CALCIUM SPEC-SCNC: 8.7 MG/DL (ref 8.6–10.5)
CHLORIDE SERPL-SCNC: 98 MMOL/L (ref 98–107)
CO2 SERPL-SCNC: 24 MMOL/L (ref 22–29)
CREAT SERPL-MCNC: 1.24 MG/DL (ref 0.76–1.27)
DEPRECATED RDW RBC AUTO: 60.6 FL (ref 37–54)
EGFRCR SERPLBLD CKD-EPI 2021: 64.5 ML/MIN/1.73
EOSINOPHIL # BLD AUTO: 0.13 10*3/MM3 (ref 0–0.4)
EOSINOPHIL NFR BLD AUTO: 2.2 % (ref 0.3–6.2)
ERYTHROCYTE [DISTWIDTH] IN BLOOD BY AUTOMATED COUNT: 17.5 % (ref 12.3–15.4)
GLOBULIN UR ELPH-MCNC: 3.3 GM/DL
GLUCOSE SERPL-MCNC: 77 MG/DL (ref 65–99)
HCT VFR BLD AUTO: 25.4 % (ref 37.5–51)
HGB BLD-MCNC: 8.6 G/DL (ref 13–17.7)
IMM GRANULOCYTES # BLD AUTO: 0.05 10*3/MM3 (ref 0–0.05)
IMM GRANULOCYTES NFR BLD AUTO: 0.9 % (ref 0–0.5)
LYMPHOCYTES # BLD AUTO: 1.21 10*3/MM3 (ref 0.7–3.1)
LYMPHOCYTES NFR BLD AUTO: 20.8 % (ref 19.6–45.3)
MAGNESIUM SERPL-MCNC: 1.3 MG/DL (ref 1.6–2.4)
MCH RBC QN AUTO: 31.7 PG (ref 26.6–33)
MCHC RBC AUTO-ENTMCNC: 33.9 G/DL (ref 31.5–35.7)
MCV RBC AUTO: 93.7 FL (ref 79–97)
MONOCYTES # BLD AUTO: 0.94 10*3/MM3 (ref 0.1–0.9)
MONOCYTES NFR BLD AUTO: 16.2 % (ref 5–12)
NEUTROPHILS NFR BLD AUTO: 3.4 10*3/MM3 (ref 1.7–7)
NEUTROPHILS NFR BLD AUTO: 58.5 % (ref 42.7–76)
NRBC BLD AUTO-RTO: 0 /100 WBC (ref 0–0.2)
PLATELET # BLD AUTO: 395 10*3/MM3 (ref 140–450)
PMV BLD AUTO: 10.2 FL (ref 6–12)
POTASSIUM SERPL-SCNC: 4.2 MMOL/L (ref 3.5–5.2)
PROT SERPL-MCNC: 6.9 G/DL (ref 6–8.5)
RBC # BLD AUTO: 2.71 10*6/MM3 (ref 4.14–5.8)
SODIUM SERPL-SCNC: 135 MMOL/L (ref 136–145)
TSH SERPL DL<=0.05 MIU/L-ACNC: 44.11 UIU/ML (ref 0.27–4.2)
WBC NRBC COR # BLD: 5.81 10*3/MM3 (ref 3.4–10.8)

## 2023-06-15 PROCEDURE — 85025 COMPLETE CBC W/AUTO DIFF WBC: CPT

## 2023-06-15 PROCEDURE — 36415 COLL VENOUS BLD VENIPUNCTURE: CPT

## 2023-06-15 PROCEDURE — 83935 ASSAY OF URINE OSMOLALITY: CPT

## 2023-06-15 PROCEDURE — 83735 ASSAY OF MAGNESIUM: CPT

## 2023-06-15 PROCEDURE — 80053 COMPREHEN METABOLIC PANEL: CPT

## 2023-06-15 PROCEDURE — 84443 ASSAY THYROID STIM HORMONE: CPT

## 2023-06-15 PROCEDURE — 83930 ASSAY OF BLOOD OSMOLALITY: CPT

## 2023-06-15 RX ORDER — LEVOTHYROXINE SODIUM 0.05 MG/1
100 TABLET ORAL
Qty: 90 TABLET | Refills: 0 | Status: SHIPPED | OUTPATIENT
Start: 2023-06-15

## 2023-06-15 NOTE — TELEPHONE ENCOUNTER
Spoke to pt and made aware per Dr. العلي needs to increase synthroid to 100 mcg and confirmed taking on empty stomach. V/u obtained.

## 2023-06-15 NOTE — PROGRESS NOTES
I called patient to let him know of increase in synthroid by Oncology. I reminded him to take medication on empty stomach.     ?  This document has been electronically signed by Van Urbina MD on Alejandrina 15, 2023 14:12 CDT

## 2023-06-15 NOTE — TELEPHONE ENCOUNTER
----- Message from Ivonne Davies MD sent at 6/15/2023  2:10 PM CDT -----  Increase synthroid to 100 mcg.   Make sure he takes this empty stomach.

## 2023-06-16 LAB
OSMOLALITY SERPL: 281 MOSM/KG (ref 280–301)
OSMOLALITY UR: 143 MOSM/KG

## 2023-07-06 ENCOUNTER — HOSPITAL ENCOUNTER (OUTPATIENT)
Dept: RADIATION ONCOLOGY | Facility: HOSPITAL | Age: 68
Setting detail: RADIATION/ONCOLOGY SERIES
End: 2023-07-06
Payer: MEDICARE

## 2023-07-06 PROCEDURE — 77336 RADIATION PHYSICS CONSULT: CPT | Performed by: STUDENT IN AN ORGANIZED HEALTH CARE EDUCATION/TRAINING PROGRAM

## 2023-07-06 PROCEDURE — 77427 RADIATION TX MANAGEMENT X5: CPT | Performed by: STUDENT IN AN ORGANIZED HEALTH CARE EDUCATION/TRAINING PROGRAM

## 2023-07-06 PROCEDURE — 77386: CPT | Performed by: STUDENT IN AN ORGANIZED HEALTH CARE EDUCATION/TRAINING PROGRAM

## 2023-07-07 PROCEDURE — 77386: CPT | Performed by: STUDENT IN AN ORGANIZED HEALTH CARE EDUCATION/TRAINING PROGRAM

## 2023-07-10 PROCEDURE — 77386: CPT | Performed by: STUDENT IN AN ORGANIZED HEALTH CARE EDUCATION/TRAINING PROGRAM

## 2023-07-11 PROCEDURE — 77386: CPT | Performed by: STUDENT IN AN ORGANIZED HEALTH CARE EDUCATION/TRAINING PROGRAM

## 2023-07-12 PROCEDURE — 77386: CPT | Performed by: STUDENT IN AN ORGANIZED HEALTH CARE EDUCATION/TRAINING PROGRAM

## 2023-07-13 ENCOUNTER — TELEPHONE (OUTPATIENT)
Dept: ONCOLOGY | Facility: CLINIC | Age: 68
End: 2023-07-13
Payer: MEDICARE

## 2023-07-13 PROCEDURE — 77338 DESIGN MLC DEVICE FOR IMRT: CPT | Performed by: STUDENT IN AN ORGANIZED HEALTH CARE EDUCATION/TRAINING PROGRAM

## 2023-07-13 PROCEDURE — 77300 RADIATION THERAPY DOSE PLAN: CPT | Performed by: STUDENT IN AN ORGANIZED HEALTH CARE EDUCATION/TRAINING PROGRAM

## 2023-07-13 PROCEDURE — 77386: CPT | Performed by: STUDENT IN AN ORGANIZED HEALTH CARE EDUCATION/TRAINING PROGRAM

## 2023-07-13 PROCEDURE — 77427 RADIATION TX MANAGEMENT X5: CPT | Performed by: RADIOLOGY

## 2023-07-14 PROCEDURE — 77386: CPT | Performed by: STUDENT IN AN ORGANIZED HEALTH CARE EDUCATION/TRAINING PROGRAM

## 2023-07-17 PROCEDURE — 77386: CPT | Performed by: STUDENT IN AN ORGANIZED HEALTH CARE EDUCATION/TRAINING PROGRAM

## 2023-07-17 PROCEDURE — 77336 RADIATION PHYSICS CONSULT: CPT | Performed by: RADIOLOGY

## 2023-07-18 PROCEDURE — 77386: CPT | Performed by: STUDENT IN AN ORGANIZED HEALTH CARE EDUCATION/TRAINING PROGRAM

## 2023-07-19 PROCEDURE — 77386: CPT | Performed by: STUDENT IN AN ORGANIZED HEALTH CARE EDUCATION/TRAINING PROGRAM

## 2023-07-20 ENCOUNTER — TELEPHONE (OUTPATIENT)
Dept: FAMILY MEDICINE CLINIC | Facility: CLINIC | Age: 68
End: 2023-07-20
Payer: MEDICARE

## 2023-07-20 PROCEDURE — 77427 RADIATION TX MANAGEMENT X5: CPT | Performed by: RADIOLOGY

## 2023-07-20 PROCEDURE — 85025 COMPLETE CBC W/AUTO DIFF WBC: CPT | Performed by: INTERNAL MEDICINE

## 2023-07-20 PROCEDURE — 77386: CPT | Performed by: STUDENT IN AN ORGANIZED HEALTH CARE EDUCATION/TRAINING PROGRAM

## 2023-07-20 PROCEDURE — 80053 COMPREHEN METABOLIC PANEL: CPT | Performed by: INTERNAL MEDICINE

## 2023-07-20 NOTE — TELEPHONE ENCOUNTER
Pt called and stated he would like a return call from Dr. Dinero, didn't say in regards to what. Call back at [phone].    Thanks

## 2023-07-21 ENCOUNTER — HOSPITAL ENCOUNTER (OUTPATIENT)
Dept: RADIATION ONCOLOGY | Facility: HOSPITAL | Age: 68
Discharge: HOME | End: 2023-07-21
Payer: MEDICARE

## 2023-07-21 LAB
RAD ONC ARIA COURSE ID: 1
RAD ONC ARIA COURSE INTENT: NORMAL
RAD ONC ARIA COURSE LAST TREATMENT DATE: NORMAL
RAD ONC ARIA COURSE START DATE: NORMAL
RAD ONC ARIA COURSE TREATMENT ELAPSED DAYS: 15
RAD ONC ARIA FIRST TREATMENT DATE: NORMAL
RAD ONC ARIA PLAN FRACTIONS TREATED TO DATE: 12
RAD ONC ARIA PLAN ID: NORMAL
RAD ONC ARIA PLAN NAME: NORMAL
RAD ONC ARIA PLAN PRESCRIBED DOSE PER FRACTION: 1.8 GY
RAD ONC ARIA PLAN PRIMARY REFERENCE POINT: NORMAL
RAD ONC ARIA PLAN TOTAL FRACTIONS PRESCRIBED: 25
RAD ONC ARIA PLAN TOTAL PRESCRIBED DOSE: 4500 CGY
RAD ONC ARIA REFERENCE POINT DOSAGE GIVEN TO DATE: 21.6 GY
RAD ONC ARIA REFERENCE POINT ID: NORMAL
RAD ONC ARIA REFERENCE POINT SESSION DOSAGE GIVEN: 1.8 GY

## 2023-07-21 PROCEDURE — 77386: CPT | Performed by: STUDENT IN AN ORGANIZED HEALTH CARE EDUCATION/TRAINING PROGRAM

## 2023-07-24 ENCOUNTER — HOSPITAL ENCOUNTER (OUTPATIENT)
Dept: RADIATION ONCOLOGY | Facility: HOSPITAL | Age: 68
Discharge: HOME | End: 2023-07-24
Payer: MEDICARE

## 2023-07-24 ENCOUNTER — SPECIALTY PHARMACY (OUTPATIENT)
Dept: ONCOLOGY | Facility: CLINIC | Age: 68
End: 2023-07-24
Payer: MEDICARE

## 2023-07-24 LAB
RAD ONC ARIA COURSE ID: 1
RAD ONC ARIA COURSE INTENT: NORMAL
RAD ONC ARIA COURSE LAST TREATMENT DATE: NORMAL
RAD ONC ARIA COURSE START DATE: NORMAL
RAD ONC ARIA COURSE TREATMENT ELAPSED DAYS: 18
RAD ONC ARIA FIRST TREATMENT DATE: NORMAL
RAD ONC ARIA PLAN FRACTIONS TREATED TO DATE: 13
RAD ONC ARIA PLAN ID: NORMAL
RAD ONC ARIA PLAN NAME: NORMAL
RAD ONC ARIA PLAN PRESCRIBED DOSE PER FRACTION: 1.8 GY
RAD ONC ARIA PLAN PRIMARY REFERENCE POINT: NORMAL
RAD ONC ARIA PLAN TOTAL FRACTIONS PRESCRIBED: 25
RAD ONC ARIA PLAN TOTAL PRESCRIBED DOSE: 4500 CGY
RAD ONC ARIA REFERENCE POINT DOSAGE GIVEN TO DATE: 23.4 GY
RAD ONC ARIA REFERENCE POINT ID: NORMAL
RAD ONC ARIA REFERENCE POINT SESSION DOSAGE GIVEN: 1.8 GY

## 2023-07-24 PROCEDURE — 77336 RADIATION PHYSICS CONSULT: CPT | Performed by: STUDENT IN AN ORGANIZED HEALTH CARE EDUCATION/TRAINING PROGRAM

## 2023-07-24 PROCEDURE — 77386: CPT | Performed by: STUDENT IN AN ORGANIZED HEALTH CARE EDUCATION/TRAINING PROGRAM

## 2023-07-24 NOTE — PROGRESS NOTES
Specialty Pharmacy Refill Coordination Note     Jian is a 68 y.o. male contacted today regarding refills of  xeloda  specialty medication(s).    Reviewed and verified with patient:       Specialty medication(s) and dose(s) confirmed: yes    Refill Questions      Flowsheet Row Most Recent Value   Changes to allergies? No   Changes to medications? No   Financial problems or insurance changes  No   Since the previous refill, were any specialty medication doses or scheduled injections missed or delayed?  No   Does this patient require a clinical escalation to a pharmacist? No            Delivery Questions      Flowsheet Row Most Recent Value   Delivery method  at Pharmacy            Pt will  in the [facility] center on 07/25       Follow-up: 30 day(s)     Erma Hargrove, Pharmacy Technician  Specialty Pharmacy Technician

## 2023-07-25 ENCOUNTER — HOSPITAL ENCOUNTER (OUTPATIENT)
Dept: RADIATION ONCOLOGY | Facility: HOSPITAL | Age: 68
Discharge: HOME | End: 2023-07-25
Payer: MEDICARE

## 2023-07-25 LAB
RAD ONC ARIA COURSE ID: 1
RAD ONC ARIA COURSE INTENT: NORMAL
RAD ONC ARIA COURSE LAST TREATMENT DATE: NORMAL
RAD ONC ARIA COURSE START DATE: NORMAL
RAD ONC ARIA COURSE TREATMENT ELAPSED DAYS: 19
RAD ONC ARIA FIRST TREATMENT DATE: NORMAL
RAD ONC ARIA PLAN FRACTIONS TREATED TO DATE: 14
RAD ONC ARIA PLAN ID: NORMAL
RAD ONC ARIA PLAN NAME: NORMAL
RAD ONC ARIA PLAN PRESCRIBED DOSE PER FRACTION: 1.8 GY
RAD ONC ARIA PLAN PRIMARY REFERENCE POINT: NORMAL
RAD ONC ARIA PLAN TOTAL FRACTIONS PRESCRIBED: 25
RAD ONC ARIA PLAN TOTAL PRESCRIBED DOSE: 4500 CGY
RAD ONC ARIA REFERENCE POINT DOSAGE GIVEN TO DATE: 25.2 GY
RAD ONC ARIA REFERENCE POINT ID: NORMAL
RAD ONC ARIA REFERENCE POINT SESSION DOSAGE GIVEN: 1.8 GY

## 2023-07-25 PROCEDURE — 77386: CPT | Performed by: STUDENT IN AN ORGANIZED HEALTH CARE EDUCATION/TRAINING PROGRAM

## 2023-07-26 ENCOUNTER — HOSPITAL ENCOUNTER (OUTPATIENT)
Dept: RADIATION ONCOLOGY | Facility: HOSPITAL | Age: 68
Discharge: HOME | End: 2023-07-26
Payer: MEDICARE

## 2023-07-26 ENCOUNTER — HOSPITAL ENCOUNTER (OUTPATIENT)
Dept: RADIATION ONCOLOGY | Facility: HOSPITAL | Age: 68
Discharge: HOME | End: 2023-07-26

## 2023-07-26 VITALS
DIASTOLIC BLOOD PRESSURE: 86 MMHG | BODY MASS INDEX: 25.53 KG/M2 | HEART RATE: 78 BPM | WEIGHT: 163 LBS | SYSTOLIC BLOOD PRESSURE: 135 MMHG | TEMPERATURE: 97.1 F | RESPIRATION RATE: 18 BRPM

## 2023-07-26 DIAGNOSIS — C24.9 MALIGNANT NEOPLASM OF BILIARY TRACT, UNSPECIFIED: Primary | ICD-10-CM

## 2023-07-26 LAB
RAD ONC ARIA COURSE ID: 1
RAD ONC ARIA COURSE INTENT: NORMAL
RAD ONC ARIA COURSE LAST TREATMENT DATE: NORMAL
RAD ONC ARIA COURSE START DATE: NORMAL
RAD ONC ARIA COURSE TREATMENT ELAPSED DAYS: 20
RAD ONC ARIA FIRST TREATMENT DATE: NORMAL
RAD ONC ARIA PLAN FRACTIONS TREATED TO DATE: 15
RAD ONC ARIA PLAN ID: NORMAL
RAD ONC ARIA PLAN NAME: NORMAL
RAD ONC ARIA PLAN PRESCRIBED DOSE PER FRACTION: 1.8 GY
RAD ONC ARIA PLAN PRIMARY REFERENCE POINT: NORMAL
RAD ONC ARIA PLAN TOTAL FRACTIONS PRESCRIBED: 25
RAD ONC ARIA PLAN TOTAL PRESCRIBED DOSE: 4500 CGY
RAD ONC ARIA REFERENCE POINT DOSAGE GIVEN TO DATE: 27 GY
RAD ONC ARIA REFERENCE POINT ID: NORMAL
RAD ONC ARIA REFERENCE POINT SESSION DOSAGE GIVEN: 1.8 GY

## 2023-07-26 PROCEDURE — 77386: CPT | Performed by: STUDENT IN AN ORGANIZED HEALTH CARE EDUCATION/TRAINING PROGRAM

## 2023-07-26 NOTE — PROGRESS NOTES
On Treatment Visit       Patient: Jian Baker   YOB: 1955   Medical Record Number: [id number]     Date of Visit  July 26, 2023   Primary Diagnosis:Malignant neoplasm of biliary tract, unspecified [C24.9]  Cancer Staging: Cancer Staging   Malignant neoplasm of biliary tract, unspecified  Staging form: Distal Bile Duct, AJCC 8th Edition  - Clinical stage from 1/25/2023: Stage IIIB (cT4, cN1, cM0) - Signed by Ivonne Davies MD on 1/25/2023         was seen today for an on treatment visit.  He is receiving radiation therapy to the abdomen/biliary tract. He  has received 2700 cGy in 15 fractions out of a planned dose of 5040 cGy in 28 fractions. He is currently receiving concurrent capecitabine chemotherapy per Dr. Davies.     He reports feeling well overall with some persistent chronic discomfort in his right lateral abdomen.  Drainage from tubes is decreasing over time.      Vitals:     Vitals:    07/26/23 0915   BP: 135/86   Pulse: 78   Resp: 18   Temp: 97.1 °F (36.2 °C)       Weight:   Wt Readings from Last 3 Encounters:   07/26/23 73.9 kg (163 lb)   07/20/23 75.3 kg (166 lb)   07/19/23 75.4 kg (166 lb 3.2 oz)      Pain:    Pain Score    07/26/23 0915   PainSc: 0-No pain         Physical Exam:  Gen: WD/WN; NAD  HEENT: MMM  Trachea: midline  Chest: symmetric  Resp: normal respiratory effort  Extr: warm, well-perfused  Neuro: awake and alert; no aphasia or neglect    Plan: I have reviewed treatment setup notes, checked and approved the daily guidance images.  I reviewed dose delivery, treatment parameters and deemed them appropriate. We plan to continue radiation therapy as prescribed.          Radiation Oncology    Electronically signed by Serge Zhang MD 7/26/2023  10:54 CDT

## 2023-07-27 ENCOUNTER — HOSPITAL ENCOUNTER (OUTPATIENT)
Dept: RADIATION ONCOLOGY | Facility: HOSPITAL | Age: 68
Discharge: HOME | End: 2023-07-27
Payer: MEDICARE

## 2023-07-27 LAB
RAD ONC ARIA COURSE ID: 1
RAD ONC ARIA COURSE INTENT: NORMAL
RAD ONC ARIA COURSE LAST TREATMENT DATE: NORMAL
RAD ONC ARIA COURSE START DATE: NORMAL
RAD ONC ARIA COURSE TREATMENT ELAPSED DAYS: 21
RAD ONC ARIA FIRST TREATMENT DATE: NORMAL
RAD ONC ARIA PLAN FRACTIONS TREATED TO DATE: 16
RAD ONC ARIA PLAN ID: NORMAL
RAD ONC ARIA PLAN NAME: NORMAL
RAD ONC ARIA PLAN PRESCRIBED DOSE PER FRACTION: 1.8 GY
RAD ONC ARIA PLAN PRIMARY REFERENCE POINT: NORMAL
RAD ONC ARIA PLAN TOTAL FRACTIONS PRESCRIBED: 25
RAD ONC ARIA PLAN TOTAL PRESCRIBED DOSE: 4500 CGY
RAD ONC ARIA REFERENCE POINT DOSAGE GIVEN TO DATE: 28.8 GY
RAD ONC ARIA REFERENCE POINT ID: NORMAL
RAD ONC ARIA REFERENCE POINT SESSION DOSAGE GIVEN: 1.8 GY

## 2023-07-27 PROCEDURE — 77386: CPT | Performed by: STUDENT IN AN ORGANIZED HEALTH CARE EDUCATION/TRAINING PROGRAM

## 2023-07-27 PROCEDURE — 77427 RADIATION TX MANAGEMENT X5: CPT | Performed by: STUDENT IN AN ORGANIZED HEALTH CARE EDUCATION/TRAINING PROGRAM

## 2023-07-28 ENCOUNTER — DOCUMENTATION (OUTPATIENT)
Dept: NUTRITION | Facility: HOSPITAL | Age: 68
End: 2023-07-28
Payer: MEDICARE

## 2023-07-28 ENCOUNTER — HOSPITAL ENCOUNTER (OUTPATIENT)
Dept: RADIATION ONCOLOGY | Facility: HOSPITAL | Age: 68
Discharge: HOME | End: 2023-07-28
Payer: MEDICARE

## 2023-07-28 LAB
RAD ONC ARIA COURSE ID: 1
RAD ONC ARIA COURSE INTENT: NORMAL
RAD ONC ARIA COURSE LAST TREATMENT DATE: NORMAL
RAD ONC ARIA COURSE START DATE: NORMAL
RAD ONC ARIA COURSE TREATMENT ELAPSED DAYS: 22
RAD ONC ARIA FIRST TREATMENT DATE: NORMAL
RAD ONC ARIA PLAN FRACTIONS TREATED TO DATE: 17
RAD ONC ARIA PLAN ID: NORMAL
RAD ONC ARIA PLAN NAME: NORMAL
RAD ONC ARIA PLAN PRESCRIBED DOSE PER FRACTION: 1.8 GY
RAD ONC ARIA PLAN PRIMARY REFERENCE POINT: NORMAL
RAD ONC ARIA PLAN TOTAL FRACTIONS PRESCRIBED: 25
RAD ONC ARIA PLAN TOTAL PRESCRIBED DOSE: 4500 CGY
RAD ONC ARIA REFERENCE POINT DOSAGE GIVEN TO DATE: 30.6 GY
RAD ONC ARIA REFERENCE POINT ID: NORMAL
RAD ONC ARIA REFERENCE POINT SESSION DOSAGE GIVEN: 1.8 GY

## 2023-07-28 PROCEDURE — 77386: CPT | Performed by: STUDENT IN AN ORGANIZED HEALTH CARE EDUCATION/TRAINING PROGRAM

## 2023-07-31 ENCOUNTER — HOSPITAL ENCOUNTER (OUTPATIENT)
Dept: RADIATION ONCOLOGY | Facility: HOSPITAL | Age: 68
Discharge: HOME | End: 2023-07-31
Payer: MEDICARE

## 2023-07-31 LAB
RAD ONC ARIA COURSE ID: 1
RAD ONC ARIA COURSE INTENT: NORMAL
RAD ONC ARIA COURSE LAST TREATMENT DATE: NORMAL
RAD ONC ARIA COURSE START DATE: NORMAL
RAD ONC ARIA COURSE TREATMENT ELAPSED DAYS: 25
RAD ONC ARIA FIRST TREATMENT DATE: NORMAL
RAD ONC ARIA PLAN FRACTIONS TREATED TO DATE: 18
RAD ONC ARIA PLAN ID: NORMAL
RAD ONC ARIA PLAN NAME: NORMAL
RAD ONC ARIA PLAN PRESCRIBED DOSE PER FRACTION: 1.8 GY
RAD ONC ARIA PLAN PRIMARY REFERENCE POINT: NORMAL
RAD ONC ARIA PLAN TOTAL FRACTIONS PRESCRIBED: 25
RAD ONC ARIA PLAN TOTAL PRESCRIBED DOSE: 4500 CGY
RAD ONC ARIA REFERENCE POINT DOSAGE GIVEN TO DATE: 32.4 GY
RAD ONC ARIA REFERENCE POINT ID: NORMAL
RAD ONC ARIA REFERENCE POINT SESSION DOSAGE GIVEN: 1.8 GY

## 2023-07-31 PROCEDURE — 77386: CPT | Performed by: STUDENT IN AN ORGANIZED HEALTH CARE EDUCATION/TRAINING PROGRAM

## 2023-07-31 PROCEDURE — 77336 RADIATION PHYSICS CONSULT: CPT | Performed by: STUDENT IN AN ORGANIZED HEALTH CARE EDUCATION/TRAINING PROGRAM

## 2023-08-01 ENCOUNTER — DOCUMENTATION (OUTPATIENT)
Dept: NUTRITION | Facility: HOSPITAL | Age: 68
End: 2023-08-01
Payer: MEDICARE

## 2023-08-01 ENCOUNTER — HOSPITAL ENCOUNTER (OUTPATIENT)
Dept: RADIATION ONCOLOGY | Facility: HOSPITAL | Age: 68
Setting detail: RADIATION/ONCOLOGY SERIES
End: 2023-08-01
Payer: MEDICARE

## 2023-08-01 ENCOUNTER — HOSPITAL ENCOUNTER (OUTPATIENT)
Dept: RADIATION ONCOLOGY | Facility: HOSPITAL | Age: 68
Discharge: HOME | End: 2023-08-01
Payer: MEDICARE

## 2023-08-01 LAB
RAD ONC ARIA COURSE ID: 1
RAD ONC ARIA COURSE INTENT: NORMAL
RAD ONC ARIA COURSE LAST TREATMENT DATE: NORMAL
RAD ONC ARIA COURSE START DATE: NORMAL
RAD ONC ARIA COURSE TREATMENT ELAPSED DAYS: 26
RAD ONC ARIA FIRST TREATMENT DATE: NORMAL
RAD ONC ARIA PLAN FRACTIONS TREATED TO DATE: 19
RAD ONC ARIA PLAN ID: NORMAL
RAD ONC ARIA PLAN NAME: NORMAL
RAD ONC ARIA PLAN PRESCRIBED DOSE PER FRACTION: 1.8 GY
RAD ONC ARIA PLAN PRIMARY REFERENCE POINT: NORMAL
RAD ONC ARIA PLAN TOTAL FRACTIONS PRESCRIBED: 25
RAD ONC ARIA PLAN TOTAL PRESCRIBED DOSE: 4500 CGY
RAD ONC ARIA REFERENCE POINT DOSAGE GIVEN TO DATE: 34.2 GY
RAD ONC ARIA REFERENCE POINT ID: NORMAL
RAD ONC ARIA REFERENCE POINT SESSION DOSAGE GIVEN: 1.8 GY

## 2023-08-01 PROCEDURE — 77386: CPT | Performed by: STUDENT IN AN ORGANIZED HEALTH CARE EDUCATION/TRAINING PROGRAM

## 2023-08-02 ENCOUNTER — HOSPITAL ENCOUNTER (OUTPATIENT)
Dept: RADIATION ONCOLOGY | Facility: HOSPITAL | Age: 68
Discharge: HOME | End: 2023-08-02

## 2023-08-02 ENCOUNTER — HOSPITAL ENCOUNTER (OUTPATIENT)
Dept: RADIATION ONCOLOGY | Facility: HOSPITAL | Age: 68
Discharge: HOME | End: 2023-08-02
Payer: MEDICARE

## 2023-08-02 VITALS
OXYGEN SATURATION: 98 % | BODY MASS INDEX: 26 KG/M2 | HEART RATE: 98 BPM | RESPIRATION RATE: 18 BRPM | DIASTOLIC BLOOD PRESSURE: 93 MMHG | TEMPERATURE: 97.6 F | SYSTOLIC BLOOD PRESSURE: 160 MMHG | WEIGHT: 166 LBS

## 2023-08-02 DIAGNOSIS — C24.9 MALIGNANT NEOPLASM OF BILIARY TRACT, UNSPECIFIED: Primary | ICD-10-CM

## 2023-08-02 LAB
RAD ONC ARIA COURSE ID: 1
RAD ONC ARIA COURSE INTENT: NORMAL
RAD ONC ARIA COURSE LAST TREATMENT DATE: NORMAL
RAD ONC ARIA COURSE START DATE: NORMAL
RAD ONC ARIA COURSE TREATMENT ELAPSED DAYS: 27
RAD ONC ARIA FIRST TREATMENT DATE: NORMAL
RAD ONC ARIA PLAN FRACTIONS TREATED TO DATE: 20
RAD ONC ARIA PLAN ID: NORMAL
RAD ONC ARIA PLAN NAME: NORMAL
RAD ONC ARIA PLAN PRESCRIBED DOSE PER FRACTION: 1.8 GY
RAD ONC ARIA PLAN PRIMARY REFERENCE POINT: NORMAL
RAD ONC ARIA PLAN TOTAL FRACTIONS PRESCRIBED: 25
RAD ONC ARIA PLAN TOTAL PRESCRIBED DOSE: 4500 CGY
RAD ONC ARIA REFERENCE POINT DOSAGE GIVEN TO DATE: 36 GY
RAD ONC ARIA REFERENCE POINT ID: NORMAL
RAD ONC ARIA REFERENCE POINT SESSION DOSAGE GIVEN: 1.8 GY

## 2023-08-02 PROCEDURE — 77386: CPT | Performed by: STUDENT IN AN ORGANIZED HEALTH CARE EDUCATION/TRAINING PROGRAM

## 2023-08-03 ENCOUNTER — HOSPITAL ENCOUNTER (OUTPATIENT)
Dept: RADIATION ONCOLOGY | Facility: HOSPITAL | Age: 68
Discharge: HOME | End: 2023-08-03
Payer: MEDICARE

## 2023-08-03 ENCOUNTER — OFFICE VISIT (OUTPATIENT)
Dept: ONCOLOGY | Facility: CLINIC | Age: 68
End: 2023-08-03
Payer: MEDICARE

## 2023-08-03 ENCOUNTER — INFUSION (OUTPATIENT)
Dept: ONCOLOGY | Facility: HOSPITAL | Age: 68
End: 2023-08-03
Payer: MEDICARE

## 2023-08-03 ENCOUNTER — DOCUMENTATION (OUTPATIENT)
Dept: NUTRITION | Facility: HOSPITAL | Age: 68
End: 2023-08-03
Payer: MEDICARE

## 2023-08-03 VITALS
DIASTOLIC BLOOD PRESSURE: 93 MMHG | RESPIRATION RATE: 18 BRPM | SYSTOLIC BLOOD PRESSURE: 160 MMHG | WEIGHT: 165 LBS | BODY MASS INDEX: 25.84 KG/M2 | OXYGEN SATURATION: 99 % | HEART RATE: 96 BPM

## 2023-08-03 DIAGNOSIS — Z45.2 ENCOUNTER FOR VENOUS ACCESS DEVICE CARE: Primary | ICD-10-CM

## 2023-08-03 DIAGNOSIS — E03.2 HYPOTHYROIDISM DUE TO MEDICATION: ICD-10-CM

## 2023-08-03 DIAGNOSIS — Z79.899 OTHER LONG TERM (CURRENT) DRUG THERAPY: ICD-10-CM

## 2023-08-03 DIAGNOSIS — M10.072 ACUTE IDIOPATHIC GOUT OF LEFT FOOT: ICD-10-CM

## 2023-08-03 DIAGNOSIS — R51.9 ACUTE NONINTRACTABLE HEADACHE, UNSPECIFIED HEADACHE TYPE: ICD-10-CM

## 2023-08-03 DIAGNOSIS — C24.9 MALIGNANT NEOPLASM OF BILIARY TRACT, UNSPECIFIED: ICD-10-CM

## 2023-08-03 DIAGNOSIS — C24.9 MALIGNANT NEOPLASM OF BILIARY TRACT, UNSPECIFIED: Primary | ICD-10-CM

## 2023-08-03 LAB
ALBUMIN SERPL-MCNC: 3.8 G/DL (ref 3.5–5.2)
ALBUMIN/GLOB SERPL: 1 G/DL
ALP SERPL-CCNC: 94 U/L (ref 39–117)
ALT SERPL W P-5'-P-CCNC: 7 U/L (ref 1–41)
ANION GAP SERPL CALCULATED.3IONS-SCNC: 13 MMOL/L (ref 5–15)
AST SERPL-CCNC: 18 U/L (ref 1–40)
BASOPHILS # BLD AUTO: 0.02 10*3/MM3 (ref 0–0.2)
BASOPHILS NFR BLD AUTO: 0.5 % (ref 0–1.5)
BILIRUB SERPL-MCNC: 0.5 MG/DL (ref 0–1.2)
BUN SERPL-MCNC: 9 MG/DL (ref 8–23)
BUN/CREAT SERPL: 10.1 (ref 7–25)
CALCIUM SPEC-SCNC: 8.9 MG/DL (ref 8.6–10.5)
CHLORIDE SERPL-SCNC: 96 MMOL/L (ref 98–107)
CO2 SERPL-SCNC: 24 MMOL/L (ref 22–29)
CREAT SERPL-MCNC: 0.89 MG/DL (ref 0.76–1.27)
DEPRECATED RDW RBC AUTO: 56.8 FL (ref 37–54)
EGFRCR SERPLBLD CKD-EPI 2021: 94.5 ML/MIN/1.73
EOSINOPHIL # BLD AUTO: 0.14 10*3/MM3 (ref 0–0.4)
EOSINOPHIL NFR BLD AUTO: 3.6 % (ref 0.3–6.2)
ERYTHROCYTE [DISTWIDTH] IN BLOOD BY AUTOMATED COUNT: 17.2 % (ref 12.3–15.4)
GLOBULIN UR ELPH-MCNC: 3.7 GM/DL
GLUCOSE SERPL-MCNC: 92 MG/DL (ref 65–99)
HCT VFR BLD AUTO: 27.2 % (ref 37.5–51)
HGB BLD-MCNC: 9.4 G/DL (ref 13–17.7)
IMM GRANULOCYTES # BLD AUTO: 0.01 10*3/MM3 (ref 0–0.05)
IMM GRANULOCYTES NFR BLD AUTO: 0.3 % (ref 0–0.5)
LYMPHOCYTES # BLD AUTO: 0.1 10*3/MM3 (ref 0.7–3.1)
LYMPHOCYTES NFR BLD AUTO: 2.6 % (ref 19.6–45.3)
MCH RBC QN AUTO: 32.8 PG (ref 26.6–33)
MCHC RBC AUTO-ENTMCNC: 34.6 G/DL (ref 31.5–35.7)
MCV RBC AUTO: 94.8 FL (ref 79–97)
MONOCYTES # BLD AUTO: 0.56 10*3/MM3 (ref 0.1–0.9)
MONOCYTES NFR BLD AUTO: 14.3 % (ref 5–12)
NEUTROPHILS NFR BLD AUTO: 3.08 10*3/MM3 (ref 1.7–7)
NEUTROPHILS NFR BLD AUTO: 78.7 % (ref 42.7–76)
NRBC BLD AUTO-RTO: 0 /100 WBC (ref 0–0.2)
PLATELET # BLD AUTO: 119 10*3/MM3 (ref 140–450)
PMV BLD AUTO: 9.8 FL (ref 6–12)
POTASSIUM SERPL-SCNC: 3.3 MMOL/L (ref 3.5–5.2)
PROT SERPL-MCNC: 7.5 G/DL (ref 6–8.5)
RAD ONC ARIA COURSE ID: 1
RAD ONC ARIA COURSE INTENT: NORMAL
RAD ONC ARIA COURSE LAST TREATMENT DATE: NORMAL
RAD ONC ARIA COURSE START DATE: NORMAL
RAD ONC ARIA COURSE TREATMENT ELAPSED DAYS: 28
RAD ONC ARIA FIRST TREATMENT DATE: NORMAL
RAD ONC ARIA PLAN FRACTIONS TREATED TO DATE: 21
RAD ONC ARIA PLAN ID: NORMAL
RAD ONC ARIA PLAN NAME: NORMAL
RAD ONC ARIA PLAN PRESCRIBED DOSE PER FRACTION: 1.8 GY
RAD ONC ARIA PLAN PRIMARY REFERENCE POINT: NORMAL
RAD ONC ARIA PLAN TOTAL FRACTIONS PRESCRIBED: 25
RAD ONC ARIA PLAN TOTAL PRESCRIBED DOSE: 4500 CGY
RAD ONC ARIA REFERENCE POINT DOSAGE GIVEN TO DATE: 37.8 GY
RAD ONC ARIA REFERENCE POINT ID: NORMAL
RAD ONC ARIA REFERENCE POINT SESSION DOSAGE GIVEN: 1.8 GY
RBC # BLD AUTO: 2.87 10*6/MM3 (ref 4.14–5.8)
SODIUM SERPL-SCNC: 133 MMOL/L (ref 136–145)
TSH SERPL DL<=0.05 MIU/L-ACNC: 4.67 UIU/ML (ref 0.27–4.2)
WBC NRBC COR # BLD: 3.91 10*3/MM3 (ref 3.4–10.8)

## 2023-08-03 PROCEDURE — 80053 COMPREHEN METABOLIC PANEL: CPT | Performed by: INTERNAL MEDICINE

## 2023-08-03 PROCEDURE — 77386: CPT | Performed by: STUDENT IN AN ORGANIZED HEALTH CARE EDUCATION/TRAINING PROGRAM

## 2023-08-03 PROCEDURE — 85025 COMPLETE CBC W/AUTO DIFF WBC: CPT | Performed by: INTERNAL MEDICINE

## 2023-08-03 PROCEDURE — 84443 ASSAY THYROID STIM HORMONE: CPT | Performed by: INTERNAL MEDICINE

## 2023-08-03 PROCEDURE — 77427 RADIATION TX MANAGEMENT X5: CPT | Performed by: STUDENT IN AN ORGANIZED HEALTH CARE EDUCATION/TRAINING PROGRAM

## 2023-08-03 PROCEDURE — 25010000002 HEPARIN LOCK FLUSH PER 10 UNITS: Performed by: INTERNAL MEDICINE

## 2023-08-03 PROCEDURE — 36591 DRAW BLOOD OFF VENOUS DEVICE: CPT | Performed by: INTERNAL MEDICINE

## 2023-08-03 RX ORDER — SODIUM CHLORIDE 0.9 % (FLUSH) 0.9 %
10 SYRINGE (ML) INJECTION AS NEEDED
OUTPATIENT
Start: 2023-08-03

## 2023-08-03 RX ORDER — HEPARIN SODIUM (PORCINE) LOCK FLUSH IV SOLN 100 UNIT/ML 100 UNIT/ML
500 SOLUTION INTRAVENOUS AS NEEDED
OUTPATIENT
Start: 2023-08-03

## 2023-08-03 RX ORDER — HEPARIN SODIUM (PORCINE) LOCK FLUSH IV SOLN 100 UNIT/ML 100 UNIT/ML
500 SOLUTION INTRAVENOUS AS NEEDED
Status: DISCONTINUED | OUTPATIENT
Start: 2023-08-03 | End: 2023-08-03 | Stop reason: HOSPADM

## 2023-08-03 RX ADMIN — HEPARIN 500 UNITS: 100 SYRINGE at 09:08

## 2023-08-03 NOTE — PROGRESS NOTES
Adult Outpatient Nutrition  Assessment    Patient Name:  Jian Baker  YOB: 1955  MRN: [id number]    Assessment Date:  8/3/2023    Comments: Reinforced nutrition needs. Due to strawberry supplements not being avail, provided strawberry syrup to add to vanilla Boost Plus. Wt 165 lb. Alb 3.8.                     Electronically signed by:  Isis Alvarez RD  08/03/23 10:27 CDT

## 2023-08-04 ENCOUNTER — HOSPITAL ENCOUNTER (OUTPATIENT)
Dept: RADIATION ONCOLOGY | Facility: HOSPITAL | Age: 68
Discharge: HOME | End: 2023-08-04
Payer: MEDICARE

## 2023-08-04 LAB
RAD ONC ARIA COURSE ID: 1
RAD ONC ARIA COURSE INTENT: NORMAL
RAD ONC ARIA COURSE LAST TREATMENT DATE: NORMAL
RAD ONC ARIA COURSE START DATE: NORMAL
RAD ONC ARIA COURSE TREATMENT ELAPSED DAYS: 29
RAD ONC ARIA FIRST TREATMENT DATE: NORMAL
RAD ONC ARIA PLAN FRACTIONS TREATED TO DATE: 22
RAD ONC ARIA PLAN ID: NORMAL
RAD ONC ARIA PLAN NAME: NORMAL
RAD ONC ARIA PLAN PRESCRIBED DOSE PER FRACTION: 1.8 GY
RAD ONC ARIA PLAN PRIMARY REFERENCE POINT: NORMAL
RAD ONC ARIA PLAN TOTAL FRACTIONS PRESCRIBED: 25
RAD ONC ARIA PLAN TOTAL PRESCRIBED DOSE: 4500 CGY
RAD ONC ARIA REFERENCE POINT DOSAGE GIVEN TO DATE: 39.6 GY
RAD ONC ARIA REFERENCE POINT ID: NORMAL
RAD ONC ARIA REFERENCE POINT SESSION DOSAGE GIVEN: 1.8 GY

## 2023-08-04 PROCEDURE — 77386: CPT | Performed by: STUDENT IN AN ORGANIZED HEALTH CARE EDUCATION/TRAINING PROGRAM

## 2023-08-07 ENCOUNTER — HOSPITAL ENCOUNTER (OUTPATIENT)
Dept: RADIATION ONCOLOGY | Facility: HOSPITAL | Age: 68
Discharge: HOME | End: 2023-08-07
Payer: MEDICARE

## 2023-08-07 DIAGNOSIS — C24.9 MALIGNANT NEOPLASM OF BILIARY TRACT, UNSPECIFIED: ICD-10-CM

## 2023-08-07 LAB
RAD ONC ARIA COURSE ID: 1
RAD ONC ARIA COURSE INTENT: NORMAL
RAD ONC ARIA COURSE LAST TREATMENT DATE: NORMAL
RAD ONC ARIA COURSE START DATE: NORMAL
RAD ONC ARIA COURSE TREATMENT ELAPSED DAYS: 32
RAD ONC ARIA FIRST TREATMENT DATE: NORMAL
RAD ONC ARIA PLAN FRACTIONS TREATED TO DATE: 23
RAD ONC ARIA PLAN ID: NORMAL
RAD ONC ARIA PLAN NAME: NORMAL
RAD ONC ARIA PLAN PRESCRIBED DOSE PER FRACTION: 1.8 GY
RAD ONC ARIA PLAN PRIMARY REFERENCE POINT: NORMAL
RAD ONC ARIA PLAN TOTAL FRACTIONS PRESCRIBED: 25
RAD ONC ARIA PLAN TOTAL PRESCRIBED DOSE: 4500 CGY
RAD ONC ARIA REFERENCE POINT DOSAGE GIVEN TO DATE: 41.4 GY
RAD ONC ARIA REFERENCE POINT ID: NORMAL
RAD ONC ARIA REFERENCE POINT SESSION DOSAGE GIVEN: 1.8 GY

## 2023-08-07 PROCEDURE — 77386: CPT | Performed by: STUDENT IN AN ORGANIZED HEALTH CARE EDUCATION/TRAINING PROGRAM

## 2023-08-07 PROCEDURE — 77336 RADIATION PHYSICS CONSULT: CPT | Performed by: STUDENT IN AN ORGANIZED HEALTH CARE EDUCATION/TRAINING PROGRAM

## 2023-08-07 NOTE — TELEPHONE ENCOUNTER
Rx Refill Note  Requested Prescriptions     Pending Prescriptions Disp Refills    capecitabine (XELODA) 500 MG chemo tablet 64 tablet 0     Sig: Take 4 tablets by mouth in the AM and 4 tablets by mouth in the PM on days of radiation.      Last office visit with prescribing clinician: Visit date not found   Last telemedicine visit with prescribing clinician: Visit date not found   Next office visit with prescribing clinician: Visit date not found                         Would you like a call back once the refill request has been completed: [] Yes [] No    If the office needs to give you a call back, can they leave a voicemail: [] Yes [] No    Erma Hargrove, Pharmacy Technician  08/07/23, 09:24 CDT

## 2023-08-08 ENCOUNTER — HOSPITAL ENCOUNTER (OUTPATIENT)
Dept: RADIATION ONCOLOGY | Facility: HOSPITAL | Age: 68
Discharge: HOME | End: 2023-08-08
Payer: MEDICARE

## 2023-08-08 ENCOUNTER — SPECIALTY PHARMACY (OUTPATIENT)
Dept: ONCOLOGY | Facility: CLINIC | Age: 68
End: 2023-08-08
Payer: MEDICARE

## 2023-08-08 ENCOUNTER — DOCUMENTATION (OUTPATIENT)
Dept: NUTRITION | Facility: HOSPITAL | Age: 68
End: 2023-08-08
Payer: MEDICARE

## 2023-08-08 LAB
RAD ONC ARIA COURSE ID: 1
RAD ONC ARIA COURSE INTENT: NORMAL
RAD ONC ARIA COURSE LAST TREATMENT DATE: NORMAL
RAD ONC ARIA COURSE START DATE: NORMAL
RAD ONC ARIA COURSE TREATMENT ELAPSED DAYS: 33
RAD ONC ARIA FIRST TREATMENT DATE: NORMAL
RAD ONC ARIA PLAN FRACTIONS TREATED TO DATE: 24
RAD ONC ARIA PLAN ID: NORMAL
RAD ONC ARIA PLAN NAME: NORMAL
RAD ONC ARIA PLAN PRESCRIBED DOSE PER FRACTION: 1.8 GY
RAD ONC ARIA PLAN PRIMARY REFERENCE POINT: NORMAL
RAD ONC ARIA PLAN TOTAL FRACTIONS PRESCRIBED: 25
RAD ONC ARIA PLAN TOTAL PRESCRIBED DOSE: 4500 CGY
RAD ONC ARIA REFERENCE POINT DOSAGE GIVEN TO DATE: 43.2 GY
RAD ONC ARIA REFERENCE POINT ID: NORMAL
RAD ONC ARIA REFERENCE POINT SESSION DOSAGE GIVEN: 1.8 GY

## 2023-08-08 PROCEDURE — 77386: CPT | Performed by: STUDENT IN AN ORGANIZED HEALTH CARE EDUCATION/TRAINING PROGRAM

## 2023-08-08 RX ORDER — CAPECITABINE 500 MG/1
2000 TABLET, FILM COATED ORAL TAKE AS DIRECTED
Qty: 64 TABLET | Refills: 0 | OUTPATIENT
Start: 2023-08-08

## 2023-08-08 NOTE — PROGRESS NOTES
Care Coordination General Call Note    Dr. العلي confirmed  pt is to discontinue xeloda after he completes this cycle.     Erma Hargrove, Pharmacy Technician  8/8/2023  12:54 CDT

## 2023-08-08 NOTE — PROGRESS NOTES
Adult Outpatient Nutrition  Assessment    Patient Name:  Jian Baker  YOB: 1955  MRN: [id number]    Assessment Date:  8/8/2023    Comments: Follow-up to check nutrition. Wt 165 lb. Pt stated has been adding strawberry syrup to Boost Plus (helps flavor). States almost out of Boost--would like to  more samples tomorrow. Boost Plus samples ready for pt to  at his convenience.                     Electronically signed by:  Isis Alvarez RD  08/08/23 16:36 CDT

## 2023-08-09 ENCOUNTER — HOSPITAL ENCOUNTER (OUTPATIENT)
Dept: RADIATION ONCOLOGY | Facility: HOSPITAL | Age: 68
Discharge: HOME | End: 2023-08-09
Payer: MEDICARE

## 2023-08-09 ENCOUNTER — HOSPITAL ENCOUNTER (OUTPATIENT)
Dept: RADIATION ONCOLOGY | Facility: HOSPITAL | Age: 68
Discharge: HOME | End: 2023-08-09

## 2023-08-09 VITALS
TEMPERATURE: 97.6 F | OXYGEN SATURATION: 100 % | BODY MASS INDEX: 25.29 KG/M2 | SYSTOLIC BLOOD PRESSURE: 138 MMHG | RESPIRATION RATE: 18 BRPM | DIASTOLIC BLOOD PRESSURE: 82 MMHG | HEART RATE: 77 BPM | WEIGHT: 161.5 LBS

## 2023-08-09 DIAGNOSIS — R12 HEARTBURN: ICD-10-CM

## 2023-08-09 DIAGNOSIS — C24.9 MALIGNANT NEOPLASM OF BILIARY TRACT, UNSPECIFIED: Primary | ICD-10-CM

## 2023-08-09 DIAGNOSIS — K21.9 GASTROESOPHAGEAL REFLUX DISEASE WITHOUT ESOPHAGITIS: ICD-10-CM

## 2023-08-09 LAB
RAD ONC ARIA COURSE ID: 1
RAD ONC ARIA COURSE INTENT: NORMAL
RAD ONC ARIA COURSE LAST TREATMENT DATE: NORMAL
RAD ONC ARIA COURSE START DATE: NORMAL
RAD ONC ARIA COURSE TREATMENT ELAPSED DAYS: 34
RAD ONC ARIA FIRST TREATMENT DATE: NORMAL
RAD ONC ARIA PLAN FRACTIONS TREATED TO DATE: 25
RAD ONC ARIA PLAN ID: NORMAL
RAD ONC ARIA PLAN NAME: NORMAL
RAD ONC ARIA PLAN PRESCRIBED DOSE PER FRACTION: 1.8 GY
RAD ONC ARIA PLAN PRIMARY REFERENCE POINT: NORMAL
RAD ONC ARIA PLAN TOTAL FRACTIONS PRESCRIBED: 25
RAD ONC ARIA PLAN TOTAL PRESCRIBED DOSE: 4500 CGY
RAD ONC ARIA REFERENCE POINT DOSAGE GIVEN TO DATE: 45 GY
RAD ONC ARIA REFERENCE POINT ID: NORMAL
RAD ONC ARIA REFERENCE POINT SESSION DOSAGE GIVEN: 1.8 GY

## 2023-08-09 PROCEDURE — 77386: CPT | Performed by: STUDENT IN AN ORGANIZED HEALTH CARE EDUCATION/TRAINING PROGRAM

## 2023-08-09 RX ORDER — FAMOTIDINE 20 MG/1
20 TABLET, FILM COATED ORAL DAILY
Qty: 60 TABLET | Refills: 2 | Status: SHIPPED | OUTPATIENT
Start: 2023-08-09

## 2023-08-09 RX ORDER — PANTOPRAZOLE SODIUM 40 MG/1
40 TABLET, DELAYED RELEASE ORAL DAILY
Qty: 30 TABLET | Refills: 2 | Status: SHIPPED | OUTPATIENT
Start: 2023-08-09

## 2023-08-09 NOTE — PROGRESS NOTES
On Treatment Visit     Patient: Jian Baker   YOB: 1955   Medical Record Number: [id number]     Date of Visit  August 9, 2023   Primary Diagnosis:  Cancer Staging   Malignant neoplasm of biliary tract, unspecified  Staging form: Distal Bile Duct, AJCC 8th Edition  - Clinical stage from 1/25/2023: Stage IIIB (cT4, cN1, cM0) - Signed by Ivonne Davies MD on 1/25/2023      Mr. Baker was seen today for an on treatment visit. He is receiving radiation therapy to the abdomen.     Treatment Site Technique Dose per fraction (cGy) Fractions Total dose (cGy)   Abdomen IMRT 180 25/25 4500/4500   Tumor boost IMRT 180 0/3 0/540     Concurrent chemotherapy: capecitabine    Doing well. Continues having left ankle pain. Occasional nausea.    Pain Score    08/09/23 0908   PainSc:   6   PainLoc: Ankle     Jian Baker reports a pain score of 6.  Given his pain assessment as noted, treatment options were discussed and the following options were decided upon as a follow-up plan to address the patient's pain: continuation of current treatment plan for pain.    Vitals:    08/09/23 0908   BP: 138/82   Pulse: 77   Resp: 18   Temp: 97.6 øF (36.4 øC)   SpO2: 100%       Wt Readings from Last 3 Encounters:   08/09/23 73.3 kg (161 lb 8 oz)   08/03/23 74.8 kg (165 lb)   08/02/23 75.3 kg (166 lb)     On exam, he is sitting up in no distress, breathing comfortably on room air.    Plan: I have reviewed treatment setup notes and checked and approved the daily guidance images. I reviewed dose delivery and treatment parameters and deemed them appropriate. Continue radiation as prescribed.    Electronically signed by Rhonda Arevalo MD 08/09/23 09:33 CDT

## 2023-08-09 NOTE — PATIENT INSTRUCTIONS
External Beam Radiation Therapy, Care After    What can I expect after my radiation treatments are finished?    Symptoms may last for weeks or months. Most symptoms will go away over time. Sometimes they are permanent. Your doctor will ask you about your symptoms at your follow up visit.    It is common to have:  Fatigue.  Red, flaking, dry skin in the treated area.  A sunburn-like rash on the skin in the treated area.  Hair loss in the treated area.  Itching in the treated area.    Other side effects may occur, depending on which part of the body was exposed to radiation and how much radiation was used. These may include:  Hair loss if the radiation therapy was directed to the head.  Coughing or difficulty swallowing if the radiation therapy was directed to the head, neck, or chest  Nausea, vomiting, or diarrhea if the radiation therapy was directed to the abdomen or pelvis.  Bladder problems, frequent urination, or sexual dysfunction if the radiation therapy was directed to the bladder, kidney, or prostate.  Memory loss and cognitive changes if the radiation therapy was directed to your brain.    Although some side effects may show up months to years later, most side effects are usually temporary and get better over time. It can take up to 3-4 weeks for you to regain your energy or for side effects to get better.       Follow these instructions at home:    Skin care   Skin reactions may worsen for 7 to 10 days after your final treatment. During this time, gently clean and moisturize your skin with the  skin products your radiation team suggested. Let your markings slowly wear off.   Continue to wash your treated skin with a fragrance-free mild soap. Do not scrub or rub your skin. Pat the area dry.  If you received treatment to your head, face, or neck area, use a mild shampoo and be gentle when washing your hair.  Continue to apply your moisturizer to the treated area .  Protect your skin. Even after the skin in  the treatment area has healed, it will be sensitive. Protect your skin from injury and avoid direct sun. If you cannot avoid the sun, use a sunscreen with a SPF of 30 or higher.   Avoid scratching the treated area.  Do not use a heating pad or a warm cloth to relieve pain in the treated area.      Diet  Eat a well-balanced diet.   Drink at least 8 glasses of fluid each day.Drinking fluids will help to speed the healing process.    Avoid caffeine and alcohol.   Return to your normal diet slowly. If you were on a special diet to control the side effects of your treatment, follow this diet until the side effects go away. Then, slowly return to your normal diet to prevent the return of symptoms.     Activity  Pace yourself and adjust your normal routine, as needed. It may take weeks or months before your energy level returns to normal     General instructions   Take over-the-counter and prescription medicines only as told by your health care provider.  Keep all follow-up visits as told by your health care provider. This is important. They are needed to determine if the radiation therapy worked as it was intended to.      Contact the Radiation Team if:  You have pain in the treated area.  The redness worsens in the treated area.  Open skin or blisters develop in the treated area.  You have unexplained weight loss.  You continue to have nausea or vomiting not relieved by your medications, or develop nausea or vomiting in the following weeks after your treatment is completed   You continue to have diarrhea that lasts a long time, or develop diarrhea in the following weeks after your treatment is completed

## 2023-08-10 ENCOUNTER — OFFICE VISIT (OUTPATIENT)
Dept: ONCOLOGY | Facility: CLINIC | Age: 68
End: 2023-08-10
Payer: MEDICARE

## 2023-08-10 ENCOUNTER — INFUSION (OUTPATIENT)
Dept: ONCOLOGY | Facility: HOSPITAL | Age: 68
End: 2023-08-10
Payer: MEDICARE

## 2023-08-10 ENCOUNTER — HOSPITAL ENCOUNTER (OUTPATIENT)
Dept: RADIATION ONCOLOGY | Facility: HOSPITAL | Age: 68
Discharge: HOME | End: 2023-08-10
Payer: MEDICARE

## 2023-08-10 VITALS
RESPIRATION RATE: 18 BRPM | SYSTOLIC BLOOD PRESSURE: 141 MMHG | HEART RATE: 80 BPM | BODY MASS INDEX: 25.37 KG/M2 | OXYGEN SATURATION: 95 % | WEIGHT: 162 LBS | DIASTOLIC BLOOD PRESSURE: 76 MMHG

## 2023-08-10 DIAGNOSIS — M25.572 ACUTE LEFT ANKLE PAIN: ICD-10-CM

## 2023-08-10 DIAGNOSIS — C24.9 MALIGNANT NEOPLASM OF BILIARY TRACT, UNSPECIFIED: Primary | ICD-10-CM

## 2023-08-10 DIAGNOSIS — R51.9 ACUTE NONINTRACTABLE HEADACHE, UNSPECIFIED HEADACHE TYPE: ICD-10-CM

## 2023-08-10 DIAGNOSIS — G89.3 CANCER ASSOCIATED PAIN: ICD-10-CM

## 2023-08-10 DIAGNOSIS — M25.472 LEFT ANKLE SWELLING: ICD-10-CM

## 2023-08-10 DIAGNOSIS — Z79.899 OTHER LONG TERM (CURRENT) DRUG THERAPY: ICD-10-CM

## 2023-08-10 DIAGNOSIS — E03.2 HYPOTHYROIDISM DUE TO MEDICATION: ICD-10-CM

## 2023-08-10 DIAGNOSIS — C24.9 MALIGNANT NEOPLASM OF BILIARY TRACT, UNSPECIFIED: ICD-10-CM

## 2023-08-10 DIAGNOSIS — Z45.2 ENCOUNTER FOR VENOUS ACCESS DEVICE CARE: Primary | ICD-10-CM

## 2023-08-10 LAB
ALBUMIN SERPL-MCNC: 3.5 G/DL (ref 3.5–5.2)
ALBUMIN/GLOB SERPL: 0.9 G/DL
ALP SERPL-CCNC: 89 U/L (ref 39–117)
ALT SERPL W P-5'-P-CCNC: 6 U/L (ref 1–41)
ANION GAP SERPL CALCULATED.3IONS-SCNC: 9 MMOL/L (ref 5–15)
AST SERPL-CCNC: 14 U/L (ref 1–40)
BASOPHILS # BLD AUTO: 0.03 10*3/MM3 (ref 0–0.2)
BASOPHILS NFR BLD AUTO: 0.7 % (ref 0–1.5)
BILIRUB SERPL-MCNC: 0.5 MG/DL (ref 0–1.2)
BUN SERPL-MCNC: 9 MG/DL (ref 8–23)
BUN/CREAT SERPL: 9.9 (ref 7–25)
CALCIUM SPEC-SCNC: 8.8 MG/DL (ref 8.6–10.5)
CHLORIDE SERPL-SCNC: 100 MMOL/L (ref 98–107)
CO2 SERPL-SCNC: 25 MMOL/L (ref 22–29)
CREAT SERPL-MCNC: 0.91 MG/DL (ref 0.76–1.27)
DEPRECATED RDW RBC AUTO: 63 FL (ref 37–54)
EGFRCR SERPLBLD CKD-EPI 2021: 93 ML/MIN/1.73
EOSINOPHIL # BLD AUTO: 0.15 10*3/MM3 (ref 0–0.4)
EOSINOPHIL NFR BLD AUTO: 3.5 % (ref 0.3–6.2)
ERYTHROCYTE [DISTWIDTH] IN BLOOD BY AUTOMATED COUNT: 18.5 % (ref 12.3–15.4)
GLOBULIN UR ELPH-MCNC: 3.7 GM/DL
GLUCOSE SERPL-MCNC: 98 MG/DL (ref 65–99)
HCT VFR BLD AUTO: 24 % (ref 37.5–51)
HGB BLD-MCNC: 8.6 G/DL (ref 13–17.7)
IMM GRANULOCYTES # BLD AUTO: 0.02 10*3/MM3 (ref 0–0.05)
IMM GRANULOCYTES NFR BLD AUTO: 0.5 % (ref 0–0.5)
LYMPHOCYTES # BLD AUTO: 0.1 10*3/MM3 (ref 0.7–3.1)
LYMPHOCYTES NFR BLD AUTO: 2.3 % (ref 19.6–45.3)
MCH RBC QN AUTO: 34.3 PG (ref 26.6–33)
MCHC RBC AUTO-ENTMCNC: 35.8 G/DL (ref 31.5–35.7)
MCV RBC AUTO: 95.6 FL (ref 79–97)
MONOCYTES # BLD AUTO: 0.57 10*3/MM3 (ref 0.1–0.9)
MONOCYTES NFR BLD AUTO: 13.2 % (ref 5–12)
NEUTROPHILS NFR BLD AUTO: 3.44 10*3/MM3 (ref 1.7–7)
NEUTROPHILS NFR BLD AUTO: 79.8 % (ref 42.7–76)
NRBC BLD AUTO-RTO: 0 /100 WBC (ref 0–0.2)
PLATELET # BLD AUTO: 116 10*3/MM3 (ref 140–450)
PMV BLD AUTO: 10.1 FL (ref 6–12)
POTASSIUM SERPL-SCNC: 3.4 MMOL/L (ref 3.5–5.2)
PROT SERPL-MCNC: 7.2 G/DL (ref 6–8.5)
RAD ONC ARIA COURSE ID: 1
RAD ONC ARIA COURSE INTENT: NORMAL
RAD ONC ARIA COURSE LAST TREATMENT DATE: NORMAL
RAD ONC ARIA COURSE START DATE: NORMAL
RAD ONC ARIA COURSE TREATMENT ELAPSED DAYS: 35
RAD ONC ARIA FIRST TREATMENT DATE: NORMAL
RAD ONC ARIA PLAN FRACTIONS TREATED TO DATE: 1
RAD ONC ARIA PLAN ID: NORMAL
RAD ONC ARIA PLAN NAME: NORMAL
RAD ONC ARIA PLAN PRESCRIBED DOSE PER FRACTION: 1.8 GY
RAD ONC ARIA PLAN PRIMARY REFERENCE POINT: NORMAL
RAD ONC ARIA PLAN TOTAL FRACTIONS PRESCRIBED: 3
RAD ONC ARIA PLAN TOTAL PRESCRIBED DOSE: 540 CGY
RAD ONC ARIA REFERENCE POINT DOSAGE GIVEN TO DATE: 1.8 GY
RAD ONC ARIA REFERENCE POINT ID: NORMAL
RAD ONC ARIA REFERENCE POINT SESSION DOSAGE GIVEN: 1.8 GY
RBC # BLD AUTO: 2.51 10*6/MM3 (ref 4.14–5.8)
SODIUM SERPL-SCNC: 134 MMOL/L (ref 136–145)
WBC NRBC COR # BLD: 4.31 10*3/MM3 (ref 3.4–10.8)

## 2023-08-10 PROCEDURE — 36591 DRAW BLOOD OFF VENOUS DEVICE: CPT | Performed by: INTERNAL MEDICINE

## 2023-08-10 PROCEDURE — 80053 COMPREHEN METABOLIC PANEL: CPT

## 2023-08-10 PROCEDURE — 25010000002 HEPARIN LOCK FLUSH PER 10 UNITS: Performed by: INTERNAL MEDICINE

## 2023-08-10 PROCEDURE — 77427 RADIATION TX MANAGEMENT X5: CPT | Performed by: STUDENT IN AN ORGANIZED HEALTH CARE EDUCATION/TRAINING PROGRAM

## 2023-08-10 PROCEDURE — 85025 COMPLETE CBC W/AUTO DIFF WBC: CPT

## 2023-08-10 PROCEDURE — 77386: CPT | Performed by: STUDENT IN AN ORGANIZED HEALTH CARE EDUCATION/TRAINING PROGRAM

## 2023-08-10 RX ORDER — HEPARIN SODIUM (PORCINE) LOCK FLUSH IV SOLN 100 UNIT/ML 100 UNIT/ML
500 SOLUTION INTRAVENOUS AS NEEDED
OUTPATIENT
Start: 2023-08-10

## 2023-08-10 RX ORDER — SODIUM CHLORIDE 0.9 % (FLUSH) 0.9 %
10 SYRINGE (ML) INJECTION AS NEEDED
OUTPATIENT
Start: 2023-08-10

## 2023-08-10 RX ORDER — HEPARIN SODIUM (PORCINE) LOCK FLUSH IV SOLN 100 UNIT/ML 100 UNIT/ML
500 SOLUTION INTRAVENOUS AS NEEDED
Status: DISCONTINUED | OUTPATIENT
Start: 2023-08-10 | End: 2023-08-10 | Stop reason: HOSPADM

## 2023-08-10 RX ORDER — SODIUM CHLORIDE 0.9 % (FLUSH) 0.9 %
10 SYRINGE (ML) INJECTION AS NEEDED
Status: DISCONTINUED | OUTPATIENT
Start: 2023-08-10 | End: 2023-08-10 | Stop reason: HOSPADM

## 2023-08-10 RX ADMIN — HEPARIN 500 UNITS: 100 SYRINGE at 07:35

## 2023-08-10 NOTE — PROGRESS NOTES
Subjective     Jian Baker was seen in follow-up for cholangiocarcinoma.  He is overall stable.   Tolerating chemoradiation well.   Continue to struggle with left ankle pain/swelling.   No fever or chills.       Past Medical History, Past Surgical History, Social History, Family History have been reviewed and are without significant changes except as mentioned.        Medications:  The current medication list was reviewed in the EMR    ALLERGIES:  No Known Allergies    Objective      Vitals:    08/10/23 0758   BP: 141/76   Pulse: 80   Resp: 18   SpO2: 95%             5/26/2023     7:58 AM   Current Status   ECOG score 0       Physical Exam  Vitals and nursing note reviewed.   Constitutional:       Appearance: Normal appearance.   Musculoskeletal:      Comments: Left ankle pain/swelling    Neurological:      General: No focal deficit present.      Mental Status: He is alert and oriented to person, place, and time. Mental status is at baseline.   Psychiatric:         Mood and Affect: Mood normal.         Behavior: Behavior normal.         Thought Content: Thought content normal.         RECENT LABS:Independently reviewed and summarized    Hematology WBC   Date Value Ref Range Status   08/03/2023 3.91 3.40 - 10.80 10*3/mm3 Final   01/11/2023 8.31 4.5 - 11.0 10*3/uL Final     RBC   Date Value Ref Range Status   08/03/2023 2.87 (L) 4.14 - 5.80 10*6/mm3 Final   01/11/2023 4.17 (L) 4.5 - 5.9 10*6/uL Final     Hemoglobin   Date Value Ref Range Status   08/03/2023 9.4 (L) 13.0 - 17.7 g/dL Final   01/11/2023 13.0 (L) 13.5 - 17.5 g/dL Final     Hematocrit   Date Value Ref Range Status   08/03/2023 27.2 (L) 37.5 - 51.0 % Final   01/11/2023 39.2 (L) 41.0 - 53.0 % Final     Platelets   Date Value Ref Range Status   08/03/2023 119 (L) 140 - 450 10*3/mm3 Final   01/11/2023 290 140 - 440 10*3/uL Final         Lab Results   Component Value Date    GLUCOSE 98 08/10/2023    BUN 9 08/10/2023    CREATININE 0.91 08/10/2023    EGFR  93.0 08/10/2023    BCR 9.9 08/10/2023    K 3.4 (L) 08/10/2023    CO2 25.0 08/10/2023    CALCIUM 8.8 08/10/2023    ALBUMIN 3.5 08/10/2023    BILITOT 0.5 08/10/2023    AST 14 08/10/2023    ALT 6 08/10/2023          Jian Baker reports a pain score of 8.  Given his pain assessment as noted, treatment options were discussed and the following options were decided upon as a follow-up plan to address the patient's pain: continuation of current treatment plan for pain and referral to specialist for assistance in pain treatment guidance.    Patient screened negative for depression based on a PHQ-9 score of 0 on 8/10/2023.     Advance Care Planning   ACP discussion was declined by the patient. Patient does not have an advance directive, declines further assistance.           Diagnosis:   (1) Cholangiocarcinoma, locally advance, unresectable   At least Stage IIIB (cT4, cN1, cM0)      Current therapy:   Gemcitabine/Cisplatin/Durvalumab      D1C1: 2/3/22, D8C1: 2/10/23  D1C2: 2/24/23, D8C2: 3/3/23   D1C3: 3/17/23,D8C3: 3/24/23     CT scan showed overall stable disease with slight less dense primary tumor.  No evidence of progressive disease.     D1C4: 4/5/23, D8C4: 4/14/23   D1C5: 4/28/23, D8C5: 5/5/23  D1C6: 5/19/23 , D8C6: 5/26/23      CT showed on 5/25/23 showed stable to minimally reduced cholangiocarcinoma without any evidence of progression.  Case discussed with radiation oncology and concurrent chemoradiation with Xeloda was recommended.     (2) Biliary obstruction   (3) Cancer associated pain    (4) Chemo therapy induced nausea/emesis  (5) Hypothyroidism    (6) Gout   (7) Ankle pain/swelling        Assessment & Plan     (1) Cholangiocarcinoma, locally advance, unresectable     Chronic, stable.   Currently on concurrent chemoRT.   Has tolerated  treatment well.   No major side effects.   He will complete his radiation.   CBC, CMP next month.     (2) Biliary obstruction     Biliary obstruction from cholangiocarcinoma.  S/p percutaneous biliary drainage. Both of the drainage tube but draining well without any issue.     (3) Cancer associated pain      Chronic, stable.   He is on oxycodone as needed.   Continue this.     (4) Chemotherapy induced nausea/emesis    Chronic, stable.   Continue zofran as needed.     (5) Hypothyroidism      Chronic, stable.   Secondary to immunotherapy.   On synthroid 125 mcg daily.   Continue synthroid.   TSH in 3 weeks.       (6) Gout (7) Ankle pain/swelling     Persistent  issue.   Redness has improved.   Continue allopurinol.   Will refer to podiatry.       8/10/2023      CC:

## 2023-08-11 ENCOUNTER — HOSPITAL ENCOUNTER (OUTPATIENT)
Dept: RADIATION ONCOLOGY | Facility: HOSPITAL | Age: 68
Discharge: HOME | End: 2023-08-11
Payer: MEDICARE

## 2023-08-11 ENCOUNTER — DOCUMENTATION (OUTPATIENT)
Dept: NUTRITION | Facility: HOSPITAL | Age: 68
End: 2023-08-11
Payer: MEDICARE

## 2023-08-11 LAB
RAD ONC ARIA COURSE ID: 1
RAD ONC ARIA COURSE INTENT: NORMAL
RAD ONC ARIA COURSE LAST TREATMENT DATE: NORMAL
RAD ONC ARIA COURSE START DATE: NORMAL
RAD ONC ARIA COURSE TREATMENT ELAPSED DAYS: 36
RAD ONC ARIA FIRST TREATMENT DATE: NORMAL
RAD ONC ARIA PLAN FRACTIONS TREATED TO DATE: 2
RAD ONC ARIA PLAN ID: NORMAL
RAD ONC ARIA PLAN NAME: NORMAL
RAD ONC ARIA PLAN PRESCRIBED DOSE PER FRACTION: 1.8 GY
RAD ONC ARIA PLAN PRIMARY REFERENCE POINT: NORMAL
RAD ONC ARIA PLAN TOTAL FRACTIONS PRESCRIBED: 3
RAD ONC ARIA PLAN TOTAL PRESCRIBED DOSE: 540 CGY
RAD ONC ARIA REFERENCE POINT DOSAGE GIVEN TO DATE: 3.6 GY
RAD ONC ARIA REFERENCE POINT ID: NORMAL
RAD ONC ARIA REFERENCE POINT SESSION DOSAGE GIVEN: 1.8 GY

## 2023-08-11 PROCEDURE — 77386: CPT | Performed by: STUDENT IN AN ORGANIZED HEALTH CARE EDUCATION/TRAINING PROGRAM

## 2023-08-11 NOTE — PROGRESS NOTES
Adult Outpatient Nutrition  Assessment    Patient Name:  Jian Baker  YOB: 1955  MRN: [id number]    Assessment Date:  8/11/2023    Comments: Follow-up to check nutrition. Receiving rad due to bladder cancer.  Completed chemo. Wt 162 lb (recently holding). States appetite comes and goes. No diarrhea. Pt accepted additional supplement assistance.                     Electronically signed by:  Isis Alvarez RD  08/11/23 13:28 CDT

## 2023-08-14 ENCOUNTER — HOSPITAL ENCOUNTER (OUTPATIENT)
Dept: RADIATION ONCOLOGY | Facility: HOSPITAL | Age: 68
Discharge: HOME | End: 2023-08-14
Payer: MEDICARE

## 2023-08-14 ENCOUNTER — DOCUMENTATION (OUTPATIENT)
Dept: RADIATION ONCOLOGY | Facility: HOSPITAL | Age: 68
End: 2023-08-14
Payer: MEDICARE

## 2023-08-14 LAB
RAD ONC ARIA COURSE ID: 1
RAD ONC ARIA COURSE INTENT: NORMAL
RAD ONC ARIA COURSE LAST TREATMENT DATE: NORMAL
RAD ONC ARIA COURSE START DATE: NORMAL
RAD ONC ARIA COURSE TREATMENT ELAPSED DAYS: 39
RAD ONC ARIA FIRST TREATMENT DATE: NORMAL
RAD ONC ARIA PLAN FRACTIONS TREATED TO DATE: 3
RAD ONC ARIA PLAN ID: NORMAL
RAD ONC ARIA PLAN NAME: NORMAL
RAD ONC ARIA PLAN PRESCRIBED DOSE PER FRACTION: 1.8 GY
RAD ONC ARIA PLAN PRIMARY REFERENCE POINT: NORMAL
RAD ONC ARIA PLAN TOTAL FRACTIONS PRESCRIBED: 3
RAD ONC ARIA PLAN TOTAL PRESCRIBED DOSE: 540 CGY
RAD ONC ARIA REFERENCE POINT DOSAGE GIVEN TO DATE: 5.4 GY
RAD ONC ARIA REFERENCE POINT ID: NORMAL
RAD ONC ARIA REFERENCE POINT SESSION DOSAGE GIVEN: 1.8 GY

## 2023-08-14 PROCEDURE — 77336 RADIATION PHYSICS CONSULT: CPT | Performed by: STUDENT IN AN ORGANIZED HEALTH CARE EDUCATION/TRAINING PROGRAM

## 2023-08-14 PROCEDURE — 77386: CPT | Performed by: STUDENT IN AN ORGANIZED HEALTH CARE EDUCATION/TRAINING PROGRAM

## 2023-08-14 NOTE — PROGRESS NOTES
Radiation Completion Note     Patient: Jian Baker   YOB: 1955   Medical Record Number: [id number]    Diagnosis:  Cancer Staging   Malignant neoplasm of biliary tract, unspecified  Staging form: Distal Bile Duct, AJCC 8th Edition  - Clinical stage from 1/25/2023: Stage IIIB (cT4, cN1, cM0) - Signed by Ivonne Davies MD on 1/25/2023      Prior Treatment:  - gemcitabine/cisplatin/durvalumab x6 2/3/2023-5/26/2023     Implanted Devices: none    Summary: Mr. Baker is a 68 y.o. male with cholangiocarcinoma, cT4 N1 M0. He presented with obstructive jaundice, bilirubin 11.4. MRCP on 11/18/2022 showed a 3.9 cm obstructing mass at the biliary confluence; ERCP was attempted on 11/22/2022 but was unsuccessful due to compression from the tumor. He went to [place] and underwent IR biliary drainage and brushings on 11/28/2023 which was nondiagnostic; repeat biopsy on 1/11/2023 showed adenocarcinoma. He completed 6 cycles of chemotherapy on 5/26/2023. CT chest/abd/pelvis on 5/25/2023 showed a stable 4 cm mass. He completed abdominal radiotherapy on 8/14/23.    Treatment Site Technique Dose per fraction (cGy) Fractions Total dose (cGy) Start date End date Elapsed Days   Abdomen IMRT 384 03 7779 7/6/2023 8/9/2023 34   Tumor boost IMRT 180 3 540 8/10/2023 8/14/2023 4     Concurrent therapy: capecitabine    Treatment course: Mr. Baker tolerated treatment well. There were no significant treatment-related complications.    Disposition: I will see him back for follow up in 3 months, or sooner if needed.    Electronically signed by Rhonda Arevalo MD 08/14/23 10:13 CDT

## 2023-08-15 ENCOUNTER — OFFICE VISIT (OUTPATIENT)
Dept: PODIATRY | Facility: CLINIC | Age: 68
End: 2023-08-15
Payer: MEDICARE

## 2023-08-15 VITALS
DIASTOLIC BLOOD PRESSURE: 80 MMHG | BODY MASS INDEX: 25.43 KG/M2 | HEART RATE: 90 BPM | OXYGEN SATURATION: 98 % | SYSTOLIC BLOOD PRESSURE: 140 MMHG | WEIGHT: 162 LBS | HEIGHT: 67 IN

## 2023-08-15 DIAGNOSIS — M25.572 LEFT ANKLE PAIN, UNSPECIFIED CHRONICITY: Primary | ICD-10-CM

## 2023-08-15 DIAGNOSIS — M19.072 OSTEOARTHRITIS OF LEFT ANKLE AND FOOT: ICD-10-CM

## 2023-08-15 RX ORDER — MELOXICAM 15 MG/1
15 TABLET ORAL DAILY
Qty: 30 TABLET | Refills: 1 | Status: SHIPPED | OUTPATIENT
Start: 2023-08-15

## 2023-08-15 RX ORDER — TRIAMCINOLONE ACETONIDE 40 MG/ML
20 INJECTION, SUSPENSION INTRA-ARTICULAR; INTRAMUSCULAR ONCE
Status: COMPLETED | OUTPATIENT
Start: 2023-08-15 | End: 2023-08-15

## 2023-08-15 RX ORDER — DEXAMETHASONE SODIUM PHOSPHATE 4 MG/ML
2 INJECTION, SOLUTION INTRA-ARTICULAR; INTRALESIONAL; INTRAMUSCULAR; INTRAVENOUS; SOFT TISSUE ONCE
Status: COMPLETED | OUTPATIENT
Start: 2023-08-15 | End: 2023-08-15

## 2023-08-15 RX ADMIN — TRIAMCINOLONE ACETONIDE 20 MG: 40 INJECTION, SUSPENSION INTRA-ARTICULAR; INTRAMUSCULAR at 09:31

## 2023-08-15 RX ADMIN — DEXAMETHASONE SODIUM PHOSPHATE 2 MG: 4 INJECTION, SOLUTION INTRA-ARTICULAR; INTRALESIONAL; INTRAMUSCULAR; INTRAVENOUS; SOFT TISSUE at 09:30

## 2023-08-15 NOTE — PROGRESS NOTES
Jian Baker  1955  68 y.o. male      08/15/2023    Chief Complaint   Patient presents with    Right Ankle - Pain       History of Present Illness    Jian Baker is a 68 y.o.male who presents to clinic today for left ankle pain. Pain is localized on the outside of left ankle and is constant.. Patient states he went to the ER about a month ago for the pain and they told him it was gout. They gave him medicine for it, but he states it has not helped. No injuries known. X-rays obtained today.       Past Medical History:   Diagnosis Date    Arthritis     Asthma     Previously diagnosed and previously prescribed inhaler medications.      Cancer     cholangiocarcinoma    Elevated cholesterol     Essential hypertension     Gastroesophageal reflux disease          Past Surgical History:   Procedure Laterality Date    CERVICAL SPINE SURGERY      x3    DENTAL PROCEDURE      3 wisdom teeth and 2+ other teeth removed    ERCP N/A 11/22/2022    Procedure: ENDOSCOPIC RETROGRADE CHOLANGIOPANCREATOGRAPHY;  Surgeon: Nabila Worthy MD;  Location: [facility] ENDOSCOPY;  Service: Gastroenterology;  Laterality: N/A;    PORTACATH PLACEMENT Right 2/2/2023    Procedure: PORT PLACEMENT         (C-ARM#2), right internal jugular;  Surgeon: Van Austin MD;  Location: [facility] OR;  Service: General;  Laterality: Right;    WRIST FRACTURE SURGERY Bilateral          Family History   Problem Relation Age of Onset    COPD Mother     Hypertension Mother     Diabetes type II Mother     Cancer Father     Heart failure Father     Cancer Brother     Early death Son     Lung cancer Maternal Uncle     Early death Sister        No Known Allergies    Social History     Socioeconomic History    Marital status:    Tobacco Use    Smoking status: Former     Packs/day: 1.00     Years: 45.00     Pack years: 45.00     Types: Pipe, Cigarettes     Start date: 10/10/1972     Quit date: 10/31/2019     Years since quitting: 3.7     Passive  exposure: Past    Smokeless tobacco: Current     Types: Chew   Vaping Use    Vaping Use: Never used   Substance and Sexual Activity    Alcohol use: Not Currently     Alcohol/week: 7.0 standard drinks     Types: 7 Cans of beer per week     Comment: Prior heavy drinker    Drug use: Never    Sexual activity: Defer     Partners: Female         Current Outpatient Medications   Medication Sig Dispense Refill    albuterol sulfate  (90 Base) MCG/ACT inhaler INHALE 2 PUFFS EVERY 4 HOURS AS NEEDED FOR WHEEZING OR SHORTNESS OF AIR. 18 g 0    allopurinol (ZYLOPRIM) 100 MG tablet Take 1 tablet by mouth Daily. 90 tablet 0    atorvastatin (LIPITOR) 20 MG tablet Take 1 tablet by mouth Daily. 90 tablet 0    capecitabine (XELODA) 500 MG chemo tablet Take 4 tablets by mouth in the AM and 4 tablets by mouth in the PM on days of radiation. 64 tablet 0    cyanocobalamin (VITAMIN B-12) 1000 MCG tablet Take 1 tablet by mouth Daily. 90 tablet 0    cyclobenzaprine (FLEXERIL) 10 MG tablet Take 1 tablet by mouth 3 (Three) Times a Day As Needed for Muscle Spasms. 60 tablet 11    famotidine (PEPCID) 20 MG tablet Take 1 tablet by mouth Daily. 60 tablet 2    fluticasone (FLONASE) 50 MCG/ACT nasal spray INSTILL 2 SPRAYS INTO THE NOSTRIL(S) AS DIRECTED BY PROVIDER DAILY. 16 g 2    levothyroxine (Synthroid) 125 MCG tablet Take 1 tablet by mouth Daily. 90 tablet 0    lisinopril (PRINIVIL,ZESTRIL) 20 MG tablet Take 1 tablet by mouth Daily. 30 tablet 1    loperamide (IMODIUM A-D) 2 MG tablet Take 1 tablet by mouth 4 (Four) Times a Day As Needed for Diarrhea. 60 tablet 3    loratadine (CLARITIN) 10 MG tablet Take 1 tablet by mouth Daily. 30 tablet 1    meloxicam (MOBIC) 15 MG tablet Take 1 tablet by mouth Daily. 30 tablet 1    methylPREDNISolone (MEDROL) 2 MG tablet Take 2 tablets by mouth Daily. 8 tablet 0    OLANZapine (zyPREXA) 5 MG tablet Take 2 tablets by mouth Every Night. 6 tablet 7    ondansetron (ZOFRAN) 8 MG tablet Take 1 tablet by mouth  "3 (Three) Times a Day As Needed for Nausea or Vomiting. 30 tablet 5    ondansetron (ZOFRAN) 8 MG tablet Take 1 tablet by mouth 3 (Three) Times a Day As Needed for Nausea or Vomiting. 30 tablet 5    oxyCODONE (OXY-IR) 5 MG capsule Take 1 capsule by mouth Every 4 (Four) Hours As Needed for Moderate Pain. 60 capsule 0    pantoprazole (Protonix) 40 MG EC tablet Take 1 tablet by mouth Daily. 30 tablet 2    polyethylene glycol (MIRALAX) 17 g packet Take 17 g by mouth Daily As Needed (constipation). 20 each 0     No current facility-administered medications for this visit.       Review of Systems   Musculoskeletal:         Left ankle pain    All other systems reviewed and are negative.      OBJECTIVE    /80   Pulse 90   Ht 170.2 cm (67\")   Wt 73.5 kg (162 lb)   SpO2 98%   BMI 25.37 kg/mý     Physical Exam  HENT:      Head: Normocephalic.   Musculoskeletal:      Left ankle: Swelling present. Tenderness present. Decreased range of motion. Anterior drawer test negative. Normal pulse.      Left Achilles Tendon: Normal.      Left foot: Decreased range of motion. Deformity present.        Legs:         Feet:    Neurological:      Mental Status: He is alert.   Psychiatric:         Mood and Affect: Mood normal.            Medium Joint Arthrocentesis: L ankle  Date/Time: 8/15/2023 9:48 AM  Consent given by: patient  Site marked: site marked  Timeout: Immediately prior to procedure a time out was called to verify the correct patient, procedure, equipment, support staff and site/side marked as required   Supporting Documentation  Indications: pain   Procedure Details  Location: ankle - L ankle  Needle size: 26 G  Approach: anterolateral  Patient tolerance: patient tolerated the procedure well with no immediate complications          ASSESSMENT AND PLAN    Diagnoses and all orders for this visit:    1. Left ankle pain, unspecified chronicity (Primary)  -     XR Ankle 3+ View Left  -     meloxicam (MOBIC) 15 MG tablet; Take " 1 tablet by mouth Daily.  Dispense: 30 tablet; Refill: 1  -     dexAMETHasone (DECADRON) injection 2 mg  -     triamcinolone acetonide (KENALOG-40) injection 20 mg    2. Osteoarthritis of left ankle and foot    Other orders  -     Medium Joint Arthrocentesis: L ankle        - Patient examined and evaluated  - Radiographs reviewed   - Rx for mobic  - Steriod injection given into left ankle.  See procedure note above  - Patient given a ankle brace for support  - All questions were answered   - RTC 8 weeks            This document has been electronically signed by Omer Luque DPM on August 15, 2023 12:04 CDT     8/15/2023  12:04 CDT

## 2023-08-26 ENCOUNTER — APPOINTMENT (OUTPATIENT)
Dept: GENERAL RADIOLOGY | Facility: HOSPITAL | Age: 68
End: 2023-08-26
Payer: MEDICARE

## 2023-08-26 ENCOUNTER — HOSPITAL ENCOUNTER (EMERGENCY)
Facility: HOSPITAL | Age: 68
Discharge: HOME | End: 2023-08-26
Attending: STUDENT IN AN ORGANIZED HEALTH CARE EDUCATION/TRAINING PROGRAM
Payer: MEDICARE

## 2023-08-26 VITALS
HEART RATE: 66 BPM | SYSTOLIC BLOOD PRESSURE: 142 MMHG | HEIGHT: 67 IN | BODY MASS INDEX: 26.21 KG/M2 | DIASTOLIC BLOOD PRESSURE: 82 MMHG | RESPIRATION RATE: 16 BRPM | OXYGEN SATURATION: 100 % | WEIGHT: 167 LBS | TEMPERATURE: 97.9 F

## 2023-08-26 DIAGNOSIS — R07.89 CHEST WALL PAIN: Primary | ICD-10-CM

## 2023-08-26 LAB
ALBUMIN SERPL-MCNC: 3.7 G/DL (ref 3.5–5.2)
ALBUMIN/GLOB SERPL: 0.9 G/DL
ALP SERPL-CCNC: 161 U/L (ref 39–117)
ALT SERPL W P-5'-P-CCNC: 13 U/L (ref 1–41)
ANION GAP SERPL CALCULATED.3IONS-SCNC: 15 MMOL/L (ref 5–15)
AST SERPL-CCNC: 24 U/L (ref 1–40)
BASOPHILS # BLD AUTO: 0.03 10*3/MM3 (ref 0–0.2)
BASOPHILS NFR BLD AUTO: 0.7 % (ref 0–1.5)
BILIRUB SERPL-MCNC: 0.4 MG/DL (ref 0–1.2)
BUN SERPL-MCNC: 11 MG/DL (ref 8–23)
BUN/CREAT SERPL: 11.8 (ref 7–25)
CALCIUM SPEC-SCNC: 8.8 MG/DL (ref 8.6–10.5)
CHLORIDE SERPL-SCNC: 96 MMOL/L (ref 98–107)
CO2 SERPL-SCNC: 21 MMOL/L (ref 22–29)
CREAT SERPL-MCNC: 0.93 MG/DL (ref 0.76–1.27)
DEPRECATED RDW RBC AUTO: 65.2 FL (ref 37–54)
EGFRCR SERPLBLD CKD-EPI 2021: 90.6 ML/MIN/1.73
EOSINOPHIL # BLD AUTO: 0.09 10*3/MM3 (ref 0–0.4)
EOSINOPHIL NFR BLD AUTO: 2.1 % (ref 0.3–6.2)
ERYTHROCYTE [DISTWIDTH] IN BLOOD BY AUTOMATED COUNT: 18.4 % (ref 12.3–15.4)
GEN 5 2HR TROPONIN T REFLEX: 15 NG/L
GLOBULIN UR ELPH-MCNC: 3.9 GM/DL
GLUCOSE SERPL-MCNC: 88 MG/DL (ref 65–99)
HCT VFR BLD AUTO: 26.5 % (ref 37.5–51)
HGB BLD-MCNC: 9.3 G/DL (ref 13–17.7)
HOLD SPECIMEN: NORMAL
IMM GRANULOCYTES # BLD AUTO: 0.02 10*3/MM3 (ref 0–0.05)
IMM GRANULOCYTES NFR BLD AUTO: 0.5 % (ref 0–0.5)
LYMPHOCYTES # BLD AUTO: 0.22 10*3/MM3 (ref 0.7–3.1)
LYMPHOCYTES NFR BLD AUTO: 5 % (ref 19.6–45.3)
MAGNESIUM SERPL-MCNC: 1.9 MG/DL (ref 1.6–2.4)
MCH RBC QN AUTO: 34.3 PG (ref 26.6–33)
MCHC RBC AUTO-ENTMCNC: 35.1 G/DL (ref 31.5–35.7)
MCV RBC AUTO: 97.8 FL (ref 79–97)
MONOCYTES # BLD AUTO: 0.64 10*3/MM3 (ref 0.1–0.9)
MONOCYTES NFR BLD AUTO: 14.6 % (ref 5–12)
NEUTROPHILS NFR BLD AUTO: 3.38 10*3/MM3 (ref 1.7–7)
NEUTROPHILS NFR BLD AUTO: 77.1 % (ref 42.7–76)
NRBC BLD AUTO-RTO: 0 /100 WBC (ref 0–0.2)
NT-PROBNP SERPL-MCNC: 111.8 PG/ML (ref 0–900)
PLATELET # BLD AUTO: 113 10*3/MM3 (ref 140–450)
PMV BLD AUTO: 10.3 FL (ref 6–12)
POTASSIUM SERPL-SCNC: 3.6 MMOL/L (ref 3.5–5.2)
PROT SERPL-MCNC: 7.6 G/DL (ref 6–8.5)
QT INTERVAL: 354 MS
QTC INTERVAL: 418 MS
RBC # BLD AUTO: 2.71 10*6/MM3 (ref 4.14–5.8)
SODIUM SERPL-SCNC: 132 MMOL/L (ref 136–145)
TROPONIN T DELTA: 0 NG/L
TROPONIN T SERPL HS-MCNC: 15 NG/L
WBC NRBC COR # BLD: 4.38 10*3/MM3 (ref 3.4–10.8)
WHOLE BLOOD HOLD COAG: NORMAL
WHOLE BLOOD HOLD SPECIMEN: NORMAL

## 2023-08-26 PROCEDURE — 96374 THER/PROPH/DIAG INJ IV PUSH: CPT

## 2023-08-26 PROCEDURE — 93005 ELECTROCARDIOGRAM TRACING: CPT | Performed by: STUDENT IN AN ORGANIZED HEALTH CARE EDUCATION/TRAINING PROGRAM

## 2023-08-26 PROCEDURE — 99284 EMERGENCY DEPT VISIT MOD MDM: CPT

## 2023-08-26 PROCEDURE — 25010000002 KETOROLAC TROMETHAMINE PER 15 MG: Performed by: STUDENT IN AN ORGANIZED HEALTH CARE EDUCATION/TRAINING PROGRAM

## 2023-08-26 PROCEDURE — 36415 COLL VENOUS BLD VENIPUNCTURE: CPT

## 2023-08-26 PROCEDURE — 93005 ELECTROCARDIOGRAM TRACING: CPT

## 2023-08-26 PROCEDURE — 93010 ELECTROCARDIOGRAM REPORT: CPT | Performed by: INTERNAL MEDICINE

## 2023-08-26 PROCEDURE — 80053 COMPREHEN METABOLIC PANEL: CPT | Performed by: STUDENT IN AN ORGANIZED HEALTH CARE EDUCATION/TRAINING PROGRAM

## 2023-08-26 PROCEDURE — 83880 ASSAY OF NATRIURETIC PEPTIDE: CPT | Performed by: STUDENT IN AN ORGANIZED HEALTH CARE EDUCATION/TRAINING PROGRAM

## 2023-08-26 PROCEDURE — 84484 ASSAY OF TROPONIN QUANT: CPT | Performed by: STUDENT IN AN ORGANIZED HEALTH CARE EDUCATION/TRAINING PROGRAM

## 2023-08-26 PROCEDURE — 71045 X-RAY EXAM CHEST 1 VIEW: CPT

## 2023-08-26 PROCEDURE — 83735 ASSAY OF MAGNESIUM: CPT | Performed by: STUDENT IN AN ORGANIZED HEALTH CARE EDUCATION/TRAINING PROGRAM

## 2023-08-26 PROCEDURE — 85025 COMPLETE CBC W/AUTO DIFF WBC: CPT | Performed by: STUDENT IN AN ORGANIZED HEALTH CARE EDUCATION/TRAINING PROGRAM

## 2023-08-26 RX ORDER — SODIUM CHLORIDE 0.9 % (FLUSH) 0.9 %
10 SYRINGE (ML) INJECTION AS NEEDED
Status: DISCONTINUED | OUTPATIENT
Start: 2023-08-26 | End: 2023-08-26 | Stop reason: HOSPADM

## 2023-08-26 RX ORDER — KETOROLAC TROMETHAMINE 15 MG/ML
15 INJECTION, SOLUTION INTRAMUSCULAR; INTRAVENOUS ONCE
Status: COMPLETED | OUTPATIENT
Start: 2023-08-26 | End: 2023-08-26

## 2023-08-26 RX ADMIN — KETOROLAC TROMETHAMINE 15 MG: 15 INJECTION, SOLUTION INTRAMUSCULAR; INTRAVENOUS at 16:03

## 2023-08-26 NOTE — DISCHARGE INSTRUCTIONS
Use ibuprofen 600 mg every 6 hours as needed for pain.  You can use topical ointments or gels on your chest wall, ice or heat to see if this helps.  Call your primary care for follow-up as needed.  Return to ED with any worsening or concerning symptoms.

## 2023-08-26 NOTE — ED PROVIDER NOTES
Subjective   History of Present Illness  Patient presents with chest pain starting this morning as being constant.  Patient has had intermittent chest pain in the same area in the same quality but has not lasted as long.  Patient states pain is dull in the lower sternum area that increases significantly with cough and deep breathing.  He has a nonproductive cough but no increase in his cough.  He denies shortness of breath, fever or chills, or nausea or vomiting.  Patient has cholangiocarcinoma and has several drainage tubes which he has no problems or complaints with today.    Review of Systems   Constitutional:  Negative for activity change, appetite change, chills and fever.   HENT:  Negative for congestion and ear pain.    Eyes:  Negative for pain and discharge.   Respiratory:  Positive for cough. Negative for chest tightness and shortness of breath.    Cardiovascular:  Positive for chest pain. Negative for palpitations.   Gastrointestinal:  Negative for abdominal distention and abdominal pain.   Endocrine: Negative for cold intolerance and heat intolerance.   Genitourinary:  Negative for difficulty urinating and dysuria.   Musculoskeletal:  Negative for arthralgias and back pain.   Skin:  Negative for color change and rash.   Allergic/Immunologic: Negative for environmental allergies and food allergies.   Neurological:  Negative for dizziness and headaches.   Hematological:  Negative for adenopathy. Does not bruise/bleed easily.   Psychiatric/Behavioral:  Negative for agitation and confusion.      Past Medical History:   Diagnosis Date    Arthritis     Asthma     Previously diagnosed and previously prescribed inhaler medications.      Cancer     cholangiocarcinoma    Elevated cholesterol     Essential hypertension     Gastroesophageal reflux disease        No Known Allergies    Past Surgical History:   Procedure Laterality Date    CERVICAL SPINE SURGERY      x3    DENTAL PROCEDURE      3 wisdom teeth and 2+  other teeth removed    ERCP N/A 11/22/2022    Procedure: ENDOSCOPIC RETROGRADE CHOLANGIOPANCREATOGRAPHY;  Surgeon: Nabila Worthy MD;  Location: [facility] ENDOSCOPY;  Service: Gastroenterology;  Laterality: N/A;    PORTACATH PLACEMENT Right 2/2/2023    Procedure: PORT PLACEMENT         (C-ARM#2), right internal jugular;  Surgeon: Van Austin MD;  Location: [facility] OR;  Service: General;  Laterality: Right;    WRIST FRACTURE SURGERY Bilateral        Family History   Problem Relation Age of Onset    COPD Mother     Hypertension Mother     Diabetes type II Mother     Cancer Father     Heart failure Father     Cancer Brother     Early death Son     Lung cancer Maternal Uncle     Early death Sister        Social History     Socioeconomic History    Marital status:    Tobacco Use    Smoking status: Former     Packs/day: 1.00     Years: 45.00     Pack years: 45.00     Types: Pipe, Cigarettes     Start date: 10/10/1972     Quit date: 10/31/2019     Years since quitting: 3.8     Passive exposure: Past    Smokeless tobacco: Current     Types: Chew   Vaping Use    Vaping Use: Never used   Substance and Sexual Activity    Alcohol use: Not Currently     Alcohol/week: 7.0 standard drinks     Types: 7 Cans of beer per week     Comment: Prior heavy drinker    Drug use: Never    Sexual activity: Defer     Partners: Female           Objective   Physical Exam  Vitals and nursing note reviewed.   Constitutional:       Appearance: He is well-developed.   HENT:      Head: Normocephalic and atraumatic.   Eyes:      Pupils: Pupils are equal, round, and reactive to light.   Neck:      Thyroid: No thyromegaly.      Trachea: No tracheal deviation.   Cardiovascular:      Rate and Rhythm: Normal rate.      Pulses:           Radial pulses are 2+ on the right side and 2+ on the left side.      Heart sounds: Normal heart sounds, S1 normal and S2 normal.   Pulmonary:      Effort: Pulmonary effort is normal.      Breath sounds: Normal  breath sounds.   Chest:      Chest wall: Tenderness (to lower sternum) present.   Abdominal:      Palpations: Abdomen is soft.   Musculoskeletal:         General: Normal range of motion.      Cervical back: Neck supple.   Skin:     General: Skin is warm and dry.      Capillary Refill: Capillary refill takes 2 to 3 seconds.   Neurological:      Mental Status: He is alert and oriented to person, place, and time.      GCS: GCS eye subscore is 4. GCS verbal subscore is 5. GCS motor subscore is 6.   Psychiatric:         Speech: Speech normal.         Behavior: Behavior normal.         Thought Content: Thought content normal.       ECG 12 Lead      Date/Time: 8/26/2023 5:05 PM  Performed by: Rashad Devine MD  Authorized by: Rashad Devine MD   Interpreted by physician  Comparison: compared with previous ECG from 1/30/2023  Similar to previous ECG  Rhythm: sinus rhythm  Comments: Sinus rhythm ventricular rate 84 bpm, QRS 86 ms, QTc 4 8 2 ms,  T wave inversions noted in lead V1..  No ST elevations.  This is my independent interpretation.             ED Course  ED Course as of 08/26/23 1747   Sat Aug 26, 2023   1623 Patient feeling better after Toradol.  Awaiting second troponin. [TRACY]      ED Course User Index  [TRACY] Rashad Devine MD           Vitals:    08/26/23 1530 08/26/23 1600 08/26/23 1630 08/26/23 1700   BP: 140/84 137/85 150/83 142/82   BP Location:       Patient Position:       Pulse: 76 74 78 66   Resp: 16 16  16   Temp:       TempSrc:       SpO2: 98% 100% 98% 100%   Weight:       Height:          Lab Results (last 24 hours)       Procedure Component Value Units Date/Time    High Sensitivity Troponin T 2Hr [[id number]]  (Abnormal) Collected: 08/26/23 1624    Specimen: Blood Updated: 08/26/23 1646     HS Troponin T 15 ng/L      Troponin T Delta 0 ng/L     Narrative:      High Sensitive Troponin T Reference Range:  <10.0 ng/L- Negative Female for AMI  <15.0 ng/L- Negative Male for AMI  >=10 -  Abnormal Female indicating possible myocardial injury.  >=15 - Abnormal Male indicating possible myocardial injury.   Clinicians would have to utilize clinical acumen, EKG, Troponin, and serial changes to determine if it is an Acute Myocardial Infarction or myocardial injury due to an underlying chronic condition.         [place] Draw [[id number]] Collected: 08/26/23 1420    Specimen: Blood Updated: 08/26/23 1531    Narrative:      The following orders were created for panel order [place] Draw.  Procedure                               Abnormality         Status                     ---------                               -----------         ------                     Green Top (Gel)[[id number]]                                  Final result               Lavender Top[[id number]]                                     Final result               Gold Top - SST[[id number]]                                   Final result               Light Blue Top[[id number]]                                   Final result                 Please view results for these tests on the individual orders.    Light Blue Top [[id number]] Collected: 08/26/23 1420    Specimen: Blood Updated: 08/26/23 1531     Extra Tube Hold for add-ons.     Comment: Auto resulted       Green Top (Gel) [[id number]] Collected: 08/26/23 1420    Specimen: Blood Updated: 08/26/23 1531     Extra Tube Hold for add-ons.     Comment: Auto resulted.       Lavender Top [[id number]] Collected: 08/26/23 1420    Specimen: Blood Updated: 08/26/23 1531     Extra Tube hold for add-on     Comment: Auto resulted       Gold Top - SST [[id number]] Collected: 08/26/23 1420    Specimen: Blood Updated: 08/26/23 1531     Extra Tube Hold for add-ons.     Comment: Auto resulted.       High Sensitivity Troponin T [[id number]]  (Abnormal) Collected: 08/26/23 1420    Specimen: Blood Updated: 08/26/23 1519     HS Troponin T 15 ng/L     Narrative:      High Sensitive Troponin T Reference Range:  <10.0 ng/L-  Negative Female for AMI  <15.0 ng/L- Negative Male for AMI  >=10 - Abnormal Female indicating possible myocardial injury.  >=15 - Abnormal Male indicating possible myocardial injury.   Clinicians would have to utilize clinical acumen, EKG, Troponin, and serial changes to determine if it is an Acute Myocardial Infarction or myocardial injury due to an underlying chronic condition.         BNP [[id number]]  (Normal) Collected: 08/26/23 1420    Specimen: Blood Updated: 08/26/23 1519     proBNP 111.8 pg/mL     Narrative:      Among patients with dyspnea, NT-proBNP is highly sensitive for the detection of acute congestive heart failure. In addition NT-proBNP of <300 pg/ml effectively rules out acute congestive heart failure with 99% negative predictive value.      Magnesium [[id number]]  (Normal) Collected: 08/26/23 1420    Specimen: Blood Updated: 08/26/23 1516     Magnesium 1.9 mg/dL     Comprehensive Metabolic Panel [[id number]]  (Abnormal) Collected: 08/26/23 1420    Specimen: Blood Updated: 08/26/23 1516     Glucose 88 mg/dL      BUN 11 mg/dL      Creatinine 0.93 mg/dL      Sodium 132 mmol/L      Potassium 3.6 mmol/L      Chloride 96 mmol/L      CO2 21.0 mmol/L      Calcium 8.8 mg/dL      Total Protein 7.6 g/dL      Albumin 3.7 g/dL      ALT (SGPT) 13 U/L      AST (SGOT) 24 U/L      Alkaline Phosphatase 161 U/L      Total Bilirubin 0.4 mg/dL      Globulin 3.9 gm/dL      A/G Ratio 0.9 g/dL      BUN/Creatinine Ratio 11.8     Anion Gap 15.0 mmol/L      eGFR 90.6 mL/min/1.73     Narrative:      GFR Normal >60  Chronic Kidney Disease <60  Kidney Failure <15      CBC & Differential [[id number]]  (Abnormal) Collected: 08/26/23 1420    Specimen: Blood Updated: 08/26/23 1501    Narrative:      The following orders were created for panel order CBC & Differential.  Procedure                               Abnormality         Status                     ---------                               -----------         ------                      CBC Auto Differential[[id number]]        Abnormal            Final result                 Please view results for these tests on the individual orders.    CBC Auto Differential [[id number]]  (Abnormal) Collected: 08/26/23 1420    Specimen: Blood Updated: 08/26/23 1501     WBC 4.38 10*3/mm3      RBC 2.71 10*6/mm3      Hemoglobin 9.3 g/dL      Hematocrit 26.5 %      MCV 97.8 fL      MCH 34.3 pg      MCHC 35.1 g/dL      RDW 18.4 %      RDW-SD 65.2 fl      MPV 10.3 fL      Platelets 113 10*3/mm3      Neutrophil % 77.1 %      Lymphocyte % 5.0 %      Monocyte % 14.6 %      Eosinophil % 2.1 %      Basophil % 0.7 %      Immature Grans % 0.5 %      Neutrophils, Absolute 3.38 10*3/mm3      Lymphocytes, Absolute 0.22 10*3/mm3      Monocytes, Absolute 0.64 10*3/mm3      Eosinophils, Absolute 0.09 10*3/mm3      Basophils, Absolute 0.03 10*3/mm3      Immature Grans, Absolute 0.02 10*3/mm3      nRBC 0.0 /100 WBC     Extra Tubes [[id number]] Collected: 08/26/23 1420    Specimen: Blood, Venous Line Updated: 08/26/23 1458    Narrative:      The following orders were created for panel order Extra Tubes.  Procedure                               Abnormality         Status                     ---------                               -----------         ------                     [place] Top[[id number]]                                         In process                   Please view results for these tests on the individual orders.    [place] Top [[id number]] Collected: 08/26/23 1420    Specimen: Blood Updated: 08/26/23 1458           XR Chest 1 View    Result Date: 8/26/2023  No acute findings.                                     Medical Decision Making  Patient with decreased chest wall pain and tenderness after Toradol.  Labs unremarkable including 2 troponins.  Patient encouraged to follow-up with his primary care and continue ibuprofen and or topical treatments for his chest wall pain. No identifiable cause noted for hospitalization or  transfer noted during today's visit.       Problems Addressed:  Chest wall pain: complicated acute illness or injury    Amount and/or Complexity of Data Reviewed  Labs: ordered.  Radiology: ordered.  ECG/medicine tests: ordered and independent interpretation performed.    Risk  Prescription drug management.        Final diagnoses:   Chest wall pain       ED Disposition  ED Disposition       ED Disposition   Discharge    Condition   Stable    Comment   --               Kelin Dinero MD  [facility]   [place] [address]  [phone]    Call in 1 day  As needed         Medication List      No changes were made to your prescriptions during this visit.         This document has been electronically signed by Rashad Devine MD on August 26, 2023 17:48 CDT  .  Signatureline  Part of this note may be an electronic transcription/translation of spoken language to printed text using the Dragon Dictation System.        Rashad Devine MD  08/26/23 7015

## 2023-08-28 ENCOUNTER — TELEPHONE (OUTPATIENT)
Dept: ONCOLOGY | Facility: CLINIC | Age: 68
End: 2023-08-28
Payer: MEDICARE

## 2023-08-28 NOTE — TELEPHONE ENCOUNTER
"    Reason for call: Patient left voicemail on 8/26/23 @ 1:26 pm - Message: \"I need to get in touch with Dr. العلي, I think I need to go to ER.\"    The reason is related to: Message for Dr. العلي    Best phone number to return call: [phone]        "

## 2023-08-28 NOTE — TELEPHONE ENCOUNTER
Called and spoke with pt regarding his symptoms and going to the ER on 08/26. Pt states he is feeling much better this morning and states his symptoms has resolved.

## 2023-08-29 DIAGNOSIS — E78.2 MIXED HYPERLIPIDEMIA: ICD-10-CM

## 2023-08-29 RX ORDER — ATORVASTATIN CALCIUM 20 MG/1
20 TABLET, FILM COATED ORAL DAILY
Qty: 90 TABLET | Refills: 0 | Status: SHIPPED | OUTPATIENT
Start: 2023-08-29

## 2023-09-07 ENCOUNTER — INFUSION (OUTPATIENT)
Dept: ONCOLOGY | Facility: HOSPITAL | Age: 68
End: 2023-09-07
Payer: MEDICARE

## 2023-09-07 ENCOUNTER — OFFICE VISIT (OUTPATIENT)
Dept: ONCOLOGY | Facility: CLINIC | Age: 68
End: 2023-09-07
Payer: MEDICARE

## 2023-09-07 VITALS
HEART RATE: 81 BPM | OXYGEN SATURATION: 95 % | BODY MASS INDEX: 24.75 KG/M2 | SYSTOLIC BLOOD PRESSURE: 160 MMHG | DIASTOLIC BLOOD PRESSURE: 90 MMHG | RESPIRATION RATE: 18 BRPM | WEIGHT: 158 LBS

## 2023-09-07 DIAGNOSIS — E03.2 HYPOTHYROIDISM DUE TO MEDICATION: ICD-10-CM

## 2023-09-07 DIAGNOSIS — C24.9 MALIGNANT NEOPLASM OF BILIARY TRACT, UNSPECIFIED: ICD-10-CM

## 2023-09-07 DIAGNOSIS — C24.9 MALIGNANT NEOPLASM OF BILIARY TRACT, UNSPECIFIED: Primary | ICD-10-CM

## 2023-09-07 DIAGNOSIS — Z45.2 ENCOUNTER FOR VENOUS ACCESS DEVICE CARE: Primary | ICD-10-CM

## 2023-09-07 DIAGNOSIS — D64.9 ANEMIA, UNSPECIFIED TYPE: ICD-10-CM

## 2023-09-07 DIAGNOSIS — R51.9 ACUTE NONINTRACTABLE HEADACHE, UNSPECIFIED HEADACHE TYPE: ICD-10-CM

## 2023-09-07 LAB
ALBUMIN SERPL-MCNC: 3.6 G/DL (ref 3.5–5.2)
ALBUMIN/GLOB SERPL: 1.1 G/DL
ALP SERPL-CCNC: 315 U/L (ref 39–117)
ALT SERPL W P-5'-P-CCNC: 22 U/L (ref 1–41)
ANION GAP SERPL CALCULATED.3IONS-SCNC: 14 MMOL/L (ref 5–15)
AST SERPL-CCNC: 26 U/L (ref 1–40)
BASOPHILS # BLD AUTO: 0.04 10*3/MM3 (ref 0–0.2)
BASOPHILS NFR BLD AUTO: 1.1 % (ref 0–1.5)
BILIRUB SERPL-MCNC: 0.4 MG/DL (ref 0–1.2)
BUN SERPL-MCNC: 12 MG/DL (ref 8–23)
BUN/CREAT SERPL: 12.4 (ref 7–25)
CALCIUM SPEC-SCNC: 9 MG/DL (ref 8.6–10.5)
CHLORIDE SERPL-SCNC: 101 MMOL/L (ref 98–107)
CO2 SERPL-SCNC: 22 MMOL/L (ref 22–29)
CREAT SERPL-MCNC: 0.97 MG/DL (ref 0.76–1.27)
DEPRECATED RDW RBC AUTO: 61.1 FL (ref 37–54)
EGFRCR SERPLBLD CKD-EPI 2021: 86.1 ML/MIN/1.73
EOSINOPHIL # BLD AUTO: 0.07 10*3/MM3 (ref 0–0.4)
EOSINOPHIL NFR BLD AUTO: 2 % (ref 0.3–6.2)
ERYTHROCYTE [DISTWIDTH] IN BLOOD BY AUTOMATED COUNT: 16.8 % (ref 12.3–15.4)
FERRITIN SERPL-MCNC: 587.6 NG/ML (ref 30–400)
FOLATE SERPL-MCNC: 7.84 NG/ML (ref 4.78–24.2)
GLOBULIN UR ELPH-MCNC: 3.2 GM/DL
GLUCOSE SERPL-MCNC: 106 MG/DL (ref 65–99)
HCT VFR BLD AUTO: 25.8 % (ref 37.5–51)
HGB BLD-MCNC: 8.7 G/DL (ref 13–17.7)
HOLD SPECIMEN: NORMAL
IMM GRANULOCYTES # BLD AUTO: 0.01 10*3/MM3 (ref 0–0.05)
IMM GRANULOCYTES NFR BLD AUTO: 0.3 % (ref 0–0.5)
IRON 24H UR-MRATE: 48 MCG/DL (ref 59–158)
IRON SATN MFR SERPL: 14 % (ref 20–50)
LYMPHOCYTES # BLD AUTO: 0.18 10*3/MM3 (ref 0.7–3.1)
LYMPHOCYTES NFR BLD AUTO: 5.2 % (ref 19.6–45.3)
MCH RBC QN AUTO: 33.7 PG (ref 26.6–33)
MCHC RBC AUTO-ENTMCNC: 33.7 G/DL (ref 31.5–35.7)
MCV RBC AUTO: 100 FL (ref 79–97)
MONOCYTES # BLD AUTO: 0.45 10*3/MM3 (ref 0.1–0.9)
MONOCYTES NFR BLD AUTO: 12.9 % (ref 5–12)
NEUTROPHILS NFR BLD AUTO: 2.74 10*3/MM3 (ref 1.7–7)
NEUTROPHILS NFR BLD AUTO: 78.5 % (ref 42.7–76)
NRBC BLD AUTO-RTO: 0 /100 WBC (ref 0–0.2)
PLATELET # BLD AUTO: 113 10*3/MM3 (ref 140–450)
PMV BLD AUTO: 10.4 FL (ref 6–12)
POTASSIUM SERPL-SCNC: 3.6 MMOL/L (ref 3.5–5.2)
PROT SERPL-MCNC: 6.8 G/DL (ref 6–8.5)
RBC # BLD AUTO: 2.58 10*6/MM3 (ref 4.14–5.8)
SODIUM SERPL-SCNC: 137 MMOL/L (ref 136–145)
TIBC SERPL-MCNC: 346 MCG/DL (ref 298–536)
TRANSFERRIN SERPL-MCNC: 232 MG/DL (ref 200–360)
TSH SERPL DL<=0.05 MIU/L-ACNC: 2.31 UIU/ML (ref 0.27–4.2)
VIT B12 BLD-MCNC: 1071 PG/ML (ref 211–946)
WBC NRBC COR # BLD: 3.49 10*3/MM3 (ref 3.4–10.8)

## 2023-09-07 PROCEDURE — 82728 ASSAY OF FERRITIN: CPT | Performed by: INTERNAL MEDICINE

## 2023-09-07 PROCEDURE — 84466 ASSAY OF TRANSFERRIN: CPT | Performed by: INTERNAL MEDICINE

## 2023-09-07 PROCEDURE — 85025 COMPLETE CBC W/AUTO DIFF WBC: CPT | Performed by: INTERNAL MEDICINE

## 2023-09-07 PROCEDURE — 25010000002 HEPARIN LOCK FLUSH PER 10 UNITS: Performed by: INTERNAL MEDICINE

## 2023-09-07 PROCEDURE — 82746 ASSAY OF FOLIC ACID SERUM: CPT

## 2023-09-07 PROCEDURE — 36591 DRAW BLOOD OFF VENOUS DEVICE: CPT | Performed by: INTERNAL MEDICINE

## 2023-09-07 PROCEDURE — 84443 ASSAY THYROID STIM HORMONE: CPT | Performed by: INTERNAL MEDICINE

## 2023-09-07 PROCEDURE — 80053 COMPREHEN METABOLIC PANEL: CPT | Performed by: INTERNAL MEDICINE

## 2023-09-07 PROCEDURE — 82607 VITAMIN B-12: CPT

## 2023-09-07 PROCEDURE — 83540 ASSAY OF IRON: CPT | Performed by: INTERNAL MEDICINE

## 2023-09-07 RX ORDER — SODIUM CHLORIDE 0.9 % (FLUSH) 0.9 %
10 SYRINGE (ML) INJECTION AS NEEDED
OUTPATIENT
Start: 2023-09-07

## 2023-09-07 RX ORDER — HEPARIN SODIUM (PORCINE) LOCK FLUSH IV SOLN 100 UNIT/ML 100 UNIT/ML
500 SOLUTION INTRAVENOUS AS NEEDED
OUTPATIENT
Start: 2023-09-07

## 2023-09-07 RX ORDER — HEPARIN SODIUM (PORCINE) LOCK FLUSH IV SOLN 100 UNIT/ML 100 UNIT/ML
500 SOLUTION INTRAVENOUS AS NEEDED
Status: DISCONTINUED | OUTPATIENT
Start: 2023-09-07 | End: 2023-09-07 | Stop reason: HOSPADM

## 2023-09-07 RX ADMIN — HEPARIN 500 UNITS: 100 SYRINGE at 09:31

## 2023-09-08 ENCOUNTER — DOCUMENTATION (OUTPATIENT)
Dept: NUTRITION | Facility: HOSPITAL | Age: 68
End: 2023-09-08
Payer: MEDICARE

## 2023-09-08 NOTE — PROGRESS NOTES
Adult Outpatient Nutrition  Assessment    Patient Name:  Jian Baker  YOB: 1955  MRN: [id number]    Assessment Date:  Entry from 9/7/2023    Comments:  Case of Boost Plus provided.                    Electronically signed by:  Isis Alvarez RD  09/08/23 15:12 CDT

## 2023-09-09 NOTE — PROGRESS NOTES
Subjective     Jian Baker was seen in follow-up for cholangiocarcinoma.  He is overall stable.  Ankle pain and swelling has improved after intra-articular injection.  No fever or chills.  Nausea has improved.        Past Medical History, Past Surgical History, Social History, Family History have been reviewed and are without significant changes except as mentioned.        Medications:  The current medication list was reviewed in the EMR    ALLERGIES:  No Known Allergies    Objective      Vitals:    09/07/23 0942   BP: 160/90   Pulse: 81   Resp: 18   SpO2: 95%   Weight: 71.7 kg (158 lb)   PainSc: 0-No pain         5/26/2023     7:58 AM   Current Status   ECOG score 0       Physical Exam  Vitals and nursing note reviewed.   Constitutional:       Appearance: Normal appearance.   Neurological:      General: No focal deficit present.      Mental Status: He is alert and oriented to person, place, and time. Mental status is at baseline.   Psychiatric:         Mood and Affect: Mood normal.         Behavior: Behavior normal.         Thought Content: Thought content normal.             RECENT LABS:Independently reviewed and summarized  Hematology WBC   Date Value Ref Range Status   09/07/2023 3.49 3.40 - 10.80 10*3/mm3 Final   01/11/2023 8.31 4.5 - 11.0 10*3/uL Final     RBC   Date Value Ref Range Status   09/07/2023 2.58 (L) 4.14 - 5.80 10*6/mm3 Final   01/11/2023 4.17 (L) 4.5 - 5.9 10*6/uL Final     Hemoglobin   Date Value Ref Range Status   09/07/2023 8.7 (L) 13.0 - 17.7 g/dL Final   01/11/2023 13.0 (L) 13.5 - 17.5 g/dL Final     Hematocrit   Date Value Ref Range Status   09/07/2023 25.8 (L) 37.5 - 51.0 % Final   01/11/2023 39.2 (L) 41.0 - 53.0 % Final     Platelets   Date Value Ref Range Status   09/07/2023 113 (L) 140 - 450 10*3/mm3 Final   01/11/2023 290 140 - 440 10*3/uL Final            Lab Results   Component Value Date    GLUCOSE 106 (H) 09/07/2023    BUN 12 09/07/2023    CREATININE 0.97 09/07/2023    EGFR  86.1 09/07/2023    BCR 12.4 09/07/2023    K 3.6 09/07/2023    CO2 22.0 09/07/2023    CALCIUM 9.0 09/07/2023    ALBUMIN 3.6 09/07/2023    BILITOT 0.4 09/07/2023    AST 26 09/07/2023    ALT 22 09/07/2023         Jian Baker reports a pain score of 0.  Given his pain assessment as noted, treatment options were discussed and the following options were decided upon as a follow-up plan to address the patient's pain: continuation of current treatment plan for pain.    Patient screened negative for depression based on a PHQ-9 score of 0 on 9/7/2023.     Advance Care Planning   ACP discussion was declined by the patient. Patient does not have an advance directive, declines further assistance.           Diagnosis:   (1) Cholangiocarcinoma, locally advance, unresectable   At least Stage IIIB (cT4, cN1, cM0)      Current therapy:   Gemcitabine/Cisplatin/Durvalumab      D1C1: 2/3/22, D8C1: 2/10/23  D1C2: 2/24/23, D8C2: 3/3/23   D1C3: 3/17/23,D8C3: 3/24/23     CT scan showed overall stable disease with slight less dense primary tumor.  No evidence of progressive disease.     D1C4: 4/5/23, D8C4: 4/14/23   D1C5: 4/28/23, D8C5: 5/5/23  D1C6: 5/19/23 , D8C6: 5/26/23      CT showed on 5/25/23 showed stable to minimally reduced cholangiocarcinoma without any evidence of progression.  Case discussed with radiation oncology and concurrent chemoradiation with Xeloda was recommended.    Completed concurrent chemoRT (7/6/23 - 8/14/23)      (2) Biliary obstruction   (3) Cancer associated pain    (4) Hypothyroidism   (5) Anemia     Assessment & Plan     (1) Cholangiocarcinoma, locally advance, unresectable     Chronic, stable.  He has completed consolidative concurrent chemoradiation.  Tolerated treatment overall well.  I will order CBC, CMP, CA 19-9 and CT chest abdomen pelvis in 6 weeks to evaluate response to treatment.    (2) Biliary obstruction     Chronic, stable.  Biliary obstruction from cholangiocarcinoma.  This post  percutaneous biliary drainage.  Both the draining tubes are draining well without any issue.    (3) Cancer associated pain      Chronic, stable.  He is on oxycodone as needed.    (4) Hypothyroidism     Chronic, stable.  Hypothyroidism likely secondary to immunotherapy.  He is currently on Synthroid.  TSH is normal today.  Recheck TSH in 6 weeks.  Continue Synthroid.    (5) Anemia     Suspect anemia is related to chemoradiation.  I checked a CBC, ferritin, iron profile today which is consistent with anemia of inflammation.  We will continue to monitor.  CBC in 6 weeks.    9/9/2023      CC:

## 2023-09-14 RX ORDER — LEVOTHYROXINE SODIUM 0.12 MG/1
125 TABLET ORAL DAILY
Qty: 90 TABLET | Refills: 0 | Status: SHIPPED | OUTPATIENT
Start: 2023-09-14

## 2023-09-14 NOTE — TELEPHONE ENCOUNTER
Rx Refill Note  Requested Prescriptions     Pending Prescriptions Disp Refills    levothyroxine (SYNTHROID, LEVOTHROID) 125 MCG tablet [Pharmacy Med Name: LEVOTHYROXINE SODIUM 125MCG TABLET] 90 tablet 0     Sig: TAKE 1 TABLET BY MOUTH DAILY.      Last office visit with prescribing clinician: 9/7/2023   Last telemedicine visit with prescribing clinician: Visit date not found   Next office visit with prescribing clinician: 10/19/2023                         Would you like a call back once the refill request has been completed: [] Yes [] No    If the office needs to give you a call back, can they leave a voicemail: [] Yes [] No    Clementine Lainez, Arabella Rep  09/14/23, 11:02 CDT

## 2023-09-19 ENCOUNTER — TELEPHONE (OUTPATIENT)
Dept: ONCOLOGY | Facility: CLINIC | Age: 68
End: 2023-09-19
Payer: MEDICARE

## 2023-09-19 NOTE — TELEPHONE ENCOUNTER
Clinical Assessment Needs      Reason for Call: Pain bottom of rib cage     When did it start: awhile back but has gotten a lot worse    Where is it located: Bottom of rib cage    Characteristics of symptom/severity: severe    Is it constant or intermittent: constant    What makes it worse: na    What makes it better: na    What therapies/medications have you tried: na    When was the patient last seen: 09/07/2023    [facility]     Call back# [phone]

## 2023-09-19 NOTE — TELEPHONE ENCOUNTER
Attempted to contact pt again to make aware per Dr. العلي pt needs to go to ER for pain if severe. Left VM with callback #.   Suicide attempt Suicide attempt Suicide attempt Suicide attempt Suicide attempt

## 2023-09-19 NOTE — TELEPHONE ENCOUNTER
Pt called in regards to message left this morning. Made pt aware left two Vms and made aware per Dr. العلي if pain severe needs to go to ER. Pt stated if pain persists/worsens will try to go to ER tomorrow or Friday.

## 2023-09-22 ENCOUNTER — HOSPITAL ENCOUNTER (EMERGENCY)
Facility: HOSPITAL | Age: 68
Discharge: HOME | End: 2023-09-22
Attending: STUDENT IN AN ORGANIZED HEALTH CARE EDUCATION/TRAINING PROGRAM
Payer: MEDICARE

## 2023-09-22 ENCOUNTER — APPOINTMENT (OUTPATIENT)
Dept: GENERAL RADIOLOGY | Facility: HOSPITAL | Age: 68
End: 2023-09-22
Payer: MEDICARE

## 2023-09-22 ENCOUNTER — APPOINTMENT (OUTPATIENT)
Dept: CT IMAGING | Facility: HOSPITAL | Age: 68
End: 2023-09-22
Payer: MEDICARE

## 2023-09-22 ENCOUNTER — APPOINTMENT (OUTPATIENT)
Dept: ULTRASOUND IMAGING | Facility: HOSPITAL | Age: 68
End: 2023-09-22
Payer: MEDICARE

## 2023-09-22 VITALS
SYSTOLIC BLOOD PRESSURE: 122 MMHG | DIASTOLIC BLOOD PRESSURE: 74 MMHG | HEART RATE: 81 BPM | BODY MASS INDEX: 24.33 KG/M2 | WEIGHT: 155 LBS | RESPIRATION RATE: 17 BRPM | TEMPERATURE: 98 F | HEIGHT: 67 IN | OXYGEN SATURATION: 97 %

## 2023-09-22 DIAGNOSIS — N39.0 URINARY TRACT INFECTION IN MALE: ICD-10-CM

## 2023-09-22 DIAGNOSIS — G89.3 CANCER ASSOCIATED PAIN: ICD-10-CM

## 2023-09-22 DIAGNOSIS — E87.6 HYPOKALEMIA: ICD-10-CM

## 2023-09-22 DIAGNOSIS — R10.13 EPIGASTRIC PAIN: Primary | ICD-10-CM

## 2023-09-22 LAB
ALBUMIN SERPL-MCNC: 3.4 G/DL (ref 3.5–5.2)
ALBUMIN/GLOB SERPL: 0.9 G/DL
ALP SERPL-CCNC: 394 U/L (ref 39–117)
ALT SERPL W P-5'-P-CCNC: 27 U/L (ref 1–41)
ANION GAP SERPL CALCULATED.3IONS-SCNC: 12 MMOL/L (ref 5–15)
AST SERPL-CCNC: 42 U/L (ref 1–40)
BACTERIA UR QL AUTO: ABNORMAL /HPF
BASOPHILS # BLD AUTO: 0.03 10*3/MM3 (ref 0–0.2)
BASOPHILS NFR BLD AUTO: 0.6 % (ref 0–1.5)
BILIRUB SERPL-MCNC: 0.7 MG/DL (ref 0–1.2)
BILIRUB UR QL STRIP: ABNORMAL
BUN SERPL-MCNC: 14 MG/DL (ref 8–23)
BUN/CREAT SERPL: 13.6 (ref 7–25)
CALCIUM SPEC-SCNC: 8.6 MG/DL (ref 8.6–10.5)
CHLORIDE SERPL-SCNC: 98 MMOL/L (ref 98–107)
CLARITY UR: CLEAR
CO2 SERPL-SCNC: 23 MMOL/L (ref 22–29)
COLOR UR: ABNORMAL
CREAT SERPL-MCNC: 1.03 MG/DL (ref 0.76–1.27)
D-LACTATE SERPL-SCNC: 1.4 MMOL/L (ref 0.5–2)
DEPRECATED RDW RBC AUTO: 53.4 FL (ref 37–54)
EGFRCR SERPLBLD CKD-EPI 2021: 80.1 ML/MIN/1.73
EOSINOPHIL # BLD AUTO: 0.15 10*3/MM3 (ref 0–0.4)
EOSINOPHIL NFR BLD AUTO: 3 % (ref 0.3–6.2)
ERYTHROCYTE [DISTWIDTH] IN BLOOD BY AUTOMATED COUNT: 14.8 % (ref 12.3–15.4)
GLOBULIN UR ELPH-MCNC: 3.7 GM/DL
GLUCOSE SERPL-MCNC: 78 MG/DL (ref 65–99)
GLUCOSE UR STRIP-MCNC: NEGATIVE MG/DL
HCT VFR BLD AUTO: 26.4 % (ref 37.5–51)
HGB BLD-MCNC: 8.8 G/DL (ref 13–17.7)
HGB UR QL STRIP.AUTO: NEGATIVE
HOLD SPECIMEN: NORMAL
HYALINE CASTS UR QL AUTO: ABNORMAL /LPF
IMM GRANULOCYTES # BLD AUTO: 0.02 10*3/MM3 (ref 0–0.05)
IMM GRANULOCYTES NFR BLD AUTO: 0.4 % (ref 0–0.5)
KETONES UR QL STRIP: ABNORMAL
LEUKOCYTE ESTERASE UR QL STRIP.AUTO: ABNORMAL
LIPASE SERPL-CCNC: 9 U/L (ref 13–60)
LYMPHOCYTES # BLD AUTO: 0.23 10*3/MM3 (ref 0.7–3.1)
LYMPHOCYTES NFR BLD AUTO: 4.6 % (ref 19.6–45.3)
MCH RBC QN AUTO: 32.6 PG (ref 26.6–33)
MCHC RBC AUTO-ENTMCNC: 33.3 G/DL (ref 31.5–35.7)
MCV RBC AUTO: 97.8 FL (ref 79–97)
MONOCYTES # BLD AUTO: 1.02 10*3/MM3 (ref 0.1–0.9)
MONOCYTES NFR BLD AUTO: 20.2 % (ref 5–12)
NEUTROPHILS NFR BLD AUTO: 3.59 10*3/MM3 (ref 1.7–7)
NEUTROPHILS NFR BLD AUTO: 71.2 % (ref 42.7–76)
NITRITE UR QL STRIP: NEGATIVE
NRBC BLD AUTO-RTO: 0 /100 WBC (ref 0–0.2)
NT-PROBNP SERPL-MCNC: 332.7 PG/ML (ref 0–900)
PH UR STRIP.AUTO: 5.5 [PH] (ref 5–9)
PLATELET # BLD AUTO: 113 10*3/MM3 (ref 140–450)
PMV BLD AUTO: 10 FL (ref 6–12)
POTASSIUM SERPL-SCNC: 3.1 MMOL/L (ref 3.5–5.2)
PROT SERPL-MCNC: 7.1 G/DL (ref 6–8.5)
PROT UR QL STRIP: NEGATIVE
QT INTERVAL: 368 MS
QTC INTERVAL: 434 MS
RBC # BLD AUTO: 2.7 10*6/MM3 (ref 4.14–5.8)
RBC # UR STRIP: ABNORMAL /HPF
REF LAB TEST METHOD: ABNORMAL
SODIUM SERPL-SCNC: 133 MMOL/L (ref 136–145)
SP GR UR STRIP: 1.02 (ref 1–1.03)
SQUAMOUS #/AREA URNS HPF: ABNORMAL /HPF
TROPONIN T SERPL HS-MCNC: 15 NG/L
TROPONIN T SERPL HS-MCNC: 18 NG/L
UROBILINOGEN UR QL STRIP: ABNORMAL
WBC # UR STRIP: ABNORMAL /HPF
WBC NRBC COR # BLD: 5.04 10*3/MM3 (ref 3.4–10.8)
WHOLE BLOOD HOLD COAG: NORMAL

## 2023-09-22 PROCEDURE — 25510000001 IOPAMIDOL 61 % SOLUTION: Performed by: STUDENT IN AN ORGANIZED HEALTH CARE EDUCATION/TRAINING PROGRAM

## 2023-09-22 PROCEDURE — 83605 ASSAY OF LACTIC ACID: CPT | Performed by: NURSE PRACTITIONER

## 2023-09-22 PROCEDURE — 76705 ECHO EXAM OF ABDOMEN: CPT

## 2023-09-22 PROCEDURE — 83880 ASSAY OF NATRIURETIC PEPTIDE: CPT | Performed by: NURSE PRACTITIONER

## 2023-09-22 PROCEDURE — 93005 ELECTROCARDIOGRAM TRACING: CPT | Performed by: NURSE PRACTITIONER

## 2023-09-22 PROCEDURE — 36415 COLL VENOUS BLD VENIPUNCTURE: CPT

## 2023-09-22 PROCEDURE — 71046 X-RAY EXAM CHEST 2 VIEWS: CPT

## 2023-09-22 PROCEDURE — 83690 ASSAY OF LIPASE: CPT | Performed by: NURSE PRACTITIONER

## 2023-09-22 PROCEDURE — 80053 COMPREHEN METABOLIC PANEL: CPT | Performed by: NURSE PRACTITIONER

## 2023-09-22 PROCEDURE — 85025 COMPLETE CBC W/AUTO DIFF WBC: CPT | Performed by: NURSE PRACTITIONER

## 2023-09-22 PROCEDURE — 84484 ASSAY OF TROPONIN QUANT: CPT | Performed by: NURSE PRACTITIONER

## 2023-09-22 PROCEDURE — 87086 URINE CULTURE/COLONY COUNT: CPT | Performed by: NURSE PRACTITIONER

## 2023-09-22 PROCEDURE — 74177 CT ABD & PELVIS W/CONTRAST: CPT

## 2023-09-22 PROCEDURE — 99285 EMERGENCY DEPT VISIT HI MDM: CPT

## 2023-09-22 PROCEDURE — 25010000002 HEPARIN LOCK FLUSH PER 10 UNITS: Performed by: STUDENT IN AN ORGANIZED HEALTH CARE EDUCATION/TRAINING PROGRAM

## 2023-09-22 PROCEDURE — 81001 URINALYSIS AUTO W/SCOPE: CPT | Performed by: NURSE PRACTITIONER

## 2023-09-22 RX ORDER — POTASSIUM CHLORIDE 20 MEQ/1
20 TABLET, EXTENDED RELEASE ORAL 2 TIMES DAILY
Qty: 8 TABLET | Refills: 0 | Status: SHIPPED | OUTPATIENT
Start: 2023-09-22 | End: 2023-09-26

## 2023-09-22 RX ORDER — POTASSIUM CHLORIDE 750 MG/1
40 CAPSULE, EXTENDED RELEASE ORAL ONCE
Status: COMPLETED | OUTPATIENT
Start: 2023-09-22 | End: 2023-09-22

## 2023-09-22 RX ORDER — CEPHALEXIN 500 MG/1
500 CAPSULE ORAL 2 TIMES DAILY
Qty: 10 CAPSULE | Refills: 0 | Status: SHIPPED | OUTPATIENT
Start: 2023-09-22 | End: 2023-09-27

## 2023-09-22 RX ORDER — CEPHALEXIN 500 MG/1
500 CAPSULE ORAL ONCE
Status: COMPLETED | OUTPATIENT
Start: 2023-09-22 | End: 2023-09-22

## 2023-09-22 RX ORDER — OXYCODONE HYDROCHLORIDE 5 MG/1
5 CAPSULE ORAL EVERY 4 HOURS PRN
Qty: 60 CAPSULE | Refills: 0 | Status: SHIPPED | OUTPATIENT
Start: 2023-09-22

## 2023-09-22 RX ORDER — HEPARIN SODIUM (PORCINE) LOCK FLUSH IV SOLN 100 UNIT/ML 100 UNIT/ML
500 SOLUTION INTRAVENOUS ONCE
Status: COMPLETED | OUTPATIENT
Start: 2023-09-22 | End: 2023-09-22

## 2023-09-22 RX ORDER — SODIUM CHLORIDE 0.9 % (FLUSH) 0.9 %
10 SYRINGE (ML) INJECTION AS NEEDED
Status: DISCONTINUED | OUTPATIENT
Start: 2023-09-22 | End: 2023-09-22 | Stop reason: HOSPADM

## 2023-09-22 RX ADMIN — POTASSIUM CHLORIDE 40 MEQ: 750 CAPSULE, EXTENDED RELEASE ORAL at 20:08

## 2023-09-22 RX ADMIN — CEPHALEXIN 500 MG: 500 CAPSULE ORAL at 20:08

## 2023-09-22 RX ADMIN — HEPARIN 500 UNITS: 100 SYRINGE at 20:17

## 2023-09-22 RX ADMIN — IOPAMIDOL 90 ML: 612 INJECTION, SOLUTION INTRAVENOUS at 18:42

## 2023-09-23 NOTE — ED PROVIDER NOTES
Subjective   History of Present Illness  Patient presents to the ER with c/o epigastric pain that started about one month ago. He was seen the ER at that time and states the pain really never stopped. Denies CP or radiation of pain. States it feels like a pulling sensation. Pain increases with cough and deep breathing. He has a chronic nonproductive cough with no change today. He denies shortness of breath, fever or chills, or nausea or vomiting. Patient has cholangiocarcinoma and has several drainage tubes which he has no problems or complaints with today.    Review of Systems   Constitutional:  Negative for chills, fatigue and fever.   HENT:  Negative for congestion.    Respiratory:  Positive for cough. Negative for shortness of breath.    Cardiovascular:  Negative for chest pain.   Gastrointestinal:  Positive for abdominal pain. Negative for nausea and vomiting.   Genitourinary: Negative.    Musculoskeletal: Negative.    Skin: Negative.    Neurological: Negative.    Psychiatric/Behavioral: Negative.       Past Medical History:   Diagnosis Date    Arthritis     Asthma     Previously diagnosed and previously prescribed inhaler medications.      Cancer     cholangiocarcinoma    Elevated cholesterol     Essential hypertension     Gastroesophageal reflux disease        No Known Allergies    Past Surgical History:   Procedure Laterality Date    CERVICAL SPINE SURGERY      x3    DENTAL PROCEDURE      3 wisdom teeth and 2+ other teeth removed    ERCP N/A 11/22/2022    Procedure: ENDOSCOPIC RETROGRADE CHOLANGIOPANCREATOGRAPHY;  Surgeon: Nabila Worthy MD;  Location: [facility] ENDOSCOPY;  Service: Gastroenterology;  Laterality: N/A;    PORTACATH PLACEMENT Right 2/2/2023    Procedure: PORT PLACEMENT         (C-ARM#2), right internal jugular;  Surgeon: Van Austin MD;  Location: [facility] OR;  Service: General;  Laterality: Right;    WRIST FRACTURE SURGERY Bilateral        Family History   Problem Relation Age of Onset     "COPD Mother     Hypertension Mother     Diabetes type II Mother     Cancer Father     Heart failure Father     Cancer Brother     Early death Son     Lung cancer Maternal Uncle     Early death Sister        Social History     Socioeconomic History    Marital status:    Tobacco Use    Smoking status: Former     Packs/day: 1.00     Years: 45.00     Pack years: 45.00     Types: Pipe, Cigarettes     Start date: 10/10/1972     Quit date: 10/31/2019     Years since quitting: 3.8     Passive exposure: Past    Smokeless tobacco: Current     Types: Chew   Vaping Use    Vaping Use: Never used   Substance and Sexual Activity    Alcohol use: Not Currently     Alcohol/week: 7.0 standard drinks     Types: 7 Cans of beer per week     Comment: Prior heavy drinker    Drug use: Never    Sexual activity: Defer     Partners: Female           Objective   /74 (BP Location: Right arm, Patient Position: Lying)   Pulse 81   Temp 98 °F (36.7 °C)   Resp 17   Ht 170.2 cm (67\")   Wt 70.3 kg (155 lb)   SpO2 97%   BMI 24.28 kg/m²     Physical Exam  Vitals and nursing note reviewed.   Constitutional:       General: He is not in acute distress.     Appearance: He is well-developed. He is not ill-appearing.   HENT:      Head: Normocephalic and atraumatic.   Cardiovascular:      Rate and Rhythm: Normal rate and regular rhythm.      Heart sounds: Normal heart sounds. No murmur heard.  Pulmonary:      Effort: Pulmonary effort is normal. No respiratory distress.      Breath sounds: Normal breath sounds. No wheezing.   Abdominal:      General: Bowel sounds are normal. There is no distension.      Palpations: Abdomen is soft.      Tenderness: There is no abdominal tenderness in the epigastric area.      Comments: Two drainage bags with appropriate drainge   Musculoskeletal:         General: Normal range of motion.      Cervical back: Normal range of motion and neck supple.   Skin:     General: Skin is warm and dry.      Capillary " Refill: Capillary refill takes less than 2 seconds.   Neurological:      Mental Status: He is alert and oriented to person, place, and time.      Coordination: Coordination normal.   Psychiatric:         Behavior: Behavior normal.         Thought Content: Thought content normal.         Judgment: Judgment normal.       Procedures  Results for orders placed or performed during the hospital encounter of 09/22/23   Comprehensive Metabolic Panel    Specimen: Blood   Result Value Ref Range    Glucose 78 65 - 99 mg/dL    BUN 14 8 - 23 mg/dL    Creatinine 1.03 0.76 - 1.27 mg/dL    Sodium 133 (L) 136 - 145 mmol/L    Potassium 3.1 (L) 3.5 - 5.2 mmol/L    Chloride 98 98 - 107 mmol/L    CO2 23.0 22.0 - 29.0 mmol/L    Calcium 8.6 8.6 - 10.5 mg/dL    Total Protein 7.1 6.0 - 8.5 g/dL    Albumin 3.4 (L) 3.5 - 5.2 g/dL    ALT (SGPT) 27 1 - 41 U/L    AST (SGOT) 42 (H) 1 - 40 U/L    Alkaline Phosphatase 394 (H) 39 - 117 U/L    Total Bilirubin 0.7 0.0 - 1.2 mg/dL    Globulin 3.7 gm/dL    A/G Ratio 0.9 g/dL    BUN/Creatinine Ratio 13.6 7.0 - 25.0    Anion Gap 12.0 5.0 - 15.0 mmol/L    eGFR 80.1 >60.0 mL/min/1.73   Lipase    Specimen: Blood   Result Value Ref Range    Lipase 9 (L) 13 - 60 U/L   Urinalysis With Microscopic If Indicated (No Culture) - Urine, Clean Catch    Specimen: Urine, Clean Catch   Result Value Ref Range    Color, UA Dark Yellow Yellow, Straw, Dark Yellow, Gin    Appearance, UA Clear Clear    pH, UA 5.5 5.0 - 9.0    Specific Gravity, UA 1.019 1.003 - 1.030    Glucose, UA Negative Negative    Ketones, UA Trace (A) Negative    Bilirubin, UA Small (1+) (A) Negative    Blood, UA Negative Negative    Protein, UA Negative Negative    Leuk Esterase, UA Moderate (2+) (A) Negative    Nitrite, UA Negative Negative    Urobilinogen, UA 1.0 E.U./dL 0.2 - 1.0 E.U./dL   BNP    Specimen: Blood   Result Value Ref Range    proBNP 332.7 0.0 - 900.0 pg/mL   Single High Sensitivity Troponin T    Specimen: Blood   Result Value Ref Range     HS Troponin T 18 (H) <15 ng/L   Lactic Acid, Plasma    Specimen: Blood   Result Value Ref Range    Lactate 1.4 0.5 - 2.0 mmol/L   CBC Auto Differential    Specimen: Blood   Result Value Ref Range    WBC 5.04 3.40 - 10.80 10*3/mm3    RBC 2.70 (L) 4.14 - 5.80 10*6/mm3    Hemoglobin 8.8 (L) 13.0 - 17.7 g/dL    Hematocrit 26.4 (L) 37.5 - 51.0 %    MCV 97.8 (H) 79.0 - 97.0 fL    MCH 32.6 26.6 - 33.0 pg    MCHC 33.3 31.5 - 35.7 g/dL    RDW 14.8 12.3 - 15.4 %    RDW-SD 53.4 37.0 - 54.0 fl    MPV 10.0 6.0 - 12.0 fL    Platelets 113 (L) 140 - 450 10*3/mm3    Neutrophil % 71.2 42.7 - 76.0 %    Lymphocyte % 4.6 (L) 19.6 - 45.3 %    Monocyte % 20.2 (H) 5.0 - 12.0 %    Eosinophil % 3.0 0.3 - 6.2 %    Basophil % 0.6 0.0 - 1.5 %    Immature Grans % 0.4 0.0 - 0.5 %    Neutrophils, Absolute 3.59 1.70 - 7.00 10*3/mm3    Lymphocytes, Absolute 0.23 (L) 0.70 - 3.10 10*3/mm3    Monocytes, Absolute 1.02 (H) 0.10 - 0.90 10*3/mm3    Eosinophils, Absolute 0.15 0.00 - 0.40 10*3/mm3    Basophils, Absolute 0.03 0.00 - 0.20 10*3/mm3    Immature Grans, Absolute 0.02 0.00 - 0.05 10*3/mm3    nRBC 0.0 0.0 - 0.2 /100 WBC   Urinalysis, Microscopic Only - Urine, Clean Catch    Specimen: Urine, Clean Catch   Result Value Ref Range    RBC, UA 0-2 (A) None Seen /HPF    WBC, UA 13-20 (A) None Seen, 0-2, 3-5 /HPF    Bacteria, UA None Seen None Seen /HPF    Squamous Epithelial Cells, UA 3-5 (A) None Seen, 0-2 /HPF    Hyaline Casts, UA 13-20 None Seen /LPF    Methodology Automated Microscopy    Single High Sensitivity Troponin T    Specimen: Blood   Result Value Ref Range    HS Troponin T 15 (H) <15 ng/L   ECG 12 Lead Other; epigastric pain   Result Value Ref Range    QT Interval 368 ms    QTC Interval 434 ms   Light Blue Top   Result Value Ref Range    Extra Tube Hold for add-ons.    Gold Top - SST   Result Value Ref Range    Extra Tube Hold for add-ons.      XR Chest 2 View    Result Date: 9/22/2023  Narrative: History: epigastric pain COMPARISON: None  Findings: PA and lateral views of the chest obtained.Heart size is normal. There is no focal consolidation. There are no osseous lesions.     Impression: No acute abnormality.    CT Abdomen Pelvis With Contrast    Result Date: 9/22/2023  Narrative: HISTORY: epigastric pain close to tube insertion site, N/V COMPARISON: 5/25/2023. Automated exposure control was also utilized to decrease patient radiation dose. TECHNIQUE: Following the oral ingestion and intravenous administration of contrast, helical CT tomographic images of the abdomen and pelvis were acquired. Multiplanar reformatted images were provided for review. FINDINGS: The lung bases are clear. There are no osseous lesions. 15 mm low-density lesion within the right hepatic lobe. The right and left left hepatic lobe biliary drains remain in place. The pancreas and adrenal glands are normal. The kidneys are normal. There are no dilated loops of bowel. There is no free air or bowel obstruction. Large amount of stool in the rectum.  Small fat-containing ventral hernia.     Impression: 1. No acute inflammatory process is identified in the abdomen or pelvis.. 2.  New 15 mm low-density lesion in the right hepatic lobe.     US Liver    Result Date: 9/22/2023  Narrative: INDICATION: lesion on CT, elevated liver enzymes. COMPARISON: CT from 9/22/2023. TECHNIQUE: High-resolution gray scale images of the liver, pancreas, gallbladder, common bile duct, and right kidney were obtained. FINDINGS: Common bile duct is nondistended.  The gallbladder is not well visualized.  No obvious cholelithiasis or cholecystitis is seen.  Pancreas is not well seen. The right kidney measures 10.6 cm.  The liver appears to be coarsened and somewhat small.  There is an isoechoic lesion measuring 1.5 x 1.7 x 1.8 cm. There appears to be a hypoechoic halo.     Impression: 1.  Small and coarse liver.  1.8 cm isoechoic lesion within the liver with a hypoechoic halo.  Findings are concerning for  metastatic focus or hepatoma. 2.  Gallbladder is not well visualized.  It appears to be within normal limits.    XR Chest 1 View    Result Date: 8/26/2023  Narrative: INDICATION: Chest Pain triage protocolChest Pain Triage Protocol COMPARISON: None relevant. FINDINGS: AP view of the chest obtained. Heart size is normal. There is no focal consolidation. Osseous structures are intact.     Impression: No acute findings.     IR Vascular Surgery Unlisted Procedure    Result Date: 8/30/2023  Narrative: Placido Grey Jr., MD     8/30/2023  1:49 PM PERCUTANEOUS BILIARY TUBE REPLACEMENT: CLINICAL HISTORY: Routine biliary tube exchange. TECHNIQUE:  After the risks and benefits of the procedure were thoroughly explained to the patient, informed consent was obtained.  The indications, potential risks and complications were discussed with the patient.  Therapeutic and diagnostic objectives were reviewed.  All the patient's questions were answered.  Written and verbal informed consent was obtained.   PROCEDURE:  The patient was placed supine on the fluoroscopic table. A pause for safety was performed.  The skin of the abdomen was prepped in the usual sterile fashion.  Sterile drapes were placed and 1% lidocaine was administered to the skin over the existing biliary tube.  A small amount of contrast was injected.   After this a Kumpe catheter was inserted to the patient's exit site and a Glidewire was advanced through the patient's tract. This was then placed into the duodenum. The patient's existing biliary drain was removed and discarded. 10 [place]ican sheath was advanced over wire and a cholangiogram was performed demonstrating mild stricture at the anastomosis. A 10 mm x 4 cm express balloon was then used to angioplasty the left or right anastomosis. This is then followed by placement of a 22 [place]ambican internal/external Bentec biliary stent which is advanced over a wire with cholangiogram which revealed no dilatation of any  residual stricture. Final position was confirmed by cholangiogram which demonstrates well-positioned drain. This was adherent to the skin with 2-0 silk sutures. The skin was cleaned and a dressing applied. The patient remained comfortable throughout the procedure.  There were no immediate complications.   TOTAL FLUOROSCOPY TIME:  8.3 minutes. KERMA: 1227 mGy DAP: 98072 cGycm2. Medications: MAC anesthesia. 3.375 mg IV Zosyn. BLOOD LOSS:  None. COMPLICATIONS:  None immediately. PHYSICAL STATUS: ASA-3 FINDING: Initial injection the patient revealed that the tube had fallen out of the tract and was not within the biliary system. After advancement of a Kumpe catheter and reaccessing the tract, the tract reveals an area of narrowing of approximately 70% at the junction of the left hepatic biliary system. Angioplasty this is markedly improved and a larger 22 Portuguese internal/external Bentec biliary stent was placed. Placement of the stent with air was free flow of contrast external to the tube.   IMPRESSION:   Successful exchange of a pigtail catheter into the biliary system. PLAN: The stent should be maintained to a closed gravity drain system x 24 hours after which point if she is without fever or abdominal pain she may cap the stent to allow internal drainage. The stent should be placed to close gravity drain in the event of fever, abdominal pain, jaundice or pericatheter leak, at which point she should  return to interventional radiology for evaluation. Otherwise, she will follow up in interventional radiology in 2-3 weeks for additional stricture dilatation per benign biliary stricture protocol. FOLLOW-UP:  Patient will follow up with the clinical serviced and with interventional radiology as warranted.            ED Course  ED Course as of 09/22/23 2051   Fri Sep 22, 2023   2000 Spoke with Dr. العلي. CT scan reviewed by him. Advised this is not related to his pain. He states he will send him in some oxycodone for  pain, I will treat him for UTI, and patient will follow up in a couple of weeks in his office.  [SH]      ED Course User Index  [SH] Bailee Alvarez APRN                                           Medical Decision Making  Patient presents to the ER with complaints of continued epigastric pain that has not changed in presentation since his last ER visit.  Work-up was completed and reviewed with Dr. العلي.  Pain medication was sent in by Dr. العلي.  Patient treated for UTI and hypokalemia while in the ER.  Advised to follow-up back with Dr. العلي regarding his CT results.  Patient to follow-up with primary care provider for follow-up on UTI and hypokalemia.  Patient to return back to the ER if symptoms worsen or change in presentation.    Problems Addressed:  Epigastric pain: complicated acute illness or injury  Hypokalemia: complicated acute illness or injury  Urinary tract infection in male: complicated acute illness or injury    Amount and/or Complexity of Data Reviewed  Labs: ordered.  Radiology: ordered.  ECG/medicine tests: ordered.    Risk  Prescription drug management.        Final diagnoses:   Epigastric pain   Urinary tract infection in male   Hypokalemia       ED Disposition  ED Disposition       ED Disposition   Discharge    Condition   Stable    Comment   --               Ivonne Davies MD  [address] [place] [address] [address] [place] [address] [phone]    Schedule an appointment as soon as possible for a visit   call Monday for appointment.         Medication List        New Prescriptions      cephalexin 500 MG capsule  Commonly known as: KEFLEX  Take 1 capsule by mouth 2 (Two) Times a Day for 5 days.     potassium chloride 20 MEQ CR tablet  Commonly known as: K-DUR,KLOR-CON  Take 1 tablet by mouth 2 (Two) Times a Day for 4 days.               Where to Get Your Medications        These medications were sent to [facility] - [place], [place] - [address] [place] [address] [phone] PH -  [phone]   [address] [facility] [address]      Phone: [phone]   cephalexin 500 MG capsule  oxyCODONE 5 MG capsule  potassium chloride 20 MEQ CR tablet            Bailee Alvarez, APRN  09/22/23 2057

## 2023-09-23 NOTE — DISCHARGE INSTRUCTIONS
Fill your prescriptions and take as directed. Follow up with Dr. العلي in a couple of weeks, call his office on Monday to schedule this appointment. Return back to the ER if your symptoms worsen or change in presentation.

## 2023-09-24 LAB — BACTERIA SPEC AEROBE CULT: NORMAL

## 2023-09-25 ENCOUNTER — TELEPHONE (OUTPATIENT)
Dept: ONCOLOGY | Facility: CLINIC | Age: 68
End: 2023-09-25

## 2023-09-25 NOTE — TELEPHONE ENCOUNTER
It looks like Severiano reviewed his CT scan and spoke to the ER provider; he can wait and see Severiano when he is back

## 2023-09-25 NOTE — TELEPHONE ENCOUNTER
Reason for call: Patient called - stated he went to ED on 9/22/23 - was told to schedule appointment with Dr العلي in 2 weeks     The reason is related to: Scheduling appointment     Best phone number to return call: [phone]

## 2023-09-26 DIAGNOSIS — C24.9 MALIGNANT NEOPLASM OF BILIARY TRACT, UNSPECIFIED: Primary | ICD-10-CM

## 2023-09-26 NOTE — TELEPHONE ENCOUNTER
Ok I have put in order for CT chest with contrast to be done before his next appt here with Severiano

## 2025-01-10 NOTE — ANESTHESIA POSTPROCEDURE EVALUATION
Patient: Jian Baekr    Procedure Summary     Date: 11/22/22 Room / Location: [facility] OR 08 / [facility] OR    Anesthesia Start: 1647 Anesthesia Stop: 1731    Procedure: ENDOSCOPIC RETROGRADE CHOLANGIOPANCREATOGRAPHY Diagnosis:       Obstructive jaundice      (Obstructive jaundice [K83.1])    Surgeons: Nabila Worthy MD Provider: Carl Britton CRNA    Anesthesia Type: general ASA Status: 3          Anesthesia Type: general    Vitals  Vitals Value Taken Time   /80 11/22/22 1727   Temp 98 °F (36.7 °C) 11/22/22 1727   Pulse 83 11/22/22 1727   Resp 18 11/22/22 1727   SpO2 97 % 11/22/22 1727           Post Anesthesia Care and Evaluation    Patient location during evaluation: ICU  Patient participation: complete - patient participated  Level of consciousness: awake and awake and alert  Pain score: 0  Pain management: adequate    Airway patency: patent  Anesthetic complications: No anesthetic complications  PONV Status: none  Cardiovascular status: acceptable and stable  Respiratory status: acceptable, room air and spontaneous ventilation  Hydration status: acceptable    Comments: BP:    HR:    SAT:    RR:    TEMP:           
For information on Fall & Injury Prevention, visit: [email or web address] OR  [email or web address] OR  https://www.cdc.gov/steadi/patient.html
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
## (undated) DEVICE — STERILE POLYISOPRENE POWDER-FREE SURGICAL GLOVES WITH EMOLLIENT COATING: Brand: PROTEXIS

## (undated) DEVICE — ADHS SKIN PREMIERPRO EXOFIN TOPICAL HI/VISC .5ML

## (undated) DEVICE — NDL HYPO ECLPS SFTY 25G 1 1/2IN

## (undated) DEVICE — GLV SURG SIGNATURE ESSENTIAL PF LTX SZ7.5

## (undated) DEVICE — SUT PROLN 3/0 SH D/A 36IN 8522H

## (undated) DEVICE — ST CVR PROB PULLUP ULTRASND 5X48IN

## (undated) DEVICE — SUT MONOCRYL 4/0 PS2 27IN Y426H ETY426H

## (undated) DEVICE — BITE BLOCKS

## (undated) DEVICE — INTENDED FOR TISSUE SEPARATION, AND OTHER PROCEDURES THAT REQUIRE A SHARP SURGICAL BLADE TO PUNCTURE OR CUT.: Brand: BARD-PARKER ® CARBON RIB-BACK BLADES

## (undated) DEVICE — CVR SURG EQUIP BND RECTG 36X28

## (undated) DEVICE — PENCL ES MEGADINE EZ/CLEAN BUTN W/HOLSTR 10FT

## (undated) DEVICE — SUT VIC 3/0 RB1 27IN J215H

## (undated) DEVICE — GLV SURG SENSICARE PI ORTHO SZ6.5 LF STRL

## (undated) DEVICE — PK MAJ PROC LF 60